# Patient Record
Sex: MALE | Race: WHITE | NOT HISPANIC OR LATINO | Employment: FULL TIME | URBAN - METROPOLITAN AREA
[De-identification: names, ages, dates, MRNs, and addresses within clinical notes are randomized per-mention and may not be internally consistent; named-entity substitution may affect disease eponyms.]

---

## 2023-08-09 LAB
ALANINE AMINOTRANSFERASE (SGPT) (U/L) IN SER/PLAS: 47 U/L (ref 10–52)
ALBUMIN (G/DL) IN SER/PLAS: 4.4 G/DL (ref 3.4–5)
ALKALINE PHOSPHATASE (U/L) IN SER/PLAS: 107 U/L (ref 33–136)
ANION GAP IN SER/PLAS: 11 MMOL/L (ref 10–20)
ASPARTATE AMINOTRANSFERASE (SGOT) (U/L) IN SER/PLAS: 24 U/L (ref 9–39)
BASOPHILS (10*3/UL) IN BLOOD BY AUTOMATED COUNT: 0.05 X10E9/L (ref 0–0.1)
BASOPHILS/100 LEUKOCYTES IN BLOOD BY AUTOMATED COUNT: 0.5 % (ref 0–2)
BILIRUBIN TOTAL (MG/DL) IN SER/PLAS: 0.8 MG/DL (ref 0–1.2)
CALCIDIOL (25 OH VITAMIN D3) (NG/ML) IN SER/PLAS: 50 NG/ML
CALCIUM (MG/DL) IN SER/PLAS: 9.2 MG/DL (ref 8.6–10.3)
CARBON DIOXIDE, TOTAL (MMOL/L) IN SER/PLAS: 29 MMOL/L (ref 21–32)
CHLORIDE (MMOL/L) IN SER/PLAS: 106 MMOL/L (ref 98–107)
CHOLESTEROL (MG/DL) IN SER/PLAS: 110 MG/DL (ref 0–199)
CHOLESTEROL IN HDL (MG/DL) IN SER/PLAS: 36.4 MG/DL
CHOLESTEROL IN LDL (MG/DL) IN SER/PLAS BY DIRECT ASSAY: 71 MG/DL (ref 0–129)
CHOLESTEROL/HDL RATIO: 3
CREATININE (MG/DL) IN SER/PLAS: 0.91 MG/DL (ref 0.5–1.3)
EOSINOPHILS (10*3/UL) IN BLOOD BY AUTOMATED COUNT: 0.46 X10E9/L (ref 0–0.7)
EOSINOPHILS/100 LEUKOCYTES IN BLOOD BY AUTOMATED COUNT: 4.4 % (ref 0–6)
ERYTHROCYTE DISTRIBUTION WIDTH (RATIO) BY AUTOMATED COUNT: 13.6 % (ref 11.5–14.5)
ERYTHROCYTE MEAN CORPUSCULAR HEMOGLOBIN CONCENTRATION (G/DL) BY AUTOMATED: 33 G/DL (ref 32–36)
ERYTHROCYTE MEAN CORPUSCULAR VOLUME (FL) BY AUTOMATED COUNT: 85 FL (ref 80–100)
ERYTHROCYTES (10*6/UL) IN BLOOD BY AUTOMATED COUNT: 5.38 X10E12/L (ref 4.5–5.9)
ESTIMATED AVERAGE GLUCOSE FOR HBA1C: 143 MG/DL
GFR MALE: >90 ML/MIN/1.73M2
GLUCOSE (MG/DL) IN SER/PLAS: 123 MG/DL (ref 74–99)
HEMATOCRIT (%) IN BLOOD BY AUTOMATED COUNT: 45.7 % (ref 41–52)
HEMOGLOBIN (G/DL) IN BLOOD: 15.1 G/DL (ref 13.5–17.5)
HEMOGLOBIN A1C/HEMOGLOBIN TOTAL IN BLOOD: 6.6 %
IMMATURE GRANULOCYTES/100 LEUKOCYTES IN BLOOD BY AUTOMATED COUNT: 0.3 % (ref 0–0.9)
LDL: 55 MG/DL (ref 0–99)
LEUKOCYTES (10*3/UL) IN BLOOD BY AUTOMATED COUNT: 10.4 X10E9/L (ref 4.4–11.3)
LYMPHOCYTES (10*3/UL) IN BLOOD BY AUTOMATED COUNT: 5.64 X10E9/L (ref 1.2–4.8)
LYMPHOCYTES/100 LEUKOCYTES IN BLOOD BY AUTOMATED COUNT: 54.1 % (ref 13–44)
MONOCYTES (10*3/UL) IN BLOOD BY AUTOMATED COUNT: 0.73 X10E9/L (ref 0.1–1)
MONOCYTES/100 LEUKOCYTES IN BLOOD BY AUTOMATED COUNT: 7 % (ref 2–10)
NEUTROPHILS (10*3/UL) IN BLOOD BY AUTOMATED COUNT: 3.52 X10E9/L (ref 1.2–7.7)
NEUTROPHILS/100 LEUKOCYTES IN BLOOD BY AUTOMATED COUNT: 33.7 % (ref 40–80)
PLATELETS (10*3/UL) IN BLOOD AUTOMATED COUNT: 107 X10E9/L (ref 150–450)
POTASSIUM (MMOL/L) IN SER/PLAS: 4.2 MMOL/L (ref 3.5–5.3)
PROTEIN TOTAL: 6.5 G/DL (ref 6.4–8.2)
RBC MORPHOLOGY IN BLOOD: NORMAL
SODIUM (MMOL/L) IN SER/PLAS: 142 MMOL/L (ref 136–145)
TRIGLYCERIDE (MG/DL) IN SER/PLAS: 91 MG/DL (ref 0–149)
UREA NITROGEN (MG/DL) IN SER/PLAS: 14 MG/DL (ref 6–23)
VLDL: 18 MG/DL (ref 0–40)

## 2023-08-16 PROBLEM — C91.10 CLL (CHRONIC LYMPHOCYTIC LEUKEMIA) (MULTI): Status: ACTIVE | Noted: 2023-08-16

## 2023-08-16 PROBLEM — B00.9 HERPES SIMPLEX: Status: RESOLVED | Noted: 2023-08-16 | Resolved: 2023-08-16

## 2023-08-16 PROBLEM — H90.3 SENSORINEURAL HEARING LOSS (SNHL) OF BOTH EARS: Status: ACTIVE | Noted: 2023-08-16

## 2023-08-16 PROBLEM — G47.33 OSA ON CPAP: Status: ACTIVE | Noted: 2023-08-16

## 2023-08-16 PROBLEM — E55.9 VITAMIN D DEFICIENCY: Status: ACTIVE | Noted: 2023-08-16

## 2023-08-16 PROBLEM — D69.6 THROMBOCYTOPENIA (CMS-HCC): Status: ACTIVE | Noted: 2023-08-16

## 2023-08-16 PROBLEM — M62.08 DIASTASIS RECTI: Status: ACTIVE | Noted: 2023-08-16

## 2023-08-16 PROBLEM — I25.10 CAD (CORONARY ARTERY DISEASE): Status: ACTIVE | Noted: 2023-08-16

## 2023-08-16 PROBLEM — E78.2 MIXED HYPERLIPIDEMIA: Status: ACTIVE | Noted: 2023-08-16

## 2023-08-16 PROBLEM — R73.01 IMPAIRED FASTING GLUCOSE: Status: ACTIVE | Noted: 2023-08-16

## 2023-08-16 PROBLEM — H93.13 TINNITUS OF BOTH EARS: Status: ACTIVE | Noted: 2023-08-16

## 2023-08-16 PROBLEM — I10 ESSENTIAL HYPERTENSION: Status: ACTIVE | Noted: 2023-08-16

## 2023-08-16 PROBLEM — Z95.1 S/P CABG X 3: Status: ACTIVE | Noted: 2023-08-16

## 2023-08-16 PROBLEM — I48.0 PAROXYSMAL ATRIAL FIBRILLATION (MULTI): Status: ACTIVE | Noted: 2023-08-16

## 2023-08-16 PROBLEM — K42.9 UMBILICAL HERNIA WITHOUT OBSTRUCTION AND WITHOUT GANGRENE: Status: ACTIVE | Noted: 2023-08-16

## 2023-08-16 PROBLEM — H04.123 DRY EYES, BILATERAL: Status: ACTIVE | Noted: 2023-08-16

## 2023-08-16 RX ORDER — ERGOCALCIFEROL 1.25 MG/1
1 CAPSULE ORAL
COMMUNITY
Start: 2019-12-13 | End: 2024-02-19 | Stop reason: ALTCHOICE

## 2023-08-16 RX ORDER — METOPROLOL TARTRATE 25 MG/1
1 TABLET, FILM COATED ORAL 2 TIMES DAILY
COMMUNITY
Start: 2019-11-25 | End: 2023-11-25

## 2023-08-16 RX ORDER — MULTIVITAMIN
1 TABLET ORAL DAILY
Status: ON HOLD | COMMUNITY
Start: 2019-12-09

## 2023-08-16 RX ORDER — ATORVASTATIN CALCIUM 20 MG/1
1 TABLET, FILM COATED ORAL NIGHTLY
COMMUNITY
Start: 2019-10-24 | End: 2023-12-11

## 2023-08-16 RX ORDER — PANTOPRAZOLE SODIUM 40 MG/1
1 TABLET, DELAYED RELEASE ORAL DAILY
COMMUNITY
Start: 2019-11-25 | End: 2024-03-18 | Stop reason: SDUPTHER

## 2023-08-16 RX ORDER — AMLODIPINE BESYLATE 5 MG/1
1 TABLET ORAL DAILY
COMMUNITY
Start: 2020-04-30 | End: 2023-12-11

## 2023-08-16 RX ORDER — NITROGLYCERIN 0.4 MG/1
0.4 TABLET SUBLINGUAL EVERY 5 MIN PRN
Status: ON HOLD | COMMUNITY
Start: 2019-10-24

## 2023-08-16 RX ORDER — ASPIRIN 81 MG/1
81 TABLET ORAL DAILY
Status: ON HOLD | COMMUNITY
Start: 2019-12-09

## 2023-08-20 PROBLEM — E11.9 CONTROLLED TYPE 2 DIABETES MELLITUS WITHOUT COMPLICATION, WITHOUT LONG-TERM CURRENT USE OF INSULIN (MULTI): Status: ACTIVE | Noted: 2023-08-16

## 2023-08-20 PROBLEM — Z00.00 ANNUAL PHYSICAL EXAM: Status: ACTIVE | Noted: 2023-08-20

## 2023-08-20 NOTE — PATIENT INSTRUCTIONS
Continue current medication.  Continue work on diet - recommend lots of fruits and vegetables, lean protein like chicken, turkey, fish, beans and Greek yogurt. Try to choose healthier carbohydrate options like oatmeal, wheat bread and pasta, sweet potatoes. Limit sugary treats.  Check a fasting sugar first thing in the AM twice weekly and keep a log of the results to bring to your next office visit.  Please contact office if your sugars are consistently >140.  Reevaluate in 4 months    Recommend healthy diet based around fruits, vegetables, and lean proteins such as chicken, turkey, fish, and beans.  Also include moderate portions of healthy carbohydrates such as wheat bread and pasta, sweet potatoes. Limit sweets and alcoholic beverages. Try not drink more than 100 calories in beverages daily.   It is important to get a protein-rich breakfast daily such as oatmeal, eggs or Greek yogurt.  Increase activity as able to a recommended goal of at least 30 minutes of cardiovascular exercise (walking, swimming, biking, jogging etc.) at least 5 days weekly and a goal of 45 minutes or more most days of the week for weight loss. This exercise can be done all at one time or broken up into 2 or more sessions throughout the day.  .

## 2023-08-20 NOTE — ASSESSMENT & PLAN NOTE
Yearly physical done.  PSA ordered  Hep C ordered  Prevnar 20 given - Risks, benefits and side effects reviewed with patient.   Shingrix, Boostrix recommended   colonoscopy 9/14  nonsmoker

## 2023-08-20 NOTE — ASSESSMENT & PLAN NOTE
New diagnosis reviewed with patient  Check sugars in AM 2x weekly - call if consistently >140  Work on diet reviewed with patient.   Reevaluate in 4 months.

## 2023-08-20 NOTE — PROGRESS NOTES
Subjective :  Chief Complaint: Demar Pittman is an 63 y.o. male here for an annual physical.    Earlier today had about 1 1/2 hour episode substernal chest pain associated with some mild diaphoresis. No shortness of breath, pain, nausea. Was at working working at desk. Resolved spontaneously - no current symptoms.    Patient otherwise feels well. No other complaints or concerns.    I have reviewed and reconciled the medication list with the patient today.    Current Outpatient Medications:     amLODIPine (Norvasc) 5 mg tablet, Take 1 tablet (5 mg) by mouth once daily., Disp: , Rfl:     aspirin 81 mg EC tablet, Take 1 tablet (81 mg) by mouth once daily., Disp: , Rfl:     atorvastatin (Lipitor) 20 mg tablet, Take 1 tablet (20 mg) by mouth once daily at bedtime., Disp: , Rfl:     ergocalciferol (Vitamin D-2) 1.25 MG (47444 UT) capsule, Take 1 capsule (1,250 mcg) by mouth every 14 (fourteen) days., Disp: , Rfl:     metoprolol tartrate (Lopressor) 25 mg tablet, Take 1 tablet (25 mg) by mouth 2 times a day., Disp: , Rfl:     multivitamin with folic acid (One Daily Multivitamin) 400 mcg tablet, Take 1 tablet by mouth once daily., Disp: , Rfl:     nitroglycerin (Nitrostat) 0.4 mg SL tablet, Place 1 tablet (0.4 mg) under the tongue every 5 minutes if needed., Disp: , Rfl:     pantoprazole (ProtoNix) 40 mg EC tablet, Take 1 tablet (40 mg) by mouth once daily., Disp: , Rfl:     The patient's relevant past medical, surgical, family and social history was reviewed in [facility].  All pertinent lab work and results for this visit were reviewed with patient.    Recent Results (from the past 1008 hour(s))   Hemoglobin A1C    Collection Time: 08/09/23  9:33 AM   Result Value Ref Range    Hemoglobin A1C 6.6 (A) %    Estimated Average Glucose 143 MG/DL   Lipid Panel    Collection Time: 08/09/23  9:33 AM   Result Value Ref Range    Cholesterol 110 0 - 199 mg/dL    HDL 36.4 (A) mg/dL    Cholesterol/HDL Ratio 3.0     LDL 55 0 - 99 mg/dL     VLDL 18 0 - 40 mg/dL    Triglycerides 91 0 - 149 mg/dL   Comprehensive Metabolic Panel    Collection Time: 08/09/23  9:33 AM   Result Value Ref Range    Glucose 123 (H) 74 - 99 mg/dL    Sodium 142 136 - 145 mmol/L    Potassium 4.2 3.5 - 5.3 mmol/L    Chloride 106 98 - 107 mmol/L    Bicarbonate 29 21 - 32 mmol/L    Anion Gap 11 10 - 20 mmol/L    Urea Nitrogen 14 6 - 23 mg/dL    Creatinine 0.91 0.50 - 1.30 mg/dL    GFR MALE >90 >90 mL/min/1.73m2    Calcium 9.2 8.6 - 10.3 mg/dL    Albumin 4.4 3.4 - 5.0 g/dL    Alkaline Phosphatase 107 33 - 136 U/L    Total Protein 6.5 6.4 - 8.2 g/dL    AST 24 9 - 39 U/L    Total Bilirubin 0.8 0.0 - 1.2 mg/dL    ALT (SGPT) 47 10 - 52 U/L   CBC and Auto Differential    Collection Time: 08/09/23  9:33 AM   Result Value Ref Range    WBC 10.4 4.4 - 11.3 x10E9/L    RBC 5.38 4.50 - 5.90 x10E12/L    Hemoglobin 15.1 13.5 - 17.5 g/dL    Hematocrit 45.7 41.0 - 52.0 %    MCV 85 80 - 100 fL    MCHC 33.0 32.0 - 36.0 g/dL    Platelets 107 (L) 150 - 450 x10E9/L    RDW 13.6 11.5 - 14.5 %    Neutrophils % 33.7 40.0 - 80.0 %    Immature Granulocytes %, Automated 0.3 0.0 - 0.9 %    Lymphocytes % 54.1 13.0 - 44.0 %    Monocytes % 7.0 2.0 - 10.0 %    Eosinophils % 4.4 0.0 - 6.0 %    Basophils % 0.5 0.0 - 2.0 %    Neutrophils Absolute 3.52 1.20 - 7.70 x10E9/L    Lymphocytes Absolute 5.64 (H) 1.20 - 4.80 x10E9/L    Monocytes Absolute 0.73 0.10 - 1.00 x10E9/L    Eosinophils Absolute 0.46 0.00 - 0.70 x10E9/L    Basophils Absolute 0.05 0.00 - 0.10 x10E9/L   Vitamin D, Total    Collection Time: 08/09/23  9:33 AM   Result Value Ref Range    Vitamin D, 25-Hydroxy 50 ng/mL   Cholesterol, LDL Direct    Collection Time: 08/09/23  9:33 AM   Result Value Ref Range    LDL Direct 71 0 - 129 mg/dL   Morphology    Collection Time: 08/09/23  9:33 AM   Result Value Ref Range    RBC Morphology SEE COMMENT    CBC and Auto Differential    Collection Time: 08/17/23  3:00 PM   Result Value Ref Range    WBC CANCELED     nRBC  "CANCELED     RBC CANCELED     Hemoglobin CANCELED     Hematocrit CANCELED     MCV CANCELED     MCHC CANCELED     Platelets CANCELED     RDW CANCELED     Neutrophils % CANCELED     Immature Granulocytes %, Automated CANCELED     Lymphocytes % CANCELED     Monocytes % CANCELED     Eosinophils % CANCELED     Basophils % CANCELED     Neutrophils Absolute CANCELED     Lymphocytes Absolute CANCELED     Monocytes Absolute CANCELED     Eosinophils Absolute CANCELED     Basophils Absolute CANCELED     MANUAL DIFFERENTIAL Y/N CANCELED          Review of Systems   A complete review of systems was performed and all systems were normal except what is noted in the HPI.      List of current healthcare providers:  Patient Care Team:  Deedee Mcclendon MD as PCP - General  Deedee Mcclendon MD as PCP - [facility] PCP  Donny Fox MD as Consulting Physician (Cardiology)  Mandy Wang MD as Consulting Physician (Hematology and Oncology)  Isabela Pineda MD as Surgeon (General Surgery)  Henri Mccormick MD as Surgeon (Otolaryngology)        Over the past 2 weeks, how often have you been bothered by any of the following problems?  Little interest or pleasure in doing things: Not at all  Feeling down, depressed, or hopeless: Not at all  Patient Health Questionnaire-2 Score: 0             Advance Care Planning:    Living Will: No  POA: No    Objective :  /84   Pulse 76   Temp 36.3 °C (97.4 °F)   Ht 1.702 m (5' 7\")   Wt 93.7 kg (206 lb 9.6 oz)   SpO2 95%   BMI 32.36 kg/m²    No results found.  Physical Exam  Constitutional:       Appearance: Normal appearance. He is obese.   HENT:      Head: Normocephalic and atraumatic.   Neck:      Vascular: No carotid bruit.   Cardiovascular:      Rate and Rhythm: Normal rate and regular rhythm.      Heart sounds: Normal heart sounds.   Pulmonary:      Effort: Pulmonary effort is normal.      Breath sounds: Normal breath sounds. No wheezing, rhonchi or rales.   Abdominal:      " General: Abdomen is flat. Bowel sounds are normal.      Palpations: Abdomen is soft.      Tenderness: There is no abdominal tenderness. There is no guarding.   Musculoskeletal:         General: Normal range of motion.      Right lower leg: No edema.      Left lower leg: No edema.   Skin:     General: Skin is dry.   Neurological:      General: No focal deficit present.      Mental Status: He is alert and oriented to person, place, and time.   Psychiatric:         Mood and Affect: Mood normal.         Behavior: Behavior normal.         Thought Content: Thought content normal.         Assessment/Plan :  Problem List Items Addressed This Visit       CLL (chronic lymphocytic leukemia) (CMS/HCC)     Recently saw oncology - off medication at this time  Has follow up 4 months         Essential hypertension     Well controlled continue current medication. Reevaluate in 4 months.            Relevant Orders    Comprehensive Metabolic Panel    Controlled type 2 diabetes mellitus without complication, without long-term current use of insulin (CMS/HCC)     New diagnosis reviewed with patient  Check sugars in AM 2x weekly - call if consistently >140  Work on diet reviewed with patient.   Reevaluate in 4 months.           Relevant Orders    Comprehensive Metabolic Panel    Albumin , Urine Random    Hemoglobin A1C    Vitamin B12    Mixed hyperlipidemia     Well controlled continue current medication. Reevaluate in 4 months.            Relevant Orders    Cholesterol, LDL Direct    Lipid Panel    XENA on CPAP     Patient is compliant with and benefiting from CPAP - will continue use.           Paroxysmal atrial fibrillation (CMS/HCC)     Rate and rhythm controlled.  Saw cardiology 6/23 - no changes has 1 year follow up          Relevant Medications    amLODIPine (Norvasc) 5 mg tablet    metoprolol tartrate (Lopressor) 25 mg tablet    nitroglycerin (Nitrostat) 0.4 mg SL tablet    Thrombocytopenia (CMS/HCC)     Stable  Continue to  monitor - recheck 4 months         Relevant Medications    aspirin 81 mg EC tablet    Other Relevant Orders    CBC and Auto Differential    Vitamin D deficiency     Well controlled continue current medication. Reevaluate in 4 months.            Relevant Orders    Vitamin D 1,25 Dihydroxy    Annual physical exam - Primary     Yearly physical done.  PSA ordered  Hep C ordered  Prevnar 20 given - Risks, benefits and side effects reviewed with patient.   Shingrix, Boostrix recommended   colonoscopy 9/14  nonsmoker         Chest pressure     New ST elevation lead III  Discussed with Dr. Fox   ASA 81 mg x 3 given to patient at 1535  Will go directly to ER for further evaluation         Relevant Orders    ECG 12 lead (Completed)     Other Visit Diagnoses       Encounter for hepatitis C screening test for low risk patient        Relevant Orders    Hepatitis C Antibody    Screening PSA (prostate specific antigen)        Relevant Orders    Prostate Specific Antigen               The following health maintenance schedule was reviewed with the patient and provided in printed form in the after visit summary:  Health Maintenance   Topic Date Due    Yearly Adult Physical  Never done    Medicare Initial Physical (IPPE)  Never done    MMR Vaccines (1 of 1 - Standard series) Never done    Diabetes: Foot Exam  Never done    Diabetes: Retinopathy Screening  Never done    Hepatitis C Screening  Never done    Zoster Vaccines (1 of 2) Never done    DTaP/Tdap/Td Vaccines (1 - Tdap) Never done    COVID-19 Vaccine (2 - Pfizer risk series) 03/12/2022    Abdominal Aortic Aneurysm (AAA) Screening  Never done    Diabetes: Urine Protein Screening  07/30/2023    Influenza Vaccine (1) 09/01/2023    Diabetes: Hemoglobin A1C  11/09/2023    Lipid Panel  08/09/2024    Colorectal Cancer Screening  09/12/2024    Pneumococcal Vaccine: 65+ Years  Completed    HIB Vaccines  Aged Out    Hepatitis B Vaccines  Aged Out    IPV Vaccines  Aged Out    Hepatitis  A Vaccines  Aged Out    Meningococcal Vaccine  Aged Out    Rotavirus Vaccines  Aged Out    HPV Vaccines  Aged Out    Irritable Bowel Syndrome  Discontinued           Patient understands and agrees with treatment plan.          Deedee Mcclendon MD

## 2023-08-21 ENCOUNTER — OFFICE VISIT (OUTPATIENT)
Dept: PRIMARY CARE | Facility: CLINIC | Age: 63
End: 2023-08-21
Payer: COMMERCIAL

## 2023-08-21 VITALS
HEART RATE: 76 BPM | WEIGHT: 206.6 LBS | HEIGHT: 67 IN | SYSTOLIC BLOOD PRESSURE: 126 MMHG | TEMPERATURE: 97.4 F | DIASTOLIC BLOOD PRESSURE: 84 MMHG | OXYGEN SATURATION: 95 % | BODY MASS INDEX: 32.43 KG/M2

## 2023-08-21 DIAGNOSIS — E55.9 VITAMIN D DEFICIENCY: ICD-10-CM

## 2023-08-21 DIAGNOSIS — D69.6 THROMBOCYTOPENIA (CMS-HCC): ICD-10-CM

## 2023-08-21 DIAGNOSIS — Z00.00 ANNUAL PHYSICAL EXAM: Primary | ICD-10-CM

## 2023-08-21 DIAGNOSIS — Z11.59 ENCOUNTER FOR HEPATITIS C SCREENING TEST FOR LOW RISK PATIENT: ICD-10-CM

## 2023-08-21 DIAGNOSIS — E11.9 CONTROLLED TYPE 2 DIABETES MELLITUS WITHOUT COMPLICATION, WITHOUT LONG-TERM CURRENT USE OF INSULIN (MULTI): ICD-10-CM

## 2023-08-21 DIAGNOSIS — I48.0 PAROXYSMAL ATRIAL FIBRILLATION (MULTI): ICD-10-CM

## 2023-08-21 DIAGNOSIS — C91.10 CLL (CHRONIC LYMPHOCYTIC LEUKEMIA) (MULTI): ICD-10-CM

## 2023-08-21 DIAGNOSIS — G47.33 OSA ON CPAP: ICD-10-CM

## 2023-08-21 DIAGNOSIS — E78.2 MIXED HYPERLIPIDEMIA: ICD-10-CM

## 2023-08-21 DIAGNOSIS — I10 ESSENTIAL HYPERTENSION: ICD-10-CM

## 2023-08-21 DIAGNOSIS — Z12.5 SCREENING PSA (PROSTATE SPECIFIC ANTIGEN): ICD-10-CM

## 2023-08-21 DIAGNOSIS — R07.89 CHEST PRESSURE: ICD-10-CM

## 2023-08-21 PROCEDURE — 1160F RVW MEDS BY RX/DR IN RCRD: CPT | Performed by: FAMILY MEDICINE

## 2023-08-21 PROCEDURE — 3079F DIAST BP 80-89 MM HG: CPT | Performed by: FAMILY MEDICINE

## 2023-08-21 PROCEDURE — 90677 PCV20 VACCINE IM: CPT | Performed by: FAMILY MEDICINE

## 2023-08-21 PROCEDURE — 93000 ELECTROCARDIOGRAM COMPLETE: CPT | Performed by: FAMILY MEDICINE

## 2023-08-21 PROCEDURE — 3044F HG A1C LEVEL LT 7.0%: CPT | Performed by: FAMILY MEDICINE

## 2023-08-21 PROCEDURE — 3074F SYST BP LT 130 MM HG: CPT | Performed by: FAMILY MEDICINE

## 2023-08-21 PROCEDURE — 99397 PER PM REEVAL EST PAT 65+ YR: CPT | Performed by: FAMILY MEDICINE

## 2023-08-21 PROCEDURE — 1159F MED LIST DOCD IN RCRD: CPT | Performed by: FAMILY MEDICINE

## 2023-08-21 PROCEDURE — 99215 OFFICE O/P EST HI 40 MIN: CPT | Performed by: FAMILY MEDICINE

## 2023-08-21 PROCEDURE — 1036F TOBACCO NON-USER: CPT | Performed by: FAMILY MEDICINE

## 2023-08-21 PROCEDURE — 90471 IMMUNIZATION ADMIN: CPT | Performed by: FAMILY MEDICINE

## 2023-08-21 RX ORDER — BLOOD SUGAR DIAGNOSTIC
2 STRIP MISCELLANEOUS 2 TIMES WEEKLY
Qty: 200 STRIP | Refills: 11 | Status: SHIPPED | OUTPATIENT
Start: 2023-08-21 | End: 2024-02-19 | Stop reason: WASHOUT

## 2023-08-21 RX ORDER — LANCETS 28 GAUGE
1 EACH MISCELLANEOUS 2 TIMES WEEKLY
Qty: 100 EACH | Refills: 11 | Status: SHIPPED | OUTPATIENT
Start: 2023-08-21 | End: 2024-02-19 | Stop reason: WASHOUT

## 2023-08-21 RX ORDER — BLOOD SUGAR DIAGNOSTIC
STRIP MISCELLANEOUS 2 TIMES WEEKLY
COMMUNITY
End: 2023-08-21 | Stop reason: SDUPTHER

## 2023-08-21 RX ORDER — INSULIN PUMP SYRINGE, 3 ML
1 EACH MISCELLANEOUS 2 TIMES WEEKLY
Qty: 1 EACH | Refills: 0 | Status: SHIPPED | OUTPATIENT
Start: 2023-08-21 | End: 2024-02-12 | Stop reason: WASHOUT

## 2023-08-21 RX ORDER — INSULIN PUMP SYRINGE, 3 ML
1 EACH MISCELLANEOUS 2 TIMES WEEKLY
COMMUNITY
End: 2023-08-21 | Stop reason: SDUPTHER

## 2023-08-21 RX ORDER — LANCETS 28 GAUGE
1 EACH MISCELLANEOUS 2 TIMES WEEKLY
COMMUNITY
End: 2023-08-21 | Stop reason: SDUPTHER

## 2023-08-21 ASSESSMENT — PATIENT HEALTH QUESTIONNAIRE - PHQ9
SUM OF ALL RESPONSES TO PHQ9 QUESTIONS 1 AND 2: 0
1. LITTLE INTEREST OR PLEASURE IN DOING THINGS: NOT AT ALL
2. FEELING DOWN, DEPRESSED OR HOPELESS: NOT AT ALL

## 2023-08-21 ASSESSMENT — ENCOUNTER SYMPTOMS
DEPRESSION: 0
OCCASIONAL FEELINGS OF UNSTEADINESS: 0
LOSS OF SENSATION IN FEET: 0

## 2023-08-21 NOTE — ASSESSMENT & PLAN NOTE
New ST elevation lead III  Discussed with Dr. Fox   ASA 81 mg x 3 given to patient at 1535  Will go directly to ER for further evaluation

## 2023-08-23 ENCOUNTER — PATIENT OUTREACH (OUTPATIENT)
Dept: CARE COORDINATION | Facility: CLINIC | Age: 63
End: 2023-08-23
Payer: COMMERCIAL

## 2023-08-23 ENCOUNTER — DOCUMENTATION (OUTPATIENT)
Dept: CARE COORDINATION | Facility: CLINIC | Age: 63
End: 2023-08-23
Payer: COMMERCIAL

## 2023-08-23 ENCOUNTER — TELEPHONE (OUTPATIENT)
Dept: PRIMARY CARE | Facility: CLINIC | Age: 63
End: 2023-08-23
Payer: COMMERCIAL

## 2023-08-23 NOTE — PROGRESS NOTES
The number listed for this patient is not correct. I had to check allscripts and call his cell phone. Spoke with patient he said he call Dr. Fox office to try to schedule a appointment because he needed a return back to work form. Patient stated that they told him that the form was waitng at the  so he assumes that they didn't want to see him because they didn't give him an appointment. He stated which he didn't understand that but oh well is what he said.

## 2023-08-23 NOTE — PROGRESS NOTES
Discharge Facility:[facility]  Discharge Diagnosis:Myocardial scar on stress test. Chest pain   Admission Date:08/21/23  Discharge Date: 08/22/23    PCP Appointment Date:08/25/23  Specialist Appointment Date:   Hospital Encounter and Summary: Linked   See discharge assessment below for further details  Engagement  Call Start Time: 0852 (8/23/2023  8:52 AM)    Medications  Medications reviewed with patient/caregiver?: Yes (no new meds) (8/23/2023  8:52 AM)  Is the patient having any side effects they believe may be caused by any medication additions or changes?: No (8/23/2023  8:52 AM)  Does the patient have all medications ordered at discharge?: Not applicable (8/23/2023  8:52 AM)  Is the patient taking all medications as directed (includes completed medication regime)?: Yes (8/23/2023  8:52 AM)    Appointments  Does the patient have a primary care provider?: Yes (8/23/2023  8:52 AM)    Self Management  Has home health visited the patient within 72 hours of discharge?: Not applicable (8/23/2023  8:52 AM)  Has all Durable Medical Equipment (DME) been delivered?: No (8/23/2023  8:52 AM)    Patient Teaching  Does the patient have access to their discharge instructions?: Yes (8/23/2023  8:52 AM)  Care Management Interventions: Reviewed instructions with patient (8/23/2023  8:52 AM)  What is the patient's perception of their health status since discharge?: Improving (8/23/2023  8:52 AM)    Wrap Up  Call End Time: 0900 (8/23/2023  8:52 AM)

## 2023-08-25 ENCOUNTER — LAB (OUTPATIENT)
Dept: LAB | Facility: LAB | Age: 63
End: 2023-08-25
Payer: COMMERCIAL

## 2023-08-25 ENCOUNTER — APPOINTMENT (OUTPATIENT)
Dept: PRIMARY CARE | Facility: CLINIC | Age: 63
End: 2023-08-25
Payer: COMMERCIAL

## 2023-08-25 ENCOUNTER — OFFICE VISIT (OUTPATIENT)
Dept: PRIMARY CARE | Facility: CLINIC | Age: 63
End: 2023-08-25
Payer: COMMERCIAL

## 2023-08-25 VITALS
OXYGEN SATURATION: 94 % | BODY MASS INDEX: 32.21 KG/M2 | HEIGHT: 67 IN | RESPIRATION RATE: 16 BRPM | WEIGHT: 205.2 LBS | DIASTOLIC BLOOD PRESSURE: 76 MMHG | TEMPERATURE: 97.8 F | SYSTOLIC BLOOD PRESSURE: 133 MMHG

## 2023-08-25 DIAGNOSIS — R07.89 CHEST PRESSURE: Primary | ICD-10-CM

## 2023-08-25 DIAGNOSIS — I10 ESSENTIAL HYPERTENSION: ICD-10-CM

## 2023-08-25 DIAGNOSIS — E87.6 HYPOKALEMIA: ICD-10-CM

## 2023-08-25 DIAGNOSIS — I48.0 PAROXYSMAL ATRIAL FIBRILLATION (MULTI): ICD-10-CM

## 2023-08-25 DIAGNOSIS — I25.10 CORONARY ARTERY DISEASE INVOLVING NATIVE HEART WITHOUT ANGINA PECTORIS, UNSPECIFIED VESSEL OR LESION TYPE: ICD-10-CM

## 2023-08-25 LAB
ANION GAP IN SER/PLAS: 10 MMOL/L (ref 10–20)
CALCIUM (MG/DL) IN SER/PLAS: 8.8 MG/DL (ref 8.6–10.3)
CARBON DIOXIDE, TOTAL (MMOL/L) IN SER/PLAS: 29 MMOL/L (ref 21–32)
CHLORIDE (MMOL/L) IN SER/PLAS: 103 MMOL/L (ref 98–107)
CREATININE (MG/DL) IN SER/PLAS: 0.89 MG/DL (ref 0.5–1.3)
GFR MALE: >90 ML/MIN/1.73M2
GLUCOSE (MG/DL) IN SER/PLAS: 130 MG/DL (ref 74–99)
POTASSIUM (MMOL/L) IN SER/PLAS: 4.3 MMOL/L (ref 3.5–5.3)
SODIUM (MMOL/L) IN SER/PLAS: 138 MMOL/L (ref 136–145)
UREA NITROGEN (MG/DL) IN SER/PLAS: 11 MG/DL (ref 6–23)

## 2023-08-25 PROCEDURE — 3044F HG A1C LEVEL LT 7.0%: CPT | Performed by: NURSE PRACTITIONER

## 2023-08-25 PROCEDURE — 3078F DIAST BP <80 MM HG: CPT | Performed by: NURSE PRACTITIONER

## 2023-08-25 PROCEDURE — 1036F TOBACCO NON-USER: CPT | Performed by: NURSE PRACTITIONER

## 2023-08-25 PROCEDURE — 1159F MED LIST DOCD IN RCRD: CPT | Performed by: NURSE PRACTITIONER

## 2023-08-25 PROCEDURE — 1160F RVW MEDS BY RX/DR IN RCRD: CPT | Performed by: NURSE PRACTITIONER

## 2023-08-25 PROCEDURE — 3075F SYST BP GE 130 - 139MM HG: CPT | Performed by: NURSE PRACTITIONER

## 2023-08-25 PROCEDURE — 80048 BASIC METABOLIC PNL TOTAL CA: CPT

## 2023-08-25 PROCEDURE — 99213 OFFICE O/P EST LOW 20 MIN: CPT | Performed by: NURSE PRACTITIONER

## 2023-08-25 PROCEDURE — 36415 COLL VENOUS BLD VENIPUNCTURE: CPT

## 2023-08-25 RX ORDER — POTASSIUM CHLORIDE 750 MG/1
TABLET, EXTENDED RELEASE ORAL
COMMUNITY
Start: 2023-08-23 | End: 2024-03-18 | Stop reason: WASHOUT

## 2023-08-25 ASSESSMENT — ENCOUNTER SYMPTOMS
WHEEZING: 0
COUGH: 0
HEADACHES: 0
RESPIRATORY NEGATIVE: 1
CONSTITUTIONAL NEGATIVE: 1
SHORTNESS OF BREATH: 0
PALPITATIONS: 0
CONFUSION: 0
CHILLS: 0
WEAKNESS: 0
VOMITING: 0
DIZZINESS: 0
ABDOMINAL PAIN: 0
NERVOUS/ANXIOUS: 0
ACTIVITY CHANGE: 0
FEVER: 0
APNEA: 0
NAUSEA: 0

## 2023-08-25 NOTE — PROGRESS NOTES
Subjective   Patient ID: Demar Pittman is a 63 y.o. male who presents for Follow-up (Hospital follow up ).    Hospital Follow-Up  Patient was seen in the office by PCP on 8/21/2023.  Patient had right bundle aurea block noticed on EKG and was sent to the ER for evaluation.  During that appointment his PCP called cardiology for an opinion via phone which cardiology recommended an ED visit.  Patient had stress test and echocardiogram during hospitalization for 24 hours which was normal.  He did have hypokalemia with potassium 3.1 and 3.4.  He has not started on the oral potassium recommended at discharge.  We will recheck the BMP on today.  The patient is okay but his wife seems extremely concerned.  She was hoping to come in today to talk with the PCP for rationale of what was seen on the EKG.  I did attempt to give the patient a thorough explanation of the right bundle branch block and likely causing arrhythmias related to the hypokalemia.  However I did explain to them the patient had a thorough heart work-up with a cardiac stress test and echocardiogram and if they had any further questions in regards to those results they should reach out to his cardiologist.    Patient is a 63-year-old male with past medical history of hypertension, hyperlipidemia, coronary artery disease status post CABG x3 (11/20/2019), CLL/SLL status post chemo in remission, GERD who initially presented to the office with chest pain.  The patient's wife is stating that the patient never had chest pain, however she was not present at the office visit when the patient saw the PCP who initiated an EKG prior to discussing and recommending transport to the ED for evaluation.  Patient chose to drive himself to the ED for eval. He was given asp 81mg during his last visit         Review of Systems   Constitutional: Negative.  Negative for activity change, chills and fever.   Respiratory: Negative.  Negative for apnea, cough, shortness of breath  "and wheezing.    Cardiovascular:  Negative for chest pain and palpitations.   Gastrointestinal:  Negative for abdominal pain, nausea and vomiting.   Neurological:  Negative for dizziness, weakness and headaches.   Psychiatric/Behavioral:  Negative for confusion. The patient is not nervous/anxious.        Objective   /76 (BP Location: Right arm, Patient Position: Sitting, BP Cuff Size: Adult)   Temp 36.6 °C (97.8 °F) (Temporal)   Resp 16   Ht 1.702 m (5' 7\")   Wt 93.1 kg (205 lb 3.2 oz)   SpO2 94%   BMI 32.14 kg/m²     Physical Exam  Vitals reviewed.   Constitutional:       Appearance: Normal appearance.   Cardiovascular:      Rate and Rhythm: Normal rate and regular rhythm.      Pulses: Normal pulses.      Heart sounds: Normal heart sounds.   Pulmonary:      Effort: Pulmonary effort is normal.      Breath sounds: Normal breath sounds.   Neurological:      Mental Status: He is alert and oriented to person, place, and time.   Psychiatric:         Mood and Affect: Mood normal.         Behavior: Behavior normal.         Assessment/Plan   Problem List Items Addressed This Visit       CAD (coronary artery disease)     Prn nitroglycerin   Continue asp, beta blocker   Follow up with labs in 3-4 months          Essential hypertension    Paroxysmal atrial fibrillation (CMS/HCC)     Rate and rhythm normal   Follow up with cariology   Continue beta blocker          Chest pressure - Primary     Echocardiogram and Stress test normal   Has cardiology   Continue statin, beta blocker and asp           Hypokalemia     Bmp today   Will call prior to patient starting supplement from ED          Relevant Orders    Basic metabolic panel         "

## 2023-08-29 ENCOUNTER — TELEPHONE (OUTPATIENT)
Dept: PRIMARY CARE | Facility: CLINIC | Age: 63
End: 2023-08-29
Payer: COMMERCIAL

## 2023-08-29 NOTE — TELEPHONE ENCOUNTER
Patient's wife called in and wanted to verify that her  is supposed to be taking the Potassium supplement. The patient did not get his results back from [facility]. [facility] is gone for the day that is the reason I am sending the question to you. The patient was seen by Elissa on 08/25/2023.

## 2023-09-06 ENCOUNTER — PATIENT OUTREACH (OUTPATIENT)
Dept: CARE COORDINATION | Facility: CLINIC | Age: 63
End: 2023-09-06
Payer: COMMERCIAL

## 2023-09-06 NOTE — PROGRESS NOTES
Unable to reach patient for call back after patient's follow up appointment with PCP.   CLAUDIAM with call back number for patient to call if needed   If no voicemail available call attempts x 2 were made to contact the patient to assist with any questions or concerns patient may have.

## 2023-09-20 ENCOUNTER — PATIENT OUTREACH (OUTPATIENT)
Dept: CARE COORDINATION | Facility: CLINIC | Age: 63
End: 2023-09-20
Payer: COMMERCIAL

## 2023-10-10 ENCOUNTER — TELEPHONE (OUTPATIENT)
Dept: PRIMARY CARE | Facility: CLINIC | Age: 63
End: 2023-10-10

## 2023-10-10 DIAGNOSIS — G47.33 OSA ON CPAP: ICD-10-CM

## 2023-10-10 NOTE — TELEPHONE ENCOUNTER
Mrs. Pittman called in and is asking for a prescription for a new cpap machine to be faxed to [facility] at [phone].

## 2023-11-17 ENCOUNTER — PATIENT OUTREACH (OUTPATIENT)
Dept: CARE COORDINATION | Facility: CLINIC | Age: 63
End: 2023-11-17
Payer: COMMERCIAL

## 2023-11-24 DIAGNOSIS — I25.10 CORONARY ARTERY DISEASE INVOLVING NATIVE HEART WITHOUT ANGINA PECTORIS, UNSPECIFIED VESSEL OR LESION TYPE: ICD-10-CM

## 2023-11-24 DIAGNOSIS — E78.2 MIXED HYPERLIPIDEMIA: Primary | ICD-10-CM

## 2023-11-24 DIAGNOSIS — I10 ESSENTIAL HYPERTENSION: ICD-10-CM

## 2023-11-25 RX ORDER — METOPROLOL TARTRATE 25 MG/1
25 TABLET, FILM COATED ORAL 2 TIMES DAILY
Qty: 180 TABLET | Refills: 3 | Status: SHIPPED | OUTPATIENT
Start: 2023-11-25 | End: 2024-04-11

## 2023-12-09 DIAGNOSIS — E78.2 MIXED HYPERLIPIDEMIA: Primary | ICD-10-CM

## 2023-12-09 DIAGNOSIS — I10 ESSENTIAL HYPERTENSION: ICD-10-CM

## 2023-12-11 RX ORDER — AMLODIPINE BESYLATE 5 MG/1
5 TABLET ORAL DAILY
Qty: 90 TABLET | Refills: 3 | Status: SHIPPED | OUTPATIENT
Start: 2023-12-11 | End: 2024-04-11

## 2023-12-11 RX ORDER — ATORVASTATIN CALCIUM 20 MG/1
20 TABLET, FILM COATED ORAL NIGHTLY
Qty: 90 TABLET | Refills: 3 | Status: SHIPPED | OUTPATIENT
Start: 2023-12-11 | End: 2024-04-11

## 2023-12-15 ENCOUNTER — OFFICE VISIT (OUTPATIENT)
Dept: PRIMARY CARE | Facility: CLINIC | Age: 63
End: 2023-12-15
Payer: COMMERCIAL

## 2023-12-15 VITALS
TEMPERATURE: 98.1 F | OXYGEN SATURATION: 93 % | RESPIRATION RATE: 18 BRPM | BODY MASS INDEX: 30.54 KG/M2 | WEIGHT: 194.6 LBS | DIASTOLIC BLOOD PRESSURE: 85 MMHG | HEIGHT: 67 IN | SYSTOLIC BLOOD PRESSURE: 136 MMHG | HEART RATE: 87 BPM

## 2023-12-15 DIAGNOSIS — J06.9 UPPER RESPIRATORY TRACT INFECTION, UNSPECIFIED TYPE: ICD-10-CM

## 2023-12-15 DIAGNOSIS — R05.1 ACUTE COUGH: ICD-10-CM

## 2023-12-15 DIAGNOSIS — C91.10 CLL (CHRONIC LYMPHOCYTIC LEUKEMIA) (MULTI): Primary | ICD-10-CM

## 2023-12-15 PROCEDURE — 1159F MED LIST DOCD IN RCRD: CPT | Performed by: NURSE PRACTITIONER

## 2023-12-15 PROCEDURE — 87632 RESP VIRUS 6-11 TARGETS: CPT

## 2023-12-15 PROCEDURE — 3075F SYST BP GE 130 - 139MM HG: CPT | Performed by: NURSE PRACTITIONER

## 2023-12-15 PROCEDURE — 3044F HG A1C LEVEL LT 7.0%: CPT | Performed by: NURSE PRACTITIONER

## 2023-12-15 PROCEDURE — 87636 SARSCOV2 & INF A&B AMP PRB: CPT

## 2023-12-15 PROCEDURE — 99214 OFFICE O/P EST MOD 30 MIN: CPT | Performed by: NURSE PRACTITIONER

## 2023-12-15 PROCEDURE — 1036F TOBACCO NON-USER: CPT | Performed by: NURSE PRACTITIONER

## 2023-12-15 PROCEDURE — 3079F DIAST BP 80-89 MM HG: CPT | Performed by: NURSE PRACTITIONER

## 2023-12-15 PROCEDURE — 1160F RVW MEDS BY RX/DR IN RCRD: CPT | Performed by: NURSE PRACTITIONER

## 2023-12-15 RX ORDER — AMOXICILLIN AND CLAVULANATE POTASSIUM 500; 125 MG/1; MG/1
500 TABLET, FILM COATED ORAL 2 TIMES DAILY
Qty: 20 TABLET | Refills: 0 | Status: SHIPPED | OUTPATIENT
Start: 2023-12-15 | End: 2023-12-25

## 2023-12-15 RX ORDER — BENZONATATE 200 MG/1
200 CAPSULE ORAL 3 TIMES DAILY PRN
Qty: 42 CAPSULE | Refills: 0 | Status: SHIPPED | OUTPATIENT
Start: 2023-12-15 | End: 2024-01-14

## 2023-12-15 RX ORDER — PREDNISONE 20 MG/1
TABLET ORAL
Qty: 30 TABLET | Refills: 0 | Status: SHIPPED | OUTPATIENT
Start: 2023-12-15 | End: 2023-12-30

## 2023-12-15 ASSESSMENT — ENCOUNTER SYMPTOMS
SHORTNESS OF BREATH: 0
EYE DISCHARGE: 1
FEVER: 0
FATIGUE: 1
SORE THROAT: 1
DIARRHEA: 0
VOMITING: 0
NAUSEA: 0
CHILLS: 1
COUGH: 1
HEADACHES: 1
CHEST TIGHTNESS: 0
WHEEZING: 0

## 2023-12-15 NOTE — ASSESSMENT & PLAN NOTE
Start Augmentin as directed  Risk/benefits/side effects discussed  Prednisone 20 mg with taper dosing  Tessalon 200 mg 3 times daily as needed for cough  Call for worsening  Notify hematology/oncology due to lymph node swelling  CBC and Monospot test today

## 2023-12-15 NOTE — LETTER
December 15, 2023     Patient: Demar Pittman   YOB: 1960   Date of Visit: 12/15/2023       To Whom It May Concern:    Demar Pittman was seen in my clinic on 12/15/2023 at 3:20 pm. Please excuse Demar for his absence from work on this day to make the appointment. It is advised the patient does not return to work until Tuesday, December 18 2023.    If you have any questions or concerns, please don't hesitate to call.         Sincerely,         ANNELISE Pratt-CNP        CC: No Recipients

## 2023-12-15 NOTE — PROGRESS NOTES
"Subjective   Patient ID: Demar Pittman is a 63 y.o. male who presents for Sore Throat (Past 2 weeks ), Cough (Past 2 weeks ), Headache (Past 2 weeks ), and Generalized Body Aches (Past 2 weeks ).    63-year-old male patient seen today due to sore throat, cough, body aches, swollen lymph nodes and headaches.  Symptoms present for approximately 2 weeks.  Others in the home are sick as well.  No COVID testing has been done so far.  Patient does have past medical history of chronic lymphocytic leukemia.  In remission.  Reports seeing hematology for routine office visit earlier this year, has upcoming appointment in February.  We discussed giving them a phone call and making them aware of his symptoms however we will check CBC today    Sore Throat   Associated symptoms include congestion, coughing and headaches. Pertinent negatives include no diarrhea, ear discharge, ear pain, shortness of breath or vomiting.   Cough  Associated symptoms include chills, headaches, postnasal drip and a sore throat. Pertinent negatives include no ear pain, fever, shortness of breath or wheezing.   Headache   Associated symptoms include coughing, a sore throat and tinnitus. Pertinent negatives include no ear pain, fever, nausea or vomiting.        Review of Systems   Constitutional:  Positive for chills and fatigue. Negative for fever.   HENT:  Positive for congestion, postnasal drip, sore throat and tinnitus. Negative for ear discharge and ear pain.    Eyes:  Positive for discharge.   Respiratory:  Positive for cough. Negative for chest tightness, shortness of breath and wheezing.    Gastrointestinal:  Negative for diarrhea, nausea and vomiting.   Neurological:  Positive for headaches.       Objective   /85   Pulse 87   Temp 36.7 °C (98.1 °F) (Temporal)   Resp 18   Ht 1.702 m (5' 7\")   Wt 88.3 kg (194 lb 9.6 oz)   SpO2 93%   BMI 30.48 kg/m²     Physical Exam  Vitals reviewed.   Constitutional:       Appearance: Normal " appearance.   HENT:      Right Ear: Tympanic membrane normal.      Left Ear: Tympanic membrane normal.      Nose: Congestion present.      Mouth/Throat:      Pharynx: Posterior oropharyngeal erythema present.   Cardiovascular:      Rate and Rhythm: Normal rate and regular rhythm.      Pulses: Normal pulses.      Heart sounds: Normal heart sounds.   Lymphadenopathy:      Cervical: Cervical adenopathy present.   Neurological:      Mental Status: He is alert and oriented to person, place, and time.   Psychiatric:         Mood and Affect: Mood normal.         Behavior: Behavior normal.         Assessment/Plan   Problem List Items Addressed This Visit             ICD-10-CM    CLL (chronic lymphocytic leukemia) (CMS/[facility]) - Primary C91.10     Start Augmentin as directed  Risk/benefits/side effects discussed  Prednisone 20 mg with taper dosing  Tessalon 200 mg 3 times daily as needed for cough  Call for worsening  Notify hematology/oncology due to lymph node swelling  CBC and Monospot test today         Relevant Orders    CBC and Auto Differential    Upper respiratory tract infection J06.9     Start Augmentin as directed  Risk/benefits/side effects discussed  Prednisone 20 mg with taper dosing  Tessalon 200 mg 3 times daily as needed for cough  Call for worsening  Notify hematology/oncology due to lymph node swelling  CBC and Monospot test today         Relevant Medications    amoxicillin-pot clavulanate (Augmentin) 500-125 mg tablet    benzonatate (Tessalon) 200 mg capsule    predniSONE (Deltasone) 20 mg tablet    Other Relevant Orders    Mononucleosis Screen    Respiratory Viral Panel    Sars-CoV-2 and Influenza A/B PCR    Acute cough R05.1     Start Augmentin as directed-treating empirically due to 2 weeks of symptoms.  Risk/benefits/side effects discussed  Prednisone 20 mg with taper dosing  Tessalon 200 mg 3 times daily as needed for cough  Call for worsening  Notify hematology/oncology due to lymph node swelling  CBC  and Monospot test today         Relevant Medications    benzonatate (Tessalon) 200 mg capsule

## 2023-12-15 NOTE — ASSESSMENT & PLAN NOTE
Start Augmentin as directed-treating empirically due to 2 weeks of symptoms.  Risk/benefits/side effects discussed  Prednisone 20 mg with taper dosing  Tessalon 200 mg 3 times daily as needed for cough  Call for worsening  Notify hematology/oncology due to lymph node swelling  CBC and Monospot test today

## 2023-12-16 LAB
FLUAV RNA RESP QL NAA+PROBE: NOT DETECTED
FLUBV RNA RESP QL NAA+PROBE: NOT DETECTED
SARS-COV-2 RNA RESP QL NAA+PROBE: NOT DETECTED

## 2023-12-18 ENCOUNTER — TELEPHONE (OUTPATIENT)
Dept: PRIMARY CARE | Facility: CLINIC | Age: 63
End: 2023-12-18

## 2023-12-18 ENCOUNTER — LAB (OUTPATIENT)
Dept: LAB | Facility: LAB | Age: 63
End: 2023-12-18
Payer: COMMERCIAL

## 2023-12-18 DIAGNOSIS — J06.9 UPPER RESPIRATORY TRACT INFECTION, UNSPECIFIED TYPE: ICD-10-CM

## 2023-12-18 DIAGNOSIS — C91.10 CLL (CHRONIC LYMPHOCYTIC LEUKEMIA) (MULTI): ICD-10-CM

## 2023-12-18 LAB
BASOPHILS # BLD AUTO: 0.13 X10*3/UL (ref 0–0.1)
BASOPHILS NFR BLD AUTO: 0.3 %
BURR CELLS BLD QL SMEAR: NORMAL
EOSINOPHIL # BLD AUTO: 0.01 X10*3/UL (ref 0–0.7)
EOSINOPHIL NFR BLD AUTO: 0 %
ERYTHROCYTE [DISTWIDTH] IN BLOOD BY AUTOMATED COUNT: 14.3 % (ref 11.5–14.5)
HCT VFR BLD AUTO: 42.3 % (ref 41–52)
HETEROPH AB SERPLBLD QL IA.RAPID: NEGATIVE
HGB BLD-MCNC: 13.6 G/DL (ref 13.5–17.5)
IMM GRANULOCYTES # BLD AUTO: 0.21 X10*3/UL (ref 0–0.7)
IMM GRANULOCYTES NFR BLD AUTO: 0.5 % (ref 0–0.9)
LYMPHOCYTES # BLD AUTO: 32.09 X10*3/UL (ref 1.2–4.8)
LYMPHOCYTES NFR BLD AUTO: 73.7 %
MCH RBC QN AUTO: 27.5 PG (ref 26–34)
MCHC RBC AUTO-ENTMCNC: 32.2 G/DL (ref 32–36)
MCV RBC AUTO: 86 FL (ref 80–100)
MONOCYTES # BLD AUTO: 1.5 X10*3/UL (ref 0.1–1)
MONOCYTES NFR BLD AUTO: 3.4 %
NEUTROPHILS # BLD AUTO: 9.62 X10*3/UL (ref 1.2–7.7)
NEUTROPHILS NFR BLD AUTO: 22.1 %
NRBC BLD-RTO: 0 /100 WBCS (ref 0–0)
PLATELET # BLD AUTO: 223 X10*3/UL (ref 150–450)
RBC # BLD AUTO: 4.94 X10*6/UL (ref 4.5–5.9)
RBC MORPH BLD: NORMAL
WBC # BLD AUTO: 43.6 X10*3/UL (ref 4.4–11.3)

## 2023-12-18 PROCEDURE — 86308 HETEROPHILE ANTIBODY SCREEN: CPT

## 2023-12-18 PROCEDURE — 85025 COMPLETE CBC W/AUTO DIFF WBC: CPT

## 2023-12-18 PROCEDURE — 36415 COLL VENOUS BLD VENIPUNCTURE: CPT

## 2023-12-18 NOTE — TELEPHONE ENCOUNTER
Patient called in and stated he was seen on 12/15/2023 for respiratory infection and he was tested for FLU and COVID and he just did blood work this morning for his labs I did see the results for the FLU and COVID and that was given but he would like the results of the labs when they come in.

## 2023-12-18 NOTE — TELEPHONE ENCOUNTER
----- Message from HELDER Pratt sent at 12/18/2023  3:10 PM EST -----  Please notify hematology with elevated wbc and lymphocytes   Hx of CLL   Notify patient that hematology/oncology will contact him for poc changes

## 2023-12-21 LAB
ADENOVIRUS RVP, VIRC: NOT DETECTED
ENTEROVIRUS/RHINOVIRUS RVP, VIRC: POSITIVE
HUMAN BOCAVIRUS RVP, VIRC: NOT DETECTED
HUMAN CORONAVIRUS RVP, VIRC: NOT DETECTED
INFLUENZA A , VIRC: NOT DETECTED
INFLUENZA A H1N1-09 , VIRC: NOT DETECTED
INFLUENZA B PCR, VIRC: NOT DETECTED
METAPNEUMOVIRUS , VIRC: NOT DETECTED
PARAINFLUENZA PCR, VIRC: NOT DETECTED
RSV PCR, RVP, VIRC: NOT DETECTED

## 2023-12-29 ENCOUNTER — OFFICE VISIT (OUTPATIENT)
Dept: HEMATOLOGY/ONCOLOGY | Facility: CLINIC | Age: 63
End: 2023-12-29
Payer: COMMERCIAL

## 2023-12-29 VITALS
WEIGHT: 193.12 LBS | RESPIRATION RATE: 16 BRPM | OXYGEN SATURATION: 93 % | HEART RATE: 77 BPM | HEIGHT: 67 IN | TEMPERATURE: 98.1 F | DIASTOLIC BLOOD PRESSURE: 86 MMHG | SYSTOLIC BLOOD PRESSURE: 140 MMHG | BODY MASS INDEX: 30.31 KG/M2

## 2023-12-29 DIAGNOSIS — C91.10 CLL (CHRONIC LYMPHOCYTIC LEUKEMIA) (MULTI): Primary | ICD-10-CM

## 2023-12-29 LAB
BASOPHILS # BLD MANUAL: 0 X10*3/UL (ref 0–0.1)
BASOPHILS NFR BLD MANUAL: 0 %
EOSINOPHIL # BLD MANUAL: 0 X10*3/UL (ref 0–0.7)
EOSINOPHIL NFR BLD MANUAL: 0 %
ERYTHROCYTE [DISTWIDTH] IN BLOOD BY AUTOMATED COUNT: 15.1 % (ref 11.5–14.5)
HCT VFR BLD AUTO: 46.8 % (ref 41–52)
HGB BLD-MCNC: 15.3 G/DL (ref 13.5–17.5)
IMM GRANULOCYTES # BLD AUTO: 0.32 X10*3/UL (ref 0–0.7)
IMM GRANULOCYTES NFR BLD AUTO: 0.5 % (ref 0–0.9)
LYMPHOCYTES # BLD MANUAL: 54.2 X10*3/UL (ref 1.2–4.8)
LYMPHOCYTES NFR BLD MANUAL: 83 %
MCH RBC QN AUTO: 27.8 PG (ref 26–34)
MCHC RBC AUTO-ENTMCNC: 32.7 G/DL (ref 32–36)
MCV RBC AUTO: 85 FL (ref 80–100)
MONOCYTES # BLD MANUAL: 0 X10*3/UL (ref 0.1–1)
MONOCYTES NFR BLD MANUAL: 0 %
NEUTS SEG # BLD MANUAL: 11.1 X10*3/UL (ref 1.2–7)
NEUTS SEG NFR BLD MANUAL: 17 %
NRBC BLD-RTO: ABNORMAL /100{WBCS}
PLATELET # BLD AUTO: 166 X10*3/UL (ref 150–450)
RBC # BLD AUTO: 5.5 X10*6/UL (ref 4.5–5.9)
RBC MORPH BLD: ABNORMAL
TOTAL CELLS COUNTED BLD: 100
WBC # BLD AUTO: 65.3 X10*3/UL (ref 4.4–11.3)

## 2023-12-29 PROCEDURE — 99215 OFFICE O/P EST HI 40 MIN: CPT | Performed by: INTERNAL MEDICINE

## 2023-12-29 PROCEDURE — 85027 COMPLETE CBC AUTOMATED: CPT | Performed by: INTERNAL MEDICINE

## 2023-12-29 PROCEDURE — 1036F TOBACCO NON-USER: CPT | Performed by: INTERNAL MEDICINE

## 2023-12-29 PROCEDURE — 85007 BL SMEAR W/DIFF WBC COUNT: CPT | Performed by: INTERNAL MEDICINE

## 2023-12-29 PROCEDURE — 1160F RVW MEDS BY RX/DR IN RCRD: CPT | Performed by: INTERNAL MEDICINE

## 2023-12-29 PROCEDURE — 3079F DIAST BP 80-89 MM HG: CPT | Performed by: INTERNAL MEDICINE

## 2023-12-29 PROCEDURE — 36415 COLL VENOUS BLD VENIPUNCTURE: CPT | Performed by: INTERNAL MEDICINE

## 2023-12-29 PROCEDURE — 3077F SYST BP >= 140 MM HG: CPT | Performed by: INTERNAL MEDICINE

## 2023-12-29 PROCEDURE — 1126F AMNT PAIN NOTED NONE PRSNT: CPT | Performed by: INTERNAL MEDICINE

## 2023-12-29 PROCEDURE — 1159F MED LIST DOCD IN RCRD: CPT | Performed by: INTERNAL MEDICINE

## 2023-12-29 PROCEDURE — 3044F HG A1C LEVEL LT 7.0%: CPT | Performed by: INTERNAL MEDICINE

## 2023-12-29 RX ORDER — DIPHENHYDRAMINE HYDROCHLORIDE 50 MG/ML
50 INJECTION INTRAMUSCULAR; INTRAVENOUS AS NEEDED
Status: CANCELLED | OUTPATIENT
Start: 2024-04-09

## 2023-12-29 RX ORDER — DIPHENHYDRAMINE HYDROCHLORIDE 50 MG/ML
50 INJECTION INTRAMUSCULAR; INTRAVENOUS AS NEEDED
Status: CANCELLED | OUTPATIENT
Start: 2024-02-13

## 2023-12-29 RX ORDER — DIPHENHYDRAMINE HYDROCHLORIDE 50 MG/ML
50 INJECTION INTRAMUSCULAR; INTRAVENOUS AS NEEDED
Status: CANCELLED | OUTPATIENT
Start: 2024-06-05

## 2023-12-29 RX ORDER — ALBUTEROL SULFATE 0.83 MG/ML
3 SOLUTION RESPIRATORY (INHALATION) AS NEEDED
Status: CANCELLED | OUTPATIENT
Start: 2024-02-14

## 2023-12-29 RX ORDER — ACETAMINOPHEN 325 MG/1
650 TABLET ORAL ONCE
Status: CANCELLED | OUTPATIENT
Start: 2024-06-03

## 2023-12-29 RX ORDER — ALBUTEROL SULFATE 0.83 MG/ML
3 SOLUTION RESPIRATORY (INHALATION) AS NEEDED
Status: CANCELLED | OUTPATIENT
Start: 2024-04-09

## 2023-12-29 RX ORDER — PROCHLORPERAZINE EDISYLATE 5 MG/ML
10 INJECTION INTRAMUSCULAR; INTRAVENOUS EVERY 6 HOURS PRN
Status: CANCELLED | OUTPATIENT
Start: 2024-04-08

## 2023-12-29 RX ORDER — DIPHENHYDRAMINE HYDROCHLORIDE 50 MG/ML
50 INJECTION INTRAMUSCULAR; INTRAVENOUS AS NEEDED
Status: CANCELLED | OUTPATIENT
Start: 2024-05-07

## 2023-12-29 RX ORDER — ALBUTEROL SULFATE 0.83 MG/ML
3 SOLUTION RESPIRATORY (INHALATION) AS NEEDED
Status: CANCELLED | OUTPATIENT
Start: 2024-01-16

## 2023-12-29 RX ORDER — ACETAMINOPHEN 325 MG/1
650 TABLET ORAL ONCE
Status: CANCELLED | OUTPATIENT
Start: 2024-01-15

## 2023-12-29 RX ORDER — PROCHLORPERAZINE EDISYLATE 5 MG/ML
10 INJECTION INTRAMUSCULAR; INTRAVENOUS EVERY 6 HOURS PRN
Status: CANCELLED | OUTPATIENT
Start: 2024-04-09

## 2023-12-29 RX ORDER — DIPHENHYDRAMINE HYDROCHLORIDE 50 MG/ML
50 INJECTION INTRAMUSCULAR; INTRAVENOUS AS NEEDED
Status: CANCELLED | OUTPATIENT
Start: 2024-01-16

## 2023-12-29 RX ORDER — EPINEPHRINE 0.3 MG/.3ML
0.3 INJECTION SUBCUTANEOUS EVERY 5 MIN PRN
Status: CANCELLED | OUTPATIENT
Start: 2024-02-14

## 2023-12-29 RX ORDER — PROCHLORPERAZINE EDISYLATE 5 MG/ML
10 INJECTION INTRAMUSCULAR; INTRAVENOUS EVERY 6 HOURS PRN
Status: CANCELLED | OUTPATIENT
Start: 2024-02-13

## 2023-12-29 RX ORDER — PALONOSETRON 0.05 MG/ML
0.25 INJECTION, SOLUTION INTRAVENOUS ONCE
Status: CANCELLED | OUTPATIENT
Start: 2024-02-12

## 2023-12-29 RX ORDER — EPINEPHRINE 0.3 MG/.3ML
0.3 INJECTION SUBCUTANEOUS EVERY 5 MIN PRN
Status: CANCELLED | OUTPATIENT
Start: 2024-05-08

## 2023-12-29 RX ORDER — ALBUTEROL SULFATE 0.83 MG/ML
3 SOLUTION RESPIRATORY (INHALATION) AS NEEDED
Status: CANCELLED | OUTPATIENT
Start: 2024-03-12

## 2023-12-29 RX ORDER — EPINEPHRINE 0.3 MG/.3ML
0.3 INJECTION SUBCUTANEOUS EVERY 5 MIN PRN
Status: CANCELLED | OUTPATIENT
Start: 2024-06-04

## 2023-12-29 RX ORDER — ALBUTEROL SULFATE 0.83 MG/ML
3 SOLUTION RESPIRATORY (INHALATION) AS NEEDED
Status: CANCELLED | OUTPATIENT
Start: 2024-06-03

## 2023-12-29 RX ORDER — EPINEPHRINE 0.3 MG/.3ML
0.3 INJECTION SUBCUTANEOUS EVERY 5 MIN PRN
Status: CANCELLED | OUTPATIENT
Start: 2024-03-13

## 2023-12-29 RX ORDER — ALBUTEROL SULFATE 0.83 MG/ML
3 SOLUTION RESPIRATORY (INHALATION) AS NEEDED
Status: CANCELLED | OUTPATIENT
Start: 2024-05-06

## 2023-12-29 RX ORDER — FAMOTIDINE 10 MG/ML
20 INJECTION INTRAVENOUS ONCE AS NEEDED
Status: CANCELLED | OUTPATIENT
Start: 2024-02-12

## 2023-12-29 RX ORDER — PALONOSETRON 0.05 MG/ML
0.25 INJECTION, SOLUTION INTRAVENOUS ONCE
Status: CANCELLED | OUTPATIENT
Start: 2024-03-11

## 2023-12-29 RX ORDER — ALBUTEROL SULFATE 0.83 MG/ML
3 SOLUTION RESPIRATORY (INHALATION) AS NEEDED
Status: CANCELLED | OUTPATIENT
Start: 2024-05-08

## 2023-12-29 RX ORDER — FAMOTIDINE 10 MG/ML
20 INJECTION INTRAVENOUS ONCE AS NEEDED
Status: CANCELLED | OUTPATIENT
Start: 2024-06-04

## 2023-12-29 RX ORDER — DIPHENHYDRAMINE HYDROCHLORIDE 50 MG/ML
50 INJECTION INTRAMUSCULAR; INTRAVENOUS AS NEEDED
Status: CANCELLED | OUTPATIENT
Start: 2024-03-13

## 2023-12-29 RX ORDER — EPINEPHRINE 0.3 MG/.3ML
0.3 INJECTION SUBCUTANEOUS EVERY 5 MIN PRN
Status: CANCELLED | OUTPATIENT
Start: 2024-05-06

## 2023-12-29 RX ORDER — PROCHLORPERAZINE MALEATE 10 MG
10 TABLET ORAL EVERY 6 HOURS PRN
Status: CANCELLED | OUTPATIENT
Start: 2024-06-04

## 2023-12-29 RX ORDER — PROCHLORPERAZINE MALEATE 10 MG
10 TABLET ORAL EVERY 6 HOURS PRN
Status: CANCELLED | OUTPATIENT
Start: 2024-04-08

## 2023-12-29 RX ORDER — ALBUTEROL SULFATE 0.83 MG/ML
3 SOLUTION RESPIRATORY (INHALATION) AS NEEDED
Status: CANCELLED | OUTPATIENT
Start: 2024-03-11

## 2023-12-29 RX ORDER — DIPHENHYDRAMINE HYDROCHLORIDE 50 MG/ML
50 INJECTION INTRAMUSCULAR; INTRAVENOUS AS NEEDED
Status: CANCELLED | OUTPATIENT
Start: 2024-01-15

## 2023-12-29 RX ORDER — EPINEPHRINE 0.3 MG/.3ML
0.3 INJECTION SUBCUTANEOUS EVERY 5 MIN PRN
Status: CANCELLED | OUTPATIENT
Start: 2024-02-13

## 2023-12-29 RX ORDER — EPINEPHRINE 0.3 MG/.3ML
0.3 INJECTION SUBCUTANEOUS EVERY 5 MIN PRN
Status: CANCELLED | OUTPATIENT
Start: 2024-03-11

## 2023-12-29 RX ORDER — DIPHENHYDRAMINE HCL 25 MG
50 CAPSULE ORAL ONCE
Status: CANCELLED | OUTPATIENT
Start: 2024-01-15

## 2023-12-29 RX ORDER — EPINEPHRINE 0.3 MG/.3ML
0.3 INJECTION SUBCUTANEOUS EVERY 5 MIN PRN
Status: CANCELLED | OUTPATIENT
Start: 2024-04-10

## 2023-12-29 RX ORDER — DIPHENHYDRAMINE HYDROCHLORIDE 50 MG/ML
50 INJECTION INTRAMUSCULAR; INTRAVENOUS AS NEEDED
Status: CANCELLED | OUTPATIENT
Start: 2024-06-03

## 2023-12-29 RX ORDER — ALBUTEROL SULFATE 0.83 MG/ML
3 SOLUTION RESPIRATORY (INHALATION) AS NEEDED
Status: CANCELLED | OUTPATIENT
Start: 2024-06-05

## 2023-12-29 RX ORDER — EPINEPHRINE 0.3 MG/.3ML
0.3 INJECTION SUBCUTANEOUS EVERY 5 MIN PRN
Status: CANCELLED | OUTPATIENT
Start: 2024-06-03

## 2023-12-29 RX ORDER — PROCHLORPERAZINE EDISYLATE 5 MG/ML
10 INJECTION INTRAMUSCULAR; INTRAVENOUS EVERY 6 HOURS PRN
Status: CANCELLED | OUTPATIENT
Start: 2024-06-03

## 2023-12-29 RX ORDER — EPINEPHRINE 0.3 MG/.3ML
0.3 INJECTION SUBCUTANEOUS EVERY 5 MIN PRN
Status: CANCELLED | OUTPATIENT
Start: 2024-02-12

## 2023-12-29 RX ORDER — PROCHLORPERAZINE EDISYLATE 5 MG/ML
10 INJECTION INTRAMUSCULAR; INTRAVENOUS EVERY 6 HOURS PRN
Status: CANCELLED | OUTPATIENT
Start: 2024-05-06

## 2023-12-29 RX ORDER — PROCHLORPERAZINE EDISYLATE 5 MG/ML
10 INJECTION INTRAMUSCULAR; INTRAVENOUS EVERY 6 HOURS PRN
Status: CANCELLED | OUTPATIENT
Start: 2024-01-15

## 2023-12-29 RX ORDER — DIPHENHYDRAMINE HCL 25 MG
50 CAPSULE ORAL ONCE
Status: CANCELLED | OUTPATIENT
Start: 2024-06-03

## 2023-12-29 RX ORDER — FAMOTIDINE 10 MG/ML
20 INJECTION INTRAVENOUS ONCE AS NEEDED
Status: CANCELLED | OUTPATIENT
Start: 2024-05-06

## 2023-12-29 RX ORDER — DIPHENHYDRAMINE HYDROCHLORIDE 50 MG/ML
50 INJECTION INTRAMUSCULAR; INTRAVENOUS AS NEEDED
Status: CANCELLED | OUTPATIENT
Start: 2024-02-12

## 2023-12-29 RX ORDER — FAMOTIDINE 10 MG/ML
20 INJECTION INTRAVENOUS ONCE AS NEEDED
Status: CANCELLED | OUTPATIENT
Start: 2024-03-13

## 2023-12-29 RX ORDER — DIPHENHYDRAMINE HYDROCHLORIDE 50 MG/ML
50 INJECTION INTRAMUSCULAR; INTRAVENOUS AS NEEDED
Status: CANCELLED | OUTPATIENT
Start: 2024-03-11

## 2023-12-29 RX ORDER — ACETAMINOPHEN 325 MG/1
650 TABLET ORAL ONCE
Status: CANCELLED | OUTPATIENT
Start: 2024-03-11

## 2023-12-29 RX ORDER — ACETAMINOPHEN 325 MG/1
650 TABLET ORAL ONCE
Status: CANCELLED | OUTPATIENT
Start: 2024-05-06

## 2023-12-29 RX ORDER — PALONOSETRON 0.05 MG/ML
0.25 INJECTION, SOLUTION INTRAVENOUS ONCE
Status: CANCELLED | OUTPATIENT
Start: 2024-06-03

## 2023-12-29 RX ORDER — ACETAMINOPHEN 325 MG/1
650 TABLET ORAL ONCE
Status: CANCELLED | OUTPATIENT
Start: 2024-04-08

## 2023-12-29 RX ORDER — ALBUTEROL SULFATE 0.83 MG/ML
3 SOLUTION RESPIRATORY (INHALATION) AS NEEDED
Status: CANCELLED | OUTPATIENT
Start: 2024-02-13

## 2023-12-29 RX ORDER — PROCHLORPERAZINE EDISYLATE 5 MG/ML
10 INJECTION INTRAMUSCULAR; INTRAVENOUS EVERY 6 HOURS PRN
Status: CANCELLED | OUTPATIENT
Start: 2024-03-12

## 2023-12-29 RX ORDER — FAMOTIDINE 10 MG/ML
20 INJECTION INTRAVENOUS ONCE AS NEEDED
Status: CANCELLED | OUTPATIENT
Start: 2024-01-17

## 2023-12-29 RX ORDER — ALBUTEROL SULFATE 0.83 MG/ML
3 SOLUTION RESPIRATORY (INHALATION) AS NEEDED
Status: CANCELLED | OUTPATIENT
Start: 2024-01-15

## 2023-12-29 RX ORDER — ALBUTEROL SULFATE 0.83 MG/ML
3 SOLUTION RESPIRATORY (INHALATION) AS NEEDED
Status: CANCELLED | OUTPATIENT
Start: 2024-02-12

## 2023-12-29 RX ORDER — PROCHLORPERAZINE MALEATE 10 MG
10 TABLET ORAL EVERY 6 HOURS PRN
Status: CANCELLED | OUTPATIENT
Start: 2024-04-09

## 2023-12-29 RX ORDER — DIPHENHYDRAMINE HYDROCHLORIDE 50 MG/ML
50 INJECTION INTRAMUSCULAR; INTRAVENOUS AS NEEDED
Status: CANCELLED | OUTPATIENT
Start: 2024-04-08

## 2023-12-29 RX ORDER — FAMOTIDINE 10 MG/ML
20 INJECTION INTRAVENOUS ONCE AS NEEDED
Status: CANCELLED | OUTPATIENT
Start: 2024-04-08

## 2023-12-29 RX ORDER — EPINEPHRINE 0.3 MG/.3ML
0.3 INJECTION SUBCUTANEOUS EVERY 5 MIN PRN
Status: CANCELLED | OUTPATIENT
Start: 2024-03-12

## 2023-12-29 RX ORDER — FAMOTIDINE 10 MG/ML
20 INJECTION INTRAVENOUS ONCE AS NEEDED
Status: CANCELLED | OUTPATIENT
Start: 2024-05-08

## 2023-12-29 RX ORDER — DIPHENHYDRAMINE HYDROCHLORIDE 50 MG/ML
50 INJECTION INTRAMUSCULAR; INTRAVENOUS AS NEEDED
Status: CANCELLED | OUTPATIENT
Start: 2024-05-08

## 2023-12-29 RX ORDER — FAMOTIDINE 10 MG/ML
20 INJECTION INTRAVENOUS ONCE AS NEEDED
Status: CANCELLED | OUTPATIENT
Start: 2024-04-10

## 2023-12-29 RX ORDER — ALBUTEROL SULFATE 0.83 MG/ML
3 SOLUTION RESPIRATORY (INHALATION) AS NEEDED
Status: CANCELLED | OUTPATIENT
Start: 2024-03-13

## 2023-12-29 RX ORDER — FAMOTIDINE 10 MG/ML
20 INJECTION INTRAVENOUS ONCE AS NEEDED
Status: CANCELLED | OUTPATIENT
Start: 2024-03-12

## 2023-12-29 RX ORDER — FAMOTIDINE 10 MG/ML
20 INJECTION INTRAVENOUS ONCE AS NEEDED
Status: CANCELLED | OUTPATIENT
Start: 2024-06-05

## 2023-12-29 RX ORDER — PROCHLORPERAZINE EDISYLATE 5 MG/ML
10 INJECTION INTRAMUSCULAR; INTRAVENOUS EVERY 6 HOURS PRN
Status: CANCELLED | OUTPATIENT
Start: 2024-05-07

## 2023-12-29 RX ORDER — PALONOSETRON 0.05 MG/ML
0.25 INJECTION, SOLUTION INTRAVENOUS ONCE
Status: CANCELLED | OUTPATIENT
Start: 2024-01-15

## 2023-12-29 RX ORDER — DIPHENHYDRAMINE HYDROCHLORIDE 50 MG/ML
50 INJECTION INTRAMUSCULAR; INTRAVENOUS AS NEEDED
Status: CANCELLED | OUTPATIENT
Start: 2024-05-06

## 2023-12-29 RX ORDER — DIPHENHYDRAMINE HCL 25 MG
50 CAPSULE ORAL ONCE
Status: CANCELLED | OUTPATIENT
Start: 2024-02-12

## 2023-12-29 RX ORDER — DIPHENHYDRAMINE HCL 25 MG
50 CAPSULE ORAL ONCE
Status: CANCELLED | OUTPATIENT
Start: 2024-05-06

## 2023-12-29 RX ORDER — ALBUTEROL SULFATE 0.83 MG/ML
3 SOLUTION RESPIRATORY (INHALATION) AS NEEDED
Status: CANCELLED | OUTPATIENT
Start: 2024-05-07

## 2023-12-29 RX ORDER — ALBUTEROL SULFATE 0.83 MG/ML
3 SOLUTION RESPIRATORY (INHALATION) AS NEEDED
Status: CANCELLED | OUTPATIENT
Start: 2024-04-10

## 2023-12-29 RX ORDER — EPINEPHRINE 0.3 MG/.3ML
0.3 INJECTION SUBCUTANEOUS EVERY 5 MIN PRN
Status: CANCELLED | OUTPATIENT
Start: 2024-05-07

## 2023-12-29 RX ORDER — PROCHLORPERAZINE EDISYLATE 5 MG/ML
10 INJECTION INTRAMUSCULAR; INTRAVENOUS EVERY 6 HOURS PRN
Status: CANCELLED | OUTPATIENT
Start: 2024-02-12

## 2023-12-29 RX ORDER — EPINEPHRINE 0.3 MG/.3ML
0.3 INJECTION SUBCUTANEOUS EVERY 5 MIN PRN
Status: CANCELLED | OUTPATIENT
Start: 2024-01-16

## 2023-12-29 RX ORDER — FAMOTIDINE 10 MG/ML
20 INJECTION INTRAVENOUS ONCE AS NEEDED
Status: CANCELLED | OUTPATIENT
Start: 2024-05-07

## 2023-12-29 RX ORDER — EPINEPHRINE 0.3 MG/.3ML
0.3 INJECTION SUBCUTANEOUS EVERY 5 MIN PRN
Status: CANCELLED | OUTPATIENT
Start: 2024-01-15

## 2023-12-29 RX ORDER — PROCHLORPERAZINE MALEATE 10 MG
10 TABLET ORAL EVERY 6 HOURS PRN
Status: CANCELLED | OUTPATIENT
Start: 2024-03-11

## 2023-12-29 RX ORDER — ALBUTEROL SULFATE 0.83 MG/ML
3 SOLUTION RESPIRATORY (INHALATION) AS NEEDED
Status: CANCELLED | OUTPATIENT
Start: 2024-01-17

## 2023-12-29 RX ORDER — PROCHLORPERAZINE MALEATE 10 MG
10 TABLET ORAL EVERY 6 HOURS PRN
Status: CANCELLED | OUTPATIENT
Start: 2024-05-06

## 2023-12-29 RX ORDER — FAMOTIDINE 10 MG/ML
20 INJECTION INTRAVENOUS ONCE AS NEEDED
Status: CANCELLED | OUTPATIENT
Start: 2024-06-03

## 2023-12-29 RX ORDER — DIPHENHYDRAMINE HYDROCHLORIDE 50 MG/ML
50 INJECTION INTRAMUSCULAR; INTRAVENOUS AS NEEDED
Status: CANCELLED | OUTPATIENT
Start: 2024-02-14

## 2023-12-29 RX ORDER — FAMOTIDINE 10 MG/ML
20 INJECTION INTRAVENOUS ONCE AS NEEDED
Status: CANCELLED | OUTPATIENT
Start: 2024-02-14

## 2023-12-29 RX ORDER — PROCHLORPERAZINE MALEATE 10 MG
10 TABLET ORAL EVERY 6 HOURS PRN
Status: CANCELLED | OUTPATIENT
Start: 2024-01-15

## 2023-12-29 RX ORDER — PROCHLORPERAZINE EDISYLATE 5 MG/ML
10 INJECTION INTRAMUSCULAR; INTRAVENOUS EVERY 6 HOURS PRN
Status: CANCELLED | OUTPATIENT
Start: 2024-03-11

## 2023-12-29 RX ORDER — DIPHENHYDRAMINE HCL 25 MG
50 CAPSULE ORAL ONCE
Status: CANCELLED | OUTPATIENT
Start: 2024-04-08

## 2023-12-29 RX ORDER — DIPHENHYDRAMINE HYDROCHLORIDE 50 MG/ML
50 INJECTION INTRAMUSCULAR; INTRAVENOUS AS NEEDED
Status: CANCELLED | OUTPATIENT
Start: 2024-06-04

## 2023-12-29 RX ORDER — PROCHLORPERAZINE MALEATE 10 MG
10 TABLET ORAL EVERY 6 HOURS PRN
Status: CANCELLED | OUTPATIENT
Start: 2024-02-12

## 2023-12-29 RX ORDER — EPINEPHRINE 0.3 MG/.3ML
0.3 INJECTION SUBCUTANEOUS EVERY 5 MIN PRN
Status: CANCELLED | OUTPATIENT
Start: 2024-04-08

## 2023-12-29 RX ORDER — FAMOTIDINE 10 MG/ML
20 INJECTION INTRAVENOUS ONCE AS NEEDED
Status: CANCELLED | OUTPATIENT
Start: 2024-03-11

## 2023-12-29 RX ORDER — ALBUTEROL SULFATE 0.83 MG/ML
3 SOLUTION RESPIRATORY (INHALATION) AS NEEDED
Status: CANCELLED | OUTPATIENT
Start: 2024-04-08

## 2023-12-29 RX ORDER — DIPHENHYDRAMINE HCL 25 MG
50 CAPSULE ORAL ONCE
Status: CANCELLED | OUTPATIENT
Start: 2024-03-11

## 2023-12-29 RX ORDER — DIPHENHYDRAMINE HYDROCHLORIDE 50 MG/ML
50 INJECTION INTRAMUSCULAR; INTRAVENOUS AS NEEDED
Status: CANCELLED | OUTPATIENT
Start: 2024-04-10

## 2023-12-29 RX ORDER — PROCHLORPERAZINE MALEATE 10 MG
10 TABLET ORAL EVERY 6 HOURS PRN
Status: CANCELLED | OUTPATIENT
Start: 2024-05-07

## 2023-12-29 RX ORDER — PROCHLORPERAZINE MALEATE 10 MG
10 TABLET ORAL EVERY 6 HOURS PRN
Status: CANCELLED | OUTPATIENT
Start: 2024-03-12

## 2023-12-29 RX ORDER — ACETAMINOPHEN 325 MG/1
650 TABLET ORAL ONCE
Status: CANCELLED | OUTPATIENT
Start: 2024-02-12

## 2023-12-29 RX ORDER — FAMOTIDINE 10 MG/ML
20 INJECTION INTRAVENOUS ONCE AS NEEDED
Status: CANCELLED | OUTPATIENT
Start: 2024-04-09

## 2023-12-29 RX ORDER — EPINEPHRINE 0.3 MG/.3ML
0.3 INJECTION SUBCUTANEOUS EVERY 5 MIN PRN
Status: CANCELLED | OUTPATIENT
Start: 2024-04-09

## 2023-12-29 RX ORDER — PROCHLORPERAZINE EDISYLATE 5 MG/ML
10 INJECTION INTRAMUSCULAR; INTRAVENOUS EVERY 6 HOURS PRN
Status: CANCELLED | OUTPATIENT
Start: 2024-06-04

## 2023-12-29 RX ORDER — FAMOTIDINE 10 MG/ML
20 INJECTION INTRAVENOUS ONCE AS NEEDED
Status: CANCELLED | OUTPATIENT
Start: 2024-02-13

## 2023-12-29 RX ORDER — PROCHLORPERAZINE MALEATE 10 MG
10 TABLET ORAL EVERY 6 HOURS PRN
Status: CANCELLED | OUTPATIENT
Start: 2024-06-03

## 2023-12-29 RX ORDER — PALONOSETRON 0.05 MG/ML
0.25 INJECTION, SOLUTION INTRAVENOUS ONCE
Status: CANCELLED | OUTPATIENT
Start: 2024-04-08

## 2023-12-29 RX ORDER — PROCHLORPERAZINE EDISYLATE 5 MG/ML
10 INJECTION INTRAMUSCULAR; INTRAVENOUS EVERY 6 HOURS PRN
Status: CANCELLED | OUTPATIENT
Start: 2024-01-16

## 2023-12-29 RX ORDER — PROCHLORPERAZINE MALEATE 10 MG
10 TABLET ORAL EVERY 6 HOURS PRN
Status: CANCELLED | OUTPATIENT
Start: 2024-01-16

## 2023-12-29 RX ORDER — ALBUTEROL SULFATE 0.83 MG/ML
3 SOLUTION RESPIRATORY (INHALATION) AS NEEDED
Status: CANCELLED | OUTPATIENT
Start: 2024-06-04

## 2023-12-29 RX ORDER — PALONOSETRON 0.05 MG/ML
0.25 INJECTION, SOLUTION INTRAVENOUS ONCE
Status: CANCELLED | OUTPATIENT
Start: 2024-05-06

## 2023-12-29 RX ORDER — EPINEPHRINE 0.3 MG/.3ML
0.3 INJECTION SUBCUTANEOUS EVERY 5 MIN PRN
Status: CANCELLED | OUTPATIENT
Start: 2024-01-17

## 2023-12-29 RX ORDER — DIPHENHYDRAMINE HYDROCHLORIDE 50 MG/ML
50 INJECTION INTRAMUSCULAR; INTRAVENOUS AS NEEDED
Status: CANCELLED | OUTPATIENT
Start: 2024-01-17

## 2023-12-29 RX ORDER — EPINEPHRINE 0.3 MG/.3ML
0.3 INJECTION SUBCUTANEOUS EVERY 5 MIN PRN
Status: CANCELLED | OUTPATIENT
Start: 2024-06-05

## 2023-12-29 RX ORDER — FAMOTIDINE 10 MG/ML
20 INJECTION INTRAVENOUS ONCE AS NEEDED
Status: CANCELLED | OUTPATIENT
Start: 2024-01-15

## 2023-12-29 RX ORDER — DIPHENHYDRAMINE HYDROCHLORIDE 50 MG/ML
50 INJECTION INTRAMUSCULAR; INTRAVENOUS AS NEEDED
Status: CANCELLED | OUTPATIENT
Start: 2024-03-12

## 2023-12-29 RX ORDER — PROCHLORPERAZINE MALEATE 10 MG
10 TABLET ORAL EVERY 6 HOURS PRN
Status: CANCELLED | OUTPATIENT
Start: 2024-02-13

## 2023-12-29 RX ORDER — FAMOTIDINE 10 MG/ML
20 INJECTION INTRAVENOUS ONCE AS NEEDED
Status: CANCELLED | OUTPATIENT
Start: 2024-01-16

## 2023-12-29 ASSESSMENT — PATIENT HEALTH QUESTIONNAIRE - PHQ9
2. FEELING DOWN, DEPRESSED OR HOPELESS: NOT AT ALL
SUM OF ALL RESPONSES TO PHQ9 QUESTIONS 1 AND 2: 0
1. LITTLE INTEREST OR PLEASURE IN DOING THINGS: NOT AT ALL

## 2023-12-29 ASSESSMENT — COLUMBIA-SUICIDE SEVERITY RATING SCALE - C-SSRS
2. HAVE YOU ACTUALLY HAD ANY THOUGHTS OF KILLING YOURSELF?: NO
6. HAVE YOU EVER DONE ANYTHING, STARTED TO DO ANYTHING, OR PREPARED TO DO ANYTHING TO END YOUR LIFE?: NO
1. IN THE PAST MONTH, HAVE YOU WISHED YOU WERE DEAD OR WISHED YOU COULD GO TO SLEEP AND NOT WAKE UP?: NO

## 2023-12-29 ASSESSMENT — PAIN SCALES - GENERAL: PAINLEVEL: 0-NO PAIN

## 2023-12-29 NOTE — PROGRESS NOTES
Patient Visit Information:   Visit Type: Follow Up Visit      Cancer History:   Treatment Synopsis:    B-CLL diagnosed in 2015, SUÁREZ stage 0; WBC initially 16.3 with ALC 12.0, hemoglobin 15.1 and platelets 154,000.  The cells expressed CD5 and A light chains.   CD38 and  Zap-70 were negative. Cytogenetic studies by FISH were inconclusive.  WBC has slowly rising but he maintains good marrow reserve and has not required treatment.  CT chest done 3/9/17 showed extensive cervical, axillary and submental adenopathy; largest  node on the left measured 2.3 x 1.9 cm.  Small (less than 9 mm) pulmonology nodules were again seen; relatively stable.  Abdominal nodes slightly increased.    17: WBC 65.4, ALC 62.8; becoming more symptomatic.  17 : WBC 77.6.  ALC 72.2.  Hemoglobin and platelets were normal.  18 WBC: 69.3. A.9. Hgb 15.2. Plt: 148,000  18: WBC 88.2.  ALC 82.9.  Hemoglobin 14.2.  Platelets 170,000.  Adenopathy stable.  18: WBC 77.3.  ALC 66.5.  Hemoglobin 13.8.  Platelets 143,000.  Adenopathy slightly increased.  18: WBC 92.0.  ALC 86.5.  Hemoglobin 14.1.  Platelets 157,000.  Progressing symptomatic adenopathy.  Treatment options discussed.   18 completed rituximab weekly x 4.  19: WBC 16.3.  ALC 11.4.  3/27/19: WBC 7.2.  ALC 3.0.  Hemoglobin 14.8.  Plts. 130,000.  Submandibular nodes.  Few sxs.  Observe.  19: CBC: WBC 9.1.  ALC 5.0.  Hgb 14.1.  Plts 134,000.  Worsening adenopathy.  Observation per pt.  10/9/19: CBC: WBC 16.5.  ALC 12.4.  Hemoglobin 14.6.  Platelets 148,000.  Worsening adenopathy.  3/17/20: Started bendamustine/rituximab  20: #2 bendamustine/rituximab.  20: #3 BR  20: WBC 4.7.  Hemoglobin 12.9.  Platelets 189,000.  20: #4 BR  20: #5 BR  20: WBC 1.7. hgb 11.9. Platelets 176.  20: #6/6 bendamustine/rituximab  20: WBC 3.1. ALC 0.16. Hgb 12.1. Platelets 129,000.  2020: WBC 1.6.  ALC 0.2.  Hemoglobin  13.7.  Platelets 169,000.  2021: WBC 5.4.  ALC 0.33.  Hemoglobin 13.3.  Platelets 124,000.  9/15/2021: WBC 4.4.  ALC 0.7.  Hemoglobin 13.8.  Platelets 119,000.  No adenopathy.     2022: WBC 5.4.  Hemoglobin 14.2.  Platelets 151,000.  2022: WBC 5.1.  ALC 1.1.  Hemoglobin 14.7.  Platelets 109,000.    2023: WBC 6.4.  ALC 1.9.  Hemoglobin 15.0.  Platelets 128,000.  Observation.  23 , WBC count 10.4, hemoglobin 15.1, platelets 107, ALC 5.64 observation   23 , WBC count 43.6, hemoglobin 13.6, platelets 223, ALC 32.0  2023, WBC count 62, hemoglobin 14.9, platelets 143, absolute lymphocyte count 50.2  History of Present Illness:      ID Statement:    GUNNAR VASQUEZ is a 63 year old Male        Chief Complaint: Follow-up for CLL   Interval History:    He returns today for follow-up for CLL/SLL.  In the beginning of 2023 patient has flulike symptoms and CBC done on 2023 did show WBC count 43.6, hemoglobin 13.6, platelets 223 and absolute lymphocyte count 32.0.  Patient come for follow-up visit.  Patient is feeling better no fever complaining night sweats body weight is stable patient is still active and clinically improving.  Today CBC did show WBC count 62.9, hemoglobin 14.9 and platelets 143.  Result discussed with the patient     Past medical history: CLL/SLL (2015) as described above, status post rituximab, 6 cycles of bendamustine/rituximab (3/2020-2020).  Excellent  response to treatment.  In remission.      History of CABGx3 19, vertigo, XENA, tonsillectomy, retinal tear, stable pulmonary nodules, hyperlipidemia, hypertension, sinusitis.  Unremarkable cardiac catheterization 21.  Hyperglycemia.      Family medical history: Mother  of myeloma in her 70s and his father  of Hodgkin lymphoma at age 39.     Social history: Rare alcohol intake.  Former smoker but quit in .  Occupation:      Review of Systems:   Review  of Systems:    Constitutional: No fever, chills, night sweats.    Head and neck: Mild discomfort from nodes.  No headaches or dizziness.  No significant pain, stiffness.   HEENT: No sore throat, decreased hearing on the right. Vison is adequate.    Cardiac: No chest pain, palpitations.    Respiratory: No worsening dyspnea, cough, hemoptysis.  GI: Appetite is fair, weight stable.  No diarrhea, change in bowel movements, melena, hematochezia. No abdominal pain, nausea, vomiting.  Genitourinary: No frequency, urgency.  No polyuria, dysuria, hematuria.  Musculoskeletal: Complains of chronic arthralgias and myalgias.    Endocrine: No diabetes, thyroid disease.  Skin: No rash, skin lesions, itching.  Neuromuscular: No lightheadedness, dizziness.  No history of seizure.  No numbness, paresthesias. No focal weakness, tremor.                 Allergies and Intolerances:       Allergies:         No Known Allergies: Active     Outpatient Medication Profile:  * Patient Currently Takes Medications as of 17-Aug-2023 15:04 documented in Structured Notes         Zofran 4 mg oral tablet : Last Dose Taken:  , 1 tab(s) orally every 6 hours, As Needed -for nausea and vomiting , Start Date: 10-Mar-2020         Metoprolol Tartrate 25 mg oral tablet: Last Dose Taken:  , 1 tab(s) orally  2 times a day , Start Date: 25-Nov-2019         Multiple Vitamins oral capsule: Last Dose Taken:  , 1 cap(s) orally once  a day , Start Date: 25-Nov-2019         pantoprazole 40 mg oral delayed release tablet: Last Dose Taken:  , 1 tab(s)  orally once a day, Start Date: 25-Nov-2019         atorvastatin 20 mg oral tablet: Last Dose Taken:  , 1 tab(s) orally once  a day (at bedtime), Start Date: 25-Nov-2019         Vitamin D2 50,000 intl units (1.25 mg) oral capsule: Last Dose Taken:   , 1 cap(s) orally every other week         aspirin 81 mg oral tablet: Last Dose Taken:  , 1 tab(s) orally once a day         meclizine 25 mg oral tablet: Last Dose Taken:  , 1  tab(s) orally 3 times  a day, As Needed         nitroglycerin 0.4 mg sublingual tablet: Last Dose Taken:  , 1 tab(s)  sublingual every 5 minutes, As Needed         amLODIPine 5 mg oral tablet: Last Dose Taken:  , 1 tab(s) orally once  a day             Medical History:         Coronary artery disease: ICD-10: I25.10, Status: Active         Chronic lymphocytic leukemia: ICD-10: C91.10, Status: Active     Family History: No Family History items are recorded  in the problem list.      Social History:   Social Substance History:  ·  Smoking Status former smoker   ·  Tobacco Use denies   ·  Alcohol Use occasionally            Vitals and Measurements:   Vitals: Temp: 36.7  HR: 70  RR: 18  BP: 126/82  SPO2%:   95   Measurements: HT(cm): 170.5  WT(kg): 94.1  BSA: 2.11   BMI:  32.3   Last 3 Weights & Heights: Date:                           Weight/Scale Type:                    Height:   17-Aug-2023 14:55                94.1  kg                     170.5  cm  16-Feb-2023 14:23                93.9  kg                     170.5  cm      Physical Exam:      Constitutional: awake/alert/oriented x3, no distress,  alert and cooperative.   Eyes: PERRL, EOMI, clear sclera.   Head/Neck: No palpable nodes in the neck.   Respiratory/Thorax: Thorax symmetric. Clear to auscultation.  Normal breath sounds. No wheezes, rales, rhonchi.   Cardiovascular: Regular, rate and rhythm. No murmur.   Gastrointestinal: Nondistended.  Normal bowel sounds.   Soft, non-tender. No rebound or guarding. No masses palpable.  No palpable hepatomegaly, splenomegaly.   Musculoskeletal: ROM intact.  No joint swelling.  Adequate strength. No deformity.  No vertebral tenderness or mass.   Extremities: Normal extremities; no cyanosis, contusions,  wounds, clubbing. No edema.   Neurological: Alert and oriented x3. Senses and cranial  nerves grossly intact. No focal motor deficits noted. No focal sensory deficits.   Lymphatic: No palpable nodes in the neck,  supraclavicular  areas, axillae, or elsewhere.   Psychological: Appropriate mood and behavior.   Skin: No suspicious lesions.         Lab Results:  WBC count 62.9, hemoglobin 14.9, platelets 143  ·  Results                Assessment and Plan:   Assessment:    1.  Chronic lymphocytic leukemia with symptomatic adenopathy, excellent response to bendamustine/rituximab; completed 6 cycles in August 2020.   Today WBC count 62.9, hemoglobin 14.9, platelets 143 patient is symptomatic including weakness and night sweats.  2.  CAD, status post CABG.       3.  History of arthritis.      4.  Multiple medical problems.     Plan: Lab result discussed with the patient, 200 WBC count 62.9 and on December 18, 2023 WBC count was 43.6.  Patient has progressive disease and symptomatic, no evidence of anemia and platelet count is slightly low.  Patient qualify to initiate treatment at this time, different treatment options discussed with patient including #1 single agent rituximab with maintenance dose of rituximab #. 2 Bendamustine Rituximab #3 BKI #4 venetoclax plus rituximab  Possible side effect of Bendamustine, ibrutinib, venetoclax discussed with the patient in detail.  Ultimately we decided to start Bendamustine and rituximab, basic information provided, consent obtained we will start treatment as soon as possible.  Patient is also advised to increase p.o. intake I will also start uric acid 100 mg p.o. daily today.    Greater than 40 minutes spent discussing his condition, natural history of the disease, treatment, laboratory and imaging results, follow-up and  documentation in EMR.

## 2023-12-29 NOTE — PATIENT INSTRUCTIONS
Office visit per PCP request d/t sudden rise in WBC, in Aug Demar's WBC was 9.7  Today wbc-62.7  Demar agreed to treatment with Bendamustine and Rituxan. This treatment is to start on 1/15/24  Consent obtained  Follow up with dr. Wang with 2nd treatment

## 2024-01-02 ENCOUNTER — APPOINTMENT (OUTPATIENT)
Dept: LAB | Facility: HOSPITAL | Age: 64
End: 2024-01-02
Payer: COMMERCIAL

## 2024-01-02 ENCOUNTER — TELEPHONE (OUTPATIENT)
Dept: HEMATOLOGY/ONCOLOGY | Facility: CLINIC | Age: 64
End: 2024-01-02
Payer: COMMERCIAL

## 2024-01-02 DIAGNOSIS — C91.10 CLL (CHRONIC LYMPHOCYTIC LEUKEMIA) (MULTI): Primary | ICD-10-CM

## 2024-01-02 RX ORDER — ALLOPURINOL 300 MG/1
300 TABLET ORAL DAILY
Qty: 30 TABLET | Refills: 5 | Status: SHIPPED | OUTPATIENT
Start: 2024-01-02 | End: 2024-05-09 | Stop reason: ALTCHOICE

## 2024-01-02 NOTE — TELEPHONE ENCOUNTER
Allopurinol not listed on med list, call placed to pt to discuss. Per pt Dr. Wang prescribed medication on Friday. Informed pt would notify provider to send prescription.

## 2024-01-07 RX ORDER — HEPARIN SODIUM,PORCINE/PF 10 UNIT/ML
50 SYRINGE (ML) INTRAVENOUS AS NEEDED
Status: CANCELLED | OUTPATIENT
Start: 2024-01-15

## 2024-01-07 RX ORDER — HEPARIN 100 UNIT/ML
500 SYRINGE INTRAVENOUS AS NEEDED
Status: CANCELLED | OUTPATIENT
Start: 2024-01-15

## 2024-01-08 ENCOUNTER — TELEPHONE (OUTPATIENT)
Dept: CASE MANAGEMENT | Facility: HOSPITAL | Age: 64
End: 2024-01-08
Payer: COMMERCIAL

## 2024-01-08 NOTE — TELEPHONE ENCOUNTER
Patient dropped off his FMLA paperwork.  SW had to leave a message to see if he wanted Intermittent or Continuous FMLA.  KALEY provided phone number to call SW back.    Titi CARLIN, FRANKW     Patient called KALEY back and stated that he will still be working after his treatments.  KALEY had Dr. Wagn sign off and patient's wife will  tomorrow.    Titi CARLIN, LSW    x2 Attempt to contacting patient.  Patient stated now is not a good time to schedule appointment as she just stepped out of the hospital.  Asked patient to return call to clinic when she gets home to schedule hospital follow up appointment.  Patient agrees.

## 2024-01-15 ENCOUNTER — OFFICE VISIT (OUTPATIENT)
Dept: HEMATOLOGY/ONCOLOGY | Facility: CLINIC | Age: 64
End: 2024-01-15
Payer: COMMERCIAL

## 2024-01-15 ENCOUNTER — INFUSION (OUTPATIENT)
Dept: HEMATOLOGY/ONCOLOGY | Facility: CLINIC | Age: 64
End: 2024-01-15
Payer: COMMERCIAL

## 2024-01-15 VITALS
RESPIRATION RATE: 16 BRPM | OXYGEN SATURATION: 98 % | WEIGHT: 196.7 LBS | BODY MASS INDEX: 30.87 KG/M2 | TEMPERATURE: 97.9 F | HEART RATE: 68 BPM | DIASTOLIC BLOOD PRESSURE: 83 MMHG | SYSTOLIC BLOOD PRESSURE: 132 MMHG | HEIGHT: 67 IN

## 2024-01-15 VITALS
HEIGHT: 67 IN | WEIGHT: 196.65 LBS | RESPIRATION RATE: 16 BRPM | BODY MASS INDEX: 30.87 KG/M2 | OXYGEN SATURATION: 98 % | HEART RATE: 68 BPM | DIASTOLIC BLOOD PRESSURE: 82 MMHG | TEMPERATURE: 97.9 F | SYSTOLIC BLOOD PRESSURE: 132 MMHG

## 2024-01-15 DIAGNOSIS — C91.10 CLL (CHRONIC LYMPHOCYTIC LEUKEMIA) (MULTI): Primary | ICD-10-CM

## 2024-01-15 DIAGNOSIS — C91.10 CLL (CHRONIC LYMPHOCYTIC LEUKEMIA) (MULTI): ICD-10-CM

## 2024-01-15 LAB
ALBUMIN SERPL BCP-MCNC: 4.1 G/DL (ref 3.4–5)
ALP SERPL-CCNC: 104 U/L (ref 33–136)
ALT SERPL W P-5'-P-CCNC: 31 U/L (ref 10–52)
ANION GAP SERPL CALC-SCNC: 12 MMOL/L (ref 10–20)
AST SERPL W P-5'-P-CCNC: 15 U/L (ref 9–39)
BASOPHILS # BLD MANUAL: 0 X10*3/UL (ref 0–0.1)
BASOPHILS NFR BLD MANUAL: 0 %
BILIRUB SERPL-MCNC: 0.7 MG/DL (ref 0–1.2)
BUN SERPL-MCNC: 9 MG/DL (ref 6–23)
CALCIUM SERPL-MCNC: 9 MG/DL (ref 8.6–10.3)
CHLORIDE SERPL-SCNC: 104 MMOL/L (ref 98–107)
CO2 SERPL-SCNC: 27 MMOL/L (ref 21–32)
CREAT SERPL-MCNC: 0.79 MG/DL (ref 0.5–1.3)
EGFRCR SERPLBLD CKD-EPI 2021: >90 ML/MIN/1.73M*2
EOSINOPHIL # BLD MANUAL: 0 X10*3/UL (ref 0–0.7)
EOSINOPHIL NFR BLD MANUAL: 0 %
ERYTHROCYTE [DISTWIDTH] IN BLOOD BY AUTOMATED COUNT: 15.1 % (ref 11.5–14.5)
GLUCOSE SERPL-MCNC: 124 MG/DL (ref 74–99)
HBV CORE AB SER QL: NONREACTIVE
HBV SURFACE AB SER-ACNC: <3.1 MIU/ML
HBV SURFACE AG SERPL QL IA: NONREACTIVE
HCT VFR BLD AUTO: 44.4 % (ref 41–52)
HGB BLD-MCNC: 15 G/DL (ref 13.5–17.5)
IMM GRANULOCYTES # BLD AUTO: 0.11 X10*3/UL (ref 0–0.7)
IMM GRANULOCYTES NFR BLD AUTO: 0.3 % (ref 0–0.9)
LDH SERPL L TO P-CCNC: 168 U/L (ref 84–246)
LYMPHOCYTES # BLD MANUAL: 36.86 X10*3/UL (ref 1.2–4.8)
LYMPHOCYTES NFR BLD MANUAL: 91 %
MCH RBC QN AUTO: 27.8 PG (ref 26–34)
MCHC RBC AUTO-ENTMCNC: 33.8 G/DL (ref 32–36)
MCV RBC AUTO: 82 FL (ref 80–100)
MONOCYTES # BLD MANUAL: 0.81 X10*3/UL (ref 0.1–1)
MONOCYTES NFR BLD MANUAL: 2 %
NEUTROPHILS # BLD MANUAL: 2.84 X10*3/UL (ref 1.2–7.7)
NEUTS BAND # BLD MANUAL: 0.41 X10*3/UL (ref 0–0.7)
NEUTS BAND NFR BLD MANUAL: 1 %
NEUTS SEG # BLD MANUAL: 2.43 X10*3/UL (ref 1.2–7)
NEUTS SEG NFR BLD MANUAL: 6 %
NRBC BLD-RTO: ABNORMAL /100{WBCS}
PLATELET # BLD AUTO: 175 X10*3/UL (ref 150–450)
POTASSIUM SERPL-SCNC: 3.5 MMOL/L (ref 3.5–5.3)
PROT SERPL-MCNC: 6.5 G/DL (ref 6.4–8.2)
RBC # BLD AUTO: 5.4 X10*6/UL (ref 4.5–5.9)
RBC MORPH BLD: ABNORMAL
SODIUM SERPL-SCNC: 139 MMOL/L (ref 136–145)
TOTAL CELLS COUNTED BLD: 100
URATE SERPL-MCNC: 3.2 MG/DL (ref 4–7.5)
WBC # BLD AUTO: 40.5 X10*3/UL (ref 4.4–11.3)

## 2024-01-15 PROCEDURE — 3079F DIAST BP 80-89 MM HG: CPT | Performed by: INTERNAL MEDICINE

## 2024-01-15 PROCEDURE — 1126F AMNT PAIN NOTED NONE PRSNT: CPT | Performed by: INTERNAL MEDICINE

## 2024-01-15 PROCEDURE — 85007 BL SMEAR W/DIFF WBC COUNT: CPT

## 2024-01-15 PROCEDURE — 3075F SYST BP GE 130 - 139MM HG: CPT | Performed by: INTERNAL MEDICINE

## 2024-01-15 PROCEDURE — 1036F TOBACCO NON-USER: CPT | Performed by: INTERNAL MEDICINE

## 2024-01-15 PROCEDURE — 83615 LACTATE (LD) (LDH) ENZYME: CPT

## 2024-01-15 PROCEDURE — 86706 HEP B SURFACE ANTIBODY: CPT | Mod: PORLAB

## 2024-01-15 PROCEDURE — 86704 HEP B CORE ANTIBODY TOTAL: CPT | Mod: PORLAB

## 2024-01-15 PROCEDURE — 87340 HEPATITIS B SURFACE AG IA: CPT | Mod: PORLAB

## 2024-01-15 PROCEDURE — 36415 COLL VENOUS BLD VENIPUNCTURE: CPT

## 2024-01-15 PROCEDURE — 99203 OFFICE O/P NEW LOW 30 MIN: CPT | Performed by: INTERNAL MEDICINE

## 2024-01-15 PROCEDURE — 99213 OFFICE O/P EST LOW 20 MIN: CPT | Performed by: INTERNAL MEDICINE

## 2024-01-15 PROCEDURE — 80053 COMPREHEN METABOLIC PANEL: CPT

## 2024-01-15 PROCEDURE — 1159F MED LIST DOCD IN RCRD: CPT | Performed by: INTERNAL MEDICINE

## 2024-01-15 PROCEDURE — 85027 COMPLETE CBC AUTOMATED: CPT

## 2024-01-15 PROCEDURE — 84550 ASSAY OF BLOOD/URIC ACID: CPT

## 2024-01-15 RX ORDER — HEPARIN SODIUM,PORCINE/PF 10 UNIT/ML
50 SYRINGE (ML) INTRAVENOUS AS NEEDED
OUTPATIENT
Start: 2024-01-15

## 2024-01-15 RX ORDER — HEPARIN 100 UNIT/ML
500 SYRINGE INTRAVENOUS AS NEEDED
OUTPATIENT
Start: 2024-01-15

## 2024-01-15 ASSESSMENT — PAIN SCALES - GENERAL
PAINLEVEL: 0-NO PAIN
PAINLEVEL: 0-NO PAIN

## 2024-01-15 NOTE — PATIENT INSTRUCTIONS
Insurance denied treatment with bendamustine + rituximab, treatment will be discontinued. Will monitor lab work for now.     Follow up with Dr. Wang in 2 months.     Lab appointments are no longer scheduled. Please arrive at least 15 minutes before scheduled appointment time for blood work.

## 2024-01-15 NOTE — PROGRESS NOTES
Pt arrived awake, alert, oriented, no apparent distress with unlabored breaths. Pt reports feeling well today without s/sx of illness. Pt here for infusion of Bendamustine and rituximab, in which he did not receive, due to insurance lack of authorization. BANDAR Poon contacted pt insurance, and authorization was stated to be denied for today. Pt was notified, verbalized understanding. Pt to be seen by Dr. Wang to discuss further treatment plan going forward.

## 2024-01-15 NOTE — PROGRESS NOTES
Patient Visit Information:   Visit Type: Follow Up Visit      Cancer History:   Treatment Synopsis:    B-CLL diagnosed in 2015, SUÁREZ stage 0; WBC initially 16.3 with ALC 12.0, hemoglobin 15.1 and platelets 154,000.  The cells expressed CD5 and A light chains.   CD38 and  Zap-70 were negative. Cytogenetic studies by FISH were inconclusive.  WBC has slowly rising but he maintains good marrow reserve and has not required treatment.  CT chest done 3/9/17 showed extensive cervical, axillary and submental adenopathy; largest  node on the left measured 2.3 x 1.9 cm.  Small (less than 9 mm) pulmonology nodules were again seen; relatively stable.  Abdominal nodes slightly increased.    17: WBC 65.4, ALC 62.8; becoming more symptomatic.  17 : WBC 77.6.  ALC 72.2.  Hemoglobin and platelets were normal.  18 WBC: 69.3. A.9. Hgb 15.2. Plt: 148,000  18: WBC 88.2.  ALC 82.9.  Hemoglobin 14.2.  Platelets 170,000.  Adenopathy stable.  18: WBC 77.3.  ALC 66.5.  Hemoglobin 13.8.  Platelets 143,000.  Adenopathy slightly increased.  18: WBC 92.0.  ALC 86.5.  Hemoglobin 14.1.  Platelets 157,000.  Progressing symptomatic adenopathy.  Treatment options discussed.   18 completed rituximab weekly x 4.  19: WBC 16.3.  ALC 11.4.  3/27/19: WBC 7.2.  ALC 3.0.  Hemoglobin 14.8.  Plts. 130,000.  Submandibular nodes.  Few sxs.  Observe.  19: CBC: WBC 9.1.  ALC 5.0.  Hgb 14.1.  Plts 134,000.  Worsening adenopathy.  Observation per pt.  10/9/19: CBC: WBC 16.5.  ALC 12.4.  Hemoglobin 14.6.  Platelets 148,000.  Worsening adenopathy.  3/17/20: Started bendamustine/rituximab  20: #2 bendamustine/rituximab.  20: #3 BR  20: WBC 4.7.  Hemoglobin 12.9.  Platelets 189,000.  20: #4 BR  20: #5 BR  20: WBC 1.7. hgb 11.9. Platelets 176.  20: #6/6 bendamustine/rituximab  20: WBC 3.1. ALC 0.16. Hgb 12.1. Platelets 129,000.  2020: WBC 1.6.  ALC 0.2.  Hemoglobin  13.7.  Platelets 169,000.  2021: WBC 5.4.  ALC 0.33.  Hemoglobin 13.3.  Platelets 124,000.  9/15/2021: WBC 4.4.  ALC 0.7.  Hemoglobin 13.8.  Platelets 119,000.  No adenopathy.     2022: WBC 5.4.  Hemoglobin 14.2.  Platelets 151,000.  2022: WBC 5.1.  ALC 1.1.  Hemoglobin 14.7.  Platelets 109,000.    2023: WBC 6.4.  ALC 1.9.  Hemoglobin 15.0.  Platelets 128,000.  Observation.  23 , WBC count 10.4, hemoglobin 15.1, platelets 107, ALC 5.64 observation   23 , WBC count 43.6, hemoglobin 13.6, platelets 223, ALC 32.0  2023, WBC count 62, hemoglobin 14.9, platelets 143, absolute lymphocyte count 50.2  January 15, 2024, WBC count 40.5, hemoglobin 15.0, platelets 175  History of Present Illness:      ID Statement:    GUNNAR VASQUEZ is a 63 year old Male        Chief Complaint: Follow-up for CLL   Interval History:    He returns today for follow-up for CLL/SLL.  In the beginning of 2023 patient has flulike symptoms and CBC done on 2023 did show WBC count 43.6, hemoglobin 13.6, platelets 223 and absolute lymphocyte count 32.0.  Patient come for follow-up visit.  Patient is feeling better no fever complaining night sweats body weight is stable patient is still active and clinically improving. On 23 WBC count 62.9, hemoglobin 14.9 and platelets 143.  Today WBC count 40.5, no B symptoms     Past medical history: CLL/SLL (2015) as described above, status post rituximab, 6 cycles of bendamustine/rituximab (3/2020-2020).  Excellent  response to treatment.  In remission.      History of CABGx3 19, vertigo, XENA, tonsillectomy, retinal tear, stable pulmonary nodules, hyperlipidemia, hypertension, sinusitis.  Unremarkable cardiac catheterization 21.  Hyperglycemia.      Family medical history: Mother  of myeloma in her 70s and his father  of Hodgkin lymphoma at age 39.     Social history: Rare alcohol intake.  Former smoker but quit in  1987.  Occupation:      Review of Systems:   Review of Systems:    Constitutional: No fever, chills, night sweats.    Head and neck: Mild discomfort from nodes.  No headaches or dizziness.  No significant pain, stiffness.   HEENT: No sore throat, decreased hearing on the right. Vison is adequate.    Cardiac: No chest pain, palpitations.    Respiratory: No worsening dyspnea, cough, hemoptysis.  GI: Appetite is fair, weight stable.  No diarrhea, change in bowel movements, melena, hematochezia. No abdominal pain, nausea, vomiting.  Genitourinary: No frequency, urgency.  No polyuria, dysuria, hematuria.  Musculoskeletal: Complains of chronic arthralgias and myalgias.    Endocrine: No diabetes, thyroid disease.  Skin: No rash, skin lesions, itching.  Neuromuscular: No lightheadedness, dizziness.  No history of seizure.  No numbness, paresthesias. No focal weakness, tremor.                 Allergies and Intolerances:       Allergies:         No Known Allergies: Active     Outpatient Medication Profile:  * Patient Currently Takes Medications as of 17-Aug-2023 15:04 documented in Structured Notes         Zofran 4 mg oral tablet : Last Dose Taken:  , 1 tab(s) orally every 6 hours, As Needed -for nausea and vomiting , Start Date: 10-Mar-2020         Metoprolol Tartrate 25 mg oral tablet: Last Dose Taken:  , 1 tab(s) orally  2 times a day , Start Date: 25-Nov-2019         Multiple Vitamins oral capsule: Last Dose Taken:  , 1 cap(s) orally once  a day , Start Date: 25-Nov-2019         pantoprazole 40 mg oral delayed release tablet: Last Dose Taken:  , 1 tab(s)  orally once a day, Start Date: 25-Nov-2019         atorvastatin 20 mg oral tablet: Last Dose Taken:  , 1 tab(s) orally once  a day (at bedtime), Start Date: 25-Nov-2019         Vitamin D2 50,000 intl units (1.25 mg) oral capsule: Last Dose Taken:   , 1 cap(s) orally every other week         aspirin 81 mg oral tablet: Last Dose Taken:  , 1 tab(s) orally once a  day         meclizine 25 mg oral tablet: Last Dose Taken:  , 1 tab(s) orally 3 times  a day, As Needed         nitroglycerin 0.4 mg sublingual tablet: Last Dose Taken:  , 1 tab(s)  sublingual every 5 minutes, As Needed         amLODIPine 5 mg oral tablet: Last Dose Taken:  , 1 tab(s) orally once  a day             Medical History:         Coronary artery disease: ICD-10: I25.10, Status: Active         Chronic lymphocytic leukemia: ICD-10: C91.10, Status: Active     Family History: No Family History items are recorded  in the problem list.      Social History:   Social Substance History:  ·  Smoking Status former smoker   ·  Tobacco Use denies   ·  Alcohol Use occasionally            Vitals and Measurements:   Vitals: Temp: 36.7  HR: 70  RR: 18  BP: 126/82  SPO2%:   95   Measurements: HT(cm): 170.5  WT(kg): 94.1  BSA: 2.11   BMI:  32.3   Last 3 Weights & Heights: Date:                           Weight/Scale Type:                    Height:   17-Aug-2023 14:55                94.1  kg                     170.5  cm  16-Feb-2023 14:23                93.9  kg                     170.5  cm      Physical Exam:      Constitutional: awake/alert/oriented x3, no distress,  alert and cooperative.   Eyes: PERRL, EOMI, clear sclera.   Head/Neck: Bilateral submandibular gland is palpable   Respiratory/Thorax: Thorax symmetric. Clear to auscultation.  Normal breath sounds. No wheezes, rales, rhonchi.   Cardiovascular: Regular, rate and rhythm. No murmur.   Gastrointestinal: Nondistended.  Normal bowel sounds.   Soft, non-tender. No rebound or guarding. No masses palpable.  No palpable hepatomegaly, splenomegaly.   Musculoskeletal: ROM intact.  No joint swelling.  Adequate strength. No deformity.  No vertebral tenderness or mass.   Extremities: Normal extremities; no cyanosis, contusions,  wounds, clubbing. No edema.   Neurological: Alert and oriented x3. Senses and cranial  nerves grossly intact. No focal motor deficits noted. No  focal sensory deficits.   Lymphatic: No palpable nodes in the neck, supraclavicular  areas, axillae, or elsewhere.   Psychological: Appropriate mood and behavior.   Skin: No suspicious lesions.         Lab Results:  08:52  (1/15/24) 2 wk ago  (12/29/23) 4 wk ago  (12/18/23) 4 mo ago  (8/23/23) 4 mo ago  (8/22/23) 4 mo ago  (8/21/23) 5 mo ago  (8/17/23)    WBC  4.4 - 11.3 x10*3/uL 40.5 High  65.3 High Panic  43.6 High  CANCELED R, CM 9.7 R 11.1 R CANCELED R, CM   nRBC  CM 0.0 R CANCELED CM   CANCELED CM   Comment: Not Measured   RBC  4.50 - 5.90 x10*6/uL 5.40 5.50 4.94 CANCELED R, CM 4.84 R 5.46 R CANCELED R, CM   Hemoglobin  13.5 - 17.5 g/dL 15.0 15.3 13.6 CANCELED R, CM 13.8 15.4 CANCELED R, CM   Hematocrit  41.0 - 52.0 % 44.4 46.8 42.3 CANCELED R, CM 40.3 Low  45.5 CANCELED R, CM   MCV  80 - 100 fL 82 85 86 CANCELED R, CM 83 83 CANCELED R, CM   MCH  26.0 - 34.0 pg 27.8 27.8 27.5       MCHC  32.0 - 36.0 g/dL 33.8 32.7 32.2 CANCELED R, CM 34.2 33.8 CANCELED R, CM   RDW  11.5 - 14.5 % 15.1 High  15.1 High  14.3 CANCELED R, CM 13.6 13.7 CANCELED R, CM   Platelets  150 - 450 x10*3/uL 175 166 223         ·  Results                Assessment and Plan:   Assessment:    1.  Chronic lymphocytic leukemia with symptomatic adenopathy, excellent response to bendamustine/rituximab; completed 6 cycles in August 2020.   Today WBC count 62.9, hemoglobin 14.9, platelets 143 patient is symptomatic including weakness and night sweats.  2.  CAD, status post CABG.       3.  History of arthritis.      4.  Multiple medical problems.     Plan: The plan was to start Bendamustine and rituximab or  ibrutinib, Bcl-2 inhibitor.    Today WBC count of 40.5, no anemia, no thrombocytopenia, patient feeling better.  I will hold her treatment continue to monitor CBC every 2 months.  Plan discussed with the patient    Greater than 30 minutes spent discussing his condition, natural history of the disease, treatment, laboratory and imaging results,  follow-up and  documentation in EMR.

## 2024-01-16 ENCOUNTER — APPOINTMENT (OUTPATIENT)
Dept: HEMATOLOGY/ONCOLOGY | Facility: CLINIC | Age: 64
End: 2024-01-16
Payer: COMMERCIAL

## 2024-01-17 ENCOUNTER — APPOINTMENT (OUTPATIENT)
Dept: HEMATOLOGY/ONCOLOGY | Facility: CLINIC | Age: 64
End: 2024-01-17
Payer: COMMERCIAL

## 2024-02-10 ENCOUNTER — LAB (OUTPATIENT)
Dept: LAB | Facility: LAB | Age: 64
End: 2024-02-10
Payer: COMMERCIAL

## 2024-02-10 DIAGNOSIS — E55.9 VITAMIN D DEFICIENCY: ICD-10-CM

## 2024-02-10 DIAGNOSIS — I10 ESSENTIAL HYPERTENSION: ICD-10-CM

## 2024-02-10 DIAGNOSIS — E11.9 CONTROLLED TYPE 2 DIABETES MELLITUS WITHOUT COMPLICATION, WITHOUT LONG-TERM CURRENT USE OF INSULIN (MULTI): ICD-10-CM

## 2024-02-10 DIAGNOSIS — Z11.59 ENCOUNTER FOR HEPATITIS C SCREENING TEST FOR LOW RISK PATIENT: ICD-10-CM

## 2024-02-10 DIAGNOSIS — Z12.5 SCREENING PSA (PROSTATE SPECIFIC ANTIGEN): ICD-10-CM

## 2024-02-10 DIAGNOSIS — D69.6 THROMBOCYTOPENIA (CMS-HCC): ICD-10-CM

## 2024-02-10 DIAGNOSIS — E78.2 MIXED HYPERLIPIDEMIA: ICD-10-CM

## 2024-02-10 LAB
ALBUMIN SERPL BCP-MCNC: 4.4 G/DL (ref 3.4–5)
ALP SERPL-CCNC: 97 U/L (ref 33–136)
ALT SERPL W P-5'-P-CCNC: 38 U/L (ref 10–52)
ANION GAP SERPL CALC-SCNC: 11 MMOL/L (ref 10–20)
AST SERPL W P-5'-P-CCNC: 22 U/L (ref 9–39)
BASOPHILS # BLD AUTO: 0.04 X10*3/UL (ref 0–0.1)
BASOPHILS NFR BLD AUTO: 0.1 %
BILIRUB SERPL-MCNC: 0.9 MG/DL (ref 0–1.2)
BUN SERPL-MCNC: 9 MG/DL (ref 6–23)
CALCIUM SERPL-MCNC: 9 MG/DL (ref 8.6–10.3)
CHLORIDE SERPL-SCNC: 105 MMOL/L (ref 98–107)
CHOLEST SERPL-MCNC: 103 MG/DL (ref 0–199)
CHOLESTEROL/HDL RATIO: 2.8
CO2 SERPL-SCNC: 28 MMOL/L (ref 21–32)
CREAT SERPL-MCNC: 0.92 MG/DL (ref 0.5–1.3)
CREAT UR-MCNC: 175.9 MG/DL (ref 20–370)
EGFRCR SERPLBLD CKD-EPI 2021: >90 ML/MIN/1.73M*2
EOSINOPHIL # BLD AUTO: 0.37 X10*3/UL (ref 0–0.7)
EOSINOPHIL NFR BLD AUTO: 1.2 %
ERYTHROCYTE [DISTWIDTH] IN BLOOD BY AUTOMATED COUNT: 15.9 % (ref 11.5–14.5)
EST. AVERAGE GLUCOSE BLD GHB EST-MCNC: 148 MG/DL
GLUCOSE SERPL-MCNC: 128 MG/DL (ref 74–99)
HBA1C MFR BLD: 6.8 %
HCT VFR BLD AUTO: 46.3 % (ref 41–52)
HCV AB SER QL: NONREACTIVE
HDLC SERPL-MCNC: 36.8 MG/DL
HGB BLD-MCNC: 14.9 G/DL (ref 13.5–17.5)
IMM GRANULOCYTES # BLD AUTO: 0.05 X10*3/UL (ref 0–0.7)
IMM GRANULOCYTES NFR BLD AUTO: 0.2 % (ref 0–0.9)
LDLC SERPL CALC-MCNC: 45 MG/DL
LDLC SERPL DIRECT ASSAY-MCNC: 54 MG/DL (ref 0–129)
LYMPHOCYTES # BLD AUTO: 26.09 X10*3/UL (ref 1.2–4.8)
LYMPHOCYTES NFR BLD AUTO: 82.7 %
MCH RBC QN AUTO: 27.5 PG (ref 26–34)
MCHC RBC AUTO-ENTMCNC: 32.2 G/DL (ref 32–36)
MCV RBC AUTO: 85 FL (ref 80–100)
MICROALBUMIN UR-MCNC: 26.3 MG/L
MICROALBUMIN/CREAT UR: 15 UG/MG CREAT
MONOCYTES # BLD AUTO: 0.82 X10*3/UL (ref 0.1–1)
MONOCYTES NFR BLD AUTO: 2.6 %
NEUTROPHILS # BLD AUTO: 4.16 X10*3/UL (ref 1.2–7.7)
NEUTROPHILS NFR BLD AUTO: 13.2 %
NON HDL CHOLESTEROL: 66 MG/DL (ref 0–149)
NRBC BLD-RTO: 0 /100 WBCS (ref 0–0)
PLATELET # BLD AUTO: 131 X10*3/UL (ref 150–450)
POTASSIUM SERPL-SCNC: 3.9 MMOL/L (ref 3.5–5.3)
PROT SERPL-MCNC: 6.1 G/DL (ref 6.4–8.2)
PSA SERPL-MCNC: 0.83 NG/ML
RBC # BLD AUTO: 5.42 X10*6/UL (ref 4.5–5.9)
RBC MORPH BLD: NORMAL
SODIUM SERPL-SCNC: 140 MMOL/L (ref 136–145)
TRIGL SERPL-MCNC: 104 MG/DL (ref 0–149)
VIT B12 SERPL-MCNC: 579 PG/ML (ref 211–911)
VLDL: 21 MG/DL (ref 0–40)
WBC # BLD AUTO: 31.5 X10*3/UL (ref 4.4–11.3)

## 2024-02-10 PROCEDURE — 82652 VIT D 1 25-DIHYDROXY: CPT

## 2024-02-10 PROCEDURE — 86803 HEPATITIS C AB TEST: CPT

## 2024-02-10 PROCEDURE — 84153 ASSAY OF PSA TOTAL: CPT

## 2024-02-10 PROCEDURE — 82607 VITAMIN B-12: CPT

## 2024-02-10 PROCEDURE — 36415 COLL VENOUS BLD VENIPUNCTURE: CPT

## 2024-02-10 PROCEDURE — 82570 ASSAY OF URINE CREATININE: CPT

## 2024-02-10 PROCEDURE — 83036 HEMOGLOBIN GLYCOSYLATED A1C: CPT

## 2024-02-10 PROCEDURE — 85025 COMPLETE CBC W/AUTO DIFF WBC: CPT

## 2024-02-10 PROCEDURE — 80061 LIPID PANEL: CPT

## 2024-02-10 PROCEDURE — 82043 UR ALBUMIN QUANTITATIVE: CPT

## 2024-02-10 PROCEDURE — 83721 ASSAY OF BLOOD LIPOPROTEIN: CPT

## 2024-02-10 PROCEDURE — 80053 COMPREHEN METABOLIC PANEL: CPT

## 2024-02-12 ENCOUNTER — APPOINTMENT (OUTPATIENT)
Dept: HEMATOLOGY/ONCOLOGY | Facility: CLINIC | Age: 64
End: 2024-02-12
Payer: COMMERCIAL

## 2024-02-13 ENCOUNTER — APPOINTMENT (OUTPATIENT)
Dept: HEMATOLOGY/ONCOLOGY | Facility: CLINIC | Age: 64
End: 2024-02-13
Payer: COMMERCIAL

## 2024-02-13 LAB — 1,25(OH)2D3 SERPL-MCNC: 92.7 PG/ML (ref 19.9–79.3)

## 2024-02-14 ENCOUNTER — APPOINTMENT (OUTPATIENT)
Dept: HEMATOLOGY/ONCOLOGY | Facility: CLINIC | Age: 64
End: 2024-02-14
Payer: COMMERCIAL

## 2024-02-15 ENCOUNTER — APPOINTMENT (OUTPATIENT)
Dept: HEMATOLOGY/ONCOLOGY | Facility: CLINIC | Age: 64
End: 2024-02-15
Payer: COMMERCIAL

## 2024-02-16 PROBLEM — E66.09 CLASS 1 OBESITY DUE TO EXCESS CALORIES WITH SERIOUS COMORBIDITY AND BODY MASS INDEX (BMI) OF 30.0 TO 30.9 IN ADULT: Status: ACTIVE | Noted: 2024-02-16

## 2024-02-16 PROBLEM — R07.89 CHEST PRESSURE: Status: RESOLVED | Noted: 2023-08-21 | Resolved: 2024-02-16

## 2024-02-16 PROBLEM — R05.1 ACUTE COUGH: Status: RESOLVED | Noted: 2023-12-15 | Resolved: 2024-02-16

## 2024-02-16 PROBLEM — E66.811 CLASS 1 OBESITY DUE TO EXCESS CALORIES WITH SERIOUS COMORBIDITY AND BODY MASS INDEX (BMI) OF 30.0 TO 30.9 IN ADULT: Status: ACTIVE | Noted: 2024-02-16

## 2024-02-16 PROBLEM — J06.9 UPPER RESPIRATORY TRACT INFECTION: Status: RESOLVED | Noted: 2023-12-15 | Resolved: 2024-02-16

## 2024-02-16 NOTE — ASSESSMENT & PLAN NOTE
A1c up slightly from 6.6 to 6.8  On statin  No microalbuminuria   Work on diet reviewed with patient.   Recheck 6 months

## 2024-02-16 NOTE — PROGRESS NOTES
Subjective   Patient ID: Demar Pitmtan is a 63 y.o. male who presents for Follow-up (6 month follow up).    HPI  Patient presents today for follow up labs and chronic conditions.  Saw oncology 12/23 for CLL recuurence with WBC 65 - plan was to do chemo but WBC started going down so just monitoring at this point. Has follow up Dr. Wang 3/24.  Tired but patient otherwise feels well. No other complaints or concerns.    The patient's relevant past medical, surgical, family, and social history was reviewed in [facility].  All pertinent lab work and results for this visit were reviewed with patient.    Lab on 02/10/2024   Component Date Value Ref Range Status    LDL, Direct 02/10/2024 54  0 - 129 mg/dL Final    Cholesterol 02/10/2024 103  0 - 199 mg/dL Final          Age      Desirable   Borderline High   High     0-19 Y     0 - 169       170 - 199     >/= 200    20-24 Y     0 - 189       190 - 224     >/= 225         >24 Y     0 - 199       200 - 239     >/= 240   **All ranges are based on fasting samples. Specific   therapeutic targets will vary based on patient-specific   cardiac risk.    Pediatric guidelines reference:Pediatrics 2011, 128(S5).Adult guidelines reference: NCEP ATPIII Guidelines,HOLLY 2001, 258:2486-97    Venipuncture immediately after or during the administration of Metamizole may lead to falsely low results. Testing should be performed immediately prior to Metamizole dosing.    HDL-Cholesterol 02/10/2024 36.8  mg/dL Final      Age       Very Low   Low     Normal    High    0-19 Y    < 35      < 40     40-45     ----  20-24 Y    ----     < 40      >45      ----        >24 Y      ----     < 40     40-60      >60      Cholesterol/HDL Ratio 02/10/2024 2.8   Final      Ref Values  Desirable  < 3.4  High Risk  > 5.0    LDL Calculated 02/10/2024 45  <=99 mg/dL Final                                Near   Borderline      AGE      Desirable  Optimal    High     High     Very High     0-19 Y     0 - 109     ---     110-129   >/= 130     ----    20-24 Y     0 - 119     ---    120-159   >/= 160     ----      >24 Y     0 -  99   100-129  130-159   160-189     >/=190      VLDL 02/10/2024 21  0 - 40 mg/dL Final    Triglycerides 02/10/2024 104  0 - 149 mg/dL Final       Age         Desirable   Borderline High   High     Very High   0 D-90 D    19 - 174         ----         ----        ----  91 D- 9 Y     0 -  74        75 -  99     >/= 100      ----    10-19 Y     0 -  89        90 - 129     >/= 130      ----    20-24 Y     0 - 114       115 - 149     >/= 150      ----         >24 Y     0 - 149       150 - 199    200- 499    >/= 500    Venipuncture immediately after or during the administration of Metamizole may lead to falsely low results. Testing should be performed immediately prior to Metamizole dosing.    Non HDL Cholesterol 02/10/2024 66  0 - 149 mg/dL Final          Age       Desirable   Borderline High   High     Very High     0-19 Y     0 - 119       120 - 144     >/= 145    >/= 160    20-24 Y     0 - 149       150 - 189     >/= 190      ----         >24 Y    30 mg/dL above LDL Cholesterol goal      Glucose 02/10/2024 128 (H)  74 - 99 mg/dL Final    Sodium 02/10/2024 140  136 - 145 mmol/L Final    Potassium 02/10/2024 3.9  3.5 - 5.3 mmol/L Final    Chloride 02/10/2024 105  98 - 107 mmol/L Final    Bicarbonate 02/10/2024 28  21 - 32 mmol/L Final    Anion Gap 02/10/2024 11  10 - 20 mmol/L Final    Urea Nitrogen 02/10/2024 9  6 - 23 mg/dL Final    Creatinine 02/10/2024 0.92  0.50 - 1.30 mg/dL Final    eGFR 02/10/2024 >90  >60 mL/min/1.73m*2 Final    Calculations of estimated GFR are performed using the 2021 CKD-EPI Study Refit equation without the race variable for the IDMS-Traceable creatinine methods.  https://jasn.asnjournals.org/content/early/2021/09/22/ASN.0535713949    Calcium 02/10/2024 9.0  8.6 - 10.3 mg/dL Final    Albumin 02/10/2024 4.4  3.4 - 5.0 g/dL Final    Alkaline Phosphatase 02/10/2024 97  33 - 136 U/L Final     Total Protein 02/10/2024 6.1 (L)  6.4 - 8.2 g/dL Final    AST 02/10/2024 22  9 - 39 U/L Final    Bilirubin, Total 02/10/2024 0.9  0.0 - 1.2 mg/dL Final    ALT 02/10/2024 38  10 - 52 U/L Final    Patients treated with Sulfasalazine may generate falsely decreased results for ALT.    Albumin, Urine Random 02/10/2024 26.3  Not established mg/L Final    Creatinine, Urine Random 02/10/2024 175.9  20.0 - 370.0 mg/dL Final    Albumin/Creatine Ratio 02/10/2024 15.0  <30.0 ug/mg Creat Final    Hemoglobin A1C 02/10/2024 6.8 (H)  see below % Final    Estimated Average Glucose 02/10/2024 148  Not Established mg/dL Final    WBC 02/10/2024 31.5 (H)  4.4 - 11.3 x10*3/uL Final    nRBC 02/10/2024 0.0  0.0 - 0.0 /100 WBCs Final    RBC 02/10/2024 5.42  4.50 - 5.90 x10*6/uL Final    Hemoglobin 02/10/2024 14.9  13.5 - 17.5 g/dL Final    Hematocrit 02/10/2024 46.3  41.0 - 52.0 % Final    MCV 02/10/2024 85  80 - 100 fL Final    MCH 02/10/2024 27.5  26.0 - 34.0 pg Final    MCHC 02/10/2024 32.2  32.0 - 36.0 g/dL Final    RDW 02/10/2024 15.9 (H)  11.5 - 14.5 % Final    Platelets 02/10/2024 131 (L)  150 - 450 x10*3/uL Final    Neutrophils % 02/10/2024 13.2  40.0 - 80.0 % Final    Immature Granulocytes %, Automated 02/10/2024 0.2  0.0 - 0.9 % Final    Immature Granulocyte Count (IG) includes promyelocytes, myelocytes and metamyelocytes but does not include bands. Percent differential counts (%) should be interpreted in the context of the absolute cell counts (cells/UL).    Lymphocytes % 02/10/2024 82.7  13.0 - 44.0 % Final    Monocytes % 02/10/2024 2.6  2.0 - 10.0 % Final    Eosinophils % 02/10/2024 1.2  0.0 - 6.0 % Final    Basophils % 02/10/2024 0.1  0.0 - 2.0 % Final    Neutrophils Absolute 02/10/2024 4.16  1.20 - 7.70 x10*3/uL Final    Percent differential counts (%) should be interpreted in the context of the absolute cell counts (cells/uL).    Immature Granulocytes Absolute, Au* 02/10/2024 0.05  0.00 - 0.70 x10*3/uL Final     Lymphocytes Absolute 02/10/2024 26.09 (H)  1.20 - 4.80 x10*3/uL Final    Monocytes Absolute 02/10/2024 0.82  0.10 - 1.00 x10*3/uL Final    Eosinophils Absolute 02/10/2024 0.37  0.00 - 0.70 x10*3/uL Final    Basophils Absolute 02/10/2024 0.04  0.00 - 0.10 x10*3/uL Final    Automated WBC differential has been confirmed by manual smear.    Prostate Specific AG 02/10/2024 0.83  <=4.00 ng/mL Final    Vit D, 1,25-Dihydroxy 02/10/2024 92.7 (H)  19.9 - 79.3 pg/mL Final    INTERPRETIVE INFORMATION: Vitamin D, 1,25-Dihydroxy    This test is primarily indicated during patient evaluation for   hypercalcemia and renal failure. A normal result does not rule out   Vitamin D deficiency. The recommended test for diagnosing Vitamin   D deficiency is Vitamin D 25-hydroxy.  Performed By: [facility]  [address] [place] [id number]  : Gigi Pickett MD, PhD  CLIA Number: [id number]    Vitamin B12 02/10/2024 579  211 - 911 pg/mL Final    Hepatitis C AB 02/10/2024 Nonreactive  Nonreactive Final    Results from patients taking biotin supplements or receiving high-dose biotin therapy should be interpreted with caution due to possible interference with this test. Providers may contact their local laboratory for further information.    RBC Morphology 02/10/2024 No significant RBC morphology present   Final   Infusion on 01/15/2024   Component Date Value Ref Range Status    WBC 01/15/2024 40.5 (H)  4.4 - 11.3 x10*3/uL Final    nRBC 01/15/2024    Final    Not Measured    RBC 01/15/2024 5.40  4.50 - 5.90 x10*6/uL Final    Hemoglobin 01/15/2024 15.0  13.5 - 17.5 g/dL Final    Hematocrit 01/15/2024 44.4  41.0 - 52.0 % Final    MCV 01/15/2024 82  80 - 100 fL Final    MCH 01/15/2024 27.8  26.0 - 34.0 pg Final    MCHC 01/15/2024 33.8  32.0 - 36.0 g/dL Final    RDW 01/15/2024 15.1 (H)  11.5 - 14.5 % Final    Platelets 01/15/2024 175  150 - 450 x10*3/uL Final    Immature Granulocytes %, Automated 01/15/2024 0.3   0.0 - 0.9 % Final    Immature Granulocyte Count (IG) includes promyelocytes, myelocytes and metamyelocytes but does not include bands. Percent differential counts (%) should be interpreted in the context of the absolute cell counts (cells/UL).    Immature Granulocytes Absolute, Au* 01/15/2024 0.11  0.00 - 0.70 x10*3/uL Final    Glucose 01/15/2024 124 (H)  74 - 99 mg/dL Final    Sodium 01/15/2024 139  136 - 145 mmol/L Final    Potassium 01/15/2024 3.5  3.5 - 5.3 mmol/L Final    Chloride 01/15/2024 104  98 - 107 mmol/L Final    Bicarbonate 01/15/2024 27  21 - 32 mmol/L Final    Anion Gap 01/15/2024 12  10 - 20 mmol/L Final    Urea Nitrogen 01/15/2024 9  6 - 23 mg/dL Final    Creatinine 01/15/2024 0.79  0.50 - 1.30 mg/dL Final    eGFR 01/15/2024 >90  >60 mL/min/1.73m*2 Final    Calculations of estimated GFR are performed using the 2021 CKD-EPI Study Refit equation without the race variable for the IDMS-Traceable creatinine methods.  https://jasn.asnjournals.org/content/early/2021/09/22/ASN.7765831128    Calcium 01/15/2024 9.0  8.6 - 10.3 mg/dL Final    Albumin 01/15/2024 4.1  3.4 - 5.0 g/dL Final    Alkaline Phosphatase 01/15/2024 104  33 - 136 U/L Final    Total Protein 01/15/2024 6.5  6.4 - 8.2 g/dL Final    AST 01/15/2024 15  9 - 39 U/L Final    Bilirubin, Total 01/15/2024 0.7  0.0 - 1.2 mg/dL Final    ALT 01/15/2024 31  10 - 52 U/L Final    Patients treated with Sulfasalazine may generate falsely decreased results for ALT.    Hepatitis B Surface AG 01/15/2024 Nonreactive  Nonreactive Final    Biotin interference may cause falsely decreased results. Patients taking a Biotin dose of up to 5 mg/day should refrain from taking Biotin for 24 hours before sample collection. Providers may contact their local laboratory for  further information.    Hepatitis B Core AB- Total 01/15/2024 Nonreactive  Nonreactive Final    Hepatitis B Surface AB 01/15/2024 <3.1  <10.0 mIU/mL Final    Interpretive Criteria:                          <10 mIU/mL    Nonreactive                          >=10 mIU/mL    Reactive     Biotin interference may cause falsely decreased results. Patients taking a Biotin dose of up to 5 mg/day should refrain from taking Biotin for 24 hours before sample collection. Providers may contact their local laboratory for further information.    LDH 01/15/2024 168  84 - 246 U/L Final    Uric Acid 01/15/2024 3.2 (L)  4.0 - 7.5 mg/dL Final    Venipuncture immediately after or during the administration of Metamizole may lead to falsely low results. Testing should be performed immediately  prior to Metamizole dosing.    Neutrophils %, Manual 01/15/2024 6.0  40.0 - 80.0 % Final    Percent differential counts (%) should be interpreted in the context of the absolute cell counts (cells/uL).    Bands %, Manual 01/15/2024 1.0  0.0 - 5.0 % Final    Lymphocytes %, Manual 01/15/2024 91.0  13.0 - 44.0 % Final    Monocytes %, Manual 01/15/2024 2.0  2.0 - 10.0 % Final    Eosinophils %, Manual 01/15/2024 0.0  0.0 - 6.0 % Final    Basophils %, Manual 01/15/2024 0.0  0.0 - 2.0 % Final    Seg Neutrophils Absolute, Manual 01/15/2024 2.43  1.20 - 7.00 x10*3/uL Final    Bands Absolute, Manual 01/15/2024 0.41  0.00 - 0.70 x10*3/uL Final    Lymphocytes Absolute, Manual 01/15/2024 36.86 (H)  1.20 - 4.80 x10*3/uL Final    Monocytes Absolute, Manual 01/15/2024 0.81  0.10 - 1.00 x10*3/uL Final    Eosinophils Absolute, Manual 01/15/2024 0.00  0.00 - 0.70 x10*3/uL Final    Basophils Absolute, Manual 01/15/2024 0.00  0.00 - 0.10 x10*3/uL Final    Total Cells Counted 01/15/2024 100   Final    Neutrophils Absolute, Manual 01/15/2024 2.84  1.20 - 7.70 x10*3/uL Final    RBC Morphology 01/15/2024 No significant RBC morphology present   Final           Review of Systems   A complete review of systems was performed and all systems were normal except what is noted in the HPI.        Objective   /75 (BP Location: Left arm, Patient Position: Sitting, BP Cuff Size:  "Adult)   Pulse 75   Temp 36.2 °C (97.1 °F) (Temporal)   Resp 16   Ht 1.7 m (5' 6.93\")   Wt 91.9 kg (202 lb 9.6 oz)   SpO2 94%   BMI 31.80 kg/m²    Physical Exam  Constitutional:       Appearance: Normal appearance. He is obese.   HENT:      Head: Normocephalic and atraumatic.   Neck:      Vascular: No carotid bruit.   Cardiovascular:      Rate and Rhythm: Normal rate and regular rhythm.      Heart sounds: Normal heart sounds.   Pulmonary:      Effort: Pulmonary effort is normal.      Breath sounds: Normal breath sounds. No wheezing, rhonchi or rales.   Abdominal:      General: Abdomen is flat. Bowel sounds are normal.      Palpations: Abdomen is soft.      Tenderness: There is no abdominal tenderness. There is no guarding.   Musculoskeletal:         General: Normal range of motion.      Right lower leg: No edema.      Left lower leg: No edema.   Lymphadenopathy:      Cervical: Cervical adenopathy present.      Right cervical: Superficial cervical adenopathy present.      Left cervical: Superficial cervical adenopathy present.   Skin:     General: Skin is dry.   Neurological:      General: No focal deficit present.      Mental Status: He is alert and oriented to person, place, and time.   Psychiatric:         Mood and Affect: Mood normal.         Behavior: Behavior normal.         Thought Content: Thought content normal.         Health Maintenance Due   Topic Date Due    MMR Vaccines (1 of 1 - Standard series) Never done    Diabetes: Foot Exam  Never done    Diabetes: Retinopathy Screening  Never done    Zoster Vaccines (1 of 2) Never done    DTaP/Tdap/Td Vaccines (1 - Tdap) Never done    COVID-19 Vaccine (2 - Pfizer risk series) 03/12/2022    Abdominal Aortic Aneurysm (AAA) Screening  Never done    Influenza Vaccine (1) Never done        Assessment/Plan   Problem List Items Addressed This Visit       CAD (coronary artery disease)     Stable and asymptomatic   On statin, aspirin  Has follow up cardiology " 5/24         CLL (chronic lymphocytic leukemia) (CMS/HCC)     WBC down from 65 to 31  Closely monitored by oncology - has follow up 3/24         Relevant Orders    CBC    Essential hypertension     Well controlled. Continue current medicine and recheck in 6 months.           Relevant Orders    Comprehensive Metabolic Panel    Controlled type 2 diabetes mellitus without complication, without long-term current use of insulin (CMS/HCC) - Primary     A1c up slightly from 6.6 to 6.8  On statin  No microalbuminuria   Work on diet reviewed with patient.   Recheck 6 months            Relevant Orders    Comprehensive Metabolic Panel    Hemoglobin A1C    Mixed hyperlipidemia     Well controlled continue current medication. Reevaluate in 6 months.            Relevant Orders    Lipid Panel    Cholesterol, LDL Direct    XENA on CPAP     Patient is compliant with and benefiting from CPAP - will continue use.           Paroxysmal atrial fibrillation (CMS/formerly [facility])     Rate and rhythm controlled.  Continue current medication  Has follow up cardiology 5/24         Thrombocytopenia (CMS/formerly [facility])     Mild decrease - Continue to monitor   Recheck 6 months          Relevant Orders    CBC    Vitamin D deficiency     Hold vitamin D - level high  Recheck 6 months            Relevant Orders    Vitamin D 25-Hydroxy,Total (for eval of Vitamin D levels)    Hypokalemia     Well controlled. Continue current medicine and recheck in 6 months.          Relevant Orders    Comprehensive Metabolic Panel    Class 1 obesity due to excess calories with serious comorbidity and body mass index (BMI) of 30.0 to 30.9 in adult     Work on diet reviewed with patient.   Recommend at least 150 minutes of cardiovascular exercise weekly.           Other Visit Diagnoses       Screening for AAA (abdominal aortic aneurysm)        Relevant Orders    Vascular US Abdominal Aorta Aneurysm AAA Screening              Patient understands and agrees with care plan.           Deedee KRAMER  Jeffry Mcclendon MD

## 2024-02-19 ENCOUNTER — OFFICE VISIT (OUTPATIENT)
Dept: PRIMARY CARE | Facility: CLINIC | Age: 64
End: 2024-02-19
Payer: COMMERCIAL

## 2024-02-19 VITALS
HEART RATE: 75 BPM | WEIGHT: 202.6 LBS | SYSTOLIC BLOOD PRESSURE: 136 MMHG | TEMPERATURE: 97.1 F | BODY MASS INDEX: 31.8 KG/M2 | DIASTOLIC BLOOD PRESSURE: 75 MMHG | OXYGEN SATURATION: 94 % | RESPIRATION RATE: 16 BRPM | HEIGHT: 67 IN

## 2024-02-19 DIAGNOSIS — Z13.6 SCREENING FOR AAA (ABDOMINAL AORTIC ANEURYSM): ICD-10-CM

## 2024-02-19 DIAGNOSIS — D69.6 THROMBOCYTOPENIA (CMS-HCC): ICD-10-CM

## 2024-02-19 DIAGNOSIS — E55.9 VITAMIN D DEFICIENCY: ICD-10-CM

## 2024-02-19 DIAGNOSIS — E87.6 HYPOKALEMIA: ICD-10-CM

## 2024-02-19 DIAGNOSIS — I48.0 PAROXYSMAL ATRIAL FIBRILLATION (MULTI): ICD-10-CM

## 2024-02-19 DIAGNOSIS — C91.10 CLL (CHRONIC LYMPHOCYTIC LEUKEMIA) (MULTI): ICD-10-CM

## 2024-02-19 DIAGNOSIS — E66.09 CLASS 1 OBESITY DUE TO EXCESS CALORIES WITH SERIOUS COMORBIDITY AND BODY MASS INDEX (BMI) OF 30.0 TO 30.9 IN ADULT: ICD-10-CM

## 2024-02-19 DIAGNOSIS — I10 ESSENTIAL HYPERTENSION: ICD-10-CM

## 2024-02-19 DIAGNOSIS — G47.33 OSA ON CPAP: ICD-10-CM

## 2024-02-19 DIAGNOSIS — E11.9 CONTROLLED TYPE 2 DIABETES MELLITUS WITHOUT COMPLICATION, WITHOUT LONG-TERM CURRENT USE OF INSULIN (MULTI): Primary | ICD-10-CM

## 2024-02-19 DIAGNOSIS — I25.10 CORONARY ARTERY DISEASE INVOLVING NATIVE HEART WITHOUT ANGINA PECTORIS, UNSPECIFIED VESSEL OR LESION TYPE: ICD-10-CM

## 2024-02-19 DIAGNOSIS — E78.2 MIXED HYPERLIPIDEMIA: ICD-10-CM

## 2024-02-19 PROCEDURE — 1126F AMNT PAIN NOTED NONE PRSNT: CPT | Performed by: FAMILY MEDICINE

## 2024-02-19 PROCEDURE — 1036F TOBACCO NON-USER: CPT | Performed by: FAMILY MEDICINE

## 2024-02-19 PROCEDURE — 3078F DIAST BP <80 MM HG: CPT | Performed by: FAMILY MEDICINE

## 2024-02-19 PROCEDURE — 3008F BODY MASS INDEX DOCD: CPT | Performed by: FAMILY MEDICINE

## 2024-02-19 PROCEDURE — 99214 OFFICE O/P EST MOD 30 MIN: CPT | Performed by: FAMILY MEDICINE

## 2024-02-19 PROCEDURE — 3048F LDL-C <100 MG/DL: CPT | Performed by: FAMILY MEDICINE

## 2024-02-19 PROCEDURE — 3061F NEG MICROALBUMINURIA REV: CPT | Performed by: FAMILY MEDICINE

## 2024-02-19 PROCEDURE — 1159F MED LIST DOCD IN RCRD: CPT | Performed by: FAMILY MEDICINE

## 2024-02-19 PROCEDURE — 1124F ACP DISCUSS-NO DSCNMKR DOCD: CPT | Performed by: FAMILY MEDICINE

## 2024-02-19 PROCEDURE — 1160F RVW MEDS BY RX/DR IN RCRD: CPT | Performed by: FAMILY MEDICINE

## 2024-02-19 PROCEDURE — 3075F SYST BP GE 130 - 139MM HG: CPT | Performed by: FAMILY MEDICINE

## 2024-02-19 PROCEDURE — 3044F HG A1C LEVEL LT 7.0%: CPT | Performed by: FAMILY MEDICINE

## 2024-02-23 ENCOUNTER — TELEPHONE (OUTPATIENT)
Dept: HEMATOLOGY/ONCOLOGY | Facility: CLINIC | Age: 64
End: 2024-02-23
Payer: COMMERCIAL

## 2024-02-23 NOTE — TELEPHONE ENCOUNTER
Demar called because he was prescribed allopurinol to help counteract side effects of chemo. He said his insurance denied chemo so he isn't getting treatments. He is asking if he can discontinue taking the Allopurinol? He asked if you could call him back and please leave a message. He is at work and will not be able to answer his phone.

## 2024-02-23 NOTE — TELEPHONE ENCOUNTER
Demar was prescribed Allopurinol before he was to start treatment. His insurance denied treatment so he should no longer be taking the allopurinol but dr. Wang is not available today  2/10, WBC 31.5,  uric acid- 3.2  Left a msg for Demar informing him that this RN will speak with dr. Wang on Monday 2/26, spoke with dr. Wang regarding the Allopurinol. Per dr. Wang, Demar should stop taking allopurinol. This information was discussed with Demar's wife. She will relay the information to Demar.

## 2024-02-27 ENCOUNTER — APPOINTMENT (OUTPATIENT)
Dept: HEMATOLOGY/ONCOLOGY | Facility: CLINIC | Age: 64
End: 2024-02-27
Payer: COMMERCIAL

## 2024-03-11 ENCOUNTER — APPOINTMENT (OUTPATIENT)
Dept: HEMATOLOGY/ONCOLOGY | Facility: CLINIC | Age: 64
End: 2024-03-11
Payer: COMMERCIAL

## 2024-03-12 ENCOUNTER — APPOINTMENT (OUTPATIENT)
Dept: HEMATOLOGY/ONCOLOGY | Facility: CLINIC | Age: 64
End: 2024-03-12
Payer: COMMERCIAL

## 2024-03-13 ENCOUNTER — APPOINTMENT (OUTPATIENT)
Dept: HEMATOLOGY/ONCOLOGY | Facility: CLINIC | Age: 64
End: 2024-03-13
Payer: COMMERCIAL

## 2024-03-14 ENCOUNTER — OFFICE VISIT (OUTPATIENT)
Dept: HEMATOLOGY/ONCOLOGY | Facility: CLINIC | Age: 64
End: 2024-03-14
Payer: COMMERCIAL

## 2024-03-14 ENCOUNTER — ONCOLOGY MEDICATION OUTREACH (OUTPATIENT)
Dept: HEMATOLOGY/ONCOLOGY | Facility: CLINIC | Age: 64
End: 2024-03-14

## 2024-03-14 VITALS
HEART RATE: 71 BPM | RESPIRATION RATE: 16 BRPM | DIASTOLIC BLOOD PRESSURE: 92 MMHG | BODY MASS INDEX: 31.31 KG/M2 | OXYGEN SATURATION: 93 % | HEIGHT: 67 IN | SYSTOLIC BLOOD PRESSURE: 148 MMHG | TEMPERATURE: 97.2 F | WEIGHT: 199.52 LBS

## 2024-03-14 DIAGNOSIS — C91.10 CLL (CHRONIC LYMPHOCYTIC LEUKEMIA) (MULTI): ICD-10-CM

## 2024-03-14 LAB
ALBUMIN SERPL BCP-MCNC: 4.7 G/DL (ref 3.4–5)
ALP SERPL-CCNC: 88 U/L (ref 33–136)
ALT SERPL W P-5'-P-CCNC: 32 U/L (ref 10–52)
ANION GAP SERPL CALC-SCNC: 12 MMOL/L (ref 10–20)
AST SERPL W P-5'-P-CCNC: 20 U/L (ref 9–39)
BASOPHILS # BLD AUTO: 0.03 X10*3/UL (ref 0–0.1)
BASOPHILS NFR BLD AUTO: 0.2 %
BILIRUB SERPL-MCNC: 1.1 MG/DL (ref 0–1.2)
BUN SERPL-MCNC: 11 MG/DL (ref 6–23)
CALCIUM SERPL-MCNC: 8.9 MG/DL (ref 8.6–10.3)
CHLORIDE SERPL-SCNC: 105 MMOL/L (ref 98–107)
CO2 SERPL-SCNC: 24 MMOL/L (ref 21–32)
CREAT SERPL-MCNC: 0.92 MG/DL (ref 0.5–1.3)
EGFRCR SERPLBLD CKD-EPI 2021: >90 ML/MIN/1.73M*2
EOSINOPHIL # BLD AUTO: 0.1 X10*3/UL (ref 0–0.7)
EOSINOPHIL NFR BLD AUTO: 0.7 %
ERYTHROCYTE [DISTWIDTH] IN BLOOD BY AUTOMATED COUNT: 14.3 % (ref 11.5–14.5)
GLUCOSE SERPL-MCNC: 108 MG/DL (ref 74–99)
HCT VFR BLD AUTO: 43.9 % (ref 41–52)
HGB BLD-MCNC: 14.9 G/DL (ref 13.5–17.5)
IMM GRANULOCYTES # BLD AUTO: 0.01 X10*3/UL (ref 0–0.7)
IMM GRANULOCYTES NFR BLD AUTO: 0.1 % (ref 0–0.9)
LDH SERPL L TO P-CCNC: 157 U/L (ref 84–246)
LYMPHOCYTES # BLD AUTO: 12.35 X10*3/UL (ref 1.2–4.8)
LYMPHOCYTES NFR BLD AUTO: 88.6 %
MCH RBC QN AUTO: 28.5 PG (ref 26–34)
MCHC RBC AUTO-ENTMCNC: 33.9 G/DL (ref 32–36)
MCV RBC AUTO: 84 FL (ref 80–100)
MONOCYTES # BLD AUTO: 0.96 X10*3/UL (ref 0.1–1)
MONOCYTES NFR BLD AUTO: 6.9 %
NEUTROPHILS # BLD AUTO: 0.49 X10*3/UL (ref 1.2–7.7)
NEUTROPHILS NFR BLD AUTO: 3.5 %
NRBC BLD-RTO: ABNORMAL /100{WBCS}
PLATELET # BLD AUTO: 122 X10*3/UL (ref 150–450)
POTASSIUM SERPL-SCNC: 4.1 MMOL/L (ref 3.5–5.3)
PROT SERPL-MCNC: 6.5 G/DL (ref 6.4–8.2)
RBC # BLD AUTO: 5.23 X10*6/UL (ref 4.5–5.9)
RBC MORPH BLD: NORMAL
SODIUM SERPL-SCNC: 137 MMOL/L (ref 136–145)
URATE SERPL-MCNC: 5.3 MG/DL (ref 4–7.5)
WBC # BLD AUTO: 13.9 X10*3/UL (ref 4.4–11.3)

## 2024-03-14 PROCEDURE — 1126F AMNT PAIN NOTED NONE PRSNT: CPT | Performed by: INTERNAL MEDICINE

## 2024-03-14 PROCEDURE — 1160F RVW MEDS BY RX/DR IN RCRD: CPT | Performed by: INTERNAL MEDICINE

## 2024-03-14 PROCEDURE — 1159F MED LIST DOCD IN RCRD: CPT | Performed by: INTERNAL MEDICINE

## 2024-03-14 PROCEDURE — 36415 COLL VENOUS BLD VENIPUNCTURE: CPT | Performed by: INTERNAL MEDICINE

## 2024-03-14 PROCEDURE — 1036F TOBACCO NON-USER: CPT | Performed by: INTERNAL MEDICINE

## 2024-03-14 PROCEDURE — 3008F BODY MASS INDEX DOCD: CPT | Performed by: INTERNAL MEDICINE

## 2024-03-14 PROCEDURE — 3077F SYST BP >= 140 MM HG: CPT | Performed by: INTERNAL MEDICINE

## 2024-03-14 PROCEDURE — 99215 OFFICE O/P EST HI 40 MIN: CPT | Performed by: INTERNAL MEDICINE

## 2024-03-14 PROCEDURE — 85025 COMPLETE CBC W/AUTO DIFF WBC: CPT | Performed by: INTERNAL MEDICINE

## 2024-03-14 PROCEDURE — 3048F LDL-C <100 MG/DL: CPT | Performed by: INTERNAL MEDICINE

## 2024-03-14 PROCEDURE — 80053 COMPREHEN METABOLIC PANEL: CPT | Performed by: INTERNAL MEDICINE

## 2024-03-14 PROCEDURE — 1123F ACP DISCUSS/DSCN MKR DOCD: CPT | Performed by: INTERNAL MEDICINE

## 2024-03-14 PROCEDURE — 84550 ASSAY OF BLOOD/URIC ACID: CPT | Performed by: INTERNAL MEDICINE

## 2024-03-14 PROCEDURE — 3061F NEG MICROALBUMINURIA REV: CPT | Performed by: INTERNAL MEDICINE

## 2024-03-14 PROCEDURE — 3044F HG A1C LEVEL LT 7.0%: CPT | Performed by: INTERNAL MEDICINE

## 2024-03-14 PROCEDURE — 83615 LACTATE (LD) (LDH) ENZYME: CPT | Performed by: INTERNAL MEDICINE

## 2024-03-14 PROCEDURE — 3080F DIAST BP >= 90 MM HG: CPT | Performed by: INTERNAL MEDICINE

## 2024-03-14 ASSESSMENT — NCCN CANCER DISTRESS MANAGEMENT
NCCN PHYSICAL CONCERNS: 3
NCCN EMOTIONAL CONCERNS: 3
NCCN PHYSICAL CONCERNS: 2

## 2024-03-14 ASSESSMENT — PAIN SCALES - GENERAL: PAINLEVEL: 0-NO PAIN

## 2024-03-14 NOTE — PATIENT INSTRUCTIONS
Prescription for ibrutinib [facility] They will obtain insurance approval and then contact you to arrange delivery of medication. Their phone number is [phone] if you need to contact them.     Once you receive medication call this office prior to starting medication so we can review plan for labs and follow up.

## 2024-03-14 NOTE — PROGRESS NOTES
Reviewed Imbruvica, dose, frequency, administration, treatment cycle, duration of therapy, and missed doses. Counseled on potential side effects including but not limited to chemotherapy side effects: neutropenia, infection risk, anemia, fatigue, weakness, low energy, thrombocytopenia, bleeding/bruising, n/v, diarrhea, constipation, muscle or joint pain, mucositis, skin rash, and afib/aflutter. Discussed techniques to mitigate severity of side effects such as blood count checks, temperature checks, antiemetic use, loperamide use with max dose of 8 tabs per 24 hours, and staying hydrated if having diarrhea.  Provided medication/regimen handout. All questions answered and contact information was given to patient.

## 2024-03-14 NOTE — PROGRESS NOTES
Patient Visit Information:   Visit Type: Follow Up Visit      Cancer History:   Treatment Synopsis:    B-CLL diagnosed in 2015, SUÁREZ stage 0; WBC initially 16.3 with ALC 12.0, hemoglobin 15.1 and platelets 154,000.  The cells expressed CD5 and A light chains.   CD38 and  Zap-70 were negative. Cytogenetic studies by FISH were inconclusive.  WBC has slowly rising but he maintains good marrow reserve and has not required treatment.  CT chest done 3/9/17 showed extensive cervical, axillary and submental adenopathy; largest  node on the left measured 2.3 x 1.9 cm.  Small (less than 9 mm) pulmonology nodules were again seen; relatively stable.  Abdominal nodes slightly increased.    17: WBC 65.4, ALC 62.8; becoming more symptomatic.  17 : WBC 77.6.  ALC 72.2.  Hemoglobin and platelets were normal.  18 WBC: 69.3. A.9. Hgb 15.2. Plt: 148,000  18: WBC 88.2.  ALC 82.9.  Hemoglobin 14.2.  Platelets 170,000.  Adenopathy stable.  18: WBC 77.3.  ALC 66.5.  Hemoglobin 13.8.  Platelets 143,000.  Adenopathy slightly increased.  18: WBC 92.0.  ALC 86.5.  Hemoglobin 14.1.  Platelets 157,000.  Progressing symptomatic adenopathy.  Treatment options discussed.   18 completed rituximab weekly x 4.  19: WBC 16.3.  ALC 11.4.  3/27/19: WBC 7.2.  ALC 3.0.  Hemoglobin 14.8.  Plts. 130,000.  Submandibular nodes.  Few sxs.  Observe.  19: CBC: WBC 9.1.  ALC 5.0.  Hgb 14.1.  Plts 134,000.  Worsening adenopathy.  Observation per pt.  10/9/19: CBC: WBC 16.5.  ALC 12.4.  Hemoglobin 14.6.  Platelets 148,000.  Worsening adenopathy.  3/17/20: Started bendamustine/rituximab  20: #2 bendamustine/rituximab.  20: #3 BR  20: WBC 4.7.  Hemoglobin 12.9.  Platelets 189,000.  20: #4 BR  20: #5 BR  20: WBC 1.7. hgb 11.9. Platelets 176.  20: #6/6 bendamustine/rituximab  20: WBC 3.1. ALC 0.16. Hgb 12.1. Platelets 129,000.  2020: WBC 1.6.  ALC 0.2.  Hemoglobin  13.7.  Platelets 169,000.  2021: WBC 5.4.  ALC 0.33.  Hemoglobin 13.3.  Platelets 124,000.  9/15/2021: WBC 4.4.  ALC 0.7.  Hemoglobin 13.8.  Platelets 119,000.  No adenopathy.     2022: WBC 5.4.  Hemoglobin 14.2.  Platelets 151,000.  2022: WBC 5.1.  ALC 1.1.  Hemoglobin 14.7.  Platelets 109,000.    2023: WBC 6.4.  ALC 1.9.  Hemoglobin 15.0.  Platelets 128,000.  Observation.  23 , WBC count 10.4, hemoglobin 15.1, platelets 107, ALC 5.64 observation   23 , WBC count 43.6, hemoglobin 13.6, platelets 223, ALC 32.0  2023, WBC count 62, hemoglobin 14.9, platelets 143, absolute lymphocyte count 50.2  January 15, 2024, WBC count 40.5, hemoglobin 15.0, platelets 175  2/10/24 , WBC count 31.5, hemoglobin 14.9, platelets 131  3/14/24 , WBC count 13.9, hemoglobin 14.9, platelets 122  History of Present Illness:      ID Statement:    GUNNAR VASQUEZ is a 63 year old Male        Chief Complaint: Follow-up for CLL   Interval History:        3/14/24  He returns today for follow-up for CLL/SLL.  In the beginning of 2023 patient has flulike symptoms and CBC done on 2023 did show WBC count 43.6, hemoglobin 13.6, platelets 223 and absolute lymphocyte count 32.0.  Patient come for follow-up visit.   Patient noticed to have increasing cervical lymphadenopathy today WBC count 13.9 with 88% lymphocyte     Past medical history: CLL/SLL (2015) as described above, status post rituximab, 6 cycles of bendamustine/rituximab (3/2020-2020).  Excellent  response to treatment.  In remission.      History of CABGx3 19, vertigo, XENA, tonsillectomy, retinal tear, stable pulmonary nodules, hyperlipidemia, hypertension, sinusitis.  Unremarkable cardiac catheterization 21.  Hyperglycemia.      Family medical history: Mother  of myeloma in her 70s and his father  of Hodgkin lymphoma at age 39.     Social history: Rare alcohol intake.  Former smoker but quit in  1987.  Occupation:      Review of Systems:   Review of Systems:    Constitutional: No fever, chills, night sweats.    Head and neck: Mild discomfort from nodes.  No headaches or dizziness.  No significant pain, stiffness.   HEENT: No sore throat, decreased hearing on the right. Vison is adequate.    Cardiac: No chest pain, palpitations.    Respiratory: No worsening dyspnea, cough, hemoptysis.  GI: Appetite is fair, weight stable.  No diarrhea, change in bowel movements, melena, hematochezia. No abdominal pain, nausea, vomiting.  Genitourinary: No frequency, urgency.  No polyuria, dysuria, hematuria.  Musculoskeletal: Complains of chronic arthralgias and myalgias.    Endocrine: No diabetes, thyroid disease.  Skin: No rash, skin lesions, itching.  Neuromuscular: No lightheadedness, dizziness.  No history of seizure.  No numbness, paresthesias.                   Allergies and Intolerances:       Allergies:         No Known Allergies: Active     Outpatient Medication Profile:  * Patient Currently Takes Medications as of 17-Aug-2023 15:04 documented in Structured Notes         Zofran 4 mg oral tablet : Last Dose Taken:  , 1 tab(s) orally every 6 hours, As Needed -for nausea and vomiting , Start Date: 10-Mar-2020         Metoprolol Tartrate 25 mg oral tablet: Last Dose Taken:  , 1 tab(s) orally  2 times a day , Start Date: 25-Nov-2019         Multiple Vitamins oral capsule: Last Dose Taken:  , 1 cap(s) orally once  a day , Start Date: 25-Nov-2019         pantoprazole 40 mg oral delayed release tablet: Last Dose Taken:  , 1 tab(s)  orally once a day, Start Date: 25-Nov-2019         atorvastatin 20 mg oral tablet: Last Dose Taken:  , 1 tab(s) orally once  a day (at bedtime), Start Date: 25-Nov-2019         Vitamin D2 50,000 intl units (1.25 mg) oral capsule: Last Dose Taken:   , 1 cap(s) orally every other week         aspirin 81 mg oral tablet: Last Dose Taken:  , 1 tab(s) orally once a day         meclizine 25 mg  oral tablet: Last Dose Taken:  , 1 tab(s) orally 3 times  a day, As Needed         nitroglycerin 0.4 mg sublingual tablet: Last Dose Taken:  , 1 tab(s)  sublingual every 5 minutes, As Needed         amLODIPine 5 mg oral tablet: Last Dose Taken:  , 1 tab(s) orally once  a day             Medical History:         Coronary artery disease: ICD-10: I25.10, Status: Active         Chronic lymphocytic leukemia: ICD-10: C91.10, Status: Active     Family History: No Family History items are recorded  in the problem list.      Social History:   Social Substance History:  ·  Smoking Status former smoker   ·  Tobacco Use denies   ·  Alcohol Use occasionally            Vitals and Measurements:   Vitals: Temp: 36.7  HR: 70  RR: 18  BP: 126/82  SPO2%:   95   Measurements: HT(cm): 170.5  WT(kg): 94.1  BSA: 2.11   BMI:  32.3   Last 3 Weights & Heights: Date:                           Weight/Scale Type:                    Height:   17-Aug-2023 14:55                94.1  kg                     170.5  cm  16-Feb-2023 14:23                93.9  kg                     170.5  cm      Physical Exam:      Constitutional: awake/alert/oriented x3, no distress,  alert and cooperative.   Eyes: PERRL, EOMI, clear sclera.   Head/Neck: Bilateral submandibular gland is palpable   Respiratory/Thorax: Thorax symmetric. Clear to auscultation.  Normal breath sounds. No wheezes, rales, rhonchi.   Cardiovascular: Regular, rate and rhythm. No murmur.   Gastrointestinal: Nondistended.  Normal bowel sounds.   Soft, non-tender. No rebound or guarding. No masses palpable.  No palpable hepatomegaly, splenomegaly.   Musculoskeletal: ROM intact.  No joint swelling.  Adequate strength. No deformity.  No vertebral tenderness or mass.   Extremities: Normal extremities; no cyanosis, contusions,  wounds, clubbing. No edema.   Neurological: Alert and oriented x3. Senses and cranial  nerves grossly intact. No focal motor deficits noted. No focal sensory deficits.    Lymphatic: Patient has bilateral cervical lymphadenopathy, maximum size 2 x 2 cm, bilateral axillary lymph node adenopathy measuring 1 x 2 cm, no inguinal lymphadenopathy   Psychological: Appropriate mood and behavior.   Skin: No suspicious lesions.         Lab Results:                 Component  Ref Range & Units 11:22  (3/14/24) 1 mo ago  (2/10/24) 1 mo ago  (1/15/24) 2 mo ago  (12/29/23) 2 mo ago  (12/18/23) 6 mo ago  (8/23/23) 6 mo ago  (8/22/23)   WBC  4.4 - 11.3 x10*3/uL 13.9 High  31.5 High  40.5 High  65.3 High Panic  43.6 High  CANCELED R, CM 9.7 R   nRBC  0.0 R CM CM 0.0 R CANCELED CM    Comment: Not Measured   RBC  4.50 - 5.90 x10*6/uL 5.23 5.42 5.40 5.50 4.94 CANCELED R, CM 4.84 R   Hemoglobin  13.5 - 17.5 g/dL 14.9 14.9 15.0 15.3 13.6 CANCELED R, CM 13.8   Hematocrit  41.0 - 52.0 % 43.9 46.3 44.4 46.8 42.3 CANCELED R, CM 40.3 Low    MCV  80 - 100 fL 84 85 82 85 86 CANCELED R, CM 83   MCH  26.0 - 34.0 pg 28.5 27.5 27.8 27.8 27.5     MCHC  32.0 - 36.0 g/dL 33.9 32.2 33.8 32.7 32.2 CANCELED R, CM 34.2   RDW  11.5 - 14.5 % 14.3 15.9 High  15.1 High  15.1 High  14.3 CANCELED R, CM 13.6   Platelets  150 - 450 x10*3/uL 122 Low  131 Low  175 166 223 CANCELED R,  Low  R   Neutrophils %  40.0 - 80.0 % 3.5 13.2   22.1 C                 Assessment and Plan:   Assessment:    1.  Chronic lymphocytic leukemia with symptomatic adenopathy, excellent response to bendamustine/rituximab; completed 6 cycles in August 2020.   Today WBC count 62.9, hemoglobin 14.9, platelets 143 patient is symptomatic including weakness and night sweats.    3/14/24, physical examination positive for bilateral cervical lymphadenopathy, bilateral axillary lymphadenopathy  2.  CAD, status post CABG.       3.  History of arthritis.      4.  Multiple medical problems.     Plan:    Today WBC count of 13.9 physical examination did show significant bilateral cervical lymphadenopathy measuring 2 x 3 cm patient also has bilateral axillary  lymphadenopathy.  These findings are consistent with progression of disease.  I will schedule for PET scan and will start ibrutinib 420 mg p.o. daily and possible side effect discussed with the patient.  Reevaluation after 4-week    Greater than 40 minutes spent discussing his condition, natural history of the disease, treatment, laboratory and imaging results, follow-up and  documentation in EMR.

## 2024-03-15 ENCOUNTER — TELEPHONE (OUTPATIENT)
Dept: CASE MANAGEMENT | Facility: HOSPITAL | Age: 64
End: 2024-03-15
Payer: COMMERCIAL

## 2024-03-15 NOTE — TELEPHONE ENCOUNTER
Patient had a follow up appointment with Dr. Wang yesterday 3/14/23.  Patient scored a 5 on his distress screen and identified physical concerns-sleep and fatigue and emotional concerns-loss of interest and enjoyment.   (SW) called patient and spoke with Mrs. Pittman.  She stated that patient was at work.  SW let Mrs. Pittman know if patient would need anything to reach out to SW.  Mrs. Pittman stated that she will let patient know.      Titi Jiménez MSW, LSW

## 2024-03-18 ENCOUNTER — SPECIALTY PHARMACY (OUTPATIENT)
Dept: PHARMACY | Facility: CLINIC | Age: 64
End: 2024-03-18

## 2024-03-18 DIAGNOSIS — E87.6 HYPOKALEMIA: ICD-10-CM

## 2024-03-18 DIAGNOSIS — K21.9 GASTROESOPHAGEAL REFLUX DISEASE WITHOUT ESOPHAGITIS: Primary | ICD-10-CM

## 2024-03-18 DIAGNOSIS — C91.10 CLL (CHRONIC LYMPHOCYTIC LEUKEMIA) (MULTI): ICD-10-CM

## 2024-03-18 DIAGNOSIS — R12 HEARTBURN: ICD-10-CM

## 2024-03-18 DIAGNOSIS — K21.9 GASTROESOPHAGEAL REFLUX DISEASE WITHOUT ESOPHAGITIS: ICD-10-CM

## 2024-03-18 RX ORDER — PANTOPRAZOLE SODIUM 40 MG/1
40 TABLET, DELAYED RELEASE ORAL DAILY
Qty: 90 TABLET | Refills: 3 | Status: ON HOLD | OUTPATIENT
Start: 2024-03-18 | End: 2025-03-18

## 2024-03-18 RX ORDER — PANTOPRAZOLE SODIUM 40 MG/1
40 TABLET, DELAYED RELEASE ORAL DAILY
Qty: 90 TABLET | Refills: 3 | Status: SHIPPED | OUTPATIENT
Start: 2024-03-18 | End: 2024-03-18 | Stop reason: SDUPTHER

## 2024-03-18 RX ORDER — POTASSIUM CHLORIDE 750 MG/1
10 TABLET, FILM COATED, EXTENDED RELEASE ORAL DAILY
Qty: 90 TABLET | Refills: 3 | Status: ON HOLD | OUTPATIENT
Start: 2024-03-18 | End: 2025-03-18

## 2024-03-18 RX ORDER — PANTOPRAZOLE SODIUM 40 MG/1
40 TABLET, DELAYED RELEASE ORAL DAILY
Qty: 90 TABLET | Refills: 3 | Status: SHIPPED | OUTPATIENT
Start: 2024-03-18 | End: 2024-05-09 | Stop reason: ALTCHOICE

## 2024-03-18 RX ORDER — POTASSIUM CHLORIDE 750 MG/1
TABLET, FILM COATED, EXTENDED RELEASE ORAL
Qty: 90 TABLET | Refills: 3 | Status: SHIPPED | OUTPATIENT
Start: 2024-03-18 | End: 2024-05-09 | Stop reason: ALTCHOICE

## 2024-03-20 ENCOUNTER — TELEPHONE (OUTPATIENT)
Dept: PRIMARY CARE | Facility: CLINIC | Age: 64
End: 2024-03-20
Payer: COMMERCIAL

## 2024-03-20 NOTE — TELEPHONE ENCOUNTER
Patient's wife called in and stated that her  would like a second opinion. The patient currently sees Dr. Wang a hematologist. The patient is asking for a referral to a different Hematologist if possible.

## 2024-03-25 ENCOUNTER — SPECIALTY PHARMACY (OUTPATIENT)
Dept: HEMATOLOGY/ONCOLOGY | Facility: CLINIC | Age: 64
End: 2024-03-25
Payer: COMMERCIAL

## 2024-03-27 ENCOUNTER — TELEPHONE (OUTPATIENT)
Dept: HEMATOLOGY/ONCOLOGY | Facility: CLINIC | Age: 64
End: 2024-03-27
Payer: COMMERCIAL

## 2024-03-27 NOTE — TELEPHONE ENCOUNTER
Demar wanted to know how long he would have to be on Imbruvica.  Also is it ok to have water during NPO for CT.  Please leave a message on phone.

## 2024-03-27 NOTE — TELEPHONE ENCOUNTER
This RN called number listed, number was not identified. Left a general message with call back number.   3/28, phone call to Demar, no answer, left msg for return call

## 2024-03-28 ENCOUNTER — HOSPITAL ENCOUNTER (OUTPATIENT)
Dept: RADIOLOGY | Facility: HOSPITAL | Age: 64
Discharge: HOME | End: 2024-03-28
Payer: COMMERCIAL

## 2024-03-28 DIAGNOSIS — C91.10 CLL (CHRONIC LYMPHOCYTIC LEUKEMIA) (MULTI): ICD-10-CM

## 2024-03-28 PROCEDURE — 78815 PET IMAGE W/CT SKULL-THIGH: CPT | Mod: PI

## 2024-03-28 PROCEDURE — A9552 F18 FDG: HCPCS | Performed by: INTERNAL MEDICINE

## 2024-03-28 PROCEDURE — 3430000001 HC RX 343 DIAGNOSTIC RADIOPHARMACEUTICALS: Performed by: INTERNAL MEDICINE

## 2024-03-28 PROCEDURE — 78815 PET IMAGE W/CT SKULL-THIGH: CPT | Mod: PET TUMOR INIT TX STRAT | Performed by: RADIOLOGY

## 2024-03-28 RX ORDER — FLUDEOXYGLUCOSE F 18 200 MCI/ML
13.9 INJECTION, SOLUTION INTRAVENOUS
Status: COMPLETED | OUTPATIENT
Start: 2024-03-28 | End: 2024-03-28

## 2024-03-28 RX ADMIN — FLUDEOXYGLUCOSE F 18 13.9 MILLICURIE: 200 INJECTION, SOLUTION INTRAVENOUS at 14:22

## 2024-04-07 DIAGNOSIS — I25.10 CORONARY ARTERY DISEASE INVOLVING NATIVE HEART WITHOUT ANGINA PECTORIS, UNSPECIFIED VESSEL OR LESION TYPE: ICD-10-CM

## 2024-04-07 DIAGNOSIS — E78.2 MIXED HYPERLIPIDEMIA: ICD-10-CM

## 2024-04-07 DIAGNOSIS — I10 ESSENTIAL HYPERTENSION: ICD-10-CM

## 2024-04-08 ENCOUNTER — APPOINTMENT (OUTPATIENT)
Dept: HEMATOLOGY/ONCOLOGY | Facility: CLINIC | Age: 64
End: 2024-04-08
Payer: COMMERCIAL

## 2024-04-09 ENCOUNTER — APPOINTMENT (OUTPATIENT)
Dept: HEMATOLOGY/ONCOLOGY | Facility: CLINIC | Age: 64
End: 2024-04-09
Payer: COMMERCIAL

## 2024-04-10 ENCOUNTER — APPOINTMENT (OUTPATIENT)
Dept: HEMATOLOGY/ONCOLOGY | Facility: CLINIC | Age: 64
End: 2024-04-10
Payer: COMMERCIAL

## 2024-04-10 NOTE — TELEPHONE ENCOUNTER
Received electronic refill request for Amlodipine 5 mg, Metoprolol 25 mg, and Atorvastatin 20 mg    Last Appointment: 6/27/23 with Dr Fox   Next Appointment: 5/23/24 with Dr Fox   Last Labs: 3/14/24 CMP  2/10/24 Lipid  Last Refilled: 12/11/23 90 days 3 refills to local, but now wishes to fill at [facility]

## 2024-04-11 ENCOUNTER — DOCUMENTATION (OUTPATIENT)
Dept: HEMATOLOGY/ONCOLOGY | Facility: HOSPITAL | Age: 64
End: 2024-04-11
Payer: COMMERCIAL

## 2024-04-11 RX ORDER — METOPROLOL TARTRATE 25 MG/1
25 TABLET, FILM COATED ORAL 2 TIMES DAILY
Qty: 180 TABLET | Refills: 3 | Status: ON HOLD | OUTPATIENT
Start: 2024-04-11 | End: 2025-04-06

## 2024-04-11 RX ORDER — AMLODIPINE BESYLATE 5 MG/1
5 TABLET ORAL DAILY
Qty: 90 TABLET | Refills: 3 | Status: ON HOLD | OUTPATIENT
Start: 2024-04-11 | End: 2025-04-06

## 2024-04-11 RX ORDER — ATORVASTATIN CALCIUM 20 MG/1
20 TABLET, FILM COATED ORAL NIGHTLY
Qty: 90 TABLET | Refills: 3 | Status: SHIPPED | OUTPATIENT
Start: 2024-04-11 | End: 2024-05-23 | Stop reason: ALTCHOICE

## 2024-04-11 NOTE — PROGRESS NOTES
"4/11/24 1645  Received referral for this patient to see Dr. Luis as a second opinion on his CLL, which patient has had since 2015, treated once in 2020 with BRx6 and has been in remission since. Recently patient saw Dr. Wang and was found to have palpable LAD. His WBC had been elevated in the 30-40's as well. PET found \"innumerable\" enlarged lymph nodes above and below diaphragm and increased FDG avidity at the bilat base of tongue, poss soft tissue mass vs LAD in abdomen, aswell as pulm nodules, possibly c/f CLL. Patient most recent WBC on 3/14/24 was 13.9. I have called patient to confirm appointment details and have left my contact information. Patient is scheduled with Dr. Luis on 5/9/24. BANDAR Vicente  "

## 2024-04-17 ENCOUNTER — APPOINTMENT (OUTPATIENT)
Dept: HEMATOLOGY/ONCOLOGY | Facility: CLINIC | Age: 64
End: 2024-04-17
Payer: COMMERCIAL

## 2024-04-21 PROBLEM — D69.6 LOW PLATELET COUNT (CMS-HCC): Status: ACTIVE | Noted: 2024-02-10

## 2024-05-06 ENCOUNTER — APPOINTMENT (OUTPATIENT)
Dept: HEMATOLOGY/ONCOLOGY | Facility: CLINIC | Age: 64
End: 2024-05-06
Payer: COMMERCIAL

## 2024-05-07 ENCOUNTER — APPOINTMENT (OUTPATIENT)
Dept: HEMATOLOGY/ONCOLOGY | Facility: CLINIC | Age: 64
End: 2024-05-07
Payer: COMMERCIAL

## 2024-05-08 ENCOUNTER — APPOINTMENT (OUTPATIENT)
Dept: HEMATOLOGY/ONCOLOGY | Facility: CLINIC | Age: 64
End: 2024-05-08
Payer: COMMERCIAL

## 2024-05-09 ENCOUNTER — OFFICE VISIT (OUTPATIENT)
Dept: HEMATOLOGY/ONCOLOGY | Facility: HOSPITAL | Age: 64
End: 2024-05-09
Payer: COMMERCIAL

## 2024-05-09 ENCOUNTER — LAB (OUTPATIENT)
Dept: LAB | Facility: HOSPITAL | Age: 64
End: 2024-05-09
Payer: COMMERCIAL

## 2024-05-09 ENCOUNTER — ONCOLOGY MEDICATION OUTREACH (OUTPATIENT)
Dept: HEMATOLOGY/ONCOLOGY | Facility: HOSPITAL | Age: 64
End: 2024-05-09

## 2024-05-09 VITALS
WEIGHT: 192.02 LBS | OXYGEN SATURATION: 98 % | DIASTOLIC BLOOD PRESSURE: 80 MMHG | SYSTOLIC BLOOD PRESSURE: 145 MMHG | BODY MASS INDEX: 30.14 KG/M2 | TEMPERATURE: 97.2 F | RESPIRATION RATE: 16 BRPM | HEART RATE: 72 BPM

## 2024-05-09 DIAGNOSIS — C91.10 CLL (CHRONIC LYMPHOCYTIC LEUKEMIA) (MULTI): ICD-10-CM

## 2024-05-09 DIAGNOSIS — C91.10 CLL (CHRONIC LYMPHOCYTIC LEUKEMIA) (MULTI): Primary | ICD-10-CM

## 2024-05-09 LAB
ALBUMIN SERPL BCP-MCNC: 4.3 G/DL (ref 3.4–5)
ALP SERPL-CCNC: 139 U/L (ref 33–136)
ALT SERPL W P-5'-P-CCNC: 383 U/L (ref 10–52)
ANION GAP SERPL CALC-SCNC: 14 MMOL/L (ref 10–20)
AST SERPL W P-5'-P-CCNC: 163 U/L (ref 9–39)
BASOPHILS # BLD AUTO: 0.05 X10*3/UL (ref 0–0.1)
BASOPHILS NFR BLD AUTO: 0.4 %
BILIRUB SERPL-MCNC: 1.3 MG/DL (ref 0–1.2)
BUN SERPL-MCNC: 10 MG/DL (ref 6–23)
BURR CELLS BLD QL SMEAR: NORMAL
CALCIUM SERPL-MCNC: 9 MG/DL (ref 8.6–10.6)
CHLORIDE SERPL-SCNC: 101 MMOL/L (ref 98–107)
CO2 SERPL-SCNC: 25 MMOL/L (ref 21–32)
CREAT SERPL-MCNC: 0.79 MG/DL (ref 0.5–1.3)
DACRYOCYTES BLD QL SMEAR: NORMAL
EGFRCR SERPLBLD CKD-EPI 2021: >90 ML/MIN/1.73M*2
EOSINOPHIL # BLD AUTO: 0.14 X10*3/UL (ref 0–0.7)
EOSINOPHIL NFR BLD AUTO: 1.1 %
ERYTHROCYTE [DISTWIDTH] IN BLOOD BY AUTOMATED COUNT: 13.8 % (ref 11.5–14.5)
GLUCOSE SERPL-MCNC: 94 MG/DL (ref 74–99)
HBV CORE AB SER QL: NONREACTIVE
HBV SURFACE AG SERPL QL IA: NONREACTIVE
HCT VFR BLD AUTO: 43.1 % (ref 41–52)
HCV AB SER QL: NONREACTIVE
HGB BLD-MCNC: 14.7 G/DL (ref 13.5–17.5)
HIV 1+2 AB+HIV1 P24 AG SERPL QL IA: NONREACTIVE
IMM GRANULOCYTES # BLD AUTO: 0.01 X10*3/UL (ref 0–0.7)
IMM GRANULOCYTES NFR BLD AUTO: 0.1 % (ref 0–0.9)
LDH SERPL L TO P-CCNC: 214 U/L (ref 84–246)
LYMPHOCYTES # BLD AUTO: 10.6 X10*3/UL (ref 1.2–4.8)
LYMPHOCYTES NFR BLD AUTO: 83.9 %
MCH RBC QN AUTO: 27.5 PG (ref 26–34)
MCHC RBC AUTO-ENTMCNC: 34.1 G/DL (ref 32–36)
MCV RBC AUTO: 81 FL (ref 80–100)
MONOCYTES # BLD AUTO: 0.77 X10*3/UL (ref 0.1–1)
MONOCYTES NFR BLD AUTO: 6.1 %
NEUTROPHILS # BLD AUTO: 1.06 X10*3/UL (ref 1.2–7.7)
NEUTROPHILS NFR BLD AUTO: 8.4 %
NRBC BLD-RTO: 0 /100 WBCS (ref 0–0)
PLATELET # BLD AUTO: 146 X10*3/UL (ref 150–450)
POTASSIUM SERPL-SCNC: 3.4 MMOL/L (ref 3.5–5.3)
PROT SERPL-MCNC: 6.5 G/DL (ref 6.4–8.2)
RBC # BLD AUTO: 5.34 X10*6/UL (ref 4.5–5.9)
RBC MORPH BLD: NORMAL
SODIUM SERPL-SCNC: 137 MMOL/L (ref 136–145)
WBC # BLD AUTO: 12.6 X10*3/UL (ref 4.4–11.3)

## 2024-05-09 PROCEDURE — 1160F RVW MEDS BY RX/DR IN RCRD: CPT | Performed by: INTERNAL MEDICINE

## 2024-05-09 PROCEDURE — 83615 LACTATE (LD) (LDH) ENZYME: CPT

## 2024-05-09 PROCEDURE — 99215 OFFICE O/P EST HI 40 MIN: CPT | Performed by: INTERNAL MEDICINE

## 2024-05-09 PROCEDURE — 1126F AMNT PAIN NOTED NONE PRSNT: CPT | Performed by: INTERNAL MEDICINE

## 2024-05-09 PROCEDURE — 86704 HEP B CORE ANTIBODY TOTAL: CPT

## 2024-05-09 PROCEDURE — 81263 IGH VARI REGIONAL MUTATION: CPT | Mod: 59

## 2024-05-09 PROCEDURE — 1123F ACP DISCUSS/DSCN MKR DOCD: CPT | Performed by: INTERNAL MEDICINE

## 2024-05-09 PROCEDURE — 3061F NEG MICROALBUMINURIA REV: CPT | Performed by: INTERNAL MEDICINE

## 2024-05-09 PROCEDURE — 1159F MED LIST DOCD IN RCRD: CPT | Performed by: INTERNAL MEDICINE

## 2024-05-09 PROCEDURE — 3008F BODY MASS INDEX DOCD: CPT | Performed by: INTERNAL MEDICINE

## 2024-05-09 PROCEDURE — 86803 HEPATITIS C AB TEST: CPT

## 2024-05-09 PROCEDURE — 1036F TOBACCO NON-USER: CPT | Performed by: INTERNAL MEDICINE

## 2024-05-09 PROCEDURE — 3077F SYST BP >= 140 MM HG: CPT | Performed by: INTERNAL MEDICINE

## 2024-05-09 PROCEDURE — 80053 COMPREHEN METABOLIC PANEL: CPT

## 2024-05-09 PROCEDURE — G0452 MOLECULAR PATHOLOGY INTERPR: HCPCS | Performed by: PATHOLOGY

## 2024-05-09 PROCEDURE — 88185 FLOWCYTOMETRY/TC ADD-ON: CPT | Mod: TC

## 2024-05-09 PROCEDURE — 85025 COMPLETE CBC W/AUTO DIFF WBC: CPT

## 2024-05-09 PROCEDURE — 81450 HL NEO GSAP 5-50DNA/DNA&RNA: CPT

## 2024-05-09 PROCEDURE — 3044F HG A1C LEVEL LT 7.0%: CPT | Performed by: INTERNAL MEDICINE

## 2024-05-09 PROCEDURE — 36415 COLL VENOUS BLD VENIPUNCTURE: CPT

## 2024-05-09 PROCEDURE — 88291 CYTO/MOLECULAR REPORT: CPT | Performed by: PATHOLOGY

## 2024-05-09 PROCEDURE — 87389 HIV-1 AG W/HIV-1&-2 AB AG IA: CPT

## 2024-05-09 PROCEDURE — 99205 OFFICE O/P NEW HI 60 MIN: CPT | Performed by: INTERNAL MEDICINE

## 2024-05-09 PROCEDURE — 87340 HEPATITIS B SURFACE AG IA: CPT

## 2024-05-09 PROCEDURE — 3079F DIAST BP 80-89 MM HG: CPT | Performed by: INTERNAL MEDICINE

## 2024-05-09 PROCEDURE — 88189 FLOWCYTOMETRY/READ 16 & >: CPT | Performed by: INTERNAL MEDICINE

## 2024-05-09 PROCEDURE — 88275 CYTOGENETICS 100-300: CPT

## 2024-05-09 PROCEDURE — 3048F LDL-C <100 MG/DL: CPT | Performed by: INTERNAL MEDICINE

## 2024-05-09 ASSESSMENT — PAIN SCALES - GENERAL: PAINLEVEL: 0-NO PAIN

## 2024-05-09 NOTE — PROGRESS NOTES
Patient Visit Information:   Visit Type: Follow Up Visit      Cancer History:   Treatment Synopsis:    B-CLL diagnosed in 2015, SUÁREZ stage 0; WBC initially 16.3 with ALC 12.0, hemoglobin 15.1 and platelets 154,000.  The cells expressed CD5 and A light chains.   CD38 and  Zap-70 were negative. Cytogenetic studies by FISH were inconclusive.  WBC has slowly rising but he maintains good marrow reserve and has not required treatment.  CT chest done 3/9/17 showed extensive cervical, axillary and submental adenopathy; largest  node on the left measured 2.3 x 1.9 cm.  Small (less than 9 mm) pulmonology nodules were again seen; relatively stable.  Abdominal nodes slightly increased.    17: WBC 65.4, ALC 62.8; becoming more symptomatic.  17 : WBC 77.6.  ALC 72.2.  Hemoglobin and platelets were normal.  18 WBC: 69.3. A.9. Hgb 15.2. Plt: 148,000  18: WBC 88.2.  ALC 82.9.  Hemoglobin 14.2.  Platelets 170,000.  Adenopathy stable.  18: WBC 77.3.  ALC 66.5.  Hemoglobin 13.8.  Platelets 143,000.  Adenopathy slightly increased.  18: WBC 92.0.  ALC 86.5.  Hemoglobin 14.1.  Platelets 157,000.  Progressing symptomatic adenopathy.  Treatment options discussed.   18 completed rituximab weekly x 4.  10/9/19: CBC: WBC 16.5.  ALC 12.4.  Hemoglobin 14.6.  Platelets 148,000.  Worsening adenopathy.  3/17/20: Started bendamustine/rituximab  20: #2 bendamustine/rituximab.  20: #3 BR  20: WBC 4.7.  Hemoglobin 12.9.  Platelets 189,000.  20: #4 BR  20: #5 BR  20: WBC 1.7. hgb 11.9. Platelets 176.  20: #6/6 bendamustine/rituximab  2023, WBC count 62, hemoglobin 14.9, platelets 143, absolute lymphocyte count 50.2  January 15, 2024, WBC count 40.5, hemoglobin 15.0, platelets 175  2/10/24 , WBC count 31.5, hemoglobin 14.9, platelets 131  3/14/24 , WBC count 13.9, hemoglobin 14.9, platelets 122  3/14/24, physical examination positive for bilateral cervical  lymphadenopathy, bilateral axillary lymphadenopathy  PET imaging 3/29/2024 intense uptake in tongue base ( S/P) tonsillectomy. Multiple enlarged lymph nodes in cervical, parotid, nodes, multipl pleural based and parenchymal non FDG avid pulmonary nodes, enlarged axillary lymph nodes, mediastinal and bilateral hilar lymph nodes, periportal lymph nodes, iliac nodes, inguinal nodes.        Chief Complaint: Follow-up for CLL   Interval History:    24   Patient of Dr. Wang with a long history of CLL as above seen today with his wife and son 24 for a second opinion for treatment of CLL.   He received BR completing in 2020.  In 2023 after COVID infection and he had increased lymphocytosis which resolved, subsequently noted to have lymphadenopathy confirmed on PET scan.  Ibrutinib recommended in 2024, but patient has not yet started this because he is leary of side effects. ROS notable for fatigue, no shortness of breath, has night sweats where he actually wakes up soaked, at least 3x a week, minimal weight loss. He has ongoing neck swelling reduced from 2023. Patient reports pulmonary nodules are long standing.        Past medical history: CLL/SLL (2015) as described above, status post rituximab, 6 cycles of bendamustine/rituximab (3/2020-2020).  Excellent  response to treatment.       History of CABGx3 19, vertigo, XENA, remote tonsillectomy, retinal tear, stable pulmonary nodules, hyperlipidemia, hypertension, sinusitis.  Unremarkable cardiac catheterization 21.  Hyperglycemia, torn retina, cataract. Hx of afib since CABG.       Family medical history: Mother  of myeloma in her 70s and his father  of Hodgkin lymphoma at age 39.     Social history: Rare alcohol intake.  Former smoker but quit in .  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 2 biological children, one son with downs syndrome.      Review of  Systems:   Review of Systems:    Constitutional:  night sweats.    Head and neck: Mild discomfort from nodes.  No headaches or dizziness.  No significant pain, stiffness.   HEENT: No sore throat, decreased hearing on the right. Vison is adequate.    Cardiac: No chest pain, palpitations.    Respiratory: No worsening dyspnea, cough, hemoptysis.  GI: Appetite is fair, weight stable.  No diarrhea, change in bowel movements, melena, hematochezia. No abdominal pain, nausea, vomiting.  Genitourinary: No frequency, urgency.  No polyuria, dysuria, hematuria.  Musculoskeletal: Complains of chronic arthralgias and myalgias.    Endocrine: No diabetes, thyroid disease.  Skin: No rash, skin lesions, itching.  Neuromuscular: No lightheadedness, dizziness.  No history of seizure.  No numbness, paresthesias.              Allergies and Intolerances:       Allergies:         No Known Allergies: Active     Outpatient Medication Profile:  * Patient Currently Takes Medications as of 17-Aug-2023 15:04 documented in Structured Notes         Zofran 4 mg oral tablet : Last Dose Taken:  , 1 tab(s) orally every 6 hours, As Needed -for nausea and vomiting , Start Date: 10-Mar-2020         Metoprolol Tartrate 25 mg oral tablet: Last Dose Taken:  , 1 tab(s) orally  2 times a day , Start Date: 25-Nov-2019         Multiple Vitamins oral capsule: Last Dose Taken:  , 1 cap(s) orally once  a day , Start Date: 25-Nov-2019         pantoprazole 40 mg oral delayed release tablet: Last Dose Taken:  , 1 tab(s)  orally once a day, Start Date: 25-Nov-2019         atorvastatin 20 mg oral tablet: Last Dose Taken:  , 1 tab(s) orally once  a day (at bedtime), Start Date: 25-Nov-2019         Vitamin D2 50,000 intl units (1.25 mg) oral capsule: Last Dose Taken:   , 1 cap(s) orally every other week         aspirin 81 mg oral tablet: Last Dose Taken:  , 1 tab(s) orally once a day         meclizine 25 mg oral tablet: Last Dose Taken:  , 1 tab(s) orally 3 times  a day,  As Needed         nitroglycerin 0.4 mg sublingual tablet: Last Dose Taken:  , 1 tab(s)  sublingual every 5 minutes, As Needed         amLODIPine 5 mg oral tablet: Last Dose Taken:  , 1 tab(s) orally once  a day             Medical History:         Coronary artery disease: ICD-10: I25.10, Status: Active         Chronic lymphocytic leukemia: ICD-10: C91.10, Status: Active     Family History: No Family History items are recorded  in the problem list.      Social History:   Social Substance History:  ·  Smoking Status former smoker   ·  Tobacco Use denies   ·  Alcohol Use occasionally            Vitals and Measurements:   ECOG 0    Physical Exam:      Constitutional: awake/alert/oriented x3, no distress,  alert and cooperative.   Eyes: PERRL, EOMI, clear sclera.   Head/Neck: prominent neck with a ring of multiple cervical submandibular nodes measuring 2-3 cm.    Respiratory/Thorax: Thorax symmetric. Clear to auscultation.  Normal breath sounds. No wheezes, rales, rhonchi.   Cardiovascular: Regular, rate and rhythm. No murmur.   Gastrointestinal: Nondistended.  Normal bowel sounds.   Soft, non-tender. No rebound or guarding. No masses palpable.  No palpable hepatomegaly, splenomegaly.   Musculoskeletal: ROM intact.  No joint swelling.  Adequate strength. No deformity.  No vertebral tenderness or mass.   Extremities: Normal extremities; no cyanosis, contusions,  wounds, clubbing. No edema.   Neurological: Alert and oriented x3. Senses and cranial  nerves grossly intact. No focal motor deficits noted. No focal sensory deficits.   Lymphatic: Bilateral axillary nodes mobile about 5 cm in size, bilateral boggy inguinal LN , spleen 5 cm BCM   Psychological: Appropriate mood and behavior.   Skin: No suspicious lesions.         Lab Results:       Reviewed          Assessment and Plan:   Assessment:    1.  Chronic lymphocytic leukemia with symptomatic adenopathy, excellent response to bendamustine/rituximab; completed 6 cycles in  August 2020.    Patient seen as a second opinion today for further management of bulky CLL.  PET imaging shows discordant uptake at the base of the tongue and I have suggested and ordered an ENT consultation for biopsy of this area.    Providing no evidence of transformation.  I agree that BTK inhibitors are a reasonable option, however given the improved cardiac toxicity profile would prefer zanubrutinib over ibrutinib. Patient understands this treatment would be continued indefinitely.  Venetoclax,rituxan would also be a time limited approach  and after we reviewed pros and cons and potential toxcities of each treatment, he prefers venetoclax as it is a time limited treatment.  I explained that give the bulk of his disease, I would recommend inpatient monitoring for  at least the first two dose levels to monitor TLS during the venetoclax ramp up period.   We will go ahead and order the venetoclax for precert.    Studies sent today included hepatitis serologies, flow cytometry , FISH studies and lymphoma NGS panel.    2.  CAD, status post CABG.       3.  Transaminitis, PET imaging shows periportal mass vs lymphadenopathy, hepatitis serologies negative, will review imaging at tumor board repeat.     RTC: 2-3 weeks. ENT consult and above blood studies ordered.Tentative admission on 6/3 for venetoclax ramp up

## 2024-05-13 DIAGNOSIS — C91.10 CLL (CHRONIC LYMPHOCYTIC LEUKEMIA) (MULTI): Primary | ICD-10-CM

## 2024-05-13 LAB
CELL COUNT (BLOOD): 12.6 X10*3/UL
CELL POPULATIONS: NORMAL
DIAGNOSIS: NORMAL
FLOW DIFFERENTIAL: NORMAL
FLOW TEST ORDERED: NORMAL
LAB TEST METHOD: NORMAL
NUMBER OF CELLS COLLECTED: NORMAL PER TUBE
PATH REPORT.TOTAL CANCER: NORMAL
SIGNATURE COMMENT: NORMAL
SPECIMEN VIABILITY: NORMAL

## 2024-05-13 RX ORDER — DIPHENHYDRAMINE HYDROCHLORIDE 50 MG/ML
50 INJECTION INTRAMUSCULAR; INTRAVENOUS AS NEEDED
Status: CANCELLED | OUTPATIENT
Start: 2024-06-11

## 2024-05-13 RX ORDER — FAMOTIDINE 10 MG/ML
20 INJECTION INTRAVENOUS AS NEEDED
OUTPATIENT
Start: 2024-07-02

## 2024-05-13 RX ORDER — FAMOTIDINE 10 MG/ML
20 INJECTION INTRAVENOUS AS NEEDED
Status: CANCELLED | OUTPATIENT
Start: 2024-06-11

## 2024-05-13 RX ORDER — ONDANSETRON HYDROCHLORIDE 8 MG/1
8 TABLET, FILM COATED ORAL EVERY 8 HOURS PRN
Status: CANCELLED | OUTPATIENT
Start: 2024-06-11

## 2024-05-13 RX ORDER — PROCHLORPERAZINE EDISYLATE 5 MG/ML
10 INJECTION INTRAMUSCULAR; INTRAVENOUS EVERY 6 HOURS PRN
OUTPATIENT
Start: 2024-06-25

## 2024-05-13 RX ORDER — ONDANSETRON HYDROCHLORIDE 8 MG/1
8 TABLET, FILM COATED ORAL EVERY 8 HOURS PRN
OUTPATIENT
Start: 2024-06-18

## 2024-05-13 RX ORDER — SODIUM CHLORIDE 9 MG/ML
150 INJECTION, SOLUTION INTRAVENOUS CONTINUOUS
OUTPATIENT
Start: 2024-06-18

## 2024-05-13 RX ORDER — ONDANSETRON HYDROCHLORIDE 8 MG/1
8 TABLET, FILM COATED ORAL EVERY 8 HOURS PRN
OUTPATIENT
Start: 2024-06-25

## 2024-05-13 RX ORDER — ONDANSETRON HYDROCHLORIDE 8 MG/1
8 TABLET, FILM COATED ORAL EVERY 8 HOURS PRN
Qty: 30 TABLET | Refills: 5 | Status: SHIPPED | OUTPATIENT
Start: 2024-05-13 | End: 2024-06-03 | Stop reason: WASHOUT

## 2024-05-13 RX ORDER — SODIUM CHLORIDE 9 MG/ML
150 INJECTION, SOLUTION INTRAVENOUS CONTINUOUS
OUTPATIENT
Start: 2024-06-25

## 2024-05-13 RX ORDER — ALBUTEROL SULFATE 0.83 MG/ML
3 SOLUTION RESPIRATORY (INHALATION) AS NEEDED
OUTPATIENT
Start: 2024-06-25

## 2024-05-13 RX ORDER — ALBUTEROL SULFATE 0.83 MG/ML
3 SOLUTION RESPIRATORY (INHALATION) AS NEEDED
Status: CANCELLED | OUTPATIENT
Start: 2024-06-11

## 2024-05-13 RX ORDER — PROCHLORPERAZINE EDISYLATE 5 MG/ML
10 INJECTION INTRAMUSCULAR; INTRAVENOUS EVERY 6 HOURS PRN
OUTPATIENT
Start: 2024-07-02

## 2024-05-13 RX ORDER — ALLOPURINOL 100 MG/1
300 TABLET ORAL DAILY
OUTPATIENT
Start: 2024-07-02

## 2024-05-13 RX ORDER — ONDANSETRON HYDROCHLORIDE 2 MG/ML
8 INJECTION, SOLUTION INTRAVENOUS EVERY 8 HOURS PRN
Status: CANCELLED | OUTPATIENT
Start: 2024-06-11

## 2024-05-13 RX ORDER — EPINEPHRINE 1 MG/ML
0.3 INJECTION INTRAMUSCULAR; INTRAVENOUS; SUBCUTANEOUS EVERY 5 MIN PRN
OUTPATIENT
Start: 2024-06-25

## 2024-05-13 RX ORDER — ALLOPURINOL 100 MG/1
300 TABLET ORAL DAILY
OUTPATIENT
Start: 2024-06-25

## 2024-05-13 RX ORDER — DIPHENHYDRAMINE HYDROCHLORIDE 50 MG/ML
50 INJECTION INTRAMUSCULAR; INTRAVENOUS AS NEEDED
OUTPATIENT
Start: 2024-07-02

## 2024-05-13 RX ORDER — ONDANSETRON HYDROCHLORIDE 2 MG/ML
8 INJECTION, SOLUTION INTRAVENOUS EVERY 8 HOURS PRN
OUTPATIENT
Start: 2024-07-02

## 2024-05-13 RX ORDER — FAMOTIDINE 10 MG/ML
20 INJECTION INTRAVENOUS AS NEEDED
OUTPATIENT
Start: 2024-06-25

## 2024-05-13 RX ORDER — EPINEPHRINE 1 MG/ML
0.3 INJECTION INTRAMUSCULAR; INTRAVENOUS; SUBCUTANEOUS EVERY 5 MIN PRN
OUTPATIENT
Start: 2024-06-18

## 2024-05-13 RX ORDER — ALBUTEROL SULFATE 0.83 MG/ML
3 SOLUTION RESPIRATORY (INHALATION) AS NEEDED
OUTPATIENT
Start: 2024-06-18

## 2024-05-13 RX ORDER — EPINEPHRINE 1 MG/ML
0.3 INJECTION INTRAMUSCULAR; INTRAVENOUS; SUBCUTANEOUS EVERY 5 MIN PRN
Status: CANCELLED | OUTPATIENT
Start: 2024-06-11

## 2024-05-13 RX ORDER — DIPHENHYDRAMINE HYDROCHLORIDE 50 MG/ML
50 INJECTION INTRAMUSCULAR; INTRAVENOUS AS NEEDED
OUTPATIENT
Start: 2024-06-25

## 2024-05-13 RX ORDER — SODIUM CHLORIDE 9 MG/ML
150 INJECTION, SOLUTION INTRAVENOUS CONTINUOUS
Status: CANCELLED | OUTPATIENT
Start: 2024-06-11

## 2024-05-13 RX ORDER — DIPHENHYDRAMINE HYDROCHLORIDE 50 MG/ML
50 INJECTION INTRAMUSCULAR; INTRAVENOUS AS NEEDED
OUTPATIENT
Start: 2024-06-18

## 2024-05-13 RX ORDER — ALBUTEROL SULFATE 0.83 MG/ML
3 SOLUTION RESPIRATORY (INHALATION) AS NEEDED
OUTPATIENT
Start: 2024-07-02

## 2024-05-13 RX ORDER — EPINEPHRINE 1 MG/ML
0.3 INJECTION INTRAMUSCULAR; INTRAVENOUS; SUBCUTANEOUS EVERY 5 MIN PRN
OUTPATIENT
Start: 2024-07-02

## 2024-05-13 RX ORDER — PROCHLORPERAZINE EDISYLATE 5 MG/ML
10 INJECTION INTRAMUSCULAR; INTRAVENOUS EVERY 6 HOURS PRN
OUTPATIENT
Start: 2024-06-18

## 2024-05-13 RX ORDER — ALLOPURINOL 100 MG/1
300 TABLET ORAL DAILY
Status: CANCELLED | OUTPATIENT
Start: 2024-06-11

## 2024-05-13 RX ORDER — PROCHLORPERAZINE MALEATE 10 MG
10 TABLET ORAL EVERY 6 HOURS PRN
OUTPATIENT
Start: 2024-07-02

## 2024-05-13 RX ORDER — ONDANSETRON HYDROCHLORIDE 8 MG/1
8 TABLET, FILM COATED ORAL EVERY 8 HOURS PRN
OUTPATIENT
Start: 2024-07-02

## 2024-05-13 RX ORDER — SODIUM CHLORIDE 9 MG/ML
150 INJECTION, SOLUTION INTRAVENOUS CONTINUOUS
OUTPATIENT
Start: 2024-07-02

## 2024-05-13 RX ORDER — PROCHLORPERAZINE MALEATE 10 MG
10 TABLET ORAL EVERY 6 HOURS PRN
OUTPATIENT
Start: 2024-06-25

## 2024-05-13 RX ORDER — ALLOPURINOL 300 MG/1
300 TABLET ORAL DAILY
Qty: 30 TABLET | Refills: 1 | Status: SHIPPED | OUTPATIENT
Start: 2024-05-13 | End: 2024-06-03 | Stop reason: ALTCHOICE

## 2024-05-13 RX ORDER — PROCHLORPERAZINE MALEATE 10 MG
10 TABLET ORAL EVERY 6 HOURS PRN
Qty: 30 TABLET | Refills: 5 | Status: SHIPPED | OUTPATIENT
Start: 2024-05-13 | End: 2024-06-03 | Stop reason: WASHOUT

## 2024-05-13 RX ORDER — ALLOPURINOL 100 MG/1
300 TABLET ORAL DAILY
OUTPATIENT
Start: 2024-06-18

## 2024-05-13 RX ORDER — ONDANSETRON HYDROCHLORIDE 2 MG/ML
8 INJECTION, SOLUTION INTRAVENOUS EVERY 8 HOURS PRN
OUTPATIENT
Start: 2024-06-18

## 2024-05-13 RX ORDER — ONDANSETRON HYDROCHLORIDE 2 MG/ML
8 INJECTION, SOLUTION INTRAVENOUS EVERY 8 HOURS PRN
OUTPATIENT
Start: 2024-06-25

## 2024-05-13 RX ORDER — PROCHLORPERAZINE MALEATE 10 MG
10 TABLET ORAL EVERY 6 HOURS PRN
OUTPATIENT
Start: 2024-06-18

## 2024-05-13 RX ORDER — PROCHLORPERAZINE MALEATE 10 MG
10 TABLET ORAL EVERY 6 HOURS PRN
Status: CANCELLED | OUTPATIENT
Start: 2024-06-11

## 2024-05-13 RX ORDER — PROCHLORPERAZINE EDISYLATE 5 MG/ML
10 INJECTION INTRAMUSCULAR; INTRAVENOUS EVERY 6 HOURS PRN
Status: CANCELLED | OUTPATIENT
Start: 2024-06-11

## 2024-05-13 RX ORDER — FAMOTIDINE 10 MG/ML
20 INJECTION INTRAVENOUS AS NEEDED
OUTPATIENT
Start: 2024-06-18

## 2024-05-13 NOTE — PROGRESS NOTES
ONCOLOGY PHARMACY CHEMOTHERAPY EDUCATION NOTE     Diagnosis: CLL    Prescriber: Dr. Luis    Current Outpatient Medications on File Prior to Visit   Medication Sig Dispense Refill    amLODIPine (Norvasc) 5 mg tablet Take 1 tablet (5 mg) by mouth once daily. 90 tablet 3    aspirin 81 mg EC tablet Take 1 tablet (81 mg) by mouth once daily.      atorvastatin (Lipitor) 20 mg tablet Take 1 tablet (20 mg) by mouth once daily at bedtime. 90 tablet 3    metoprolol tartrate (Lopressor) 25 mg tablet Take 1 tablet (25 mg) by mouth 2 times a day. 180 tablet 3    multivitamin with folic acid (One Daily Multivitamin) 400 mcg tablet Take 1 tablet by mouth once daily.      nitroglycerin (Nitrostat) 0.4 mg SL tablet Place 1 tablet (0.4 mg) under the tongue every 5 minutes if needed.      pantoprazole (ProtoNix) 40 mg EC tablet Take 1 tablet (40 mg) by mouth once daily. 90 tablet 3    potassium chloride CR (Klor-Con) 10 mEq ER tablet Take 1 tablet (10 mEq) by mouth once daily. Do not crush, chew, or split. 90 tablet 3    [DISCONTINUED] allopurinol (Zyloprim) 300 mg tablet Take 1 tablet (300 mg) by mouth once daily. (Patient not taking: Reported on 5/9/2024) 30 tablet 5    [DISCONTINUED] ibrutinib (Imbruvica) 420 mg tablet Take 1 tablet (420 mg total) by mouth once daily. (Patient not taking: Reported on 5/9/2024) 28 tablet 11    [DISCONTINUED] pantoprazole (ProtoNix) 40 mg EC tablet Take 1 tablet (40 mg) by mouth once daily. (Patient not taking: Reported on 5/9/2024) 90 tablet 3    [DISCONTINUED] potassium chloride CR 10 mEq ER tablet Take 1 tablet daily (Patient not taking: Reported on 5/9/2024) 90 tablet 3     No current facility-administered medications on file prior to visit.     Treatment Plan       None            Note:      Pharmacist consulted to provide education on Zanubrutinib vs Venetoclax and rituximab  chemotherapy via In-Person  visit.      1.  Chemotherapy Education: The chemotherapy regimen Zanubrutinib vs  Venetoclax and rituximab was discussed with patient and caregiver/s including treatment schedule, potential side effects, and management of side effects.  Potential side effects include but are not limited to: chemotherapy side effects: neutropenia, infection risk, anemia, fatigue, weakness, low energy, thrombocytopenia, bleeding/bruising, n/v, diarrhea, constipation, electrolyte changes, edema, muscle or joint pain, skin rash, infusion reactions, and tumor lysis syndrome.  Management techniques of these side effects were discussed, such as blood count checks, prophylactic anti-infective medicines, temperature checks, blood product transfusions, electrolyte monitoring, antiemetic use, loperamide use with max dose of 8 tabs per 24 hours, staying hydrated if having diarrhea, moisturizer and sunblock SPF30 use, and hydration for TLS prevention.  Written materials were provided including drug-specific side effect handouts.  Prescriptions for supportive care/prophylaxis (if applicable) medications were also discussed in terms of use and potential side effects.      Counseled patient and family about the risks and benefits of each treatment. Both regimens have relatively similar and outstanding efficacy, zanubrutinib is an oral treatment given indefinitely, the combination of venetoclax and rituximab is given for a total of 2 years with potential for a long treatment free period.    2.  Home Medications: Reviewed patients documented home medications. Drug interactions identified between chemotherapy and patient’s home medications: none*.  All questions were answered.  patient and caregiver/s verbalized understanding of the plan of care and management of side effects.  Spent approximately 35 minutes coordinating care and patient interaction.  Will follow-up as necessary.        Thank you for consulting [facility] Team.   Please don’t hesitate to reach out for further questions regarding this  patient.     Pedro Jim, [facility], PharmD

## 2024-05-15 ENCOUNTER — TELEPHONE (OUTPATIENT)
Dept: HEMATOLOGY/ONCOLOGY | Facility: HOSPITAL | Age: 64
End: 2024-05-15
Payer: COMMERCIAL

## 2024-05-15 NOTE — TELEPHONE ENCOUNTER
Called patient to advise of rescheduled appointment with Ent. Appointment rescheduled for 6/19 at 2:00 with Dr Tierney. Spoke with patient's wife, who was agreeable. Advised they can call scheduling to see if a sooner appointment becomes available and advised he was also placed on a waitlist if anything were to open up. Patient's wife agreeable. No further needs at this time.

## 2024-05-16 ENCOUNTER — TUMOR BOARD CONFERENCE (OUTPATIENT)
Dept: HEMATOLOGY/ONCOLOGY | Facility: HOSPITAL | Age: 64
End: 2024-05-16
Payer: COMMERCIAL

## 2024-05-16 NOTE — TUMOR BOARD NOTE
TUMOR BOARD DISCUSSION SUMMARY    PRESENTER: Dr. Ev Luis    DIAGNOSIS: Chronic lymphocytic leukemia with symptomatic adenopathy     SUMMARY/PRESENTATION: Demar Pittman is a 63 y.o. male patient who presents with chronic lymphocytic leukemia with symptomatic adenopathy, s/p 6 cycles of bendamustine/rituximab in August 2020.  Now with increasing adenopathy comes for treatment recommendation.    History: CABGx3 11/20/19, vertigo, XENA, remote tonsillectomy, retinal tear, stable pulmonary nodules, hyperlipidemia, hypertension, sinusitis.  Unremarkable cardiac catheterization 1/4/21.  Hyperglycemia, torn retina, cataract. Hx of afib since CABG.    Previous treatment: rituximab, 6 cycles of bendamustine/rituximab (3/2020-8/2020).    Information reviewed: Radiology Review    Radiology:   (03/28/24) PET CT - Innumerable lymphadenopathy above and below diaphragm most of which demonstrating FDG uptake below blood pool and some of which demonstrating minimal FDG uptake, consistent with chronic lymphocytic lymphoma. Ill-defined large periportal abdomen soft tissue mass versus conglomerate of lymph node demonstrating mild increase in FDG uptake. Multiple pleural-based and parenchymal non FDG avid sub-centimeter pulmonary nodules in bilateral lower lungs and non FDG avid pulmonary nodules seen at major fissure possibly representing fissural lymph nodes. CLL involvement cannot be excluded. However, given their small size, follow with diagnostic CT for documentation of stability would be of value.    Base of tongue highly PET avid with SUV discordant compared to other nodes, local narrowing of esophagus.      RECOMMENDATIONS:   Obtain biopsy of base of tongue to assess for secondary malignancy or Richters transformation. Further correlation with tissue biopsy is recommended.           Disclaimer     [facility] tumor board recommendations represent the consensus opinion of physicians present at a weekly patient care conference.  The treating [facility] physician is not always present, and many of the physicians formulating the recommendation have not personally seen or examined the patient under discussion. It is understood that the treating [facility] physician considers the expertise of the Tumor Board Recommendation in formulating his/her plan for the patient. However, in many situations, based on individualized patient considerations, a different plan is determined by the treating physician to be the optimal medical management.     Scribe Attestation  By signing my name below, ISylvain Scribe   attest that this documentation has been prepared under the direction and in the presence of MALIGNANT HEME TUMOR BOARD.

## 2024-05-21 ENCOUNTER — OFFICE VISIT (OUTPATIENT)
Dept: OTOLARYNGOLOGY | Facility: HOSPITAL | Age: 64
End: 2024-05-21
Payer: COMMERCIAL

## 2024-05-21 VITALS — HEIGHT: 66 IN | BODY MASS INDEX: 29.73 KG/M2 | TEMPERATURE: 98.2 F | WEIGHT: 185 LBS

## 2024-05-21 DIAGNOSIS — K14.8 TONGUE LESION: ICD-10-CM

## 2024-05-21 DIAGNOSIS — R13.12 OROPHARYNGEAL DYSPHAGIA: ICD-10-CM

## 2024-05-21 LAB
ELECTRONICALLY SIGNED BY: NORMAL
IGHV RESULTS: NORMAL

## 2024-05-21 PROCEDURE — 99214 OFFICE O/P EST MOD 30 MIN: CPT | Performed by: STUDENT IN AN ORGANIZED HEALTH CARE EDUCATION/TRAINING PROGRAM

## 2024-05-21 PROCEDURE — 1159F MED LIST DOCD IN RCRD: CPT | Performed by: STUDENT IN AN ORGANIZED HEALTH CARE EDUCATION/TRAINING PROGRAM

## 2024-05-21 PROCEDURE — 1160F RVW MEDS BY RX/DR IN RCRD: CPT | Performed by: STUDENT IN AN ORGANIZED HEALTH CARE EDUCATION/TRAINING PROGRAM

## 2024-05-21 PROCEDURE — 1123F ACP DISCUSS/DSCN MKR DOCD: CPT | Performed by: STUDENT IN AN ORGANIZED HEALTH CARE EDUCATION/TRAINING PROGRAM

## 2024-05-21 PROCEDURE — 3061F NEG MICROALBUMINURIA REV: CPT | Performed by: STUDENT IN AN ORGANIZED HEALTH CARE EDUCATION/TRAINING PROGRAM

## 2024-05-21 PROCEDURE — 3044F HG A1C LEVEL LT 7.0%: CPT | Performed by: STUDENT IN AN ORGANIZED HEALTH CARE EDUCATION/TRAINING PROGRAM

## 2024-05-21 PROCEDURE — 3008F BODY MASS INDEX DOCD: CPT | Performed by: STUDENT IN AN ORGANIZED HEALTH CARE EDUCATION/TRAINING PROGRAM

## 2024-05-21 PROCEDURE — 3048F LDL-C <100 MG/DL: CPT | Performed by: STUDENT IN AN ORGANIZED HEALTH CARE EDUCATION/TRAINING PROGRAM

## 2024-05-21 PROCEDURE — 1036F TOBACCO NON-USER: CPT | Performed by: STUDENT IN AN ORGANIZED HEALTH CARE EDUCATION/TRAINING PROGRAM

## 2024-05-21 ASSESSMENT — PATIENT HEALTH QUESTIONNAIRE - PHQ9
2. FEELING DOWN, DEPRESSED OR HOPELESS: NOT AT ALL
1. LITTLE INTEREST OR PLEASURE IN DOING THINGS: NOT AT ALL
SUM OF ALL RESPONSES TO PHQ9 QUESTIONS 1 & 2: 0

## 2024-05-21 NOTE — H&P (VIEW-ONLY)
Chief Complaint:  No chief complaint on file.      History of Present Illness:  63 y.o. male presents to [facility] on 05/21/24 for a base of tongue lesion.  The patient was referred to me by Dr. Luis. The patient has a history of B-cell lymphoma that was diagnosed and treated in 2015.  It sounds like the disease at the time was also centered in the submental/head and neck region at the time.  There is now concern for recurrence of his cancer.  The patient had a PET scan on 3/29/2024 which showed significant FDG avidity in the base of tongue.  It sounds like additional tissue is also necessary to confirm the diagnosis and proceed with treatment.    The patient also reports that he has been experiencing more dysphagia and has lost some weight in the last several months.  He denies any significant changes in his voice or difficulty breathing.  He does have a history of sleep apnea and had a UPPP about 30 years ago.    The patient does have a history of heart surgery several years ago, but is not on any blood thinners.    Past Medical History  Past Medical History:   Diagnosis Date    Diffuse otitis externa, bilateral 10/11/2021    Chronic diffuse otitis externa of both ears    Hypertrophy of tonsils 12/05/2019    Lingual tonsil hypertrophy    Localized enlarged lymph nodes 04/30/2020    Cervical lymphadenopathy    Personal history of diseases of the skin and subcutaneous tissue 03/09/2020    History of dermatitis    Personal history of other diseases of the circulatory system     History of heart disease    Personal history of other diseases of the circulatory system     History of cardiac disorder    Personal history of other diseases of the circulatory system     History of vascular disorder    Personal history of other diseases of the circulatory system     History of hypertension    Personal history of other specified conditions 12/11/2019    History of dysphagia       Past  Surgical History  Past Surgical History:   Procedure Laterality Date    OTHER SURGICAL HISTORY  10/24/2019    Cataract surgery    OTHER SURGICAL HISTORY  10/24/2019    Tonsillectomy    OTHER SURGICAL HISTORY  2019    Retinal detachment repair    OTHER SURGICAL HISTORY  2019    Coronary artery bypass graft       Social History  Social History     Socioeconomic History    Marital status:      Spouse name: Not on file    Number of children: Not on file    Years of education: Not on file    Highest education level: Not on file   Occupational History    Not on file   Tobacco Use    Smoking status: Former     Current packs/day: 0.00     Types: Cigarettes     Quit date:      Years since quittin.4    Smokeless tobacco: Never   Vaping Use    Vaping status: Never Used   Substance and Sexual Activity    Alcohol use: Not Currently     Alcohol/week: 2.0 standard drinks of alcohol     Types: 2 Shots of liquor per week     Comment: occassionally    Drug use: Never    Sexual activity: Not on file   Other Topics Concern    Not on file   Social History Narrative    Not on file     Social Determinants of Health     Financial Resource Strain: Not on file   Food Insecurity: Not on file   Transportation Needs: Not on file   Physical Activity: Not on file   Stress: Not on file   Social Connections: Not on file   Intimate Partner Violence: Not on file   Housing Stability: Not on file       Family History  No family history on file.    Medications:  Current Outpatient Medications   Medication Sig Dispense Refill    allopurinol (Zyloprim) 300 mg tablet Take 1 tablet (300 mg) by mouth once daily. 30 tablet 1    amLODIPine (Norvasc) 5 mg tablet Take 1 tablet (5 mg) by mouth once daily. 90 tablet 3    aspirin 81 mg EC tablet Take 1 tablet (81 mg) by mouth once daily.      atorvastatin (Lipitor) 20 mg tablet Take 1 tablet (20 mg) by mouth once daily at bedtime. 90 tablet 3    metoprolol tartrate (Lopressor) 25 mg  39.4 tablet Take 1 tablet (25 mg) by mouth 2 times a day. 180 tablet 3    multivitamin with folic acid (One Daily Multivitamin) 400 mcg tablet Take 1 tablet by mouth once daily.      nitroglycerin (Nitrostat) 0.4 mg SL tablet Place 1 tablet (0.4 mg) under the tongue every 5 minutes if needed.      ondansetron (Zofran) 8 mg tablet Take 1 tablet (8 mg) by mouth every 8 hours if needed for nausea or vomiting. 30 tablet 5    pantoprazole (ProtoNix) 40 mg EC tablet Take 1 tablet (40 mg) by mouth once daily. 90 tablet 3    potassium chloride CR (Klor-Con) 10 mEq ER tablet Take 1 tablet (10 mEq) by mouth once daily. Do not crush, chew, or split. 90 tablet 3    prochlorperazine (Compazine) 10 mg tablet Take 1 tablet (10 mg) by mouth every 6 hours if needed for nausea or vomiting. 30 tablet 5    [START ON 6/2/2024] venetoclax (Venclexta) 10 mg-50 mg- 100 mg tablet therapy pack Week 1: Take 20 mg (2 x 10 mg) by mouth once daily. Week 2: take 50 mg by mouth once daily. Week 3: take 100 mg by mouth once daily. Week 4: Take 200 mg (2 x 100 mg) by mouth once daily 42 each 0    diphenhydramine/Maalox/lidocaine (Magic Mouthwash) - Compounded - Outpatient Swish and spit 10ml of mouthwash 3 times per day after meals 120 mL 2     No current facility-administered medications for this visit.       Allergies: Patient has no known allergies.    Immunizations:   Immunization History   Administered Date(s) Administered    Pfizer Gray Cap SARS-CoV-2 02/19/2022    Pfizer Purple Cap SARS-CoV-2 01/22/2022    Pneumococcal conjugate vaccine, 20-valent (PREVNAR 20) 08/21/2023        Review of Systems:  Constitutional: Negative for fever, weight loss and weight gain  HENT: Negative for ear pain, sore throat and hoarseness.  Positive for difficulty swallowing  Cardiovascular: Negative for chest pain and dyspnea on exertion (Can climb up 2 floors)  Respiratory: Is not experiencing shortness of breath  Gastrointestinal: Negative for nausea and  "vomiting  Neurological: Negative for headaches.   Psychiatric: The patient is not nervous/anxious   Musculoskeletal: Denies muscle pain/weakness  Heme/Lymph: Negative for lymph nodes, easy bruising    Physical Exam  Vital Signs:  Temp 36.8 °C (98.2 °F)   Ht 1.676 m (5' 6\")   Wt 83.9 kg (185 lb)   BMI 29.86 kg/m²     General:  Well-developed, well-nourished. No distress.  Overweight male.  Wife in room  Communication and Voice:  Clear pitch and clarity  Respiratory  Respiratory effort:  Equal inspiration and expiration without stridor  Cardiovascular  Peripheral Vascular:  Warm extremities with equal pulses  Neuro: Patient oriented to person, place, and time;  Appropriate mood and affect;  Gait is intact with no imbalance; Cranial nerves II-XII are intact  Head and Face  Inspection:  Normocephalic and atraumatic without mass or lesion  Palpation:  Facial skeleton intact without bony stepoffs  Facial Strength:  Facial motility symmetric and full bilaterally  Eyes: PERRLA. No nystagmus with normal extraocular motion bilaterally  ENT  Pinna:  External ear intact and fully developed  External canal:  Canal is patent with intact skin  Tympanic Membrane:  Clear and mobile  External nose:  No scar or anatomic deformity  Internal Nose:  Septum intact and midline.  No edema, polyp, or rhinorrhea.  TMJ:  No pain to palpation with full mobility  Salivary Glands:  No mass or tenderness  Lips: No lesion.  Oral cavity: No mass or lesion.  Status post UPPP.  There is no visible lesion that I can see from the mouth.  Oropharynx:  No mass or lesion. Tonsillar fossa symmetric. Base of tongue soft without mass or induration  Neck  Trachea:  Midline trachea.  Thyroid:  No mass or nodularity  Lymphatics:  No lymphadenopathy.  Anterior neck fullness and swelling but this seems more consistent with his habitus    Procedure note: Recommended flexible nasopharyngoscopy. Risks, benefits, and alternatives were explained.   Procedure: " Flexible nasopharyngoscopy   Indication: Base of tongue mass  Anesthesia: 4% lidocaine and 0.5% phenylephrine   After adequate Afrin and lidocaine spray advance the flexible endoscope. I was able to visualize the nasal cavity and nasopharynx. The findings were notable for the following:  No masses/lesions/ulcers in the nasopharynx.  There is significant prominence bilaterally of his base of tongue tissue.  This seems more pedunculated on the left-hand side.  This is consistent with the findings on the PET scan.  This fullness is somewhat obstructing his airway and feeling his vallecula.  I am able to pass around the base of tongue fullness and see bilaterally mobile cords    Impression/Plan:  63 y.o. male presents to [facility] on 05/21/24 with history of lymphoma now presenting with a base of lung lesion concerning for recurrence.    -I was able to see a significant mass at the base of tongue.  Unfortunately, this is not in the location that I can easily biopsy in clinic.  I will confirm with Dr. Luis if that additional tissue is necessary.  I will plan to take him to the operating room for direct laryngoscopy and biopsy.  Will make sure that the specimen is sent for lymphoma protocol.  Of note, the patient does have some obstruction of the airway due to the mass, but this does not seem significantly concerning at this time.  I did discuss that some patients do require tracheostomy if they develop airway obstruction, but I do not think this is necessary at this time

## 2024-05-21 NOTE — Clinical Note
Ev, I saw this patient today.  He certainly does have a base of tongue mass, but unfortunately, I am unable to biopsy in the office.  We will try to get him in for biopsy in the operating room as soon as possible.  I just wanted to confirm that you did want tissue from this.  Thanks!

## 2024-05-21 NOTE — PROGRESS NOTES
Chief Complaint:  No chief complaint on file.      History of Present Illness:  63 y.o. male presents to [facility] on 05/21/24 for a base of tongue lesion.  The patient was referred to me by Dr. Luis. The patient has a history of B-cell lymphoma that was diagnosed and treated in 2015.  It sounds like the disease at the time was also centered in the submental/head and neck region at the time.  There is now concern for recurrence of his cancer.  The patient had a PET scan on 3/29/2024 which showed significant FDG avidity in the base of tongue.  It sounds like additional tissue is also necessary to confirm the diagnosis and proceed with treatment.    The patient also reports that he has been experiencing more dysphagia and has lost some weight in the last several months.  He denies any significant changes in his voice or difficulty breathing.  He does have a history of sleep apnea and had a UPPP about 30 years ago.    The patient does have a history of heart surgery several years ago, but is not on any blood thinners.    Past Medical History  Past Medical History:   Diagnosis Date    Diffuse otitis externa, bilateral 10/11/2021    Chronic diffuse otitis externa of both ears    Hypertrophy of tonsils 12/05/2019    Lingual tonsil hypertrophy    Localized enlarged lymph nodes 04/30/2020    Cervical lymphadenopathy    Personal history of diseases of the skin and subcutaneous tissue 03/09/2020    History of dermatitis    Personal history of other diseases of the circulatory system     History of heart disease    Personal history of other diseases of the circulatory system     History of cardiac disorder    Personal history of other diseases of the circulatory system     History of vascular disorder    Personal history of other diseases of the circulatory system     History of hypertension    Personal history of other specified conditions 12/11/2019    History of dysphagia       Past  Surgical History  Past Surgical History:   Procedure Laterality Date    OTHER SURGICAL HISTORY  10/24/2019    Cataract surgery    OTHER SURGICAL HISTORY  10/24/2019    Tonsillectomy    OTHER SURGICAL HISTORY  2019    Retinal detachment repair    OTHER SURGICAL HISTORY  2019    Coronary artery bypass graft       Social History  Social History     Socioeconomic History    Marital status:      Spouse name: Not on file    Number of children: Not on file    Years of education: Not on file    Highest education level: Not on file   Occupational History    Not on file   Tobacco Use    Smoking status: Former     Current packs/day: 0.00     Types: Cigarettes     Quit date:      Years since quittin.4    Smokeless tobacco: Never   Vaping Use    Vaping status: Never Used   Substance and Sexual Activity    Alcohol use: Not Currently     Alcohol/week: 2.0 standard drinks of alcohol     Types: 2 Shots of liquor per week     Comment: occassionally    Drug use: Never    Sexual activity: Not on file   Other Topics Concern    Not on file   Social History Narrative    Not on file     Social Determinants of Health     Financial Resource Strain: Not on file   Food Insecurity: Not on file   Transportation Needs: Not on file   Physical Activity: Not on file   Stress: Not on file   Social Connections: Not on file   Intimate Partner Violence: Not on file   Housing Stability: Not on file       Family History  No family history on file.    Medications:  Current Outpatient Medications   Medication Sig Dispense Refill    allopurinol (Zyloprim) 300 mg tablet Take 1 tablet (300 mg) by mouth once daily. 30 tablet 1    amLODIPine (Norvasc) 5 mg tablet Take 1 tablet (5 mg) by mouth once daily. 90 tablet 3    aspirin 81 mg EC tablet Take 1 tablet (81 mg) by mouth once daily.      atorvastatin (Lipitor) 20 mg tablet Take 1 tablet (20 mg) by mouth once daily at bedtime. 90 tablet 3    metoprolol tartrate (Lopressor) 25 mg  tablet Take 1 tablet (25 mg) by mouth 2 times a day. 180 tablet 3    multivitamin with folic acid (One Daily Multivitamin) 400 mcg tablet Take 1 tablet by mouth once daily.      nitroglycerin (Nitrostat) 0.4 mg SL tablet Place 1 tablet (0.4 mg) under the tongue every 5 minutes if needed.      ondansetron (Zofran) 8 mg tablet Take 1 tablet (8 mg) by mouth every 8 hours if needed for nausea or vomiting. 30 tablet 5    pantoprazole (ProtoNix) 40 mg EC tablet Take 1 tablet (40 mg) by mouth once daily. 90 tablet 3    potassium chloride CR (Klor-Con) 10 mEq ER tablet Take 1 tablet (10 mEq) by mouth once daily. Do not crush, chew, or split. 90 tablet 3    prochlorperazine (Compazine) 10 mg tablet Take 1 tablet (10 mg) by mouth every 6 hours if needed for nausea or vomiting. 30 tablet 5    [START ON 6/2/2024] venetoclax (Venclexta) 10 mg-50 mg- 100 mg tablet therapy pack Week 1: Take 20 mg (2 x 10 mg) by mouth once daily. Week 2: take 50 mg by mouth once daily. Week 3: take 100 mg by mouth once daily. Week 4: Take 200 mg (2 x 100 mg) by mouth once daily 42 each 0    diphenhydramine/Maalox/lidocaine (Magic Mouthwash) - Compounded - Outpatient Swish and spit 10ml of mouthwash 3 times per day after meals 120 mL 2     No current facility-administered medications for this visit.       Allergies: Patient has no known allergies.    Immunizations:   Immunization History   Administered Date(s) Administered    Pfizer Gray Cap SARS-CoV-2 02/19/2022    Pfizer Purple Cap SARS-CoV-2 01/22/2022    Pneumococcal conjugate vaccine, 20-valent (PREVNAR 20) 08/21/2023        Review of Systems:  Constitutional: Negative for fever, weight loss and weight gain  HENT: Negative for ear pain, sore throat and hoarseness.  Positive for difficulty swallowing  Cardiovascular: Negative for chest pain and dyspnea on exertion (Can climb up 2 floors)  Respiratory: Is not experiencing shortness of breath  Gastrointestinal: Negative for nausea and  "vomiting  Neurological: Negative for headaches.   Psychiatric: The patient is not nervous/anxious   Musculoskeletal: Denies muscle pain/weakness  Heme/Lymph: Negative for lymph nodes, easy bruising    Physical Exam  Vital Signs:  Temp 36.8 °C (98.2 °F)   Ht 1.676 m (5' 6\")   Wt 83.9 kg (185 lb)   BMI 29.86 kg/m²     General:  Well-developed, well-nourished. No distress.  Overweight male.  Wife in room  Communication and Voice:  Clear pitch and clarity  Respiratory  Respiratory effort:  Equal inspiration and expiration without stridor  Cardiovascular  Peripheral Vascular:  Warm extremities with equal pulses  Neuro: Patient oriented to person, place, and time;  Appropriate mood and affect;  Gait is intact with no imbalance; Cranial nerves II-XII are intact  Head and Face  Inspection:  Normocephalic and atraumatic without mass or lesion  Palpation:  Facial skeleton intact without bony stepoffs  Facial Strength:  Facial motility symmetric and full bilaterally  Eyes: PERRLA. No nystagmus with normal extraocular motion bilaterally  ENT  Pinna:  External ear intact and fully developed  External canal:  Canal is patent with intact skin  Tympanic Membrane:  Clear and mobile  External nose:  No scar or anatomic deformity  Internal Nose:  Septum intact and midline.  No edema, polyp, or rhinorrhea.  TMJ:  No pain to palpation with full mobility  Salivary Glands:  No mass or tenderness  Lips: No lesion.  Oral cavity: No mass or lesion.  Status post UPPP.  There is no visible lesion that I can see from the mouth.  Oropharynx:  No mass or lesion. Tonsillar fossa symmetric. Base of tongue soft without mass or induration  Neck  Trachea:  Midline trachea.  Thyroid:  No mass or nodularity  Lymphatics:  No lymphadenopathy.  Anterior neck fullness and swelling but this seems more consistent with his habitus    Procedure note: Recommended flexible nasopharyngoscopy. Risks, benefits, and alternatives were explained.   Procedure: " Flexible nasopharyngoscopy   Indication: Base of tongue mass  Anesthesia: 4% lidocaine and 0.5% phenylephrine   After adequate Afrin and lidocaine spray advance the flexible endoscope. I was able to visualize the nasal cavity and nasopharynx. The findings were notable for the following:  No masses/lesions/ulcers in the nasopharynx.  There is significant prominence bilaterally of his base of tongue tissue.  This seems more pedunculated on the left-hand side.  This is consistent with the findings on the PET scan.  This fullness is somewhat obstructing his airway and feeling his vallecula.  I am able to pass around the base of tongue fullness and see bilaterally mobile cords    Impression/Plan:  63 y.o. male presents to [facility] on 05/21/24 with history of lymphoma now presenting with a base of lung lesion concerning for recurrence.    -I was able to see a significant mass at the base of tongue.  Unfortunately, this is not in the location that I can easily biopsy in clinic.  I will confirm with Dr. Luis if that additional tissue is necessary.  I will plan to take him to the operating room for direct laryngoscopy and biopsy.  Will make sure that the specimen is sent for lymphoma protocol.  Of note, the patient does have some obstruction of the airway due to the mass, but this does not seem significantly concerning at this time.  I did discuss that some patients do require tracheostomy if they develop airway obstruction, but I do not think this is necessary at this time

## 2024-05-22 ENCOUNTER — SPECIALTY PHARMACY (OUTPATIENT)
Dept: HEMATOLOGY/ONCOLOGY | Facility: HOSPITAL | Age: 64
End: 2024-05-22
Payer: COMMERCIAL

## 2024-05-22 ENCOUNTER — TELEPHONE (OUTPATIENT)
Dept: CARDIOLOGY | Facility: CLINIC | Age: 64
End: 2024-05-22
Payer: COMMERCIAL

## 2024-05-22 DIAGNOSIS — K14.8 TONGUE LESION: ICD-10-CM

## 2024-05-22 NOTE — PROGRESS NOTES
ONCOLOGY PHARMACY CHEMOTHERAPY EDUCATION NOTE     Diagnosis: CLL    Prescriber: Dr. Luis    Current Outpatient Medications on File Prior to Visit   Medication Sig Dispense Refill    allopurinol (Zyloprim) 300 mg tablet Take 1 tablet (300 mg) by mouth once daily. 30 tablet 1    amLODIPine (Norvasc) 5 mg tablet Take 1 tablet (5 mg) by mouth once daily. 90 tablet 3    aspirin 81 mg EC tablet Take 1 tablet (81 mg) by mouth once daily.      atorvastatin (Lipitor) 20 mg tablet Take 1 tablet (20 mg) by mouth once daily at bedtime. 90 tablet 3    diphenhydramine/Maalox/lidocaine (Magic Mouthwash) - Compounded - Outpatient Swish and spit 10ml of mouthwash 3 times per day after meals 120 mL 2    metoprolol tartrate (Lopressor) 25 mg tablet Take 1 tablet (25 mg) by mouth 2 times a day. 180 tablet 3    multivitamin with folic acid (One Daily Multivitamin) 400 mcg tablet Take 1 tablet by mouth once daily.      nitroglycerin (Nitrostat) 0.4 mg SL tablet Place 1 tablet (0.4 mg) under the tongue every 5 minutes if needed.      ondansetron (Zofran) 8 mg tablet Take 1 tablet (8 mg) by mouth every 8 hours if needed for nausea or vomiting. 30 tablet 5    pantoprazole (ProtoNix) 40 mg EC tablet Take 1 tablet (40 mg) by mouth once daily. 90 tablet 3    potassium chloride CR (Klor-Con) 10 mEq ER tablet Take 1 tablet (10 mEq) by mouth once daily. Do not crush, chew, or split. 90 tablet 3    prochlorperazine (Compazine) 10 mg tablet Take 1 tablet (10 mg) by mouth every 6 hours if needed for nausea or vomiting. 30 tablet 5    [START ON 6/2/2024] venetoclax (Venclexta) 10 mg-50 mg- 100 mg tablet therapy pack Week 1: Take 20 mg (2 x 10 mg) by mouth once daily. Week 2: take 50 mg by mouth once daily. Week 3: take 100 mg by mouth once daily. Week 4: Take 200 mg (2 x 100 mg) by mouth once daily 42 each 0     No current facility-administered medications on file prior to visit.     Treatment Plan       None            Note:      Pharmacist  consulted to provide education on Zanubrutinib vs Venetoclax and rituximab  chemotherapy via In-Person  visit.      1.  Chemotherapy Education: The chemotherapy regimen Zanubrutinib vs Venetoclax and rituximab was discussed with patient and caregiver/s including treatment schedule, potential side effects, and management of side effects.  Potential side effects include but are not limited to: chemotherapy side effects: neutropenia, infection risk, anemia, fatigue, weakness, low energy, thrombocytopenia, bleeding/bruising, n/v, diarrhea, constipation, electrolyte changes, edema, muscle or joint pain, skin rash, infusion reactions, and tumor lysis syndrome.  Management techniques of these side effects were discussed, such as blood count checks, prophylactic anti-infective medicines, temperature checks, blood product transfusions, electrolyte monitoring, antiemetic use, loperamide use with max dose of 8 tabs per 24 hours, staying hydrated if having diarrhea, moisturizer and sunblock SPF30 use, and hydration for TLS prevention.  Written materials were provided including drug-specific side effect handouts.  Prescriptions for supportive care/prophylaxis (if applicable) medications were also discussed in terms of use and potential side effects.      Counseled patient and family about the risks and benefits of each treatment. Both regimens have relatively similar and outstanding efficacy, zanubrutinib is an oral treatment given indefinitely, the combination of venetoclax and rituximab is given for a total of 2 years with potential for a long treatment free period.    2.  Home Medications: Reviewed patients documented home medications. Drug interactions identified between chemotherapy and patient’s home medications: none*.  All questions were answered.  patient and caregiver/s verbalized understanding of the plan of care and management of side effects.  Spent approximately 35 minutes coordinating care and patient interaction.   Will follow-up as necessary.        Thank you for consulting [facility].   Please don’t hesitate to reach out for further questions regarding this patient.     Pedro [facility], PharmD  [facility] Clinical Note    Demar Pittman is a 63 y.o. male, who is on the specialty pharmacy service for management of: Oncology Core with status of: (Enrolled)     Demar was contacted on 5/22/2024.    Refer to the encounter summary report for documentation details about patient counseling and education.      Medication Adherence  The importance of adherence was discussed with the patient and they were advised to take the medication as prescribed by their provider. Demar was encouraged to call his physician's office if they have a question regarding a missed dose.       Patient advised to contact the pharmacy if there are any changes to her medication list, including prescriptions, OTC medications, herbal products, or supplements. Patient was advised of [facility]’s dispensing process, refill timeline, contact information ([phone]), and patient management follow up. Patient confirmed understanding of education conducted during assessment. All patient questions and concerns were addressed to the best of my ability. Patient was encouraged to contact the specialty pharmacy with any questions or concerns.    Confirmed follow-up outreaches are properly scheduled. Reviewed goals of therapy in the program targets.    Pedro Jim, TawnyaD

## 2024-05-23 ENCOUNTER — OFFICE VISIT (OUTPATIENT)
Dept: CARDIOLOGY | Facility: CLINIC | Age: 64
End: 2024-05-23
Payer: COMMERCIAL

## 2024-05-23 ENCOUNTER — TELEPHONE (OUTPATIENT)
Dept: OTOLARYNGOLOGY | Facility: HOSPITAL | Age: 64
End: 2024-05-23

## 2024-05-23 VITALS
DIASTOLIC BLOOD PRESSURE: 84 MMHG | BODY MASS INDEX: 29.7 KG/M2 | HEART RATE: 76 BPM | WEIGHT: 184 LBS | SYSTOLIC BLOOD PRESSURE: 130 MMHG

## 2024-05-23 DIAGNOSIS — I25.10 CORONARY ARTERY DISEASE INVOLVING NATIVE CORONARY ARTERY OF NATIVE HEART WITHOUT ANGINA PECTORIS: Primary | ICD-10-CM

## 2024-05-23 LAB
ELECTRONICALLY SIGNED BY: NORMAL
LYMPHOID NGS RESULTS: NORMAL

## 2024-05-23 PROCEDURE — 3048F LDL-C <100 MG/DL: CPT | Performed by: INTERNAL MEDICINE

## 2024-05-23 PROCEDURE — 1123F ACP DISCUSS/DSCN MKR DOCD: CPT | Performed by: INTERNAL MEDICINE

## 2024-05-23 PROCEDURE — 93000 ELECTROCARDIOGRAM COMPLETE: CPT | Performed by: INTERNAL MEDICINE

## 2024-05-23 PROCEDURE — 3079F DIAST BP 80-89 MM HG: CPT | Performed by: INTERNAL MEDICINE

## 2024-05-23 PROCEDURE — 3008F BODY MASS INDEX DOCD: CPT | Performed by: INTERNAL MEDICINE

## 2024-05-23 PROCEDURE — 99214 OFFICE O/P EST MOD 30 MIN: CPT | Performed by: INTERNAL MEDICINE

## 2024-05-23 PROCEDURE — 1160F RVW MEDS BY RX/DR IN RCRD: CPT | Performed by: INTERNAL MEDICINE

## 2024-05-23 PROCEDURE — 3044F HG A1C LEVEL LT 7.0%: CPT | Performed by: INTERNAL MEDICINE

## 2024-05-23 PROCEDURE — 3061F NEG MICROALBUMINURIA REV: CPT | Performed by: INTERNAL MEDICINE

## 2024-05-23 PROCEDURE — 3075F SYST BP GE 130 - 139MM HG: CPT | Performed by: INTERNAL MEDICINE

## 2024-05-23 PROCEDURE — 1159F MED LIST DOCD IN RCRD: CPT | Performed by: INTERNAL MEDICINE

## 2024-05-23 NOTE — PROGRESS NOTES
Counseling:  The patient was counseled regarding diagnostic results, instructions for management, risk factor reductions, prognosis, patient and family education, impressions, risks and benefits of treatment options and importance of compliance with treatment.      Chief Complaint:   The patient presents today for annual followup of CAD, dyslipidemia and HTN, and for preop clearance.     History Of Present Illness:    Demar Pittman is a 63 year old male patient who presents today for annual followup of CAD, dyslipidemia and HTN, and for preop clearance. His PMH is significant for CLL, hyperlipidemia, HTN, XENA, CAD s/p CABG (LIMA- LAD, R SVG- PDA, and L Radial) 11/2019 and IFG. The patient will be undergoing direct laryngoscopy with biopsy of a tongue mass and requires cardiac clearance. Over the past year, the patient states that he has done well from a cardiac standpoint. He denies any CP, chest discomfort or SOB. His only complaint is that of a sore throat and mouth s/t a lesion found on his tongue. BP has been stable, although he reports occasional elevated readings in response to pain. EKG today shows NSR with no acute changes. The patient is compliant with his prescribed medications.     Last Recorded Vitals:  Vitals:    05/23/24 1631   BP: 130/84   Pulse: 76   Weight: 83.5 kg (184 lb)       Past Surgical History:  He has a past surgical history that includes Other surgical history (10/24/2019); Other surgical history (10/24/2019); Other surgical history (12/13/2019); and Other surgical history (12/29/2019).      Social History:  He reports that he quit smoking about 39 years ago. His smoking use included cigarettes. He has never used smokeless tobacco. He reports that he does not currently use alcohol after a past usage of about 2.0 standard drinks of alcohol per week. He reports that he does not use drugs.    Family History:  No family history on file.     Allergies:  Patient has no known  allergies.    Outpatient Medications:  Current Outpatient Medications   Medication Instructions    allopurinol (ZYLOPRIM) 300 mg, oral, Daily    amLODIPine (NORVASC) 5 mg, oral, Daily    aspirin 81 mg, oral, Daily    atorvastatin (LIPITOR) 20 mg, oral, Nightly    diphenhydramine/Maalox/lidocaine (Magic Mouthwash) - Compounded - Outpatient Swish and spit 10ml of mouthwash 3 times per day after meals    metoprolol tartrate (LOPRESSOR) 25 mg, oral, 2 times daily    multivitamin with folic acid (One Daily Multivitamin) 400 mcg tablet 1 tablet, oral, Daily    nitroglycerin (NITROSTAT) 0.4 mg, sublingual, Every 5 min PRN    ondansetron (ZOFRAN) 8 mg, oral, Every 8 hours PRN    pantoprazole (PROTONIX) 40 mg, oral, Daily    potassium chloride CR (Klor-Con) 10 mEq ER tablet 10 mEq, oral, Daily, Do not crush, chew, or split.    prochlorperazine (COMPAZINE) 10 mg, oral, Every 6 hours PRN    [START ON 6/2/2024] venetoclax (Venclexta) 10 mg-50 mg- 100 mg tablet therapy pack Week 1: Take 20 mg (2 x 10 mg) by mouth once daily. Week 2: take 50 mg by mouth once daily. Week 3: take 100 mg by mouth once daily. Week 4: Take 200 mg (2 x 100 mg) by mouth once daily     Review of Systems   HENT:          Sore mouth and throat   All other systems reviewed and are negative.     Physical Exam:  Constitutional:       Appearance: Healthy appearance. Not in distress.   Neck:      Vascular: No JVR. JVD normal.   Pulmonary:      Effort: Pulmonary effort is normal.      Breath sounds: Normal breath sounds. No wheezing. No rhonchi. No rales.   Chest:      Chest wall: Not tender to palpatation.   Cardiovascular:      PMI at left midclavicular line. Normal rate. Regular rhythm. Normal S1. Normal S2.       Murmurs: There is no murmur.      No gallop.  No click. No rub.   Pulses:     Intact distal pulses.   Edema:     Peripheral edema absent.   Abdominal:      General: Bowel sounds are normal.      Palpations: Abdomen is soft.      Tenderness: There is  no abdominal tenderness.   Musculoskeletal: Normal range of motion.         General: No tenderness. Skin:     General: Skin is warm and dry.   Neurological:      General: No focal deficit present.      Mental Status: Alert and oriented to person, place and time.          Last Labs:  CBC -  Lab Results   Component Value Date    WBC 12.6 (H) 05/09/2024    HGB 14.7 05/09/2024    HCT 43.1 05/09/2024    MCV 81 05/09/2024     (L) 05/09/2024       CMP -  Lab Results   Component Value Date    CALCIUM 9.0 05/09/2024    PHOS 3.6 11/25/2019    PROT 6.5 05/09/2024    ALBUMIN 4.3 05/09/2024     (H) 05/09/2024     (H) 05/09/2024    ALKPHOS 139 (H) 05/09/2024    BILITOT 1.3 (H) 05/09/2024       LIPID PANEL -   Lab Results   Component Value Date    CHOL 103 02/10/2024    TRIG 104 02/10/2024    HDL 36.8 02/10/2024    CHHDL 2.8 02/10/2024    LDLF 55 08/09/2023    VLDL 21 02/10/2024    NHDL 66 02/10/2024       RENAL FUNCTION PANEL -   Lab Results   Component Value Date    GLUCOSE 94 05/09/2024     05/09/2024    K 3.4 (L) 05/09/2024     05/09/2024    CO2 25 05/09/2024    ANIONGAP 14 05/09/2024    BUN 10 05/09/2024    CREATININE 0.79 05/09/2024    GFRMALE >90 08/25/2023    CALCIUM 9.0 05/09/2024    PHOS 3.6 11/25/2019    ALBUMIN 4.3 05/09/2024        Lab Results   Component Value Date     (H) 08/21/2023    HGBA1C 6.8 (H) 02/10/2024       Last Cardiology Tests:  08/22/2023 - Stress Test  1. Normal Stress Test.No clinical or electrocardiographic evidence for ischemia at maximal infusion.  2. Medium-sized area of fixed perfusion defect of the inferolateral and anterolateral segments, consistent with myocardial scar or hibernating myocardium in left circumflex territory. No ischemia was detected. Well-maintained left ventricular function.      08/22/2023 - TTE  1. Left ventricular systolic function is normal with a 65% estimated ejection fraction.  2. Mildly elevated RVSP.    01/04/2021 - Cardiac  Catheterization (LH)  1. Triple vessel coronary artery disease with proximal left anterior descending involvement.  2. Patent grafts to LAD, CX and RCA.     12/29/2020 - CXR  Minimal atelectasis left lower lobe with resolution of the previous noted pleural effusion.     12/14/2020 - Exercise Stress Echo  1. Abnormal resting EGG without further changes or chest pain.  2. No clinical, echocardiographic or electrocardiographic evidence for ischemia at maximal workload.  3. The blunted heart rate diminishes the sensitivity of this test.  4. The submaximal level of stress was achieved.     06/05/2020 - TTE  1. The left ventricular systolic function is normal with a 60% estimated ejection fraction.  2. Spectral Doppler shows an impaired relaxation pattern of left ventricular diastolic filling.     02/03/2020 - TTE  1. The left ventricular systolic function is normal with a 65% estimated ejection fraction.  2. Spectral Doppler shows an impaired relaxation pattern of left ventricular diastolic filling.  3. Aortic valve stenosis is not present.  4. Left Ventricular Global Longitudinal Strain- -17.5%.     01/03/2020 - CT Chest  1. Extensive lymphadenopathy involving the chest wall, neck, mediastinum, hilum and axilla. The nodes appear similar in size and number to the prior exam.  2. Lymphadenopathy in the upper abdomen.  3. Interval development of a left pleural effusion. Left lower lobe consolidation and loss of volume.     11/20/2019 - Interoperative RICKIE  1. The left ventricular systolic function is normal.  2. POST CARDIOPULMONARY BYPASS REPORT: Patient has a normal sinus rhythm. LV function is mildly improved from pre-pump exam. RV function is mildly improved from pre-pump exam. No evidence of post aortic cannulation dissection.     11/14/2019 - TTE  1. The left ventricular systolic function is normal with a 60-65% estimated ejection fraction.  2. Slightly elevated RVSP.  3. No prior echocardiogram available for  comparison.     11/14/2019 - Vascular Lab Carotid Artery Duplex U/S  1. Right Carotid: Findings are consistent with less than 50% stenosis of the right proximal ICA. Right external carotid artery appears patent with no evidence of stenosis. No evidence of hemodynamically significant stenosis of the right common carotid artery. The right vertebral artery is patent with antegrade flow. No evidence of hemodynamically significant stenosis in the right subclavian.  2. Left Carotid: Findings are consistent with less than 50% stenosis of the left proximal ICA. Left external carotid artery appears patent with no evidence of stenosis. No evidence of hemodynamically significant stenosis of the left common carotid artery. The left vertebral artery is patent with antegrade flow. No evidence of hemodynamically significant stenosis in the left subclavian.  3. Additional Findings: Technically difficult study due to grossly enlarged lymph nodes . Patient has history of chronic Hodgkins lymphoma.     11/07/2019 - Cardiac Catheterization (LH)  1. Triple vessel coronary artery disease with proximal left anterior descending involvement.  2. Normal LV filling pressures.    Lab review: I have personally reviewed the laboratory result(s).  Diagnostic review: I have personally reviewed the result(s) of the Echocardiogram and Stress Test from 08/2023.     Assessment/Plan   1) CAD s/p CABG Nov. 2019  On ASA 81 mg daily, metoprolol tartrate 25 mg BID, atorvastatin 20 mg daily, amlodipine 5 mg daily  Counselled about diet and exercise  Repeat echo June 2020 with normal LVEF  [facility] January 2021 with patent grafts to LAD, circumflex and RCA  Stress 08/22/2023 negative for ischemia  TTE 08/22/2023 with LVEF 65%, mildly elevated RVSP  Denies CP, chest discomfort or SOB  Discontinue atorvastatin d/t elevated liver enzymes (see below)  Continue all other prescribed medications  F/U 6 months     2) Hyperlipidemia  Goal LDL less than 70   On  atorvastatin 20 mg daily  Lipid panel 02/10/2024 with LDL of 45; at goal  CMP 05/09/2024 with elevated AST and ALT of 163 and 383 respectively; increased from 20 and 32 prior  Discontinue atorvastatin   Repeat CMP - call with results  Check U/S of liver - call with results  F/U 6 months     3) HTN  Stable - occasional elevated readings in response to pain  On metoprolol tartrate 25 mg BID and amlodipine 5 mg daily  Continue current medical Rx  F/U 6 months     4) CLL  Management per oncology   Will be undergoing further chemotherapy d/t worsening lymphadenopathy pending workup and management of tongue mass.     5) Cardiovascular Risk Stratification  Will be undergoing direct laryngoscopy with biopsy of tongue mass  Stress 08/22/2023 negative for ischemia  TTE 08/22/2023 with LVEF 65%, mildly elevated RVSP  Low risk for planned surgery - clearance provided      Scribe Attestation  By signing my name below, I, Mayte Perez, Scribe   attest that this documentation has been prepared under the direction and in the presence of Donny Fox MD.

## 2024-05-23 NOTE — TELEPHONE ENCOUNTER
Topic: Pt called and given surgery date      I called pt and informed him that surgery will be 5/28 at [facility] in [place]. Pt given address and map within the surgery packet that was handed to him on 5/21 when he was in the clinic to see Dr Chirinos. Pt aware [facility] surgery department will call him on Friday/Monday to let him know what time to arrive for surgery on Tuesday. Pt aware nothing to eat or drink after midnight on Monday.    Pt will see his Cardiologist, Dr Fox, today for a FUV ,but he also agrees to assess for surgery clearance during this appointment. Dr Chirinos and PAT team aware.

## 2024-05-23 NOTE — PATIENT INSTRUCTIONS
Discontinue your atorvastatin for now.  Continue all other  medications as prescribed.  Please have repeat blood work drawn. You will be notified of the results once they become available.  Dr. Fox has ordered an ultrasound of your liver. You will be notified of the results once they become available.  From a heart standpoint, you are cleared for your upcoming surgery.   Followup with Dr. Fox in 6 months.    If you have any questions or cardiac concerns, please call our office at [phone].

## 2024-05-23 NOTE — LETTER
May 23, 2024     Deedee Mcclendon MD  [address]  [facility], [address] [place] [id number]    Patient: Demar Pittman   YOB: 1960   Date of Visit: 5/23/2024       Dear Dr. Deedee Mcclendon MD:    Thank you for referring Demar Pittman to me for evaluation. Below are my notes for this consultation.  If you have questions, please do not hesitate to call me. I look forward to following your patient along with you.       Sincerely,     Donny Fox MD      CC: No Recipients  ______________________________________________________________________________________    Counseling:  The patient was counseled regarding diagnostic results, instructions for management, risk factor reductions, prognosis, patient and family education, impressions, risks and benefits of treatment options and importance of compliance with treatment.      Chief Complaint:   The patient presents today for annual followup of CAD, dyslipidemia and HTN, and for preop clearance.     History Of Present Illness:    Demar Pittman is a 63 year old male patient who presents today for annual followup of CAD, dyslipidemia and HTN, and for preop clearance. His PMH is significant for CLL, hyperlipidemia, HTN, XENA, CAD s/p CABG (LIMA- LAD, R SVG- PDA, and L Radial) 11/2019 and IFG. The patient will be undergoing direct laryngoscopy with biopsy of a tongue mass and requires cardiac clearance. Over the past year, the patient states that he has done well from a cardiac standpoint. He denies any CP, chest discomfort or SOB. His only complaint is that of a sore throat and mouth s/t a lesion found on his tongue. BP has been stable, although he reports occasional elevated readings in response to pain. EKG today shows NSR with no acute changes. The patient is compliant with his prescribed medications.     Last Recorded Vitals:  Vitals:    05/23/24 1631   BP: 130/84   Pulse: 76   Weight: 83.5 kg (184 lb)       Past  Surgical History:  He has a past surgical history that includes Other surgical history (10/24/2019); Other surgical history (10/24/2019); Other surgical history (12/13/2019); and Other surgical history (12/29/2019).      Social History:  He reports that he quit smoking about 39 years ago. His smoking use included cigarettes. He has never used smokeless tobacco. He reports that he does not currently use alcohol after a past usage of about 2.0 standard drinks of alcohol per week. He reports that he does not use drugs.    Family History:  No family history on file.     Allergies:  Patient has no known allergies.    Outpatient Medications:  Current Outpatient Medications   Medication Instructions   • allopurinol (ZYLOPRIM) 300 mg, oral, Daily   • amLODIPine (NORVASC) 5 mg, oral, Daily   • aspirin 81 mg, oral, Daily   • atorvastatin (LIPITOR) 20 mg, oral, Nightly   • diphenhydramine/Maalox/lidocaine (Magic Mouthwash) - Compounded - Outpatient Swish and spit 10ml of mouthwash 3 times per day after meals   • metoprolol tartrate (LOPRESSOR) 25 mg, oral, 2 times daily   • multivitamin with folic acid (One Daily Multivitamin) 400 mcg tablet 1 tablet, oral, Daily   • nitroglycerin (NITROSTAT) 0.4 mg, sublingual, Every 5 min PRN   • ondansetron (ZOFRAN) 8 mg, oral, Every 8 hours PRN   • pantoprazole (PROTONIX) 40 mg, oral, Daily   • potassium chloride CR (Klor-Con) 10 mEq ER tablet 10 mEq, oral, Daily, Do not crush, chew, or split.   • prochlorperazine (COMPAZINE) 10 mg, oral, Every 6 hours PRN   • [START ON 6/2/2024] venetoclax (Venclexta) 10 mg-50 mg- 100 mg tablet therapy pack Week 1: Take 20 mg (2 x 10 mg) by mouth once daily. Week 2: take 50 mg by mouth once daily. Week 3: take 100 mg by mouth once daily. Week 4: Take 200 mg (2 x 100 mg) by mouth once daily     Review of Systems   HENT:          Sore mouth and throat   All other systems reviewed and are negative.     Physical Exam:  Constitutional:       Appearance:  Healthy appearance. Not in distress.   Neck:      Vascular: No JVR. JVD normal.   Pulmonary:      Effort: Pulmonary effort is normal.      Breath sounds: Normal breath sounds. No wheezing. No rhonchi. No rales.   Chest:      Chest wall: Not tender to palpatation.   Cardiovascular:      PMI at left midclavicular line. Normal rate. Regular rhythm. Normal S1. Normal S2.       Murmurs: There is no murmur.      No gallop.  No click. No rub.   Pulses:     Intact distal pulses.   Edema:     Peripheral edema absent.   Abdominal:      General: Bowel sounds are normal.      Palpations: Abdomen is soft.      Tenderness: There is no abdominal tenderness.   Musculoskeletal: Normal range of motion.         General: No tenderness. Skin:     General: Skin is warm and dry.   Neurological:      General: No focal deficit present.      Mental Status: Alert and oriented to person, place and time.          Last Labs:  CBC -  Lab Results   Component Value Date    WBC 12.6 (H) 05/09/2024    HGB 14.7 05/09/2024    HCT 43.1 05/09/2024    MCV 81 05/09/2024     (L) 05/09/2024       CMP -  Lab Results   Component Value Date    CALCIUM 9.0 05/09/2024    PHOS 3.6 11/25/2019    PROT 6.5 05/09/2024    ALBUMIN 4.3 05/09/2024     (H) 05/09/2024     (H) 05/09/2024    ALKPHOS 139 (H) 05/09/2024    BILITOT 1.3 (H) 05/09/2024       LIPID PANEL -   Lab Results   Component Value Date    CHOL 103 02/10/2024    TRIG 104 02/10/2024    HDL 36.8 02/10/2024    CHHDL 2.8 02/10/2024    LDLF 55 08/09/2023    VLDL 21 02/10/2024    NHDL 66 02/10/2024       RENAL FUNCTION PANEL -   Lab Results   Component Value Date    GLUCOSE 94 05/09/2024     05/09/2024    K 3.4 (L) 05/09/2024     05/09/2024    CO2 25 05/09/2024    ANIONGAP 14 05/09/2024    BUN 10 05/09/2024    CREATININE 0.79 05/09/2024    GFRMALE >90 08/25/2023    CALCIUM 9.0 05/09/2024    PHOS 3.6 11/25/2019    ALBUMIN 4.3 05/09/2024        Lab Results   Component Value Date      (H) 08/21/2023    HGBA1C 6.8 (H) 02/10/2024       Last Cardiology Tests:  08/22/2023 - Stress Test  1. Normal Stress Test.No clinical or electrocardiographic evidence for ischemia at maximal infusion.  2. Medium-sized area of fixed perfusion defect of the inferolateral and anterolateral segments, consistent with myocardial scar or hibernating myocardium in left circumflex territory. No ischemia was detected. Well-maintained left ventricular function.      08/22/2023 - TTE  1. Left ventricular systolic function is normal with a 65% estimated ejection fraction.  2. Mildly elevated RVSP.    01/04/2021 - Cardiac Catheterization (LH)  1. Triple vessel coronary artery disease with proximal left anterior descending involvement.  2. Patent grafts to LAD, CX and RCA.     12/29/2020 - CXR  Minimal atelectasis left lower lobe with resolution of the previous noted pleural effusion.     12/14/2020 - Exercise Stress Echo  1. Abnormal resting EGG without further changes or chest pain.  2. No clinical, echocardiographic or electrocardiographic evidence for ischemia at maximal workload.  3. The blunted heart rate diminishes the sensitivity of this test.  4. The submaximal level of stress was achieved.     06/05/2020 - TTE  1. The left ventricular systolic function is normal with a 60% estimated ejection fraction.  2. Spectral Doppler shows an impaired relaxation pattern of left ventricular diastolic filling.     02/03/2020 - TTE  1. The left ventricular systolic function is normal with a 65% estimated ejection fraction.  2. Spectral Doppler shows an impaired relaxation pattern of left ventricular diastolic filling.  3. Aortic valve stenosis is not present.  4. Left Ventricular Global Longitudinal Strain- -17.5%.     01/03/2020 - CT Chest  1. Extensive lymphadenopathy involving the chest wall, neck, mediastinum, hilum and axilla. The nodes appear similar in size and number to the prior exam.  2. Lymphadenopathy in the upper  abdomen.  3. Interval development of a left pleural effusion. Left lower lobe consolidation and loss of volume.     11/20/2019 - Interoperative RICKIE  1. The left ventricular systolic function is normal.  2. POST CARDIOPULMONARY BYPASS REPORT: Patient has a normal sinus rhythm. LV function is mildly improved from pre-pump exam. RV function is mildly improved from pre-pump exam. No evidence of post aortic cannulation dissection.     11/14/2019 - TTE  1. The left ventricular systolic function is normal with a 60-65% estimated ejection fraction.  2. Slightly elevated RVSP.  3. No prior echocardiogram available for comparison.     11/14/2019 - Vascular Lab Carotid Artery Duplex U/S  1. Right Carotid: Findings are consistent with less than 50% stenosis of the right proximal ICA. Right external carotid artery appears patent with no evidence of stenosis. No evidence of hemodynamically significant stenosis of the right common carotid artery. The right vertebral artery is patent with antegrade flow. No evidence of hemodynamically significant stenosis in the right subclavian.  2. Left Carotid: Findings are consistent with less than 50% stenosis of the left proximal ICA. Left external carotid artery appears patent with no evidence of stenosis. No evidence of hemodynamically significant stenosis of the left common carotid artery. The left vertebral artery is patent with antegrade flow. No evidence of hemodynamically significant stenosis in the left subclavian.  3. Additional Findings: Technically difficult study due to grossly enlarged lymph nodes . Patient has history of chronic Hodgkins lymphoma.     11/07/2019 - Cardiac Catheterization (LH)  1. Triple vessel coronary artery disease with proximal left anterior descending involvement.  2. Normal LV filling pressures.    Lab review: I have personally reviewed the laboratory result(s).  Diagnostic review: I have personally reviewed the result(s) of the Echocardiogram and Stress  Test from 2023.     Assessment/Plan  1) CAD s/p CABG 2019  On ASA 81 mg daily, metoprolol tartrate 25 mg BID, atorvastatin 20 mg daily, amlodipine 5 mg daily  Counselled about diet and exercise  Repeat echo 2020 with normal LVEF  [facility] 2021 with patent grafts to LAD, circumflex and RCA  Stress 2023 negative for ischemia  TTE 2023 with LVEF 65%, mildly elevated RVSP  Denies CP, chest discomfort or SOB  Discontinue atorvastatin d/t elevated liver enzymes (see below)  Continue all other prescribed medications  F/U 6 months     2) Hyperlipidemia  Goal LDL less than 70   On atorvastatin 20 mg daily  Lipid panel 02/10/2024 with LDL of 45; at goal  CMP 2024 with elevated AST and ALT of 163 and 383 respectively; increased from 20 and 32 prior  Discontinue atorvastatin   Repeat CMP - call with results  Check U/S of liver - call with results  F/U 6 months     3) HTN  Stable - occasional elevated readings in response to pain  On metoprolol tartrate 25 mg BID and amlodipine 5 mg daily  Continue current medical Rx  F/U 6 months     4) CLL  Management per oncology   Will be undergoing further chemotherapy d/t worsening lymphadenopathy pending workup and management of tongue mass.     5) Cardiovascular Risk Stratification  Will be undergoing direct laryngoscopy with biopsy of tongue mass  Stress 2023 negative for ischemia  TTE 2023 with LVEF 65%, mildly elevated RVSP  Low risk for planned surgery - clearance provided      Scribe Attestation  By signing my name below, I, Yesenia Alonso   attest that this documentation has been prepared under the direction and in the presence of Donny Fox MD.                        [address] [address]                   Phone# [phone]              Fax# [phone]      Date: 24    RE: Demar Pittman            : 1960       Surgical/Procedural Clearance for:  ENT  procedure  Patient is at: LOW cardiovascular risk for this LOW risk procedure.           Can hold Antiplatelet ASA for 5 days prior      N/A hold Anticoagulant                    Is further cardiac workup is needed prior to the procedure?  No     Patient should continue Beta Blocker in the perioperative period.  Yes     Patient should resume antiplatelet/anticoagulation as soon as cleared by surgeon/procedure physician.  N/A       Thank You,    05/23/24 at 4:50 PM - Donny Fox MD

## 2024-05-25 ENCOUNTER — HOSPITAL ENCOUNTER (OUTPATIENT)
Dept: RADIOLOGY | Facility: HOSPITAL | Age: 64
Discharge: HOME | End: 2024-05-25
Payer: COMMERCIAL

## 2024-05-25 DIAGNOSIS — I25.10 CORONARY ARTERY DISEASE INVOLVING NATIVE CORONARY ARTERY OF NATIVE HEART WITHOUT ANGINA PECTORIS: ICD-10-CM

## 2024-05-25 PROCEDURE — 76705 ECHO EXAM OF ABDOMEN: CPT | Performed by: RADIOLOGY

## 2024-05-25 PROCEDURE — 76705 ECHO EXAM OF ABDOMEN: CPT

## 2024-05-28 ENCOUNTER — ANESTHESIA EVENT (OUTPATIENT)
Dept: OPERATING ROOM | Facility: HOSPITAL | Age: 64
End: 2024-05-28
Payer: COMMERCIAL

## 2024-05-28 ENCOUNTER — HOSPITAL ENCOUNTER (OUTPATIENT)
Facility: HOSPITAL | Age: 64
Setting detail: OUTPATIENT SURGERY
Discharge: HOME | End: 2024-05-28
Attending: STUDENT IN AN ORGANIZED HEALTH CARE EDUCATION/TRAINING PROGRAM | Admitting: STUDENT IN AN ORGANIZED HEALTH CARE EDUCATION/TRAINING PROGRAM
Payer: COMMERCIAL

## 2024-05-28 ENCOUNTER — ANESTHESIA (OUTPATIENT)
Dept: OPERATING ROOM | Facility: HOSPITAL | Age: 64
End: 2024-05-28
Payer: COMMERCIAL

## 2024-05-28 VITALS
HEIGHT: 67 IN | TEMPERATURE: 96.8 F | DIASTOLIC BLOOD PRESSURE: 78 MMHG | WEIGHT: 180.78 LBS | OXYGEN SATURATION: 93 % | SYSTOLIC BLOOD PRESSURE: 132 MMHG | HEART RATE: 79 BPM | RESPIRATION RATE: 18 BRPM | BODY MASS INDEX: 28.37 KG/M2

## 2024-05-28 DIAGNOSIS — K14.8 TONGUE LESION: Primary | ICD-10-CM

## 2024-05-28 PROBLEM — T88.4XXA DIFFICULT INTUBATION: Status: ACTIVE | Noted: 2024-05-28

## 2024-05-28 PROCEDURE — 2500000005 HC RX 250 GENERAL PHARMACY W/O HCPCS: Performed by: NURSE ANESTHETIST, CERTIFIED REGISTERED

## 2024-05-28 PROCEDURE — 31535 LARYNGOSCOPY W/BIOPSY: CPT | Performed by: STUDENT IN AN ORGANIZED HEALTH CARE EDUCATION/TRAINING PROGRAM

## 2024-05-28 PROCEDURE — 88307 TISSUE EXAM BY PATHOLOGIST: CPT | Mod: TC,SUR | Performed by: STUDENT IN AN ORGANIZED HEALTH CARE EDUCATION/TRAINING PROGRAM

## 2024-05-28 PROCEDURE — 2500000004 HC RX 250 GENERAL PHARMACY W/ HCPCS (ALT 636 FOR OP/ED): Performed by: STUDENT IN AN ORGANIZED HEALTH CARE EDUCATION/TRAINING PROGRAM

## 2024-05-28 PROCEDURE — 3700000001 HC GENERAL ANESTHESIA TIME - INITIAL BASE CHARGE: Performed by: STUDENT IN AN ORGANIZED HEALTH CARE EDUCATION/TRAINING PROGRAM

## 2024-05-28 PROCEDURE — A4217 STERILE WATER/SALINE, 500 ML: HCPCS | Performed by: STUDENT IN AN ORGANIZED HEALTH CARE EDUCATION/TRAINING PROGRAM

## 2024-05-28 PROCEDURE — 3700000002 HC GENERAL ANESTHESIA TIME - EACH INCREMENTAL 1 MINUTE: Performed by: STUDENT IN AN ORGANIZED HEALTH CARE EDUCATION/TRAINING PROGRAM

## 2024-05-28 PROCEDURE — 7100000001 HC RECOVERY ROOM TIME - INITIAL BASE CHARGE: Performed by: STUDENT IN AN ORGANIZED HEALTH CARE EDUCATION/TRAINING PROGRAM

## 2024-05-28 PROCEDURE — 2500000004 HC RX 250 GENERAL PHARMACY W/ HCPCS (ALT 636 FOR OP/ED): Performed by: NURSE ANESTHETIST, CERTIFIED REGISTERED

## 2024-05-28 PROCEDURE — 7100000009 HC PHASE TWO TIME - INITIAL BASE CHARGE: Performed by: STUDENT IN AN ORGANIZED HEALTH CARE EDUCATION/TRAINING PROGRAM

## 2024-05-28 PROCEDURE — 3600000008 HC OR TIME - EACH INCREMENTAL 1 MINUTE - PROCEDURE LEVEL THREE: Performed by: STUDENT IN AN ORGANIZED HEALTH CARE EDUCATION/TRAINING PROGRAM

## 2024-05-28 PROCEDURE — 88185 FLOWCYTOMETRY/TC ADD-ON: CPT | Mod: TC | Performed by: STUDENT IN AN ORGANIZED HEALTH CARE EDUCATION/TRAINING PROGRAM

## 2024-05-28 PROCEDURE — 3600000003 HC OR TIME - INITIAL BASE CHARGE - PROCEDURE LEVEL THREE: Performed by: STUDENT IN AN ORGANIZED HEALTH CARE EDUCATION/TRAINING PROGRAM

## 2024-05-28 PROCEDURE — 7100000010 HC PHASE TWO TIME - EACH INCREMENTAL 1 MINUTE: Performed by: STUDENT IN AN ORGANIZED HEALTH CARE EDUCATION/TRAINING PROGRAM

## 2024-05-28 PROCEDURE — 7100000002 HC RECOVERY ROOM TIME - EACH INCREMENTAL 1 MINUTE: Performed by: STUDENT IN AN ORGANIZED HEALTH CARE EDUCATION/TRAINING PROGRAM

## 2024-05-28 PROCEDURE — 2500000001 HC RX 250 WO HCPCS SELF ADMINISTERED DRUGS (ALT 637 FOR MEDICARE OP): Performed by: STUDENT IN AN ORGANIZED HEALTH CARE EDUCATION/TRAINING PROGRAM

## 2024-05-28 RX ORDER — SODIUM CHLORIDE, SODIUM LACTATE, POTASSIUM CHLORIDE, CALCIUM CHLORIDE 600; 310; 30; 20 MG/100ML; MG/100ML; MG/100ML; MG/100ML
100 INJECTION, SOLUTION INTRAVENOUS CONTINUOUS
Status: DISCONTINUED | OUTPATIENT
Start: 2024-05-28 | End: 2024-05-28 | Stop reason: HOSPADM

## 2024-05-28 RX ORDER — SUCCINYLCHOLINE CHLORIDE 20 MG/ML
INJECTION INTRAMUSCULAR; INTRAVENOUS AS NEEDED
Status: DISCONTINUED | OUTPATIENT
Start: 2024-05-28 | End: 2024-05-28

## 2024-05-28 RX ORDER — FENTANYL CITRATE 50 UG/ML
50 INJECTION, SOLUTION INTRAMUSCULAR; INTRAVENOUS EVERY 5 MIN PRN
Status: DISCONTINUED | OUTPATIENT
Start: 2024-05-28 | End: 2024-05-28 | Stop reason: HOSPADM

## 2024-05-28 RX ORDER — METOPROLOL TARTRATE 1 MG/ML
INJECTION, SOLUTION INTRAVENOUS AS NEEDED
Status: DISCONTINUED | OUTPATIENT
Start: 2024-05-28 | End: 2024-05-28

## 2024-05-28 RX ORDER — FENTANYL CITRATE 50 UG/ML
12.5 INJECTION, SOLUTION INTRAMUSCULAR; INTRAVENOUS EVERY 5 MIN PRN
Status: DISCONTINUED | OUTPATIENT
Start: 2024-05-28 | End: 2024-05-28 | Stop reason: HOSPADM

## 2024-05-28 RX ORDER — LIDOCAINE HYDROCHLORIDE 20 MG/ML
INJECTION, SOLUTION INFILTRATION; PERINEURAL AS NEEDED
Status: DISCONTINUED | OUTPATIENT
Start: 2024-05-28 | End: 2024-05-28

## 2024-05-28 RX ORDER — ONDANSETRON HYDROCHLORIDE 2 MG/ML
4 INJECTION, SOLUTION INTRAVENOUS ONCE AS NEEDED
Status: DISCONTINUED | OUTPATIENT
Start: 2024-05-28 | End: 2024-05-28 | Stop reason: HOSPADM

## 2024-05-28 RX ORDER — OXYMETAZOLINE HCL 0.05 %
SPRAY, NON-AEROSOL (ML) NASAL AS NEEDED
Status: DISCONTINUED | OUTPATIENT
Start: 2024-05-28 | End: 2024-05-28 | Stop reason: HOSPADM

## 2024-05-28 RX ORDER — LIDOCAINE HYDROCHLORIDE 10 MG/ML
0.1 INJECTION INFILTRATION; PERINEURAL ONCE
Status: DISCONTINUED | OUTPATIENT
Start: 2024-05-28 | End: 2024-05-28 | Stop reason: HOSPADM

## 2024-05-28 RX ORDER — FENTANYL CITRATE 50 UG/ML
INJECTION, SOLUTION INTRAMUSCULAR; INTRAVENOUS AS NEEDED
Status: DISCONTINUED | OUTPATIENT
Start: 2024-05-28 | End: 2024-05-28

## 2024-05-28 RX ORDER — PROPOFOL 10 MG/ML
INJECTION, EMULSION INTRAVENOUS AS NEEDED
Status: DISCONTINUED | OUTPATIENT
Start: 2024-05-28 | End: 2024-05-28

## 2024-05-28 RX ORDER — IBUPROFEN 400 MG/1
400 TABLET ORAL EVERY 6 HOURS PRN
Qty: 56 TABLET | Refills: 0 | Status: ON HOLD | OUTPATIENT
Start: 2024-05-28 | End: 2024-06-11

## 2024-05-28 RX ORDER — ACETAMINOPHEN 325 MG/1
650 TABLET ORAL EVERY 6 HOURS PRN
Qty: 56 TABLET | Refills: 0 | Status: ON HOLD | OUTPATIENT
Start: 2024-05-28 | End: 2024-06-11

## 2024-05-28 RX ORDER — WATER 1 ML/ML
IRRIGANT IRRIGATION AS NEEDED
Status: DISCONTINUED | OUTPATIENT
Start: 2024-05-28 | End: 2024-05-28 | Stop reason: HOSPADM

## 2024-05-28 RX ORDER — ONDANSETRON HYDROCHLORIDE 2 MG/ML
INJECTION, SOLUTION INTRAVENOUS AS NEEDED
Status: DISCONTINUED | OUTPATIENT
Start: 2024-05-28 | End: 2024-05-28

## 2024-05-28 RX ORDER — MIDAZOLAM HYDROCHLORIDE 1 MG/ML
INJECTION INTRAMUSCULAR; INTRAVENOUS AS NEEDED
Status: DISCONTINUED | OUTPATIENT
Start: 2024-05-28 | End: 2024-05-28

## 2024-05-28 RX ORDER — FENTANYL CITRATE 50 UG/ML
25 INJECTION, SOLUTION INTRAMUSCULAR; INTRAVENOUS EVERY 5 MIN PRN
Status: DISCONTINUED | OUTPATIENT
Start: 2024-05-28 | End: 2024-05-28 | Stop reason: HOSPADM

## 2024-05-28 RX ADMIN — SODIUM CHLORIDE, SODIUM LACTATE, POTASSIUM CHLORIDE, AND CALCIUM CHLORIDE: 600; 310; 30; 20 INJECTION, SOLUTION INTRAVENOUS at 17:33

## 2024-05-28 RX ADMIN — FENTANYL CITRATE 50 MCG: 50 INJECTION, SOLUTION INTRAMUSCULAR; INTRAVENOUS at 17:52

## 2024-05-28 RX ADMIN — PROPOFOL 50 MG: 10 INJECTION, EMULSION INTRAVENOUS at 18:01

## 2024-05-28 RX ADMIN — FENTANYL CITRATE 50 MCG: 50 INJECTION, SOLUTION INTRAMUSCULAR; INTRAVENOUS at 17:38

## 2024-05-28 RX ADMIN — SUCCINYLCHOLINE CHLORIDE 120 MG: 20 INJECTION, SOLUTION INTRAMUSCULAR; INTRAVENOUS at 17:39

## 2024-05-28 RX ADMIN — PROPOFOL 50 MG: 10 INJECTION, EMULSION INTRAVENOUS at 17:54

## 2024-05-28 RX ADMIN — LIDOCAINE HYDROCHLORIDE 100 MG: 20 INJECTION, SOLUTION INFILTRATION; PERINEURAL at 17:38

## 2024-05-28 RX ADMIN — MIDAZOLAM HYDROCHLORIDE 2 MG: 1 INJECTION, SOLUTION INTRAMUSCULAR; INTRAVENOUS at 17:38

## 2024-05-28 RX ADMIN — METOPROLOL TARTRATE 2 MG: 5 INJECTION, SOLUTION INTRAVENOUS at 17:54

## 2024-05-28 RX ADMIN — ONDANSETRON 4 MG: 2 INJECTION INTRAMUSCULAR; INTRAVENOUS at 18:13

## 2024-05-28 RX ADMIN — PROPOFOL 100 MG: 10 INJECTION, EMULSION INTRAVENOUS at 17:38

## 2024-05-28 RX ADMIN — PROPOFOL 50 MG: 10 INJECTION, EMULSION INTRAVENOUS at 17:47

## 2024-05-28 RX ADMIN — DEXAMETHASONE SODIUM PHOSPHATE 12 MG: 4 INJECTION INTRA-ARTICULAR; INTRALESIONAL; INTRAMUSCULAR; INTRAVENOUS; SOFT TISSUE at 17:58

## 2024-05-28 SDOH — HEALTH STABILITY: MENTAL HEALTH: CURRENT SMOKER: 0

## 2024-05-28 ASSESSMENT — PAIN SCALES - GENERAL
PAINLEVEL_OUTOF10: 0 - NO PAIN
PAIN_LEVEL: 2
PAINLEVEL_OUTOF10: 0 - NO PAIN
PAINLEVEL_OUTOF10: 0 - NO PAIN
PAINLEVEL_OUTOF10: 6
PAINLEVEL_OUTOF10: 0 - NO PAIN

## 2024-05-28 ASSESSMENT — PAIN - FUNCTIONAL ASSESSMENT
PAIN_FUNCTIONAL_ASSESSMENT: 0-10

## 2024-05-28 NOTE — ANESTHESIA PREPROCEDURE EVALUATION
Patient: Demar Pittman    Procedure Information       Date/Time: 05/28/24 1801    Procedure: Biopsy Trachea - Procedure: Direct laryngoscopy with biopsy of base of tongue lesion    Procedure time 1 hr    Location: [facility] OR  / [facility] [place] [facility]    Surgeons: Miles Chirinos MD            Relevant Problems   Anesthesia  Past Surgical History:  10/24/2019: OTHER SURGICAL HISTORY      Comment:  Cataract surgery  10/24/2019: OTHER SURGICAL HISTORY      Comment:  Tonsillectomy  12/13/2019: OTHER SURGICAL HISTORY      Comment:  Retinal detachment repair  12/29/2019: OTHER SURGICAL HISTORY      Comment:  Coronary artery bypass graft     (+) Difficult intubation      Cardiac  TTE 2023:    CONCLUSIONS:  1. Left ventricular systolic function is normal with a 65% estimated ejection fraction.  2. Mildly e     (+) CAD (coronary artery disease)   (+) Essential hypertension   (+) Mixed hyperlipidemia   (+) Paroxysmal atrial fibrillation (Multi)      Pulmonary   (+) XENA on CPAP      Endocrine   (+) Class 1 obesity due to excess calories with serious comorbidity and body mass index (BMI) of 30.0 to 30.9 in adult   (+) Controlled type 2 diabetes mellitus without complication, without long-term current use of insulin (Multi)      Hematology  Hx CLL   (+) CLL (chronic lymphocytic leukemia) (Multi)   (+) Thrombocytopenia (CMS-HCC)      HEENT  Hx BOT mass secondary to CLL   (+) Sensorineural hearing loss (SNHL) of both ears       Clinical information reviewed:   Tobacco  Allergies  Meds   Med Hx  Surg Hx   Fam Hx  Soc Hx        NPO Detail:  NPO/Void Status  Date of Last Liquid: 05/27/24  Time of Last Liquid: 2200  Date of Last Solid: 05/27/24  Time of Last Solid: 2200         Physical Exam    Airway  Mallampati: IV  TM distance: >3 FB  Neck ROM: full     Cardiovascular - normal exam  Rhythm: regular  Rate: normal     Dental   (+) upper dentures, lower dentures     Pulmonary - normal exam  Breath sounds clear to  auscultation     Abdominal            Anesthesia Plan    History of general anesthesia?: yes  History of complications of general anesthesia?: yes    ASA 4     general     The patient is not a current smoker.  Education provided regarding risk of obstructive sleep apnea.  intravenous and inhalational induction   Postoperative administration of opioids is intended.  Trial extubation is planned.  Anesthetic plan and risks discussed with patient.  Use of blood products discussed with patient who consented to blood products.    Plan discussed with CRNA.

## 2024-05-28 NOTE — ANESTHESIA PROCEDURE NOTES
Airway  Date/Time: 5/28/2024 5:45 PM  Urgency: elective    Airway not difficult    Staffing  Performed: CRNA   Authorized by: Callum Sena MD    Performed by: ANNELISE Storm-KADE  Patient location during procedure: OR    Indications and Patient Condition  Indications for airway management: anesthesia  Spontaneous Ventilation: absent  Sedation level: deep  Preoxygenated: yes  Patient position: sniffing  Mask difficulty assessment: 1 - vent by mask    Final Airway Details  Final airway type: endotracheal airway      Successful airway: ETT  Cuffed: yes   Successful intubation technique: video laryngoscopy  Endotracheal tube insertion site: oral  Blade: Laya  Blade size: #4  ETT size (mm): 6.0  Cormack-Lehane Classification: grade I - full view of glottis  Placement verified by: chest auscultation and capnometry   Measured from: lips  ETT to lips (cm): 24  Number of attempts at approach: 1

## 2024-05-28 NOTE — ANESTHESIA POSTPROCEDURE EVALUATION
Patient: Demar Pittman    Procedure Summary       Date: 05/28/24 Room / Location: [facility] OR 04 / Virtual [facility] [place] [place] [facility]    Anesthesia Start: 1734 Anesthesia Stop: 1844    Procedure: Biopsy Base of Tongue Diagnosis:       Tongue lesion      (Tongue lesion [K14.8])    Surgeons: Miles Chirinos MD Responsible Provider: Callum Sena MD    Anesthesia Type: general ASA Status: 4            Anesthesia Type: general    Vitals Value Taken Time   /78 05/28/24 1915   Temp 36 °C (96.8 °F) 05/28/24 1915   Pulse 79 05/28/24 1915   Resp 18 05/28/24 1915   SpO2 93 % 05/28/24 1915       Anesthesia Post Evaluation    Patient location during evaluation: bedside  Patient participation: complete - patient participated  Level of consciousness: sleepy but conscious  Pain score: 2  Pain management: adequate  Airway patency: patent  Cardiovascular status: acceptable and hemodynamically stable  Respiratory status: acceptable and face mask  Hydration status: acceptable  Postoperative Nausea and Vomiting: none        There were no known notable events for this encounter.

## 2024-05-28 NOTE — DISCHARGE INSTRUCTIONS
Postoperative Care Instructions:  Direct Laryngoscopy, Base of Tongue Biopsy    Postoperative Care:  It is normal for your voice to be hoarse for several days or weeks after surgery while the healing process occurs.     Make sure to drink plenty of water to stay well hydrated after surgery, as this will help to speed your recovery by keeping your secretions thin and your vocal cords moist.  Use of cough lozenges is also allowed.      Diet: It is okay to resume your normal diet. You may want to start out with liquids and light meals prior to advancing your regular diet.     What to Expect Following Surgery:    The following are some symptoms that may follow your recent surgery:    Pain - Some mild pain is normal after surgery.  As adequate pain control is necessary for healing, please take over-the-counter Tylenol or Motrin per the package directions as needed for pain.  Occasionally, a narcotic pain medication is prescribed for your use after surgery.  If this is the case, please take the medication only as directed.  You may need to take an over-the-counter stool softener as narcotic pain medications can cause constipation. Massage, cold packs, relaxation techniques, listening to music, and positive thinking will also help control your postoperative pain.    Taste disturbance and/or tongue numbness - Due to the surgical instruments used during surgery, you may experience tongue numbness and/or taste disturbance after surgery, but this is usually temporary.  It may take 1-4 weeks to completely resolve.  It is also normal for your tongue to feel swollen or bruised after surgery, and this will also resolve in a few days.    Bleeding - For a few days after surgery, it is normal to cough up some blood in your mucus.  However, if you experience continuous bright red bleeding from your mouth or if you are coughing up blood clots, please call your doctor's office immediately.  If you are on any blood thinning  medications, such as Aspirin, Plavix, or Coumadin, you may restart them immediately after surgery unless you were specifically told not to do so by your surgeon.    Muscle aches/soreness - You may experience generalized muscle aches and/or soreness after surgery, and this is a normal effect of anesthesia.  They should completely resolve after a few days.     Swelling/throat tightness - If you were given steroid medication, this can help with swelling and should be taken as directed. The side effects from steroid use is typically minimal when taken for short periods, as used in these cases. If you have questions, please discuss with your pharmacist.

## 2024-05-28 NOTE — OP NOTE
Biopsy Trachea Operative Note     Date: 2024  OR Location: [facility] OR    Name: Demar Pittman, : 1960, Age: 63 y.o., MRN: [id number], Sex: male    Diagnosis  Pre-op Diagnosis     * Tongue lesion [K14.8] Post-op Diagnosis     * Tongue lesion [K14.8]     Procedures  Biopsy Base of Tongue  17345 - IN LARYNGOSCOPY DIRECT OPERATIVE W/BIOPSY      Surgeons      * Miles Chirinos - Primary    Resident/Fellow/Other Assistant:  Surgeons and Role:     * Cordelia Dalton MD - Resident - Assisting     * Brit Slater MD - Resident - Assisting     * Kwadwo Danielson MD - Resident - Assisting    Procedure Summary  Anesthesia: General  ASA: ASA status not filed in the log.  Anesthesia Staff: Anesthesiologist: Callum Sena MD  CRNA: ANNELISE Storm-CRNA  Estimated Blood Loss: 3mL  Intra-op Medications: Administrations occurring from  to  on 24:  * No intraprocedure medications in log *        Specimen: No specimens collected     Staff:   Circulator: Toribio Mike Person: Collette         Drains and/or Catheters: * None in log *    Tourniquet Times:         Implants:     Findings: Mass of the base of tongue spanning the entire base of tongue into vallecula and effacing the epiglottis.     Indications: Demar Pittman is an 63 y.o. male who is having surgery for Tongue lesion [K14.8].    The patient was seen in the preoperative area. The risks, benefits, complications, treatment options, non-operative alternatives, expected recovery and outcomes were discussed with the patient. The possibilities of reaction to medication, pulmonary aspiration, injury to surrounding structures, bleeding, recurrent infection, the need for additional procedures, failure to diagnose a condition, and creating a complication requiring transfusion or operation were discussed with the patient. The patient concurred with the proposed plan, giving informed consent.  The site of surgery was properly noted/marked if  necessary per policy. The patient has been actively warmed in preoperative area. Preoperative antibiotics are not indicated. Venous thrombosis prophylaxis has been ordered and is bilateral SCDs.    Procedure Details:  Indications:   Patient has a base of tongue mass in the setting of known lymphoma. Therefore, discussion was held in clinic regarding proceeding to the OR for the above procedures. After discussing all risks, benefits, indications and alternatives to the planned procedures patient signed written informed consent to proceed.    Procedure in Detail:   Patient was seen and evaluated in the pre-operative area. Informed consent was obtained as described above. Patient was then taken to the operative room by the anesthesia team. General anesthesia was induced, and patient was orotracheally intubated without issue. Patient was then turned 90 degrees towards the ENT team. Time out was performed by Dr. Chirinos..    First, direct laryngoscopy was performed using the Dedo laryngoscope. All sites of the upper aerodigestive tract were visualized including the uvula, soft palate, tonsils, vallecula, epiglottis, base of tongue, arytenoids, false and true vocal cords, post-cricoid region and pyriform sinuses. There was noted to be a lesion of the tongue base as described in the findings. Multiple biopsies were obtained from this lesion and submitted for lymphoma protocol.     At this point the procedure was terminated. Patient was turned back over to the anesthesia team and was awakened, extubated and transported to the PACU in stable condition.    Dr. Chirinos was present for the critical portions of the procedure.      Complications:  None; patient tolerated the procedure well.    Disposition: PACU - hemodynamically stable.  Condition: stable     Kwadwo Danielson MD  Resident, PGY-1  Otorhinolaryngology - Head & Neck Surgery    ENT Consult Pager: [phone]  Head and Neck Phone: [phone]  ENT Peds Pager: [phone]  ENT  Subspecialty Team: [place]         Attending Attestation:     Miles Chirinos  Phone Number: [phone]

## 2024-05-28 NOTE — PERIOPERATIVE NURSING NOTE
1922- Discharge instructions given and reviewed with pt and family. No questions or concerns at this time

## 2024-05-30 ENCOUNTER — DOCUMENTATION (OUTPATIENT)
Dept: HEMATOLOGY/ONCOLOGY | Facility: HOSPITAL | Age: 64
End: 2024-05-30
Payer: COMMERCIAL

## 2024-05-30 DIAGNOSIS — C91.10 CLL (CHRONIC LYMPHOCYTIC LEUKEMIA) (MULTI): Primary | ICD-10-CM

## 2024-05-30 RX ORDER — DEXAMETHASONE 4 MG/1
4 TABLET ORAL
Qty: 14 TABLET | Refills: 0 | Status: SHIPPED | OUTPATIENT
Start: 2024-05-30 | End: 2024-06-03 | Stop reason: SDUPTHER

## 2024-05-30 NOTE — PROGRESS NOTES
Call made to patient/wife > informed her that Dr Luis was sending in prescription for steroid, 4 mg po bid, for oral/pharyngeal inflammation.  Confirmed visit for 6.3.24 and for med to go to [facility] in [place].  Verbalized understanding, no further questions at this time.

## 2024-05-31 LAB
CELL COUNT (BLOOD): 2.06 X10*3/UL
CELL POPULATIONS: NORMAL
DIAGNOSIS: NORMAL
FLOW DIFFERENTIAL: NORMAL
FLOW TEST ORDERED: NORMAL
LAB TEST METHOD: NORMAL
NUMBER OF CELLS COLLECTED: NORMAL
PATH REPORT.TOTAL CANCER: NORMAL
SIGNATURE COMMENT: NORMAL
SPECIMEN VIABILITY: NORMAL

## 2024-06-03 ENCOUNTER — DOCUMENTATION (OUTPATIENT)
Dept: HEMATOLOGY/ONCOLOGY | Facility: HOSPITAL | Age: 64
End: 2024-06-03
Payer: COMMERCIAL

## 2024-06-03 ENCOUNTER — OFFICE VISIT (OUTPATIENT)
Dept: HEMATOLOGY/ONCOLOGY | Facility: HOSPITAL | Age: 64
End: 2024-06-03
Payer: COMMERCIAL

## 2024-06-03 ENCOUNTER — LAB (OUTPATIENT)
Dept: LAB | Facility: HOSPITAL | Age: 64
End: 2024-06-03
Payer: COMMERCIAL

## 2024-06-03 ENCOUNTER — APPOINTMENT (OUTPATIENT)
Dept: HEMATOLOGY/ONCOLOGY | Facility: CLINIC | Age: 64
End: 2024-06-03
Payer: COMMERCIAL

## 2024-06-03 VITALS
TEMPERATURE: 97.3 F | DIASTOLIC BLOOD PRESSURE: 65 MMHG | HEART RATE: 65 BPM | BODY MASS INDEX: 29.04 KG/M2 | OXYGEN SATURATION: 98 % | RESPIRATION RATE: 16 BRPM | WEIGHT: 185.41 LBS | SYSTOLIC BLOOD PRESSURE: 117 MMHG

## 2024-06-03 DIAGNOSIS — C91.10 CLL (CHRONIC LYMPHOCYTIC LEUKEMIA) (MULTI): Primary | ICD-10-CM

## 2024-06-03 DIAGNOSIS — C91.10 CLL (CHRONIC LYMPHOCYTIC LEUKEMIA) (MULTI): ICD-10-CM

## 2024-06-03 LAB
ALBUMIN SERPL BCP-MCNC: 4 G/DL (ref 3.4–5)
ALP SERPL-CCNC: 122 U/L (ref 33–136)
ALT SERPL W P-5'-P-CCNC: 476 U/L (ref 10–52)
ANION GAP SERPL CALC-SCNC: 14 MMOL/L (ref 10–20)
AST SERPL W P-5'-P-CCNC: 144 U/L (ref 9–39)
BASOPHILS # BLD AUTO: 0.02 X10*3/UL (ref 0–0.1)
BASOPHILS NFR BLD AUTO: 0 %
BILIRUB SERPL-MCNC: 0.7 MG/DL (ref 0–1.2)
BUN SERPL-MCNC: 24 MG/DL (ref 6–23)
BURR CELLS BLD QL SMEAR: NORMAL
CALCIUM SERPL-MCNC: 8.9 MG/DL (ref 8.6–10.3)
CHLORIDE SERPL-SCNC: 103 MMOL/L (ref 98–107)
CHROM ANALY OVERALL INTERP-IMP: NORMAL
CO2 SERPL-SCNC: 25 MMOL/L (ref 21–32)
CREAT SERPL-MCNC: 0.74 MG/DL (ref 0.5–1.3)
DACRYOCYTES BLD QL SMEAR: NORMAL
EGFRCR SERPLBLD CKD-EPI 2021: >90 ML/MIN/1.73M*2
ELECTRONICALLY COSIGNED BY CYTOGENETICS: NORMAL
ELECTRONICALLY SIGNED BY CYTOGENETICS: NORMAL
EOSINOPHIL # BLD AUTO: 0.01 X10*3/UL (ref 0–0.7)
EOSINOPHIL NFR BLD AUTO: 0 %
ERYTHROCYTE [DISTWIDTH] IN BLOOD BY AUTOMATED COUNT: 14.6 % (ref 11.5–14.5)
FISH ISCN RESULTS: NORMAL
GLUCOSE SERPL-MCNC: 187 MG/DL (ref 74–99)
HCT VFR BLD AUTO: 43.7 % (ref 41–52)
HGB BLD-MCNC: 14.2 G/DL (ref 13.5–17.5)
IMM GRANULOCYTES # BLD AUTO: 0.37 X10*3/UL (ref 0–0.7)
IMM GRANULOCYTES NFR BLD AUTO: 0.5 % (ref 0–0.9)
LDH SERPL L TO P-CCNC: 197 U/L (ref 84–246)
LYMPHOCYTES # BLD AUTO: 57.99 X10*3/UL (ref 1.2–4.8)
LYMPHOCYTES NFR BLD AUTO: 82.7 %
MCH RBC QN AUTO: 27.3 PG (ref 26–34)
MCHC RBC AUTO-ENTMCNC: 32.5 G/DL (ref 32–36)
MCV RBC AUTO: 84 FL (ref 80–100)
MONOCYTES # BLD AUTO: 1.14 X10*3/UL (ref 0.1–1)
MONOCYTES NFR BLD AUTO: 1.6 %
NEUTROPHILS # BLD AUTO: 10.6 X10*3/UL (ref 1.2–7.7)
NEUTROPHILS NFR BLD AUTO: 15.2 %
NRBC BLD-RTO: 0 /100 WBCS (ref 0–0)
OVALOCYTES BLD QL SMEAR: NORMAL
PHOSPHATE SERPL-MCNC: 4.1 MG/DL (ref 2.5–4.9)
PLATELET # BLD AUTO: 218 X10*3/UL (ref 150–450)
PLATELET CLUMP BLD QL SMEAR: PRESENT
POTASSIUM SERPL-SCNC: 4.2 MMOL/L (ref 3.5–5.3)
PROT SERPL-MCNC: 6 G/DL (ref 6.4–8.2)
RBC # BLD AUTO: 5.2 X10*6/UL (ref 4.5–5.9)
RBC MORPH BLD: NORMAL
SODIUM SERPL-SCNC: 138 MMOL/L (ref 136–145)
URATE SERPL-MCNC: 5 MG/DL (ref 4–7.5)
WBC # BLD AUTO: 70.1 X10*3/UL (ref 4.4–11.3)

## 2024-06-03 PROCEDURE — 3008F BODY MASS INDEX DOCD: CPT | Performed by: INTERNAL MEDICINE

## 2024-06-03 PROCEDURE — 83615 LACTATE (LD) (LDH) ENZYME: CPT

## 2024-06-03 PROCEDURE — RXMED WILLOW AMBULATORY MEDICATION CHARGE

## 2024-06-03 PROCEDURE — 3074F SYST BP LT 130 MM HG: CPT | Performed by: INTERNAL MEDICINE

## 2024-06-03 PROCEDURE — 1126F AMNT PAIN NOTED NONE PRSNT: CPT | Performed by: INTERNAL MEDICINE

## 2024-06-03 PROCEDURE — 84100 ASSAY OF PHOSPHORUS: CPT

## 2024-06-03 PROCEDURE — 1159F MED LIST DOCD IN RCRD: CPT | Performed by: INTERNAL MEDICINE

## 2024-06-03 PROCEDURE — 99215 OFFICE O/P EST HI 40 MIN: CPT | Performed by: INTERNAL MEDICINE

## 2024-06-03 PROCEDURE — 85025 COMPLETE CBC W/AUTO DIFF WBC: CPT

## 2024-06-03 PROCEDURE — 84550 ASSAY OF BLOOD/URIC ACID: CPT

## 2024-06-03 PROCEDURE — 3078F DIAST BP <80 MM HG: CPT | Performed by: INTERNAL MEDICINE

## 2024-06-03 PROCEDURE — 3048F LDL-C <100 MG/DL: CPT | Performed by: INTERNAL MEDICINE

## 2024-06-03 PROCEDURE — 3044F HG A1C LEVEL LT 7.0%: CPT | Performed by: INTERNAL MEDICINE

## 2024-06-03 PROCEDURE — 1123F ACP DISCUSS/DSCN MKR DOCD: CPT | Performed by: INTERNAL MEDICINE

## 2024-06-03 PROCEDURE — 36415 COLL VENOUS BLD VENIPUNCTURE: CPT

## 2024-06-03 PROCEDURE — 84075 ASSAY ALKALINE PHOSPHATASE: CPT

## 2024-06-03 PROCEDURE — 3061F NEG MICROALBUMINURIA REV: CPT | Performed by: INTERNAL MEDICINE

## 2024-06-03 RX ORDER — ALLOPURINOL 300 MG/1
300 TABLET ORAL DAILY
Qty: 30 TABLET | Refills: 2 | Status: ON HOLD | OUTPATIENT
Start: 2024-06-03 | End: 2024-09-01

## 2024-06-03 RX ORDER — ONDANSETRON HYDROCHLORIDE 8 MG/1
8 TABLET, FILM COATED ORAL EVERY 8 HOURS PRN
Qty: 30 TABLET | Refills: 5 | Status: ON HOLD | OUTPATIENT
Start: 2024-06-03

## 2024-06-03 RX ORDER — DEXAMETHASONE 4 MG/1
4 TABLET ORAL
Qty: 8 TABLET | Refills: 0 | Status: ON HOLD | OUTPATIENT
Start: 2024-06-03 | End: 2024-06-07

## 2024-06-03 RX ORDER — ACYCLOVIR 400 MG/1
400 TABLET ORAL 2 TIMES DAILY
Qty: 14 TABLET | Refills: 0 | Status: ON HOLD | OUTPATIENT
Start: 2024-06-03 | End: 2024-06-10

## 2024-06-03 RX ORDER — PROCHLORPERAZINE MALEATE 10 MG
10 TABLET ORAL EVERY 6 HOURS PRN
Qty: 30 TABLET | Refills: 5 | Status: ON HOLD | OUTPATIENT
Start: 2024-06-03

## 2024-06-03 ASSESSMENT — PAIN SCALES - GENERAL: PAINLEVEL: 0-NO PAIN

## 2024-06-03 NOTE — PROGRESS NOTES
Patient Visit Information:   Visit Type: Follow Up Visit      Cancer History:   Treatment Synopsis:    B-CLL diagnosed in 2015, SUÁREZ stage 0; WBC initially 16.3 with ALC 12.0, hemoglobin 15.1 and platelets 154,000.  The cells expressed CD5 and A light chains.   CD38 and  Zap-70 were negative. Cytogenetic studies by FISH were inconclusive.  WBC has slowly rising but he maintains good marrow reserve and has not required treatment.  CT chest done 3/9/17 showed extensive cervical, axillary and submental adenopathy; largest  node on the left measured 2.3 x 1.9 cm.  Small (less than 9 mm) pulmonology nodules were again seen; relatively stable.  Abdominal nodes slightly increased.    17: WBC 65.4, ALC 62.8; becoming more symptomatic.  17 : WBC 77.6.  ALC 72.2.  Hemoglobin and platelets were normal.  18 WBC: 69.3. A.9. Hgb 15.2. Plt: 148,000  18: WBC 88.2.  ALC 82.9.  Hemoglobin 14.2.  Platelets 170,000.  Adenopathy stable.  18: WBC 77.3.  ALC 66.5.  Hemoglobin 13.8.  Platelets 143,000.  Adenopathy slightly increased.  18: WBC 92.0.  ALC 86.5.  Hemoglobin 14.1.  Platelets 157,000.  Progressing symptomatic adenopathy.  Treatment options discussed.   18 completed rituximab weekly x 4.  10/9/19: CBC: WBC 16.5.  ALC 12.4.  Hemoglobin 14.6.  Platelets 148,000.  Worsening adenopathy.  3/17/20: Started bendamustine/rituximab  20: #2 bendamustine/rituximab.  20: #3 BR  20: WBC 4.7.  Hemoglobin 12.9.  Platelets 189,000.  20: #4 BR  20: #5 BR  20: WBC 1.7. hgb 11.9. Platelets 176.  20: #6/6 bendamustine/rituximab  2023, WBC count 62, hemoglobin 14.9, platelets 143, absolute lymphocyte count 50.2  January 15, 2024, WBC count 40.5, hemoglobin 15.0, platelets 175  2/10/24 , WBC count 31.5, hemoglobin 14.9, platelets 131  3/14/24 , WBC count 13.9, hemoglobin 14.9, platelets 122  3/14/24, physical examination positive for bilateral cervical  lymphadenopathy, bilateral axillary lymphadenopathy    PET imaging 3/29/2024 intense uptake in tongue base ( S/P) tonsillectomy. Multiple enlarged lymph nodes in cervical, parotid, nodes, multipl pleural based and parenchymal non FDG avid pulmonary nodes, enlarged axillary lymph nodes, mediastinal and bilateral hilar lymph nodes, periportal lymph nodes, iliac nodes, inguinal nodes.     -FISH panel  of peripheral blood 5/9/2024 Pos for del 11q, negative for t(11,14), trisomy 12, monosomy 13 and deletion of 17p.    -Needle biopsy of posterior tongue mass 5/28/24 involvement by chronic lymphocytic leukemia/small lymphocytic lymphoma no evidence of transformation       Chief Complaint: Follow-up for CLL   Interval History:    Patient of Dr. Wang with a long history of CLL initially seen with his wife and son 5/9/24 for a second opinion for treatment of CLL.   He received BR completing in 8/2020.  In December 2023 after COVID infection and he had increased lymphocytosis which resolved, subsequently noted to have lymphadenopathy confirmed on PET scan.  Ibrutinib recommended in March 2024, but patient has not yet started this because he was  leary of side effects.     Due to discordance in uptake at base of tongue on PET imaging, tongue biopsy was done 5/28/24 which was consistent with CLL and had no evidence of large cell transformation.    Patient seen today 6/3/24 for further treatment planning.  Due to increasing difficulty swallowing  from his tongue mass, he was started on a course of dexamethasone 4 mg po bid 5/30/24 which has helped with the swallowing and neck swelling.   He does however have soreness and ulceration on sides of his tongue making eating difficulty. He denies shortness of breath.  Continues to have night sweats 1-2 nights a week,  weight is stabilizing.  ROS notable for fatigue, no shortness of breath, has night sweats where he actually wakes up soaked, at least 3x a week, minimal weight loss.    Recent elevation of LFTs to 5x ULN, without obvious liver lesions on imaging and negative hepatitis serologies.  Cardiologist has stopped statins due to elevated LFTs.  He significant ulcerations under his tongue.    He drags himself to work as much as possible.        Past medical history: CLL/SLL (2015) as described above, status post rituximab, 6 cycles of bendamustine/rituximab (3/2020-2020).  Excellent  response to treatment.       History of CABGx3 19, vertigo, XENA, remote tonsillectomy, retinal tear, stable pulmonary nodules, hyperlipidemia, hypertension, sinusitis.  Unremarkable cardiac catheterization 21.  Hyperglycemia, torn retina, cataract. Hx of afib since CABG.       Family medical history: Mother  of myeloma in her 70s and his father  of Hodgkin lymphoma at age 39.     Social history: Rare alcohol intake.  Former smoker but quit in .  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 2 biological children, one son with downs syndrome.      Review of Systems:   Review of Systems:    Constitutional:  night sweats.    Head and neck: Mild discomfort from nodes.  No headaches or dizziness.  No significant pain, stiffness.   HEENT: No sore throat, decreased hearing on the right. Vison is adequate.    Cardiac: No chest pain, palpitations.    Respiratory: No worsening dyspnea, cough, hemoptysis.  GI: Appetite is fair, weight stable.  No diarrhea, change in bowel movements, melena, hematochezia. No abdominal pain, nausea, vomiting.  Genitourinary: No frequency, urgency.  No polyuria, dysuria, hematuria.  Musculoskeletal: Complains of chronic arthralgias and myalgias.    Endocrine: No diabetes, thyroid disease.  Skin: No rash, skin lesions, itching.  Neuromuscular: No lightheadedness, dizziness.  No history of seizure.  No numbness, paresthesias.              Allergies and Intolerances:       Allergies:         No Known Allergies: Active      Outpatient Medication Profile:  * Patient Currently Takes Medications as of 17-Aug-2023 15:04 documented in Structured Notes         Zofran 4 mg oral tablet : Last Dose Taken:  , 1 tab(s) orally every 6 hours, As Needed -for nausea and vomiting , Start Date: 10-Mar-2020         Metoprolol Tartrate 25 mg oral tablet: Last Dose Taken:  , 1 tab(s) orally  2 times a day , Start Date: 25-Nov-2019         Multiple Vitamins oral capsule: Last Dose Taken:  , 1 cap(s) orally once  a day , Start Date: 25-Nov-2019         pantoprazole 40 mg oral delayed release tablet: Last Dose Taken:  , 1 tab(s)  orally once a day, Start Date: 25-Nov-2019         atorvastatin 20 mg oral tablet: Last Dose Taken:  , 1 tab(s) orally once  a day (at bedtime), Start Date: 25-Nov-2019         Vitamin D2 50,000 intl units (1.25 mg) oral capsule: Last Dose Taken:   , 1 cap(s) orally every other week         aspirin 81 mg oral tablet: Last Dose Taken:  , 1 tab(s) orally once a day         meclizine 25 mg oral tablet: Last Dose Taken:  , 1 tab(s) orally 3 times  a day, As Needed         nitroglycerin 0.4 mg sublingual tablet: Last Dose Taken:  , 1 tab(s)  sublingual every 5 minutes, As Needed         amLODIPine 5 mg oral tablet: Last Dose Taken:  , 1 tab(s) orally once  a day             Medical History:         Coronary artery disease: ICD-10: I25.10, Status: Active         Chronic lymphocytic leukemia: ICD-10: C91.10, Status: Active     Family History: No Family History items are recorded  in the problem list.      Social History:   Social Substance History:  ·  Smoking Status former smoker   ·  Tobacco Use denies   ·  Alcohol Use occasionally            Vitals and Measurements:   ECOG 0    Physical Exam:      Constitutional: awake/alert/oriented x3, no distress,  alert and cooperative.   Eyes: PERRL, EOMI, clear sclera.   Head/Neck: prominent neck with a ring of multiple cervical submandibular nodes measuring 2-3 cm.  Diffuse ulcerations both  sides of tongue   Respiratory/Thorax: Thorax symmetric. Clear to auscultation.  Normal breath sounds. No wheezes, rales, rhonchi.   Cardiovascular: Regular, rate and rhythm. No murmur.   Gastrointestinal: Nondistended.  Normal bowel sounds.   Soft, non-tender. No rebound or guarding. No masses palpable.  No palpable hepatomegaly, splenomegaly.   Musculoskeletal: ROM intact.  No joint swelling.  Adequate strength. No deformity.  No vertebral tenderness or mass.   Extremities: Normal extremities; no cyanosis, contusions,  wounds, clubbing. No edema.   Neurological: Alert and oriented x3. Senses and cranial  nerves grossly intact. No focal motor deficits noted. No focal sensory deficits.   Lymphatic: Bilateral axillary nodes mobile about 5 cm in size, bilateral boggy inguinal LN , spleen 5 cm BCM   Psychological: Appropriate mood and behavior.   Skin: No suspicious lesions.         Lab Results:       Reviewed          Assessment and Plan:   Assessment:    1.  Chronic lymphocytic leukemia with symptomatic adenopathy, excellent response to bendamustine/rituximab; completed 6 cycles in August 2020.    Patient seen  for further management of bulky CLL.  PET imaging shows discordant uptake at the base of the tongue , pulmonary nodules which the patient states are longstanding. There is no evidence of Richters transformation by histology and FISH analysis shows del 11q. Due to symptoms of tongue swelling has started dexamethasone 4 mg po  bid for 10 days for sx relief.    6/3/24 Previously discussed treatment options and patient would prefer to received venetoclax as a time limited approach  and I have recommended the [place] regimen with escalating doses of venetoclax followed by monthly rituxan for 6  monthly doses  and a planned 2 year course of venetoclax. We reviewed side effects of venetoclax and rituxan signed chemo consent.  Given patients tumor burden have schedule inpatient hospitalization to monitor for TLS for at  least first two dose ramp ups on 5/10 and 5/17/24  Started allopurinol for TLS prevention    2. Mouth sores ulcerative lesions both sides of tongue, given acyclovir 400 mg po tid, may need ENT evaluation when admitted.    2.  CAD, status post CABG.       3.  Transaminitis, PET imaging shows periportal mass vs lymphadenopathy, hepatitis serologies negative, ultrasound consistent with large periportal node, have ordered CT of liver with contrast as transaminitis worse today. Also sent CMV pcr on blood  .     RTC: admission 6/10 and 6/17 for venetoclax rampup. Malignant heme visit on    6/17 and visit with  Mayelin Bailon on 6/24, each with TLS labs.

## 2024-06-04 ENCOUNTER — SPECIALTY PHARMACY (OUTPATIENT)
Dept: PHARMACY | Facility: CLINIC | Age: 64
End: 2024-06-04

## 2024-06-04 ENCOUNTER — APPOINTMENT (OUTPATIENT)
Dept: HEMATOLOGY/ONCOLOGY | Facility: CLINIC | Age: 64
End: 2024-06-04
Payer: COMMERCIAL

## 2024-06-04 LAB
LAB AP ASR DISCLAIMER: NORMAL
LABORATORY COMMENT REPORT: NORMAL
PATH REPORT.FINAL DX SPEC: NORMAL
PATH REPORT.GROSS SPEC: NORMAL
PATH REPORT.RELEVANT HX SPEC: NORMAL
PATH REPORT.TOTAL CANCER: NORMAL

## 2024-06-05 ENCOUNTER — APPOINTMENT (OUTPATIENT)
Dept: HEMATOLOGY/ONCOLOGY | Facility: CLINIC | Age: 64
End: 2024-06-05
Payer: COMMERCIAL

## 2024-06-05 ENCOUNTER — PHARMACY VISIT (OUTPATIENT)
Dept: PHARMACY | Facility: CLINIC | Age: 64
End: 2024-06-05
Payer: COMMERCIAL

## 2024-06-05 LAB
CMV DNA SERPL NAA+PROBE-LOG IU: NORMAL {LOG_IU}/ML
LABORATORY COMMENT REPORT: NOT DETECTED

## 2024-06-06 ENCOUNTER — TUMOR BOARD CONFERENCE (OUTPATIENT)
Dept: HEMATOLOGY/ONCOLOGY | Facility: HOSPITAL | Age: 64
End: 2024-06-06
Payer: COMMERCIAL

## 2024-06-10 ENCOUNTER — HOSPITAL ENCOUNTER (INPATIENT)
Facility: HOSPITAL | Age: 64
LOS: 3 days | Discharge: HOME HEALTH | End: 2024-06-13
Attending: INTERNAL MEDICINE | Admitting: NURSE PRACTITIONER
Payer: COMMERCIAL

## 2024-06-10 ENCOUNTER — APPOINTMENT (OUTPATIENT)
Dept: RADIOLOGY | Facility: HOSPITAL | Age: 64
End: 2024-06-10
Payer: COMMERCIAL

## 2024-06-10 DIAGNOSIS — C91.10 CLL (CHRONIC LYMPHOCYTIC LEUKEMIA) (MULTI): Primary | ICD-10-CM

## 2024-06-10 DIAGNOSIS — K12.31 MUCOSITIS DUE TO CHEMOTHERAPY: ICD-10-CM

## 2024-06-10 LAB
ALBUMIN SERPL BCP-MCNC: 3.8 G/DL (ref 3.4–5)
ANION GAP SERPL CALC-SCNC: 17 MMOL/L (ref 10–20)
APTT PPP: 24 SECONDS (ref 27–38)
BASOPHILS # BLD AUTO: 0.03 X10*3/UL (ref 0–0.1)
BASOPHILS NFR BLD AUTO: 0 %
BUN SERPL-MCNC: 18 MG/DL (ref 6–23)
BURR CELLS BLD QL SMEAR: NORMAL
CALCIUM SERPL-MCNC: 8.9 MG/DL (ref 8.6–10.6)
CHLORIDE SERPL-SCNC: 99 MMOL/L (ref 98–107)
CO2 SERPL-SCNC: 23 MMOL/L (ref 21–32)
CREAT SERPL-MCNC: 0.69 MG/DL (ref 0.5–1.3)
EGFRCR SERPLBLD CKD-EPI 2021: >90 ML/MIN/1.73M*2
EOSINOPHIL # BLD AUTO: 0.01 X10*3/UL (ref 0–0.7)
EOSINOPHIL NFR BLD AUTO: 0 %
ERYTHROCYTE [DISTWIDTH] IN BLOOD BY AUTOMATED COUNT: 15.2 % (ref 11.5–14.5)
FIBRINOGEN PPP-MCNC: 215 MG/DL (ref 200–400)
GLUCOSE SERPL-MCNC: 189 MG/DL (ref 74–99)
HCT VFR BLD AUTO: 44.6 % (ref 41–52)
HGB BLD-MCNC: 14 G/DL (ref 13.5–17.5)
IMM GRANULOCYTES # BLD AUTO: 0.56 X10*3/UL (ref 0–0.7)
IMM GRANULOCYTES NFR BLD AUTO: 0.5 % (ref 0–0.9)
INR PPP: 1.1 (ref 0.9–1.1)
LDH SERPL L TO P-CCNC: 190 U/L (ref 84–246)
LYMPHOCYTES # BLD AUTO: 96.4 X10*3/UL (ref 1.2–4.8)
LYMPHOCYTES NFR BLD AUTO: 87.1 %
MCH RBC QN AUTO: 27.1 PG (ref 26–34)
MCHC RBC AUTO-ENTMCNC: 31.4 G/DL (ref 32–36)
MCV RBC AUTO: 86 FL (ref 80–100)
MONOCYTES # BLD AUTO: 0.73 X10*3/UL (ref 0.1–1)
MONOCYTES NFR BLD AUTO: 0.7 %
NEUTROPHILS # BLD AUTO: 12.95 X10*3/UL (ref 1.2–7.7)
NEUTROPHILS NFR BLD AUTO: 11.7 %
NRBC BLD-RTO: 0 /100 WBCS (ref 0–0)
PHOSPHATE SERPL-MCNC: 3.2 MG/DL (ref 2.5–4.9)
PLATELET # BLD AUTO: 128 X10*3/UL (ref 150–450)
POTASSIUM SERPL-SCNC: 3.8 MMOL/L (ref 3.5–5.3)
PROTHROMBIN TIME: 12.3 SECONDS (ref 9.8–12.8)
RBC # BLD AUTO: 5.16 X10*6/UL (ref 4.5–5.9)
RBC MORPH BLD: NORMAL
SODIUM SERPL-SCNC: 135 MMOL/L (ref 136–145)
URATE SERPL-MCNC: 2.8 MG/DL (ref 4–7.5)
WBC # BLD AUTO: 110.7 X10*3/UL (ref 4.4–11.3)

## 2024-06-10 PROCEDURE — 2500000004 HC RX 250 GENERAL PHARMACY W/ HCPCS (ALT 636 FOR OP/ED): Performed by: NURSE PRACTITIONER

## 2024-06-10 PROCEDURE — 2500000001 HC RX 250 WO HCPCS SELF ADMINISTERED DRUGS (ALT 637 FOR MEDICARE OP): Performed by: INTERNAL MEDICINE

## 2024-06-10 PROCEDURE — XW0DXR5 INTRODUCTION OF VENETOCLAX ANTINEOPLASTIC INTO MOUTH AND PHARYNX, EXTERNAL APPROACH, NEW TECHNOLOGY GROUP 5: ICD-10-PCS | Performed by: INTERNAL MEDICINE

## 2024-06-10 PROCEDURE — 82960 TEST FOR G6PD ENZYME: CPT | Performed by: NURSE PRACTITIONER

## 2024-06-10 PROCEDURE — 83615 LACTATE (LD) (LDH) ENZYME: CPT | Performed by: NURSE PRACTITIONER

## 2024-06-10 PROCEDURE — 84550 ASSAY OF BLOOD/URIC ACID: CPT | Performed by: NURSE PRACTITIONER

## 2024-06-10 PROCEDURE — 85610 PROTHROMBIN TIME: CPT | Performed by: NURSE PRACTITIONER

## 2024-06-10 PROCEDURE — 1200000003 HC ONCOLOGY  ROOM WITH TELEMETRY DAILY

## 2024-06-10 PROCEDURE — 80069 RENAL FUNCTION PANEL: CPT | Performed by: NURSE PRACTITIONER

## 2024-06-10 PROCEDURE — C1751 CATH, INF, PER/CENT/MIDLINE: HCPCS

## 2024-06-10 PROCEDURE — 85384 FIBRINOGEN ACTIVITY: CPT | Performed by: NURSE PRACTITIONER

## 2024-06-10 PROCEDURE — 2500000001 HC RX 250 WO HCPCS SELF ADMINISTERED DRUGS (ALT 637 FOR MEDICARE OP): Performed by: NURSE PRACTITIONER

## 2024-06-10 PROCEDURE — 36410 VNPNXR 3YR/> PHY/QHP DX/THER: CPT

## 2024-06-10 PROCEDURE — 2780000003 HC OR 278 NO HCPCS

## 2024-06-10 PROCEDURE — 05H833Z INSERTION OF INFUSION DEVICE INTO LEFT AXILLARY VEIN, PERCUTANEOUS APPROACH: ICD-10-PCS | Performed by: STUDENT IN AN ORGANIZED HEALTH CARE EDUCATION/TRAINING PROGRAM

## 2024-06-10 PROCEDURE — 85025 COMPLETE CBC W/AUTO DIFF WBC: CPT | Performed by: NURSE PRACTITIONER

## 2024-06-10 RX ORDER — ONDANSETRON HYDROCHLORIDE 8 MG/1
8 TABLET, FILM COATED ORAL EVERY 8 HOURS PRN
Status: DISCONTINUED | OUTPATIENT
Start: 2024-06-10 | End: 2024-06-13 | Stop reason: HOSPADM

## 2024-06-10 RX ORDER — PANTOPRAZOLE SODIUM 40 MG/1
40 TABLET, DELAYED RELEASE ORAL
Status: DISCONTINUED | OUTPATIENT
Start: 2024-06-11 | End: 2024-06-10

## 2024-06-10 RX ORDER — SODIUM CHLORIDE 9 MG/ML
100 INJECTION, SOLUTION INTRAVENOUS CONTINUOUS
Status: DISCONTINUED | OUTPATIENT
Start: 2024-06-10 | End: 2024-06-11

## 2024-06-10 RX ORDER — MULTIVIT-MIN/IRON FUM/FOLIC AC 7.5 MG-4
1 TABLET ORAL DAILY
Status: DISCONTINUED | OUTPATIENT
Start: 2024-06-10 | End: 2024-06-13 | Stop reason: HOSPADM

## 2024-06-10 RX ORDER — ASPIRIN 81 MG/1
81 TABLET ORAL DAILY
Status: DISCONTINUED | OUTPATIENT
Start: 2024-06-10 | End: 2024-06-13 | Stop reason: HOSPADM

## 2024-06-10 RX ORDER — LIDOCAINE HYDROCHLORIDE 10 MG/ML
5 INJECTION INFILTRATION; PERINEURAL ONCE
Status: DISCONTINUED | OUTPATIENT
Start: 2024-06-10 | End: 2024-06-13 | Stop reason: HOSPADM

## 2024-06-10 RX ORDER — DEXAMETHASONE 4 MG/1
4 TABLET ORAL
Status: DISCONTINUED | OUTPATIENT
Start: 2024-06-10 | End: 2024-06-13

## 2024-06-10 RX ORDER — ALLOPURINOL 300 MG/1
300 TABLET ORAL DAILY
Status: DISCONTINUED | OUTPATIENT
Start: 2024-06-10 | End: 2024-06-11

## 2024-06-10 RX ORDER — ACYCLOVIR 400 MG/1
400 TABLET ORAL 2 TIMES DAILY
Status: DISCONTINUED | OUTPATIENT
Start: 2024-06-10 | End: 2024-06-13 | Stop reason: HOSPADM

## 2024-06-10 RX ORDER — POLYETHYLENE GLYCOL 3350 17 G/17G
17 POWDER, FOR SOLUTION ORAL DAILY
Status: DISCONTINUED | OUTPATIENT
Start: 2024-06-10 | End: 2024-06-13 | Stop reason: HOSPADM

## 2024-06-10 RX ORDER — AMLODIPINE BESYLATE 5 MG/1
5 TABLET ORAL DAILY
Status: DISCONTINUED | OUTPATIENT
Start: 2024-06-10 | End: 2024-06-13 | Stop reason: HOSPADM

## 2024-06-10 RX ORDER — PANTOPRAZOLE SODIUM 40 MG/1
40 TABLET, DELAYED RELEASE ORAL DAILY
Status: DISCONTINUED | OUTPATIENT
Start: 2024-06-10 | End: 2024-06-13 | Stop reason: HOSPADM

## 2024-06-10 RX ORDER — METOPROLOL TARTRATE 25 MG/1
25 TABLET, FILM COATED ORAL 2 TIMES DAILY
Status: DISCONTINUED | OUTPATIENT
Start: 2024-06-10 | End: 2024-06-13 | Stop reason: HOSPADM

## 2024-06-10 RX ADMIN — ALLOPURINOL 300 MG: 300 TABLET ORAL at 20:29

## 2024-06-10 RX ADMIN — ACYCLOVIR 400 MG: 400 TABLET ORAL at 20:29

## 2024-06-10 RX ADMIN — SODIUM CHLORIDE 100 ML/HR: 9 INJECTION, SOLUTION INTRAVENOUS at 18:08

## 2024-06-10 RX ADMIN — VENETOCLAX 20 MG: 10 TABLET, FILM COATED ORAL at 18:08

## 2024-06-10 RX ADMIN — ASPIRIN 81 MG: 81 TABLET, COATED ORAL at 20:29

## 2024-06-10 RX ADMIN — DEXAMETHASONE 4 MG: 4 TABLET ORAL at 16:32

## 2024-06-10 RX ADMIN — METOPROLOL TARTRATE 25 MG: 25 TABLET, FILM COATED ORAL at 20:29

## 2024-06-10 SDOH — SOCIAL STABILITY: SOCIAL INSECURITY: DOES ANYONE TRY TO KEEP YOU FROM HAVING/CONTACTING OTHER FRIENDS OR DOING THINGS OUTSIDE YOUR HOME?: NO

## 2024-06-10 SDOH — SOCIAL STABILITY: SOCIAL INSECURITY: ARE YOU OR HAVE YOU BEEN THREATENED OR ABUSED PHYSICALLY, EMOTIONALLY, OR SEXUALLY BY ANYONE?: NO

## 2024-06-10 SDOH — SOCIAL STABILITY: SOCIAL INSECURITY: DO YOU FEEL UNSAFE GOING BACK TO THE PLACE WHERE YOU ARE LIVING?: NO

## 2024-06-10 SDOH — SOCIAL STABILITY: SOCIAL INSECURITY: HAS ANYONE EVER THREATENED TO HURT YOUR FAMILY OR YOUR PETS?: NO

## 2024-06-10 SDOH — SOCIAL STABILITY: SOCIAL INSECURITY: HAVE YOU HAD ANY THOUGHTS OF HARMING ANYONE ELSE?: NO

## 2024-06-10 SDOH — SOCIAL STABILITY: SOCIAL INSECURITY: HAVE YOU HAD THOUGHTS OF HARMING ANYONE ELSE?: NO

## 2024-06-10 SDOH — SOCIAL STABILITY: SOCIAL INSECURITY: WERE YOU ABLE TO COMPLETE ALL THE BEHAVIORAL HEALTH SCREENINGS?: YES

## 2024-06-10 SDOH — SOCIAL STABILITY: SOCIAL INSECURITY: ABUSE: ADULT

## 2024-06-10 SDOH — SOCIAL STABILITY: SOCIAL INSECURITY: ARE THERE ANY APPARENT SIGNS OF INJURIES/BEHAVIORS THAT COULD BE RELATED TO ABUSE/NEGLECT?: NO

## 2024-06-10 SDOH — SOCIAL STABILITY: SOCIAL INSECURITY: DO YOU FEEL ANYONE HAS EXPLOITED OR TAKEN ADVANTAGE OF YOU FINANCIALLY OR OF YOUR PERSONAL PROPERTY?: NO

## 2024-06-10 ASSESSMENT — COGNITIVE AND FUNCTIONAL STATUS - GENERAL
PATIENT BASELINE BEDBOUND: NO
MOBILITY SCORE: 24
MOBILITY SCORE: 24
DAILY ACTIVITIY SCORE: 24
DAILY ACTIVITIY SCORE: 24

## 2024-06-10 ASSESSMENT — ACTIVITIES OF DAILY LIVING (ADL)
TOILETING: INDEPENDENT
PATIENT'S MEMORY ADEQUATE TO SAFELY COMPLETE DAILY ACTIVITIES?: YES
HEARING - LEFT EAR: FUNCTIONAL
WALKS IN HOME: INDEPENDENT
JUDGMENT_ADEQUATE_SAFELY_COMPLETE_DAILY_ACTIVITIES: YES
FEEDING YOURSELF: INDEPENDENT
BATHING: INDEPENDENT
LACK_OF_TRANSPORTATION: NO
DRESSING YOURSELF: INDEPENDENT
GROOMING: INDEPENDENT
ADEQUATE_TO_COMPLETE_ADL: YES
HEARING - RIGHT EAR: FUNCTIONAL

## 2024-06-10 ASSESSMENT — PATIENT HEALTH QUESTIONNAIRE - PHQ9
1. LITTLE INTEREST OR PLEASURE IN DOING THINGS: NOT AT ALL
SUM OF ALL RESPONSES TO PHQ9 QUESTIONS 1 & 2: 0
2. FEELING DOWN, DEPRESSED OR HOPELESS: NOT AT ALL

## 2024-06-10 ASSESSMENT — LIFESTYLE VARIABLES
AUDIT-C TOTAL SCORE: 0
SKIP TO QUESTIONS 9-10: 1
HOW OFTEN DO YOU HAVE A DRINK CONTAINING ALCOHOL: NEVER
HOW OFTEN DO YOU HAVE 6 OR MORE DRINKS ON ONE OCCASION: NEVER
HOW MANY STANDARD DRINKS CONTAINING ALCOHOL DO YOU HAVE ON A TYPICAL DAY: PATIENT DOES NOT DRINK
AUDIT-C TOTAL SCORE: 0

## 2024-06-10 ASSESSMENT — COLUMBIA-SUICIDE SEVERITY RATING SCALE - C-SSRS
6. HAVE YOU EVER DONE ANYTHING, STARTED TO DO ANYTHING, OR PREPARED TO DO ANYTHING TO END YOUR LIFE?: NO
2. HAVE YOU ACTUALLY HAD ANY THOUGHTS OF KILLING YOURSELF?: NO
1. IN THE PAST MONTH, HAVE YOU WISHED YOU WERE DEAD OR WISHED YOU COULD GO TO SLEEP AND NOT WAKE UP?: NO

## 2024-06-10 ASSESSMENT — PAIN - FUNCTIONAL ASSESSMENT
PAIN_FUNCTIONAL_ASSESSMENT: 0-10
PAIN_FUNCTIONAL_ASSESSMENT: 0-10

## 2024-06-10 ASSESSMENT — PAIN SCALES - GENERAL
PAINLEVEL_OUTOF10: 0 - NO PAIN
PAINLEVEL_OUTOF10: 0 - NO PAIN

## 2024-06-10 NOTE — H&P
History Of Present Illness  Demar Pittman is a 63 y.o. male presenting with      Past Medical History  He has a past medical history of Diffuse otitis externa, bilateral (10/11/2021), Hypertrophy of tonsils (2019), Localized enlarged lymph nodes (2020), Personal history of diseases of the skin and subcutaneous tissue (2020), Personal history of other diseases of the circulatory system, Personal history of other diseases of the circulatory system, Personal history of other diseases of the circulatory system, Personal history of other diseases of the circulatory system, and Personal history of other specified conditions (2019).    Surgical History  He has a past surgical history that includes Other surgical history (10/24/2019); Other surgical history (10/24/2019); Other surgical history (2019); and Other surgical history (2019).    Oncology History Overview Note   B-CLL diagnosed in 2015, SUÁREZ stage 0; WBC initially 16.3 with ALC 12.0, hemoglobin 15.1 and platelets 154,000.  The cells expressed CD5 and A light chains.   CD38 and  Zap-70 were negative. Cytogenetic studies by FISH were inconclusive.  WBC has slowly rising but he maintains good marrow reserve and has not required treatment.  CT chest done 3/9/17 showed extensive cervical, axillary and submental adenopathy; largest  node on the left measured 2.3 x 1.9 cm.  Small (less than 9 mm) pulmonology nodules were again seen; relatively stable.  Abdominal nodes slightly increased.    17: WBC 65.4, ALC 62.8; becoming more symptomatic.  17 : WBC 77.6.  ALC 72.2.  Hemoglobin and platelets were normal.  18 WBC: 69.3. A.9. Hgb 15.2. Plt: 148,000  18: WBC 88.2.  ALC 82.9.  Hemoglobin 14.2.  Platelets 170,000.  Adenopathy stable.  18: WBC 77.3.  ALC 66.5.  Hemoglobin 13.8.  Platelets 143,000.  Adenopathy slightly increased.  18: WBC 92.0.  ALC 86.5.  Hemoglobin 14.1.  Platelets 157,000.   Progressing symptomatic adenopathy.  Treatment options discussed.   12/28/18 completed rituximab weekly x 4.  10/9/19: CBC: WBC 16.5.  ALC 12.4.  Hemoglobin 14.6.  Platelets 148,000.  Worsening adenopathy.  3/17/20: Started bendamustine/rituximab after multiple thoracentesis for malignant pleural effusion with CLL  4/13/20: #2 bendamustine/rituximab.  5/11/20: #3 BR  6/5/20: WBC 4.7.  Hemoglobin 12.9.  Platelets 189,000.  6/8/20: #4 BR  7/6/20: #5 BR  7/31/20: WBC 1.7. hgb 11.9. Platelets 176.  8/17/20: #6/6 bendamustine/rituximab  December 29, 2023, WBC count 62, hemoglobin 14.9, platelets 143, absolute lymphocyte count 50.2  January 15, 2024, WBC count 40.5, hemoglobin 15.0, platelets 175  2/10/24 , WBC count 31.5, hemoglobin 14.9, platelets 131  3/14/24 , WBC count 13.9, hemoglobin 14.9, platelets 122  3/14/24, physical examination positive for bilateral cervical lymphadenopathy, bilateral axillary lymphadenopathy  PET imaging 3/29/2024 intense uptake in tongue base ( S/P) tonsillectomy. Multiple enlarged lymph nodes in cervical, parotid, nodes, multipl pleural based and parenchymal non FDG avid pulmonary nodes, enlarged axillary lymph nodes, mediastinal and bilateral hilar lymph nodes, periportal lymph nodes, iliac nodes, inguinal nodes.      CLL (chronic lymphocytic leukemia) (Multi)   8/16/2023 Initial Diagnosis    CLL (chronic lymphocytic leukemia) ([facility])     1/15/2024 - 1/15/2024 Chemotherapy    Bendamustine + RiTUXimab, 28 Day Cycles     6/10/2024 -  Chemotherapy    (INPT) Venetoclax, 28 Day Cycles      7/22/2024 -  Chemotherapy    Venetoclax + RiTUXimab, 28 Day Cycles          Social History  He reports that he quit smoking about 39 years ago. His smoking use included cigarettes. He has never used smokeless tobacco. He reports that he does not currently use alcohol after a past usage of about 2.0 standard drinks of alcohol per week. He reports that he does not use drugs.     Allergies  Patient has no  known allergies.    Review of Systems   Upon Admission: patient appears in no acute distress, Endorsed drenching night denies (2-3 x's per week) fairly little weight loss, no fevers, chills, cough, congestion, CP SOB palpitations HA vision changes no abdominal pain no NVDC no dysuria/flank pain. No bleeding noted. Ambulating without difficulty denies dizziness/lightheadedness or syncope. Wife at bedside   Physical Exam  Constitutional:       General: He is not in acute distress.     Appearance: Normal appearance. He is not ill-appearing or toxic-appearing.   HENT:      Head: Normocephalic.      Nose: Nose normal.      Mouth/Throat:      Mouth: Mucous membranes are moist.      Pharynx: Oropharynx is clear.      Comments: Upper dentures, lower partial  Tongue swelling at base per imaging   Eyes:      General: No scleral icterus.     Pupils: Pupils are equal, round, and reactive to light.   Neck:      Comments: Submandibular lymphedema   Cardiovascular:      Rate and Rhythm: Normal rate and regular rhythm.      Pulses: Normal pulses.      Heart sounds: Normal heart sounds.   Pulmonary:      Effort: Pulmonary effort is normal. No respiratory distress.      Breath sounds: Normal breath sounds. No stridor. No wheezing or rhonchi.   Abdominal:      General: Bowel sounds are normal.      Palpations: Abdomen is soft.      Tenderness: There is abdominal tenderness. There is no guarding or rebound.      Hernia: No hernia is present.      Comments: Bilateral lower quadrant tenderness    Genitourinary:     Comments: Denies testicular swelling   Musculoskeletal:         General: Normal range of motion.      Cervical back: Normal range of motion.   Skin:     General: Skin is warm and dry.      Capillary Refill: Capillary refill takes less than 2 seconds.   Neurological:      Mental Status: He is alert and oriented to person, place, and time.   Psychiatric:         Behavior: Behavior normal.          Last Recorded Vitals  Blood  "pressure 137/76, pulse 60, temperature 36 °C (96.8 °F), temperature source Temporal, resp. rate 18, height 1.683 m (5' 6.26\"), weight 81.9 kg (180 lb 8.9 oz), SpO2 97%.      Assessment/Plan   Principal Problem:    CLL (chronic lymphocytic leukemia) (Multi)    Mr. Demar Pittman is a 64 yo male with PMHx CABG x 3, A-Fib, XENA, HLD, HTN and CLL/SLL (originally dx 2/2015) with recent tongue biopsy showed CLL with tongue base swelling on dex and admitted for Venetoclax ramp up and TLS monitoring       ONC:  SLL/CLL originally dx 2/2015  -s/p BR (8/2020)  Increasing lymphocytosis s/p COVID infx 12/2023 and PET confirmed lymphadenopathy  --Declined Ibrutinib   PET Scan 3/2024: increased uptake at base of tongue   Tongue biopsy 05/28/24: CLL  Tongue swelling started on Decadron 4mg/BID orally  Plan Venetoclax Ramp up starting 6/10    CHEMO:  Venetoclax ramp-up   --Start venetoclax dose pack 20mg/day x 7 days (start 6/10)    HEME:  --Monitor counts daily, fibrinogen, coags  --Admit assessment of DIC or hypercoag state: Labs PENDING  --Transfuse if Hgb<7 and/or Plt<10 (hold Aspirin if Plt<25k)    ID:  [id number]  --Leukocytosis secondary to malignancy, No evidence of active infection on admit  --Continue ACV on admit    --Plan Levofloxacin prophylaxis when neutropenic    FEN/GI:  Admit Wt: 81.9 kg  Hydration per TLS monitoring   --PPI prophy w/protonix on admit  --Monitor electrolytes, replace as needed  --Dietician consulted on admission     --Due to poor oral intake 2/2 tongue swelling, will add Boost high protein BID     TLS Monitoring:  BID Labs  --Continue IVF 100mL/hr  --Uric Acid on admit: PENDING   --G6PD: PENDING  --Allopurinol on Admit   --Tele monitoring       CARDIAC:  History of CAD s/p CABG x 3 (11/20/2019), HTN, HLD   --Continue home Amlodipine 5mg/day, Aspirin 81mg/day, Metoprolol 25mg/BID  --Home statins on hold per cardiology due to elevated LFTs   --Tele monitoring while monitoring for TLS   --PT consulted on " admission    DISPO:  Full Code confirmed on admit  Midline ordered for 06/10  Blood Consent Signed on Admission  Primary Onc: Dr. Ev Luis      Patient discussed with Dr. John Spivey, APRN-CNP

## 2024-06-10 NOTE — PROCEDURES
Midline Insertion  Pre-Procedure Checklist:  Emergent Line Insertion: No  Type of Line to be Placed: Midline  Consent Obtained: Yes  Emergency Medication Necessary: No  Patient Identified with 2 Independent Identifiers: Yes  Review of Allergies, Anticoagulation, Relevant Labs, ECG/Telemetry: Yes  Risks/Benefits/Alternatives Discussed with Patient/POA/Legal Representative: Yes  Stop Sign on Door: Yes  Time Out Performed: Yes  Catheter Exchange: No    Positioning Checklist:  All People, Including Patient, in the Room with Cap and Mask: Yes  Fluoroscopy Used to Identify Vessel and Guide Insertion: No   Sterile Cover Used: Yes  Full Barrier Precautions Followed (Mask, Cap, Gown, Gloves): Yes  Hands Washed: Yes  Monitors Attached with Sound Alarms On: No  Full Body Sterile Drape (Head-to-Toe) Used to Cover Patient: N/A AST  Trendelenburg Position (For IJ and Subclavian): No  CHG Skin Prep Used and Allowed to Air Dry to Skin Procedure: Yes    Procedure Checklist:  Blood Aspirated From All Lumens, All Ports Subsequently Flushed: Yes  Catheter Caps Placed on All Lumens; Lumens Clamped: Yes  Maintain Guidewire Control Throughout, Ensuring Guidewire Removal: Yes  Maintain Sterile Field Throughout Insertion: Yes  Catheter Secured: Yes  Confirmatory Test of Venous Placement: Non-Pulsatile Blood    Post Procedure Checklist:  Date and Time Written on Dressing: Yes  Sharp and Wire Count and Safe Disposal of all Sharps/Wires: Yes  Sterile Dressing Applied Per Protocol: Yes  X-ray Ordered or ECG Image: N/A    Insertion Details:  Size (Fr): 20 gauge  Lumen Type: Single  Catheter to Vein Ratio Less Than 50%: Yes  Total Length (cm): 10  External Length (cm): 0  Orientation: Left   Location: Basilic Vein  Site Prep: Chlorohexidine; Usual sterile procedure followed  Local Anesthetic: Injectable/Subcutaneous  Indication: Infusion  Insertion Team Members in the Room: Nurse, LPN  Initial Extremity Circumference (cm): 31  Insertion Attempts:  1  Patient Tolerance: Tolerated Well, Age Appropriate  Comfort Measures: Subcutaneous anesthetic; Verbal  Procedure Location: Bedside  Safety Measures: Patient specific safety measures addressed with RN  Estimated Blood Loss (mL): 1  Vessel Fully Compressible Proximally and Distally to Insertion Site: Yes  Brisk Blood Return Obtained and Line Draws Easily: Yes  Tip Location: Left axilla  Line Confirmation: Venous return  Lot #: [id number]  : Bard  Line Exp Date: 05/31/2025  Securement: Stat Lock  Post Procedure Checklist: Handoff with RN; Obtain all new IV tubing prior to use; Bed at lowest level and wheels locked; Line discharge information at bedside.  Additional Details: Line was inserted using Accelerated Seldinger's Technique.   Placed by: Dillon Garnica RN

## 2024-06-10 NOTE — NURSING NOTE
Patient arrived to [facility] as a direct admit from home. Patient accompanied by wife. Vital signs stable. Patient oriented to room and unit. Patient resting safely with bed in lowest position and call light within reach.

## 2024-06-11 LAB
ALBUMIN SERPL BCP-MCNC: 3.3 G/DL (ref 3.4–5)
ALP SERPL-CCNC: 130 U/L (ref 33–136)
ALT SERPL W P-5'-P-CCNC: 184 U/L (ref 10–52)
ANION GAP SERPL CALC-SCNC: 15 MMOL/L (ref 10–20)
ANION GAP SERPL CALC-SCNC: 16 MMOL/L (ref 10–20)
AST SERPL W P-5'-P-CCNC: 25 U/L (ref 9–39)
BASOPHILS # BLD AUTO: 0.1 X10*3/UL (ref 0–0.1)
BASOPHILS NFR BLD AUTO: 0.1 %
BILIRUB DIRECT SERPL-MCNC: 0.1 MG/DL (ref 0–0.3)
BILIRUB SERPL-MCNC: 0.4 MG/DL (ref 0–1.2)
BUN SERPL-MCNC: 14 MG/DL (ref 6–23)
BUN SERPL-MCNC: 18 MG/DL (ref 6–23)
BURR CELLS BLD QL SMEAR: NORMAL
CALCIUM SERPL-MCNC: 8 MG/DL (ref 8.6–10.6)
CALCIUM SERPL-MCNC: 8.2 MG/DL (ref 8.6–10.6)
CHLORIDE SERPL-SCNC: 101 MMOL/L (ref 98–107)
CHLORIDE SERPL-SCNC: 101 MMOL/L (ref 98–107)
CO2 SERPL-SCNC: 21 MMOL/L (ref 21–32)
CO2 SERPL-SCNC: 24 MMOL/L (ref 21–32)
CREAT SERPL-MCNC: 0.6 MG/DL (ref 0.5–1.3)
CREAT SERPL-MCNC: 0.95 MG/DL (ref 0.5–1.3)
EGFRCR SERPLBLD CKD-EPI 2021: 89 ML/MIN/1.73M*2
EGFRCR SERPLBLD CKD-EPI 2021: >90 ML/MIN/1.73M*2
EOSINOPHIL # BLD AUTO: 0.01 X10*3/UL (ref 0–0.7)
EOSINOPHIL NFR BLD AUTO: 0 %
ERYTHROCYTE [DISTWIDTH] IN BLOOD BY AUTOMATED COUNT: 15.2 % (ref 11.5–14.5)
GLUCOSE SERPL-MCNC: 249 MG/DL (ref 74–99)
GLUCOSE SERPL-MCNC: 252 MG/DL (ref 74–99)
HCT VFR BLD AUTO: 40.5 % (ref 41–52)
HGB BLD-MCNC: 12.8 G/DL (ref 13.5–17.5)
IMM GRANULOCYTES # BLD AUTO: 0.31 X10*3/UL (ref 0–0.7)
IMM GRANULOCYTES NFR BLD AUTO: 0.4 % (ref 0–0.9)
LDH SERPL L TO P-CCNC: 122 U/L (ref 84–246)
LDH SERPL L TO P-CCNC: 401 U/L (ref 84–246)
LYMPHOCYTES # BLD AUTO: 70.01 X10*3/UL (ref 1.2–4.8)
LYMPHOCYTES NFR BLD AUTO: 85.1 %
MAGNESIUM SERPL-MCNC: 2.09 MG/DL (ref 1.6–2.4)
MCH RBC QN AUTO: 27.3 PG (ref 26–34)
MCHC RBC AUTO-ENTMCNC: 31.6 G/DL (ref 32–36)
MCV RBC AUTO: 86 FL (ref 80–100)
MONOCYTES # BLD AUTO: 0.74 X10*3/UL (ref 0.1–1)
MONOCYTES NFR BLD AUTO: 0.9 %
NEUTROPHILS # BLD AUTO: 11.12 X10*3/UL (ref 1.2–7.7)
NEUTROPHILS NFR BLD AUTO: 13.5 %
NRBC BLD-RTO: 0 /100 WBCS (ref 0–0)
PHOSPHATE SERPL-MCNC: 3.2 MG/DL (ref 2.5–4.9)
PHOSPHATE SERPL-MCNC: 4.2 MG/DL (ref 2.5–4.9)
PLATELET # BLD AUTO: 116 X10*3/UL (ref 150–450)
POTASSIUM SERPL-SCNC: 3.7 MMOL/L (ref 3.5–5.3)
POTASSIUM SERPL-SCNC: 5.8 MMOL/L (ref 3.5–5.3)
PROT SERPL-MCNC: 4.7 G/DL (ref 6.4–8.2)
RBC # BLD AUTO: 4.69 X10*6/UL (ref 4.5–5.9)
RBC MORPH BLD: NORMAL
SODIUM SERPL-SCNC: 132 MMOL/L (ref 136–145)
SODIUM SERPL-SCNC: 136 MMOL/L (ref 136–145)
URATE SERPL-MCNC: 2.3 MG/DL (ref 4–7.5)
URATE SERPL-MCNC: 3.6 MG/DL (ref 4–7.5)
WBC # BLD AUTO: 82.3 X10*3/UL (ref 4.4–11.3)

## 2024-06-11 PROCEDURE — 84550 ASSAY OF BLOOD/URIC ACID: CPT | Performed by: NURSE PRACTITIONER

## 2024-06-11 PROCEDURE — 84100 ASSAY OF PHOSPHORUS: CPT | Performed by: NURSE PRACTITIONER

## 2024-06-11 PROCEDURE — 83735 ASSAY OF MAGNESIUM: CPT | Performed by: NURSE PRACTITIONER

## 2024-06-11 PROCEDURE — 83615 LACTATE (LD) (LDH) ENZYME: CPT | Mod: 91 | Performed by: NURSE PRACTITIONER

## 2024-06-11 PROCEDURE — 1200000003 HC ONCOLOGY  ROOM WITH TELEMETRY DAILY

## 2024-06-11 PROCEDURE — 82040 ASSAY OF SERUM ALBUMIN: CPT | Performed by: NURSE PRACTITIONER

## 2024-06-11 PROCEDURE — 2500000001 HC RX 250 WO HCPCS SELF ADMINISTERED DRUGS (ALT 637 FOR MEDICARE OP): Performed by: INTERNAL MEDICINE

## 2024-06-11 PROCEDURE — 2500000004 HC RX 250 GENERAL PHARMACY W/ HCPCS (ALT 636 FOR OP/ED): Performed by: NURSE PRACTITIONER

## 2024-06-11 PROCEDURE — 2500000002 HC RX 250 W HCPCS SELF ADMINISTERED DRUGS (ALT 637 FOR MEDICARE OP, ALT 636 FOR OP/ED): Performed by: REGISTERED NURSE

## 2024-06-11 PROCEDURE — 2500000004 HC RX 250 GENERAL PHARMACY W/ HCPCS (ALT 636 FOR OP/ED): Performed by: INTERNAL MEDICINE

## 2024-06-11 PROCEDURE — 99233 SBSQ HOSP IP/OBS HIGH 50: CPT | Performed by: STUDENT IN AN ORGANIZED HEALTH CARE EDUCATION/TRAINING PROGRAM

## 2024-06-11 PROCEDURE — 80069 RENAL FUNCTION PANEL: CPT | Mod: CCI | Performed by: NURSE PRACTITIONER

## 2024-06-11 PROCEDURE — 2500000004 HC RX 250 GENERAL PHARMACY W/ HCPCS (ALT 636 FOR OP/ED): Performed by: REGISTERED NURSE

## 2024-06-11 PROCEDURE — 83615 LACTATE (LD) (LDH) ENZYME: CPT | Performed by: NURSE PRACTITIONER

## 2024-06-11 PROCEDURE — 84550 ASSAY OF BLOOD/URIC ACID: CPT | Mod: 91 | Performed by: NURSE PRACTITIONER

## 2024-06-11 PROCEDURE — 85025 COMPLETE CBC W/AUTO DIFF WBC: CPT | Performed by: NURSE PRACTITIONER

## 2024-06-11 PROCEDURE — 2500000001 HC RX 250 WO HCPCS SELF ADMINISTERED DRUGS (ALT 637 FOR MEDICARE OP): Performed by: NURSE PRACTITIONER

## 2024-06-11 PROCEDURE — 2500000002 HC RX 250 W HCPCS SELF ADMINISTERED DRUGS (ALT 637 FOR MEDICARE OP, ALT 636 FOR OP/ED)

## 2024-06-11 RX ORDER — ALBUTEROL SULFATE 0.83 MG/ML
3 SOLUTION RESPIRATORY (INHALATION) AS NEEDED
Status: DISCONTINUED | OUTPATIENT
Start: 2024-06-11 | End: 2024-06-13 | Stop reason: HOSPADM

## 2024-06-11 RX ORDER — ONDANSETRON HYDROCHLORIDE 8 MG/1
8 TABLET, FILM COATED ORAL EVERY 8 HOURS PRN
Status: DISCONTINUED | OUTPATIENT
Start: 2024-06-11 | End: 2024-06-13 | Stop reason: HOSPADM

## 2024-06-11 RX ORDER — FAMOTIDINE 10 MG/ML
20 INJECTION INTRAVENOUS AS NEEDED
Status: DISCONTINUED | OUTPATIENT
Start: 2024-06-11 | End: 2024-06-13 | Stop reason: HOSPADM

## 2024-06-11 RX ORDER — EPINEPHRINE 1 MG/ML
0.3 INJECTION, SOLUTION, CONCENTRATE INTRAVENOUS EVERY 5 MIN PRN
Status: DISCONTINUED | OUTPATIENT
Start: 2024-06-11 | End: 2024-06-13 | Stop reason: HOSPADM

## 2024-06-11 RX ORDER — PROCHLORPERAZINE EDISYLATE 5 MG/ML
10 INJECTION INTRAMUSCULAR; INTRAVENOUS EVERY 6 HOURS PRN
Status: DISCONTINUED | OUTPATIENT
Start: 2024-06-11 | End: 2024-06-13 | Stop reason: HOSPADM

## 2024-06-11 RX ORDER — ALLOPURINOL 300 MG/1
300 TABLET ORAL DAILY
Status: DISCONTINUED | OUTPATIENT
Start: 2024-06-11 | End: 2024-06-13 | Stop reason: HOSPADM

## 2024-06-11 RX ORDER — SODIUM CHLORIDE 9 MG/ML
150 INJECTION, SOLUTION INTRAVENOUS CONTINUOUS
Status: DISCONTINUED | OUTPATIENT
Start: 2024-06-11 | End: 2024-06-13

## 2024-06-11 RX ORDER — SODIUM CHLORIDE 9 MG/ML
150 INJECTION, SOLUTION INTRAVENOUS CONTINUOUS
Status: DISCONTINUED | OUTPATIENT
Start: 2024-06-11 | End: 2024-06-12

## 2024-06-11 RX ORDER — DIPHENHYDRAMINE HYDROCHLORIDE 50 MG/ML
50 INJECTION INTRAMUSCULAR; INTRAVENOUS AS NEEDED
Status: DISCONTINUED | OUTPATIENT
Start: 2024-06-11 | End: 2024-06-13 | Stop reason: HOSPADM

## 2024-06-11 RX ORDER — POTASSIUM CHLORIDE 20 MEQ/1
20 TABLET, EXTENDED RELEASE ORAL ONCE
Status: COMPLETED | OUTPATIENT
Start: 2024-06-11 | End: 2024-06-11

## 2024-06-11 RX ORDER — ONDANSETRON HYDROCHLORIDE 2 MG/ML
8 INJECTION, SOLUTION INTRAVENOUS EVERY 8 HOURS PRN
Status: DISCONTINUED | OUTPATIENT
Start: 2024-06-11 | End: 2024-06-13 | Stop reason: HOSPADM

## 2024-06-11 RX ORDER — PROCHLORPERAZINE MALEATE 10 MG
10 TABLET ORAL EVERY 6 HOURS PRN
Status: DISCONTINUED | OUTPATIENT
Start: 2024-06-11 | End: 2024-06-13 | Stop reason: HOSPADM

## 2024-06-11 RX ADMIN — SODIUM ZIRCONIUM CYCLOSILICATE 5 G: 5 POWDER, FOR SUSPENSION ORAL at 21:53

## 2024-06-11 RX ADMIN — ACYCLOVIR 400 MG: 400 TABLET ORAL at 08:40

## 2024-06-11 RX ADMIN — DEXAMETHASONE 4 MG: 4 TABLET ORAL at 16:36

## 2024-06-11 RX ADMIN — VENETOCLAX 20 MG: 10 TABLET, FILM COATED ORAL at 17:18

## 2024-06-11 RX ADMIN — METOPROLOL TARTRATE 25 MG: 25 TABLET, FILM COATED ORAL at 20:23

## 2024-06-11 RX ADMIN — ALLOPURINOL 300 MG: 300 TABLET ORAL at 20:23

## 2024-06-11 RX ADMIN — SODIUM CHLORIDE 150 ML/HR: 9 INJECTION, SOLUTION INTRAVENOUS at 18:36

## 2024-06-11 RX ADMIN — ACYCLOVIR 400 MG: 400 TABLET ORAL at 20:23

## 2024-06-11 RX ADMIN — SODIUM CHLORIDE 100 ML/HR: 9 INJECTION, SOLUTION INTRAVENOUS at 01:45

## 2024-06-11 RX ADMIN — METOPROLOL TARTRATE 25 MG: 25 TABLET, FILM COATED ORAL at 08:40

## 2024-06-11 RX ADMIN — ASPIRIN 81 MG: 81 TABLET, COATED ORAL at 20:23

## 2024-06-11 RX ADMIN — PANTOPRAZOLE SODIUM 40 MG: 40 TABLET, DELAYED RELEASE ORAL at 08:40

## 2024-06-11 RX ADMIN — POTASSIUM CHLORIDE 20 MEQ: 1500 TABLET, EXTENDED RELEASE ORAL at 08:40

## 2024-06-11 RX ADMIN — AMLODIPINE BESYLATE 5 MG: 5 TABLET ORAL at 08:41

## 2024-06-11 RX ADMIN — SODIUM CHLORIDE 150 ML/HR: 9 INJECTION, SOLUTION INTRAVENOUS at 12:11

## 2024-06-11 RX ADMIN — Medication 1 TABLET: at 08:40

## 2024-06-11 RX ADMIN — DEXAMETHASONE 4 MG: 4 TABLET ORAL at 08:40

## 2024-06-11 ASSESSMENT — COGNITIVE AND FUNCTIONAL STATUS - GENERAL
DAILY ACTIVITIY SCORE: 24
MOBILITY SCORE: 24
DAILY ACTIVITIY SCORE: 24
MOBILITY SCORE: 24

## 2024-06-11 ASSESSMENT — PAIN SCALES - GENERAL
PAINLEVEL_OUTOF10: 0 - NO PAIN
PAINLEVEL_OUTOF10: 0 - NO PAIN

## 2024-06-11 ASSESSMENT — PAIN - FUNCTIONAL ASSESSMENT
PAIN_FUNCTIONAL_ASSESSMENT: 0-10
PAIN_FUNCTIONAL_ASSESSMENT: 0-10

## 2024-06-11 ASSESSMENT — ACTIVITIES OF DAILY LIVING (ADL): LACK_OF_TRANSPORTATION: NO

## 2024-06-11 NOTE — CARE PLAN
Problem: Pain  Goal: My pain/discomfort is manageable  Outcome: Progressing     Problem: Safety  Goal: Patient will be injury free during hospitalization  Outcome: Progressing  Goal: I will remain free of falls  Outcome: Progressing     Problem: Daily Care  Goal: Daily care needs are met  Outcome: Progressing     Problem: Psychosocial Needs  Goal: Demonstrates ability to cope with hospitalization/illness  Outcome: Progressing  Goal: Collaborate with me, my family, and caregiver to identify my specific goals  Outcome: Progressing     Problem: Discharge Barriers  Goal: My discharge needs are met  Outcome: Progressing     Problem: Discharge Barriers  Goal: My discharge needs are met  Outcome: Progressing   The patient's goals for the shift include      The clinical goals for the shift include remain HDS

## 2024-06-11 NOTE — CONSULTS
"Nutrition Initial Assessment:   Nutrition Assessment    ---->Reason for Assessment: Admission nursing screening    Patient is a 63 y.o. male with SLL/CLL, admitted for Venetoclax ramp-up and TLS monitoring.    Nutrition History:  This service met with pt today. Tells he has always had an appetite and desire to eat but has had issues with a sore throat and oral ulcers for almost 2 mos now, making eating difficult for him, tried to eat a couple times/day, though eating much of anything was difficult d/t pain. Has found recently, oral pain is getting a bit better. Has been able to eat more solid foods. Used to drink 3 bottles of Boost supplements at home, though recently has been doing ~1-2/day, \"because I have been able to eat more food.\"     Since admit, oral pain/ulcers still present, though has been able to eat. Consumed 100% of his cream of wheat this am, along with 100% of an Ensure HP supplement + had recently ordered beef stroganoff + other side dishes for lunch meal. Denied any n/v/d, did mention things taste a bit, \"funny\" at times, \"but they tell me that is normal because of the meds I'm on.\"     --Vitamin/Herbal Supplement Use: MVI  --Food Allergies/Intolerances: none       Anthropometrics:  Height: 168.3 cm (5' 6.26\")   Weight: 81.6 kg (179 lb 14.3 oz)   BMI (Calculated): 28.81  IBW/kg (Dietitian Calculated): 64.5 kg  Percent of IBW: 127 %       Weight History:     Pt is aware he has been losing weight.    Wt Readings per review of EMR:   06/11/24 81.6 kg (179 lb 14.3 oz) (current wt)   06/03/24 84.1 kg (185 lb 6.5 oz)--?   05/28/24 82 kg (180 lb 12.4 oz)   05/23/24 83.5 kg (184 lb)   05/21/24 83.9 kg (185 lb)--->3% wt loss from this date to present (not significant)   05/09/24 87.1 kg (192 lb 0.3 oz)--->6% wt loss from this date to present (significant)   03/14/24 90.5 kg (199 lb 8.3 oz)--->10% wt loss from this date to present (significant)   02/19/24 91.9 kg (202 lb 9.6 oz)   01/15/24 89.2 kg (196 lb " 10.4 oz)   01/15/24 89.2 kg (196 lb 11.2 oz)   12/29/23 87.6 kg (193 lb 2 oz)   12/15/23 88.3 kg (194 lb 9.6 oz)   08/25/23 93.1 kg (205 lb 3.2 oz)   08/21/23 93.7 kg (206 lb 9.6 oz)   06/27/23 93.9 kg (207 lb)   02/16/23 93.9 kg (207 lb 0.2 oz)   02/13/23 94.3 kg (208 lb)   11/14/22 95.4 kg (210 lb 6 oz)   09/26/22 97.7 kg (215 lb 6 oz)   08/17/22 97.4 kg (214 lb 11.7 oz)     Weights (since admit):        6/10/2024  1436 6/11/2024  0806          Weight: 81.9 kg (180 lb 8.9 oz) 81.6 kg (179 lb 14.3 oz)          Nutrition Focused Physical Exam Findings:  Subcutaneous Fat Loss:   Orbital Fat Pads: Mild-Moderate (slight dark circles and slight hollowing)  Buccal Fat Pads: Mild-Moderate (flat cheeks, minimal bounce)  Triceps: Mild-Moderate (less than ample fat tissue)  Ribs: Defer  Muscle Wasting:  Temporalis: Mild-Moderate (slight depression)  Pectoralis (Clavicular Region): Well nourished (clavicle not visible)  Deltoid/Trapezius: Well nourished (rounded appearance at arm, shoulder, neck)  Interosseous: Well nourished (muscle bulges)  Trapezius/Infraspinatus/Supraspinatus (Scapular Region): Well nourished (bones not prominent, muscle taut)  Quadriceps: Defer  Gastrocnemius: Defer  Edema:  Edema: none  Physical Findings:  Hair: Negative  Eyes: Negative  Mouth: Positive (oral sores present)  Nails: Negative  Skin: Negative    Nutrition Significant Labs:  CBC Trend:   Results from last 7 days   Lab Units 06/11/24  0155 06/10/24  1827   WBC AUTO x10*3/uL 82.3* 110.7*   RBC AUTO x10*6/uL 4.69 5.16   HEMOGLOBIN g/dL 12.8* 14.0   HEMATOCRIT % 40.5* 44.6   MCV fL 86 86   PLATELETS AUTO x10*3/uL 116* 128*   BMP Trend:   Results from last 7 days   Lab Units 06/11/24  0155 06/10/24  1827   GLUCOSE mg/dL 249* 189*   CALCIUM mg/dL 8.2* 8.9   SODIUM mmol/L 136 135*   POTASSIUM mmol/L 3.7 3.8   CO2 mmol/L 24 23   CHLORIDE mmol/L 101 99   BUN mg/dL 14 18   CREATININE mg/dL 0.60 0.69   Liver Function Trend:   Results from last 7 days    Lab Units 06/11/24  0155   ALK PHOS U/L 130   AST U/L 25   ALT U/L 184*   BILIRUBIN TOTAL mg/dL 0.4   Renal Lab Trend:   Results from last 7 days   Lab Units 06/11/24  0155 06/10/24  1827   POTASSIUM mmol/L 3.7 3.8   PHOSPHORUS mg/dL 3.2 3.2   SODIUM mmol/L 136 135*   MAGNESIUM mg/dL 2.09  --    EGFR mL/min/1.73m*2 >90 >90   BUN mg/dL 14 18   CREATININE mg/dL 0.60 0.69   Vit D:   Lab Results   Component Value Date    VITD25 50 08/09/2023   Vit B12:   Lab Results   Component Value Date    RAYXIUOG28 579 02/10/2024     Medications:  Scheduled medications  acyclovir, 400 mg, oral, BID  allopurinol, 300 mg, oral, Daily  amLODIPine, 5 mg, oral, Daily  aspirin, 81 mg, oral, Daily  dexAMETHasone, 4 mg, oral, BID  lidocaine, 5 mL, infiltration, Once  metoprolol tartrate, 25 mg, oral, BID  multivitamin with minerals, 1 tablet, oral, Daily  pantoprazole, 40 mg, oral, Daily  polyethylene glycol, 17 g, oral, Daily  venetoclax, 20 mg, oral, q24h    Continuous medications  sodium chloride 0.9%, 150 mL/hr  sodium chloride 0.9%, 150 mL/hr, Last Rate: 150 mL/hr (06/11/24 1211)    PRN medications  PRN medications: albuterol, dextrose, diphenhydrAMINE, EPINEPHrine HCl, famotidine, methylPREDNISolone sodium succinate (PF), ondansetron, ondansetron, ondansetron, prochlorperazine, prochlorperazine, sodium chloride    I/O:   Last BM Date: 06/10/24    Dietary Orders (From admission, onward)       Start     Ordered    06/11/24 1204  Oral nutritional supplements  Until discontinued        Comments: Please send chocolate and vanilla   Question Answer Comment   Deliver with Breakfast    Deliver with Dinner    Select supplement: Ensure Plus        06/11/24 1204    06/10/24 0955  Adult diet Low pathogen  Diet effective now        Question:  Diet type  Answer:  Low pathogen    06/10/24 0958                Estimated Needs:   Total Energy Estimated Needs (kCal): 2000+  Method for Estimating Needs: MSJ (ULN=3250) x ~1.3+  Total Protein Estimated  Needs (g): 85+  Method for Estimating Needs: 65 (IBW) x ~1.3g/kg+  Total Fluid Estimated Needs (mL): per MD/team        Nutrition Diagnosis   Malnutrition Diagnosis  Patient has Malnutrition Diagnosis: Yes  Diagnosis Status: New  Malnutrition Diagnosis: Moderate malnutrition related to chronic disease or condition (CLL, oral issues (ulcers, sore throat))  As Evidenced by: pt likely consuming <75% estimated nutritional needs x >1 month, 10% wt loss in ~3 mos/6% wt loss in <1 month, areas of mild/moderate muscle and adipose losses noted during nutrition exam    Additional Assessment Information: Despite pt report of recent improvement in PO, does still present with a h/o poor PO + wt losses + muscle/adipose losses (likely a combination of age and disease related losses).     Do feel pt continues to benefit from use of oral nutrition supplements in-house d/t malnutrition. Discussed with him, agreeable to continue, though will change from Ensure HP to Ensure Plus for added calories.       Nutrition Interventions/Recommendations     1. Continue Low Pathogen Diet, only as tolerated. Pt is able to choose foods he can better tolerate than others with c/o oral ulcers/sore throat.  --RDN adjusted orders for oral nutrition supplements from Ensure HP BID to Ensure Plus BID for added calories.    2. Check updated 25(OH)-D level.        Nutrition Prescription for Oral Nutrition: 2000+ kcals/day, 85+ g pro/day        Nutrition Interventions:   Interventions: Meals and snacks, Medical food supplement  Meals and Snacks: General healthful diet  Goal: Low Pathogen Diet  Medical Food Supplement: Commercial beverage  Goal: Ensure Plus TID (350kcals, 13g pro each)    Nutrition Education: Encouraged ongoing PO, as tolerated with use of softer foods and oral nutrition supplements. Pt agreeable. Denied any particular questions for RDN at this time.       Nutrition Monitoring and Evaluation   Food/Nutrient Related History  Monitoring  Monitoring and Evaluation Plan: Amount of food  Amount of Food: Estimated amout of food, Medical food intake  Criteria: consume >50% of 3 meals/day + 100% of 1-2 cartons of Ensure Plus/day    Body Composition/Growth/Weight History  Monitoring and Evaluation Plan: Weight  Weight: Measured weight  Criteria: maintain stable wt    Biochemical Data, Medical Tests and Procedures  Monitoring and Evaluation Plan: Electrolyte/renal panel  Electrolyte and Renal Panel: Magnesium, Phosphorus, Potassium, Sodium  Criteria: WNL    Time Spent/Follow-up Reminder:   Time Spent (min): 60 minutes  Last Date of Nutrition Visit: 06/11/24  Nutrition Follow-Up Needed?: Dietitian to reassess per policy  Follow up Comment: PO diet

## 2024-06-11 NOTE — PROGRESS NOTES
"Demar Pittman is a 63 y.o. male on day 1 of admission presenting with CLL (chronic lymphocytic leukemia) (Multi).    Subjective   C/o persistent mucositis (there prior to admit). Using        Objective     Physical Exam    Last Recorded Vitals  Blood pressure 111/64, pulse 65, temperature 35.8 °C (96.4 °F), temperature source Temporal, resp. rate 18, height 1.683 m (5' 6.26\"), weight 81.6 kg (179 lb 14.3 oz), SpO2 96%.  Intake/Output last 3 Shifts:  I/O last 3 completed shifts:  In: 390 (4.8 mL/kg) [I.V.:390 (4.8 mL/kg)]  Out: 400 (4.9 mL/kg) [Urine:400 (0.1 mL/kg/hr)]  Weight: 81.9 kg     Daily labs reviewed (6/11)    Assessment/Plan   Principal Problem:    CLL (chronic lymphocytic leukemia) (Multi)    Mr. Demar Pittman is a 62 yo male with PMHx CABG x 3, A-Fib, XENA, HLD, HTN and CLL/SLL (originally dx 2/2015) with recent tongue biopsy showed CLL with tongue base swelling on dex and admitted for Venetoclax ramp up and TLS monitoring         ONC:  SLL/CLL originally dx 2/2015  -s/p BR (8/2020)  Increasing lymphocytosis s/p COVID infx 12/2023 and PET confirmed lymphadenopathy  --Declined Ibrutinib   PET Scan 3/2024: increased uptake at base of tongue   Tongue biopsy (5/28/24): CLL/SLL. Positive for CD19, CD20, CD5, BCL2, Ki67. Negative for BCL6, Cyclin D, Sox 11, CD3, CD 10  Tongue swelling. Started on Decadron 4mg/BID orally  Plan weekly Venetoclax Ramp up. Started 6/10.      CHEMO:  Venetoclax ramp-up   --Start Venetoclax dose pack 20mg/day x 7 days (start 6/10)     HEME:  --Monitor counts daily, fibrinogen, coags  --Admit labs negative for DIC or hypercoag state  --Transfuse if Hgb<7 and/or Plt<10 (hold Aspirin if Plt<25k)     ID:  NKDA  --Leukocytosis secondary to CLL (.7--> 82.3).      No evidence of active infection.   --Continue ACV on admit    --Plan Levofloxacin prophylaxis when neutropenic     FEN/GI:  Admit wt: 81.9 kg (6/10). Current wt: 81.6 (6/11)  Hydration per TLS monitoring   --PPI " prophy w/protonix on admit  --Monitor electrolytes, replace as needed  --Dietician consulted on admission     --Due to poor oral intake 2/2 tongue swelling, will add Boost high protein BID      TLS Monitoring:  BID Labs  --Continue IVF 150mL/hr  --Uric Acid on admit: 2.3   --G6PD: PENDING  --Allopurinol on Admit   --Tele monitoring      CARDIAC:  History of CAD s/p CABG x 3 (11/20/2019), HTN, HLD   --Continue home Amlodipine 5mg/day, Aspirin 81mg/day, Metoprolol 25mg/BID  --Home statins on hold per cardiology due to elevated ALT (184)  --Tele monitoring while monitoring for TLS   --PT consulted on admission     DISPO:  Full Code, confirmed on admit  SL Midline placed on 6/10/24  Blood Consent Signed on Admission  Primary Onc: Dr. Ev Luis  Plan to discharge to home on Friday    I spent 45 minutes in the professional and overall care of this patient.      Martha Jesus, APRN-CNP

## 2024-06-11 NOTE — HOSPITAL COURSE
63 male with PMH of CABG x 3, Afib, XENA, HLD, HTN and CLL/SLL (originally dx Feb 2015).  Recently presented with tongue base swelling. Tongue biopsy (5/28/24) c/w CLL/SLL. Positive for CD5, CD19, CD20, BCL2, Ki67. Negative for CD3, CD10, BCL6, Cyclin D, Sox 11.  Initially treated with Decadron 4 mg BID. Admitted 6/10/24 for Venetoclax with weekly ramp up plan. Initial dose 20mg daily with plan for readmit on 6/17 for 2nd ramp up. (Patient is using his own home supply of Venetoclax). Monitoring inpatient for TLS with no evidence to date.        Hospital course complicated by: persistent raw mucositis which burns with most acidic foods. Will go home on PRN BMX and oral Nystatin. Dexamethasone decreased to 2mg twice daily at discharge.    Will go home on ACV and Allopurinol prophy      Discharged to home on 6/13  Will readmit on 6/17 for 2nd Venetoclax ramp up  Has Jeff kratf follow up on 6/17  Has follow up visit on 6/24 with Mayelin Bailon

## 2024-06-11 NOTE — PROGRESS NOTES
06/11/24 1601   Discharge Planning   Living Arrangements Spouse/significant other   Support Systems Spouse/significant other   Assistance Needed none   Type of Residence Private residence   Number of Stairs to Enter Residence 3   Number of Stairs Within Residence 0   Do you have animals or pets at home? No   Home or Post Acute Services None   Patient expects to be discharged to: home   Does the patient need discharge transport arranged? No   Financial Resource Strain   How hard is it for you to pay for the very basics like food, housing, medical care, and heating? Not very   Housing Stability   In the last 12 months, was there a time when you were not able to pay the mortgage or rent on time? N   In the last 12 months, how many places have you lived? 1   In the last 12 months, was there a time when you did not have a steady place to sleep or slept in a shelter (including now)? N   Transportation Needs   In the past 12 months, has lack of transportation kept you from medical appointments or from getting medications? no   In the past 12 months, has lack of transportation kept you from meetings, work, or from getting things needed for daily living? No     Pt with CLL was admitted for Venetoclax ramp up and TLS monitoring.  He will return to home, where he live with his wife, when ready for discharge.  He is independent and doesn't require any DME or home care services.  He has a midline catheter.  MD is Dr. Ev Luis.  Will continue to follow for any home going needs that may arise.  Rose Gay RN, TCC

## 2024-06-12 LAB
ALBUMIN SERPL BCP-MCNC: 3.1 G/DL (ref 3.4–5)
ALBUMIN SERPL BCP-MCNC: 3.4 G/DL (ref 3.4–5)
ANION GAP SERPL CALC-SCNC: 13 MMOL/L (ref 10–20)
ANION GAP SERPL CALC-SCNC: 16 MMOL/L (ref 10–20)
BASOPHILS # BLD AUTO: 0.02 X10*3/UL (ref 0–0.1)
BASOPHILS NFR BLD AUTO: 0 %
BUN SERPL-MCNC: 17 MG/DL (ref 6–23)
BUN SERPL-MCNC: 24 MG/DL (ref 6–23)
BURR CELLS BLD QL SMEAR: NORMAL
CALCIUM SERPL-MCNC: 7.9 MG/DL (ref 8.6–10.6)
CALCIUM SERPL-MCNC: 8.2 MG/DL (ref 8.6–10.6)
CHLORIDE SERPL-SCNC: 101 MMOL/L (ref 98–107)
CHLORIDE SERPL-SCNC: 101 MMOL/L (ref 98–107)
CO2 SERPL-SCNC: 23 MMOL/L (ref 21–32)
CO2 SERPL-SCNC: 24 MMOL/L (ref 21–32)
CREAT SERPL-MCNC: 0.66 MG/DL (ref 0.5–1.3)
CREAT SERPL-MCNC: 0.77 MG/DL (ref 0.5–1.3)
EGFRCR SERPLBLD CKD-EPI 2021: >90 ML/MIN/1.73M*2
EGFRCR SERPLBLD CKD-EPI 2021: >90 ML/MIN/1.73M*2
EOSINOPHIL # BLD AUTO: 0 X10*3/UL (ref 0–0.7)
EOSINOPHIL NFR BLD AUTO: 0 %
ERYTHROCYTE [DISTWIDTH] IN BLOOD BY AUTOMATED COUNT: 15.3 % (ref 11.5–14.5)
G6PD RBC QL: NORMAL
GLUCOSE BLD MANUAL STRIP-MCNC: 221 MG/DL (ref 74–99)
GLUCOSE BLD MANUAL STRIP-MCNC: 257 MG/DL (ref 74–99)
GLUCOSE SERPL-MCNC: 227 MG/DL (ref 74–99)
GLUCOSE SERPL-MCNC: 240 MG/DL (ref 74–99)
HCT VFR BLD AUTO: 38.4 % (ref 41–52)
HGB BLD-MCNC: 12.1 G/DL (ref 13.5–17.5)
IMM GRANULOCYTES # BLD AUTO: 0.27 X10*3/UL (ref 0–0.7)
IMM GRANULOCYTES NFR BLD AUTO: 0.3 % (ref 0–0.9)
LDH SERPL L TO P-CCNC: 121 U/L (ref 84–246)
LYMPHOCYTES # BLD AUTO: 71.21 X10*3/UL (ref 1.2–4.8)
LYMPHOCYTES NFR BLD AUTO: 86.1 %
MAGNESIUM SERPL-MCNC: 1.94 MG/DL (ref 1.6–2.4)
MCH RBC QN AUTO: 27.1 PG (ref 26–34)
MCHC RBC AUTO-ENTMCNC: 31.5 G/DL (ref 32–36)
MCV RBC AUTO: 86 FL (ref 80–100)
MONOCYTES # BLD AUTO: 0.68 X10*3/UL (ref 0.1–1)
MONOCYTES NFR BLD AUTO: 0.8 %
NEUTROPHILS # BLD AUTO: 10.55 X10*3/UL (ref 1.2–7.7)
NEUTROPHILS NFR BLD AUTO: 12.8 %
NRBC BLD-RTO: 0 /100 WBCS (ref 0–0)
PHOSPHATE SERPL-MCNC: 3.6 MG/DL (ref 2.5–4.9)
PHOSPHATE SERPL-MCNC: 4.2 MG/DL (ref 2.5–4.9)
PLATELET # BLD AUTO: 105 X10*3/UL (ref 150–450)
POTASSIUM SERPL-SCNC: 3.6 MMOL/L (ref 3.5–5.3)
POTASSIUM SERPL-SCNC: 3.9 MMOL/L (ref 3.5–5.3)
RBC # BLD AUTO: 4.46 X10*6/UL (ref 4.5–5.9)
RBC MORPH BLD: NORMAL
SODIUM SERPL-SCNC: 133 MMOL/L (ref 136–145)
SODIUM SERPL-SCNC: 137 MMOL/L (ref 136–145)
URATE SERPL-MCNC: 3.2 MG/DL (ref 4–7.5)
WBC # BLD AUTO: 82.7 X10*3/UL (ref 4.4–11.3)

## 2024-06-12 PROCEDURE — 85025 COMPLETE CBC W/AUTO DIFF WBC: CPT | Performed by: NURSE PRACTITIONER

## 2024-06-12 PROCEDURE — 2500000004 HC RX 250 GENERAL PHARMACY W/ HCPCS (ALT 636 FOR OP/ED): Performed by: NURSE PRACTITIONER

## 2024-06-12 PROCEDURE — 2500000001 HC RX 250 WO HCPCS SELF ADMINISTERED DRUGS (ALT 637 FOR MEDICARE OP): Performed by: NURSE PRACTITIONER

## 2024-06-12 PROCEDURE — 2500000001 HC RX 250 WO HCPCS SELF ADMINISTERED DRUGS (ALT 637 FOR MEDICARE OP): Performed by: INTERNAL MEDICINE

## 2024-06-12 PROCEDURE — 2500000004 HC RX 250 GENERAL PHARMACY W/ HCPCS (ALT 636 FOR OP/ED): Performed by: PHYSICIAN ASSISTANT

## 2024-06-12 PROCEDURE — 80069 RENAL FUNCTION PANEL: CPT | Performed by: NURSE PRACTITIONER

## 2024-06-12 PROCEDURE — 2500000002 HC RX 250 W HCPCS SELF ADMINISTERED DRUGS (ALT 637 FOR MEDICARE OP, ALT 636 FOR OP/ED)

## 2024-06-12 PROCEDURE — 99233 SBSQ HOSP IP/OBS HIGH 50: CPT | Performed by: STUDENT IN AN ORGANIZED HEALTH CARE EDUCATION/TRAINING PROGRAM

## 2024-06-12 PROCEDURE — 82947 ASSAY GLUCOSE BLOOD QUANT: CPT | Mod: 91

## 2024-06-12 PROCEDURE — 1200000003 HC ONCOLOGY  ROOM WITH TELEMETRY DAILY

## 2024-06-12 PROCEDURE — 2500000005 HC RX 250 GENERAL PHARMACY W/O HCPCS: Performed by: PHYSICIAN ASSISTANT

## 2024-06-12 PROCEDURE — 2500000001 HC RX 250 WO HCPCS SELF ADMINISTERED DRUGS (ALT 637 FOR MEDICARE OP): Performed by: PHYSICIAN ASSISTANT

## 2024-06-12 PROCEDURE — 2500000004 HC RX 250 GENERAL PHARMACY W/ HCPCS (ALT 636 FOR OP/ED): Performed by: INTERNAL MEDICINE

## 2024-06-12 PROCEDURE — 83735 ASSAY OF MAGNESIUM: CPT | Performed by: NURSE PRACTITIONER

## 2024-06-12 PROCEDURE — 84550 ASSAY OF BLOOD/URIC ACID: CPT | Performed by: NURSE PRACTITIONER

## 2024-06-12 PROCEDURE — 83615 LACTATE (LD) (LDH) ENZYME: CPT | Performed by: NURSE PRACTITIONER

## 2024-06-12 PROCEDURE — 80069 RENAL FUNCTION PANEL: CPT | Mod: 91 | Performed by: NURSE PRACTITIONER

## 2024-06-12 PROCEDURE — 2500000002 HC RX 250 W HCPCS SELF ADMINISTERED DRUGS (ALT 637 FOR MEDICARE OP, ALT 636 FOR OP/ED): Performed by: PHYSICIAN ASSISTANT

## 2024-06-12 RX ORDER — FUROSEMIDE 10 MG/ML
40 INJECTION INTRAMUSCULAR; INTRAVENOUS ONCE
Status: COMPLETED | OUTPATIENT
Start: 2024-06-12 | End: 2024-06-12

## 2024-06-12 RX ORDER — DEXTROSE 50 % IN WATER (D50W) INTRAVENOUS SYRINGE
25
Status: DISCONTINUED | OUTPATIENT
Start: 2024-06-12 | End: 2024-06-13 | Stop reason: HOSPADM

## 2024-06-12 RX ORDER — INSULIN LISPRO 100 [IU]/ML
0-10 INJECTION, SOLUTION INTRAVENOUS; SUBCUTANEOUS
Status: DISCONTINUED | OUTPATIENT
Start: 2024-06-12 | End: 2024-06-13 | Stop reason: HOSPADM

## 2024-06-12 RX ORDER — NYSTATIN 100000 [USP'U]/ML
5 SUSPENSION ORAL 4 TIMES DAILY
Qty: 200 ML | Refills: 0 | Status: SHIPPED | OUTPATIENT
Start: 2024-06-12 | End: 2024-06-13

## 2024-06-12 RX ORDER — ACYCLOVIR 400 MG/1
400 TABLET ORAL 2 TIMES DAILY
Qty: 14 TABLET | Refills: 0 | Status: SHIPPED | OUTPATIENT
Start: 2024-06-12 | End: 2024-06-13

## 2024-06-12 RX ORDER — DEXTROSE 50 % IN WATER (D50W) INTRAVENOUS SYRINGE
12.5
Status: DISCONTINUED | OUTPATIENT
Start: 2024-06-12 | End: 2024-06-13 | Stop reason: HOSPADM

## 2024-06-12 RX ORDER — NYSTATIN 100000 [USP'U]/ML
5 SUSPENSION ORAL 4 TIMES DAILY
Status: DISCONTINUED | OUTPATIENT
Start: 2024-06-12 | End: 2024-06-13 | Stop reason: HOSPADM

## 2024-06-12 RX ORDER — INSULIN LISPRO 100 [IU]/ML
3 INJECTION, SOLUTION INTRAVENOUS; SUBCUTANEOUS ONCE
Status: COMPLETED | OUTPATIENT
Start: 2024-06-12 | End: 2024-06-12

## 2024-06-12 RX ADMIN — ACYCLOVIR 400 MG: 400 TABLET ORAL at 21:06

## 2024-06-12 RX ADMIN — DIPHENHYDRAMINE HYDROCHLORIDE AND LIDOCAINE HYDROCHLORIDE AND ALUMINUM HYDROXIDE AND MAGNESIUM HYDRO 10 ML: KIT at 12:11

## 2024-06-12 RX ADMIN — PANTOPRAZOLE SODIUM 40 MG: 40 TABLET, DELAYED RELEASE ORAL at 09:34

## 2024-06-12 RX ADMIN — NYSTATIN 500000 UNITS: 100000 SUSPENSION ORAL at 14:43

## 2024-06-12 RX ADMIN — FUROSEMIDE 40 MG: 10 INJECTION, SOLUTION INTRAMUSCULAR; INTRAVENOUS at 12:11

## 2024-06-12 RX ADMIN — DIPHENHYDRAMINE HYDROCHLORIDE AND LIDOCAINE HYDROCHLORIDE AND ALUMINUM HYDROXIDE AND MAGNESIUM HYDRO 10 ML: KIT at 23:33

## 2024-06-12 RX ADMIN — DIPHENHYDRAMINE HYDROCHLORIDE AND LIDOCAINE HYDROCHLORIDE AND ALUMINUM HYDROXIDE AND MAGNESIUM HYDRO 10 ML: KIT at 16:57

## 2024-06-12 RX ADMIN — INSULIN LISPRO 4 UNITS: 100 INJECTION, SOLUTION INTRAVENOUS; SUBCUTANEOUS at 17:55

## 2024-06-12 RX ADMIN — ALLOPURINOL 300 MG: 300 TABLET ORAL at 21:06

## 2024-06-12 RX ADMIN — DEXAMETHASONE 4 MG: 4 TABLET ORAL at 09:34

## 2024-06-12 RX ADMIN — INSULIN LISPRO 3 UNITS: 100 INJECTION, SOLUTION INTRAVENOUS; SUBCUTANEOUS at 22:48

## 2024-06-12 RX ADMIN — VENETOCLAX 20 MG: 10 TABLET, FILM COATED ORAL at 17:55

## 2024-06-12 RX ADMIN — NYSTATIN 500000 UNITS: 100000 SUSPENSION ORAL at 17:55

## 2024-06-12 RX ADMIN — ACYCLOVIR 400 MG: 400 TABLET ORAL at 09:34

## 2024-06-12 RX ADMIN — DEXAMETHASONE 4 MG: 4 TABLET ORAL at 16:57

## 2024-06-12 RX ADMIN — AMLODIPINE BESYLATE 5 MG: 5 TABLET ORAL at 09:34

## 2024-06-12 RX ADMIN — Medication 1 TABLET: at 09:34

## 2024-06-12 RX ADMIN — ASPIRIN 81 MG: 81 TABLET, COATED ORAL at 21:06

## 2024-06-12 RX ADMIN — SODIUM CHLORIDE 150 ML/HR: 9 INJECTION, SOLUTION INTRAVENOUS at 14:43

## 2024-06-12 RX ADMIN — SODIUM CHLORIDE 150 ML/HR: 9 INJECTION, SOLUTION INTRAVENOUS at 01:31

## 2024-06-12 ASSESSMENT — COGNITIVE AND FUNCTIONAL STATUS - GENERAL
MOBILITY SCORE: 24
MOBILITY SCORE: 24
DAILY ACTIVITIY SCORE: 24
DAILY ACTIVITIY SCORE: 24

## 2024-06-12 ASSESSMENT — PAIN - FUNCTIONAL ASSESSMENT: PAIN_FUNCTIONAL_ASSESSMENT: 0-10

## 2024-06-12 ASSESSMENT — PAIN SCALES - GENERAL
PAINLEVEL_OUTOF10: 0 - NO PAIN
PAINLEVEL_OUTOF10: 0 - NO PAIN

## 2024-06-12 NOTE — PROGRESS NOTES
Pharmacy Medication History Review    Demar Pittman is a 63 y.o. male admitted for CLL (chronic lymphocytic leukemia) (Multi). Pharmacy reviewed the patient's [id number] medications and allergies for accuracy.    The list below reflects the updated PTA list. Comments regarding how patient may be taking medications differently can be found in the Admit Orders Activity  Prior to Admission Medications   Prescriptions Last Dose Informant Patient Reported?   acetaminophen (Tylenol) 325 mg tablet   No   Sig: Take 2 tablets (650 mg) by mouth every 6 hours if needed for mild pain (1 - 3) for up to 14 days.   acyclovir (Zovirax) 400 mg tablet   No   Sig: Take 1 tablet (400 mg) by mouth 2 times a day for 7 days.   allopurinol (Zyloprim) 300 mg tablet Unknown Self No   Sig: Take 1 tablet (300 mg) by mouth once daily.   amLODIPine (Norvasc) 5 mg tablet Unknown Self No   Sig: Take 1 tablet (5 mg) by mouth once daily.   aspirin 81 mg EC tablet Unknown Self Yes   Sig: Take 1 tablet (81 mg) by mouth once daily.   dexAMETHasone (Decadron) 4 mg tablet   No   Sig: Take 1 tablet (4 mg) by mouth 2 times daily (morning and late afternoon) for 4 days. Take with food, do not take ibuprofen while you are taking this medication.   diphenhydramine/Maalox/lidocaine (Magic Mouthwash) - Compounded - Outpatient Unknown Self No   Sig: Swish and spit 10ml of mouthwash 3 times per day after meals   ibuprofen 400 mg tablet Not Taking  No   Sig: Take 1 tablet (400 mg) by mouth every 6 hours if needed for moderate pain (4 - 6) for up to 14 days.   Patient not taking: Reported on 6/12/2024   metoprolol tartrate (Lopressor) 25 mg tablet Unknown Self No   Sig: Take 1 tablet (25 mg) by mouth 2 times a day.   multivitamin with folic acid (One Daily Multivitamin) 400 mcg tablet Unknown Self Yes   Sig: Take 1 tablet by mouth once daily.   nitroglycerin (Nitrostat) 0.4 mg SL tablet Unknown Self Yes   Sig: Place 1 tablet (0.4 mg) under the tongue  every 5 minutes if needed.   ondansetron (Zofran) 8 mg tablet Unknown Self No   Sig: Take 1 tablet (8 mg) by mouth every 8 hours if needed for nausea or vomiting.   pantoprazole (ProtoNix) 40 mg EC tablet Unknown Self No   Sig: Take 1 tablet (40 mg) by mouth once daily.   potassium chloride CR (Klor-Con) 10 mEq ER tablet Unknown Self No   Sig: Take 1 tablet (10 mEq) by mouth once daily. Do not crush, chew, or split.   prochlorperazine (Compazine) 10 mg tablet Unknown Self No   Sig: Take 1 tablet (10 mg) by mouth every 6 hours if needed for nausea or vomiting.   venetoclax (Venclexta) 10 mg-50 mg- 100 mg tablet therapy pack Unknown Self No   Sig: Week 1: Take 20 mg (2 x 10 mg) by mouth once daily. Week 2: take 50 mg by mouth once daily. Week 3: take 100 mg by mouth once daily. Week 4: Take 200 mg (2 x 100 mg) by mouth once daily      Facility-Administered Medications: None        The list below reflects the updated allergy list. Please review each documented allergy for additional clarification and justification.  Allergies  Reviewed by Kar Sandoval PharmD on 6/12/2024   No Known Allergies         Patient accepts M2B at discharge. Pharmacy has been updated to [facility].    Sources used to complete the med history include out patient fill history, OARRS, and patient interview. Patient had a medication list with drug names, doses and directions.      Below are additional concerns with the patient's PTA list.  Patient is no longer taking (Atorvastatin or Ibuprofen)    Kar Sandoval PharmD  Transitions of Care Pharmacist  [facility]  Please reach out via Secure Chat for questions, or if no response call Girly St[facility] or [facility]

## 2024-06-12 NOTE — PROGRESS NOTES
"Demar Pittman is a 63 y.o. male on day 2 of admission presenting with CLL (chronic lymphocytic leukemia) (Multi).    Subjective   Afebrile. C/o persistent mucositis located on sides of his tongue. Having BM's. Denies SOB or N/V. ROS otherwise unremarkable.      Objective     Physical Exam  Constitutional:       General: He is not in acute distress.     Appearance: Normal appearance. He is not ill-appearing or toxic-appearing.   HENT:      Head: Normocephalic.      Nose: Nose normal.      Mouth/Throat:      Mouth: Mucous membranes are moist.                  Large ulcerative lesions on sides of the tongue     Pharynx: Oropharynx is clear.      Comments: Upper dentures, lower partial  Eyes:      General: No scleral icterus.     Pupils: Pupils are equal, round, and reactive to light.   Neck:      Comments: Submandibular lymphedema   Cardiovascular:      Rate and Rhythm: Normal rate and regular rhythm.      Pulses: Normal pulses.      Heart sounds: Normal heart sounds.   Pulmonary:      Effort: Pulmonary effort is normal. No respiratory distress.      Breath sounds: Normal breath sounds. No stridor. No wheezing or rhonchi.   Abdominal:      General: Bowel sounds are normal.      Palpations: Abdomen is soft.      Tenderness: There is abdominal tenderness. There is no guarding or rebound.      Hernia: No hernia is present.      Comments: Bilateral lower quadrant tenderness    Musculoskeletal:         General: Normal range of motion.      Cervical back: Normal range of motion.   Skin:     General: Skin is warm and dry.      Capillary Refill: Capillary refill takes less than 2 seconds.   Neurological:      Mental Status: He is alert and oriented to person, place, and time.   Psychiatric:         Behavior: Behavior normal.        Last Recorded Vitals  Blood pressure 117/68, pulse 58, temperature 36.2 °C (97.2 °F), temperature source Temporal, resp. rate 18, height 1.683 m (5' 6.26\"), weight 84.1 kg (185 lb 6.5 oz), SpO2 " 98%.  Intake/Output last 3 Shifts:  I/O last 3 completed shifts:  In: 2130.8 (26.1 mL/kg) [I.V.:2130.8 (26.1 mL/kg)]  Out: 250 (3.1 mL/kg) [Urine:250 (0.1 mL/kg/hr)]  Weight: 81.6 kg     Type   Results for orders placed or performed during the hospital encounter of 06/10/24 (from the past 24 hour(s))   Renal Function Panel   Result Value Ref Range    Glucose 252 (H) 74 - 99 mg/dL    Sodium 132 (L) 136 - 145 mmol/L    Potassium 5.8 (H) 3.5 - 5.3 mmol/L    Chloride 101 98 - 107 mmol/L    Bicarbonate 21 21 - 32 mmol/L    Anion Gap 16 10 - 20 mmol/L    Urea Nitrogen 18 6 - 23 mg/dL    Creatinine 0.95 0.50 - 1.30 mg/dL    eGFR 89 >60 mL/min/1.73m*2    Calcium 8.0 (L) 8.6 - 10.6 mg/dL    Phosphorus 4.2 2.5 - 4.9 mg/dL    Albumin 3.3 (L) 3.4 - 5.0 g/dL   Uric Acid   Result Value Ref Range    Uric Acid 3.6 (L) 4.0 - 7.5 mg/dL   Lactate Dehydrogenase   Result Value Ref Range     (H) 84 - 246 U/L   Renal Function Panel   Result Value Ref Range    Glucose 240 (H) 74 - 99 mg/dL    Sodium 133 (L) 136 - 145 mmol/L    Potassium 3.9 3.5 - 5.3 mmol/L    Chloride 101 98 - 107 mmol/L    Bicarbonate 23 21 - 32 mmol/L    Anion Gap 13 10 - 20 mmol/L    Urea Nitrogen 17 6 - 23 mg/dL    Creatinine 0.66 0.50 - 1.30 mg/dL    eGFR >90 >60 mL/min/1.73m*2    Calcium 7.9 (L) 8.6 - 10.6 mg/dL    Phosphorus 3.6 2.5 - 4.9 mg/dL    Albumin 3.1 (L) 3.4 - 5.0 g/dL   Uric Acid   Result Value Ref Range    Uric Acid 3.2 (L) 4.0 - 7.5 mg/dL   Lactate Dehydrogenase   Result Value Ref Range     84 - 246 U/L   CBC and Auto Differential   Result Value Ref Range    WBC 82.7 (HH) 4.4 - 11.3 x10*3/uL    nRBC 0.0 0.0 - 0.0 /100 WBCs    RBC 4.46 (L) 4.50 - 5.90 x10*6/uL    Hemoglobin 12.1 (L) 13.5 - 17.5 g/dL    Hematocrit 38.4 (L) 41.0 - 52.0 %    MCV 86 80 - 100 fL    MCH 27.1 26.0 - 34.0 pg    MCHC 31.5 (L) 32.0 - 36.0 g/dL    RDW 15.3 (H) 11.5 - 14.5 %    Platelets 105 (L) 150 - 450 x10*3/uL    Neutrophils % 12.8 40.0 - 80.0 %    Immature  Granulocytes %, Automated 0.3 0.0 - 0.9 %    Lymphocytes % 86.1 13.0 - 44.0 %    Monocytes % 0.8 2.0 - 10.0 %    Eosinophils % 0.0 0.0 - 6.0 %    Basophils % 0.0 0.0 - 2.0 %    Neutrophils Absolute 10.55 (H) 1.20 - 7.70 x10*3/uL    Immature Granulocytes Absolute, Automated 0.27 0.00 - 0.70 x10*3/uL    Lymphocytes Absolute 71.21 (H) 1.20 - 4.80 x10*3/uL    Monocytes Absolute 0.68 0.10 - 1.00 x10*3/uL    Eosinophils Absolute 0.00 0.00 - 0.70 x10*3/uL    Basophils Absolute 0.02 0.00 - 0.10 x10*3/uL   Magnesium   Result Value Ref Range    Magnesium 1.94 1.60 - 2.40 mg/dL   Morphology   Result Value Ref Range    RBC Morphology See Below     Zohaib Cells Many         Assessment/Plan   Principal Problem:    CLL (chronic lymphocytic leukemia) (Multi)    Mr. Demar Pittman is a 62 yo male with PMHx CABG x 3, A-Fib, XENA, HLD, HTN and CLL/SLL (originally dx 2/2015) with recent tongue biopsy showed CLL with tongue base swelling on dex and admitted for Venetoclax ramp up and TLS monitoring.        ONC:  SLL/CLL originally dx 2/2015  -s/p BR (8/2020)  Increasing lymphocytosis s/p COVID infx 12/2023 and PET confirmed lymphadenopathy  --Declined Ibrutinib   PET Scan 3/2024: increased uptake at base of tongue   Tongue biopsy (5/28/24): CLL/SLL. Positive for CD19, CD20, CD5, BCL2, Ki67. Negative for BCL6, Cyclin D, Sox 11, CD3, CD 10  Tongue swelling. Started on Decadron 4mg/BID orally  Plan weekly Venetoclax Ramp up. Started 6/10.   Will be readmitted for 2nd Venetoclax ramp up on 6/17.     CHEMO:  Venetoclax ramp-up   --Start Venetoclax dose pack 20mg/day x 7 days (start 6/10)     HEME:  --Monitor counts daily, fibrinogen, coags  --Admit labs negative for DIC or hypercoag state  --Transfuse if Hgb<7 and/or Plt<10 (hold Aspirin if Plt<25k)     ID:  [id number]  --Leukocytosis secondary to CLL (.7--> 82.7).      No evidence of active infection.   --Continue ACV on admit    --Plan Levofloxacin prophylaxis when neutropenic      FEN/GI:  Admit wt: 81.9 kg (6/10). Current wt: 84.1 kg (6/12)  Hydration per TLS monitoring   --PPI prophy w/protonix on admit  --Monitor electrolytes, replace as needed  --Volume overload. Given Lasix 40mg IV once on 6/12.   --Mucositis. Cont PRN BMX, added scheduled oral Nystatin  --Dietician consulted on admission     --Due to poor oral intake 2/2 tongue swelling, will add Boost high protein BID      TLS Monitoring:  BID Labs  --Continue IVF 150mL/hr  --Uric Acid on admit: 2.3   --G6PD: neg  --Allopurinol on Admit   --Tele monitoring      CARDIAC:  History of CAD s/p CABG x 3 (11/20/2019), HTN, HLD   --Continue home Amlodipine 5mg/day, Aspirin 81mg/day, Metoprolol 25mg/BID  --Home statins on hold per cardiology due to elevated ALT (184)  --Tele monitoring while monitoring for TLS   --PT consulted on admission     DISPO:  Full Code, confirmed on admit  SL Midline placed on 6/10/24  Blood Consent Signed on Admission  Primary Onc: Dr. Ev Luis  Plan to discharge to home on Friday    Patient seen, examined,, and discussed with Dr. John Reyes.      Lilliam Gilbert PA-C

## 2024-06-12 NOTE — CARE PLAN
Problem: Pain  Goal: My pain/discomfort is manageable  Outcome: Progressing     Problem: Safety  Goal: Patient will be injury free during hospitalization  Outcome: Progressing  Goal: I will remain free of falls  Outcome: Progressing     Problem: Daily Care  Goal: Daily care needs are met  Outcome: Progressing     Problem: Psychosocial Needs  Goal: Demonstrates ability to cope with hospitalization/illness  Outcome: Progressing  Goal: Collaborate with me, my family, and caregiver to identify my specific goals  Outcome: Progressing     Problem: Discharge Barriers  Goal: My discharge needs are met  Outcome: Progressing   The patient's goals for the shift include      The clinical goals for the shift include remain HDS

## 2024-06-13 ENCOUNTER — HOME INFUSION (OUTPATIENT)
Dept: INFUSION THERAPY | Age: 64
End: 2024-06-13
Payer: COMMERCIAL

## 2024-06-13 VITALS
HEIGHT: 66 IN | WEIGHT: 191.8 LBS | SYSTOLIC BLOOD PRESSURE: 130 MMHG | TEMPERATURE: 97.5 F | HEART RATE: 58 BPM | DIASTOLIC BLOOD PRESSURE: 83 MMHG | OXYGEN SATURATION: 98 % | BODY MASS INDEX: 30.82 KG/M2 | RESPIRATION RATE: 18 BRPM

## 2024-06-13 LAB
ALBUMIN SERPL BCP-MCNC: 3 G/DL (ref 3.4–5)
ALBUMIN SERPL BCP-MCNC: 3 G/DL (ref 3.4–5)
ALP SERPL-CCNC: 97 U/L (ref 33–136)
ALT SERPL W P-5'-P-CCNC: 139 U/L (ref 10–52)
ANION GAP SERPL CALC-SCNC: 13 MMOL/L (ref 10–20)
AST SERPL W P-5'-P-CCNC: 20 U/L (ref 9–39)
BASOPHILS # BLD AUTO: 0.02 X10*3/UL (ref 0–0.1)
BASOPHILS NFR BLD AUTO: 0 %
BILIRUB DIRECT SERPL-MCNC: 0.1 MG/DL (ref 0–0.3)
BILIRUB SERPL-MCNC: 0.4 MG/DL (ref 0–1.2)
BUN SERPL-MCNC: 18 MG/DL (ref 6–23)
BURR CELLS BLD QL SMEAR: NORMAL
CALCIUM SERPL-MCNC: 7.8 MG/DL (ref 8.6–10.6)
CHLORIDE SERPL-SCNC: 105 MMOL/L (ref 98–107)
CO2 SERPL-SCNC: 24 MMOL/L (ref 21–32)
CREAT SERPL-MCNC: 0.57 MG/DL (ref 0.5–1.3)
EGFRCR SERPLBLD CKD-EPI 2021: >90 ML/MIN/1.73M*2
EOSINOPHIL # BLD AUTO: 0 X10*3/UL (ref 0–0.7)
EOSINOPHIL NFR BLD AUTO: 0 %
ERYTHROCYTE [DISTWIDTH] IN BLOOD BY AUTOMATED COUNT: 15.3 % (ref 11.5–14.5)
GLUCOSE BLD MANUAL STRIP-MCNC: 124 MG/DL (ref 74–99)
GLUCOSE BLD MANUAL STRIP-MCNC: 142 MG/DL (ref 74–99)
GLUCOSE SERPL-MCNC: 177 MG/DL (ref 74–99)
HCT VFR BLD AUTO: 38.2 % (ref 41–52)
HGB BLD-MCNC: 12 G/DL (ref 13.5–17.5)
IMM GRANULOCYTES # BLD AUTO: 0.29 X10*3/UL (ref 0–0.7)
IMM GRANULOCYTES NFR BLD AUTO: 0.3 % (ref 0–0.9)
LYMPHOCYTES # BLD AUTO: 76.38 X10*3/UL (ref 1.2–4.8)
LYMPHOCYTES NFR BLD AUTO: 88.5 %
MAGNESIUM SERPL-MCNC: 1.99 MG/DL (ref 1.6–2.4)
MCH RBC QN AUTO: 27.3 PG (ref 26–34)
MCHC RBC AUTO-ENTMCNC: 31.4 G/DL (ref 32–36)
MCV RBC AUTO: 87 FL (ref 80–100)
MONOCYTES # BLD AUTO: 0.9 X10*3/UL (ref 0.1–1)
MONOCYTES NFR BLD AUTO: 1 %
NEUTROPHILS # BLD AUTO: 8.75 X10*3/UL (ref 1.2–7.7)
NEUTROPHILS NFR BLD AUTO: 10.2 %
NRBC BLD-RTO: 0 /100 WBCS (ref 0–0)
PHOSPHATE SERPL-MCNC: 3.7 MG/DL (ref 2.5–4.9)
PLATELET # BLD AUTO: 102 X10*3/UL (ref 150–450)
POTASSIUM SERPL-SCNC: 3.5 MMOL/L (ref 3.5–5.3)
PROT SERPL-MCNC: 4.2 G/DL (ref 6.4–8.2)
RBC # BLD AUTO: 4.39 X10*6/UL (ref 4.5–5.9)
RBC MORPH BLD: NORMAL
SODIUM SERPL-SCNC: 138 MMOL/L (ref 136–145)
URATE SERPL-MCNC: 3 MG/DL (ref 4–7.5)
WBC # BLD AUTO: 86.3 X10*3/UL (ref 4.4–11.3)

## 2024-06-13 PROCEDURE — 84550 ASSAY OF BLOOD/URIC ACID: CPT | Performed by: PHYSICIAN ASSISTANT

## 2024-06-13 PROCEDURE — 2500000004 HC RX 250 GENERAL PHARMACY W/ HCPCS (ALT 636 FOR OP/ED): Performed by: NURSE PRACTITIONER

## 2024-06-13 PROCEDURE — 2500000005 HC RX 250 GENERAL PHARMACY W/O HCPCS: Performed by: PHYSICIAN ASSISTANT

## 2024-06-13 PROCEDURE — 83735 ASSAY OF MAGNESIUM: CPT | Performed by: NURSE PRACTITIONER

## 2024-06-13 PROCEDURE — 84100 ASSAY OF PHOSPHORUS: CPT | Performed by: NURSE PRACTITIONER

## 2024-06-13 PROCEDURE — 85025 COMPLETE CBC W/AUTO DIFF WBC: CPT | Performed by: NURSE PRACTITIONER

## 2024-06-13 PROCEDURE — 82040 ASSAY OF SERUM ALBUMIN: CPT | Performed by: PHYSICIAN ASSISTANT

## 2024-06-13 PROCEDURE — 2500000001 HC RX 250 WO HCPCS SELF ADMINISTERED DRUGS (ALT 637 FOR MEDICARE OP): Performed by: PHYSICIAN ASSISTANT

## 2024-06-13 PROCEDURE — 82947 ASSAY GLUCOSE BLOOD QUANT: CPT

## 2024-06-13 PROCEDURE — 2500000001 HC RX 250 WO HCPCS SELF ADMINISTERED DRUGS (ALT 637 FOR MEDICARE OP): Performed by: NURSE PRACTITIONER

## 2024-06-13 PROCEDURE — 99239 HOSP IP/OBS DSCHRG MGMT >30: CPT | Performed by: STUDENT IN AN ORGANIZED HEALTH CARE EDUCATION/TRAINING PROGRAM

## 2024-06-13 RX ORDER — DEXAMETHASONE 2 MG/1
2 TABLET ORAL
Qty: 56 TABLET | Refills: 0 | Status: ON HOLD | OUTPATIENT
Start: 2024-06-13 | End: 2024-06-19

## 2024-06-13 RX ORDER — DEXAMETHASONE 4 MG/1
2 TABLET ORAL
Status: DISCONTINUED | OUTPATIENT
Start: 2024-06-13 | End: 2024-06-13 | Stop reason: HOSPADM

## 2024-06-13 RX ORDER — NYSTATIN 100000 [USP'U]/ML
5 SUSPENSION ORAL 4 TIMES DAILY
Qty: 200 ML | Refills: 0 | Status: SHIPPED | OUTPATIENT
Start: 2024-06-13 | End: 2024-06-23

## 2024-06-13 RX ORDER — ACYCLOVIR 400 MG/1
400 TABLET ORAL 2 TIMES DAILY
Qty: 60 TABLET | Refills: 1 | Status: SHIPPED | OUTPATIENT
Start: 2024-06-13 | End: 2024-07-13

## 2024-06-13 RX ADMIN — NYSTATIN 500000 UNITS: 100000 SUSPENSION ORAL at 06:35

## 2024-06-13 RX ADMIN — AMLODIPINE BESYLATE 5 MG: 5 TABLET ORAL at 08:34

## 2024-06-13 RX ADMIN — DEXAMETHASONE 4 MG: 4 TABLET ORAL at 08:34

## 2024-06-13 RX ADMIN — Medication 1 TABLET: at 08:34

## 2024-06-13 RX ADMIN — ACYCLOVIR 400 MG: 400 TABLET ORAL at 08:34

## 2024-06-13 RX ADMIN — PANTOPRAZOLE SODIUM 40 MG: 40 TABLET, DELAYED RELEASE ORAL at 08:34

## 2024-06-13 RX ADMIN — METOPROLOL TARTRATE 25 MG: 25 TABLET, FILM COATED ORAL at 09:03

## 2024-06-13 RX ADMIN — DIPHENHYDRAMINE HYDROCHLORIDE AND LIDOCAINE HYDROCHLORIDE AND ALUMINUM HYDROXIDE AND MAGNESIUM HYDRO 10 ML: KIT at 06:35

## 2024-06-13 ASSESSMENT — PAIN SCALES - GENERAL: PAINLEVEL_OUTOF10: 0 - NO PAIN

## 2024-06-13 ASSESSMENT — COGNITIVE AND FUNCTIONAL STATUS - GENERAL
DAILY ACTIVITIY SCORE: 24
MOBILITY SCORE: 24

## 2024-06-13 ASSESSMENT — ACTIVITIES OF DAILY LIVING (ADL): LACK_OF_TRANSPORTATION: NO

## 2024-06-13 ASSESSMENT — PAIN - FUNCTIONAL ASSESSMENT: PAIN_FUNCTIONAL_ASSESSMENT: 0-10

## 2024-06-13 NOTE — PROGRESS NOTES
06/13/24 1100   Discharge Planning   Living Arrangements Spouse/significant other   Support Systems Spouse/significant other   Type of Residence Private residence   Number of Stairs to Enter Residence 3   Number of Stairs Within Residence 0   Do you have animals or pets at home? No   Home or Post Acute Services In home services   Type of Home Care Services Other (Comment)   Patient expects to be discharged to: home with [facility]   Does the patient need discharge transport arranged? No   Financial Resource Strain   How hard is it for you to pay for the very basics like food, housing, medical care, and heating? Not very   Housing Stability   In the last 12 months, was there a time when you were not able to pay the mortgage or rent on time? N   In the last 12 months, how many places have you lived? 1   In the last 12 months, was there a time when you did not have a steady place to sleep or slept in a shelter (including now)? N   Transportation Needs   In the past 12 months, has lack of transportation kept you from medical appointments or from getting medications? no   In the past 12 months, has lack of transportation kept you from meetings, work, or from getting things needed for daily living? No     Pt is ready for discharge today and states he would like to keep his midline in place since he is being readmitted again on Monday.  A referral was sent to [facility] for pharmacy only.  The pt's wife is an LPN and can do the flushing independently.  Currently pending insurance check and processing by [facility].  Rose Gay RN, TCC

## 2024-06-13 NOTE — DISCHARGE SUMMARY
Discharge Diagnosis  CLL (chronic lymphocytic leukemia) (Multi)      Hospital Course  63 male with PMH of CABG x 3, Afib, XENA, HLD, HTN and CLL/SLL (originally dx Feb 2015).  Recently presented with tongue base swelling. Tongue biopsy (5/28/24) c/w CLL/SLL. Positive for CD5, CD19, CD20, BCL2, Ki67. Negative for CD3, CD10, BCL6, Cyclin D, Sox 11.  Initially treated with Decadron 4 mg BID. Admitted 6/10/24 for Venetoclax with weekly ramp up plan. Initial dose 20mg daily with plan for readmit on 6/17 for 2nd ramp up. (Patient is using his own home supply of Venetoclax). Monitoring inpatient for TLS with no evidence to date.        Hospital course complicated by: persistent raw mucositis which burns with most acidic foods. Will go home on PRN BMX and oral Nystatin. Dexamethasone decreased to 2mg twice daily at discharge.    Will go home on ACV and Allopurinol prophy      Discharged to home on 6/13  Will readmit on 6/17 for 2nd Venetoclax ramp up  Has [facility] follow up on 6/17  Has follow up visit on 6/24 with Mayelin Bailon  Patient chose to keep midline at discharge. Wife is a retired nurse and will help with line care at home.      Pertinent Physical Exam At Time of Discharge  Constitutional:       General: He is not in acute distress.     Appearance: Normal appearance. He is not ill-appearing or toxic-appearing.   HENT:      Head: Normocephalic.      Nose: Nose normal.      Mouth/Throat:      Mouth: Mucous membranes are moist.                  Large ulcerative lesions on sides of the tongue     Pharynx: Oropharynx is clear.      Comments: Upper dentures, lower partial  Eyes:      General: No scleral icterus.     Pupils: Pupils are equal, round, and reactive to light.   Neck:      Comments: Submandibular lymphedema   Cardiovascular:      Rate and Rhythm: Normal rate and regular rhythm.      Pulses: Normal pulses.      Heart sounds: Normal heart sounds.   Pulmonary:      Effort: Pulmonary effort is normal. No  respiratory distress.      Breath sounds: Normal breath sounds. No stridor. No wheezing or rhonchi.   Abdominal:      General: Bowel sounds are normal.      Palpations: Abdomen is soft.      Tenderness: Nontender. There is no guarding or rebound.      Hernia: No hernia is present.   Musculoskeletal:         General: Normal range of motion.      Cervical back: Normal range of motion.   Skin:     General: Skin is warm and dry.      Capillary Refill: Capillary refill takes less than 2 seconds.   Neurological:      Mental Status: He is alert and oriented to person, place, and time.   Psychiatric:         Behavior: Behavior normal.        Home Medications     Medication List      START taking these medications     nystatin 100,000 unit/mL suspension; Commonly known as: Mycostatin;   Swish and swallow 5 mL (500,000 Units) 4 times a day for 10 days.     CHANGE how you take these medications     dexAMETHasone 2 mg tablet; Commonly known as: Decadron; Take 1 tablet (2   mg) by mouth 2 times daily (morning and late afternoon) for 14 days.; What   changed: medication strength, how much to take, additional instructions     CONTINUE taking these medications     acyclovir 400 mg tablet; Commonly known as: Zovirax; Take 1 tablet (400   mg) by mouth 2 times a day.   allopurinol 300 mg tablet; Commonly known as: Zyloprim; Take 1 tablet   (300 mg) by mouth once daily.   amLODIPine 5 mg tablet; Commonly known as: Norvasc; Take 1 tablet (5 mg)   by mouth once daily.   aspirin 81 mg EC tablet   diphenhydramine/Maalox/lidocaine (Magic Mouthwash) - Compounded -   Outpatient; Swish and spit 10ml of mouthwash 3 times per day after meals   metoprolol tartrate 25 mg tablet; Commonly known as: Lopressor; Take 1   tablet (25 mg) by mouth 2 times a day.   nitroglycerin 0.4 mg SL tablet; Commonly known as: Nitrostat   ondansetron 8 mg tablet; Commonly known as: Zofran; Take 1 tablet (8 mg)   by mouth every 8 hours if needed for nausea or  vomiting.   One Daily Multivitamin tablet; Generic drug: multivitamin   pantoprazole 40 mg EC tablet; Commonly known as: ProtoNix; Take 1 tablet   (40 mg) by mouth once daily.   prochlorperazine 10 mg tablet; Commonly known as: Compazine; Take 1   tablet (10 mg) by mouth every 6 hours if needed for nausea or vomiting.   Venclexta Starting Pack 10 mg-50 mg- 100 mg tablet therapy pack; Generic   drug: venetoclax; Week 1: Take 20 mg (2 x 10 mg) by mouth once daily. Week   2: take 50 mg by mouth once daily. Week 3: take 100 mg by mouth once   daily. Week 4: Take 200 mg (2 x 100 mg) by mouth once daily     STOP taking these medications     acetaminophen 325 mg tablet; Commonly known as: Tylenol   ibuprofen 400 mg tablet   potassium chloride CR 10 mEq ER tablet; Commonly known as: Klor-Con       Outpatient Follow-Up  Future Appointments   Date Time Provider Department Center   6/17/2024  3:00 PM INF 05 [facility]   6/17/2024  3:00 PM [facility]   6/24/2024  3:00 PM ANNELISE Gant-CNP [id number] [facility]   8/23/2024  3:00 PM Deedee Mcclendon MD [id number] [facility]   12/2/2024 10:30 AM Donny Fox MD [id number] [facility]       Lilliam Gilbert PA-C

## 2024-06-13 NOTE — PROGRESS NOTES
Demar Pittman is a 63 y.o. patient discharged from hospital to [facility] for daily midline flushing  Pt has SL MIDLINE placed 6/10 with orders to flush per [facility] protocol    Pt care rep to maintain cath care needs for pt.  Pharmacy sending flushes thru 6/17 when patient will be readmitted

## 2024-06-17 ENCOUNTER — INFUSION (OUTPATIENT)
Dept: HEMATOLOGY/ONCOLOGY | Facility: HOSPITAL | Age: 64
End: 2024-06-17
Payer: COMMERCIAL

## 2024-06-17 ENCOUNTER — HOSPITAL ENCOUNTER (INPATIENT)
Facility: HOSPITAL | Age: 64
LOS: 2 days | Discharge: HOME | End: 2024-06-19
Attending: INTERNAL MEDICINE | Admitting: PHYSICIAN ASSISTANT
Payer: COMMERCIAL

## 2024-06-17 ENCOUNTER — OFFICE VISIT (OUTPATIENT)
Dept: HEMATOLOGY/ONCOLOGY | Facility: HOSPITAL | Age: 64
End: 2024-06-17
Payer: COMMERCIAL

## 2024-06-17 VITALS
HEART RATE: 63 BPM | SYSTOLIC BLOOD PRESSURE: 135 MMHG | WEIGHT: 179.45 LBS | RESPIRATION RATE: 16 BRPM | DIASTOLIC BLOOD PRESSURE: 77 MMHG | OXYGEN SATURATION: 98 % | TEMPERATURE: 98.1 F | BODY MASS INDEX: 28.74 KG/M2

## 2024-06-17 DIAGNOSIS — E83.51 HYPOCALCEMIA: ICD-10-CM

## 2024-06-17 DIAGNOSIS — C91.10 CLL (CHRONIC LYMPHOCYTIC LEUKEMIA) (MULTI): ICD-10-CM

## 2024-06-17 DIAGNOSIS — C91.10 CLL (CHRONIC LYMPHOCYTIC LEUKEMIA) (MULTI): Primary | ICD-10-CM

## 2024-06-17 LAB
ALBUMIN SERPL BCP-MCNC: 3.4 G/DL (ref 3.4–5)
ALBUMIN SERPL BCP-MCNC: 3.8 G/DL (ref 3.4–5)
ALP SERPL-CCNC: 120 U/L (ref 33–136)
ALT SERPL W P-5'-P-CCNC: 125 U/L (ref 10–52)
ANION GAP SERPL CALC-SCNC: 13 MMOL/L (ref 10–20)
ANION GAP SERPL CALC-SCNC: 14 MMOL/L (ref 10–20)
AST SERPL W P-5'-P-CCNC: 24 U/L (ref 9–39)
BASOPHILS # BLD AUTO: 0.01 X10*3/UL (ref 0–0.1)
BASOPHILS NFR BLD AUTO: 0 %
BILIRUB SERPL-MCNC: 0.6 MG/DL (ref 0–1.2)
BUN SERPL-MCNC: 18 MG/DL (ref 6–23)
BUN SERPL-MCNC: 19 MG/DL (ref 6–23)
BURR CELLS BLD QL SMEAR: NORMAL
CALCIUM SERPL-MCNC: 8.2 MG/DL (ref 8.6–10.3)
CALCIUM SERPL-MCNC: 8.3 MG/DL (ref 8.6–10.6)
CHLORIDE SERPL-SCNC: 100 MMOL/L (ref 98–107)
CHLORIDE SERPL-SCNC: 99 MMOL/L (ref 98–107)
CO2 SERPL-SCNC: 25 MMOL/L (ref 21–32)
CO2 SERPL-SCNC: 26 MMOL/L (ref 21–32)
CREAT SERPL-MCNC: 0.65 MG/DL (ref 0.5–1.3)
CREAT SERPL-MCNC: 0.66 MG/DL (ref 0.5–1.3)
EGFRCR SERPLBLD CKD-EPI 2021: >90 ML/MIN/1.73M*2
EGFRCR SERPLBLD CKD-EPI 2021: >90 ML/MIN/1.73M*2
EOSINOPHIL # BLD AUTO: 0.03 X10*3/UL (ref 0–0.7)
EOSINOPHIL NFR BLD AUTO: 0 %
ERYTHROCYTE [DISTWIDTH] IN BLOOD BY AUTOMATED COUNT: 17.1 % (ref 11.5–14.5)
GLUCOSE SERPL-MCNC: 204 MG/DL (ref 74–99)
GLUCOSE SERPL-MCNC: 235 MG/DL (ref 74–99)
HCT VFR BLD AUTO: 44.2 % (ref 41–52)
HGB BLD-MCNC: 14.4 G/DL (ref 13.5–17.5)
IMM GRANULOCYTES # BLD AUTO: 0.47 X10*3/UL (ref 0–0.7)
IMM GRANULOCYTES NFR BLD AUTO: 0.4 % (ref 0–0.9)
LDH SERPL L TO P-CCNC: 133 U/L (ref 84–246)
LDH SERPL L TO P-CCNC: 155 U/L (ref 84–246)
LYMPHOCYTES # BLD AUTO: 112.9 X10*3/UL (ref 1.2–4.8)
LYMPHOCYTES NFR BLD AUTO: 89.9 %
MAGNESIUM SERPL-MCNC: 2.09 MG/DL (ref 1.6–2.4)
MCH RBC QN AUTO: 27.5 PG (ref 26–34)
MCHC RBC AUTO-ENTMCNC: 32.6 G/DL (ref 32–36)
MCV RBC AUTO: 85 FL (ref 80–100)
MONOCYTES # BLD AUTO: 1.33 X10*3/UL (ref 0.1–1)
MONOCYTES NFR BLD AUTO: 1.1 %
NEUTROPHILS # BLD AUTO: 10.78 X10*3/UL (ref 1.2–7.7)
NEUTROPHILS NFR BLD AUTO: 8.6 %
NRBC BLD-RTO: 0 /100 WBCS (ref 0–0)
OVALOCYTES BLD QL SMEAR: NORMAL
PHOSPHATE SERPL-MCNC: 3.6 MG/DL (ref 2.5–4.9)
PHOSPHATE SERPL-MCNC: 3.7 MG/DL (ref 2.5–4.9)
PLATELET # BLD AUTO: 116 X10*3/UL (ref 150–450)
POTASSIUM SERPL-SCNC: 3.4 MMOL/L (ref 3.5–5.3)
POTASSIUM SERPL-SCNC: 3.4 MMOL/L (ref 3.5–5.3)
PROT SERPL-MCNC: 5.5 G/DL (ref 6.4–8.2)
RBC # BLD AUTO: 5.23 X10*6/UL (ref 4.5–5.9)
RBC MORPH BLD: NORMAL
ROULEAUX BLD QL SMEAR: PRESENT
SODIUM SERPL-SCNC: 135 MMOL/L (ref 136–145)
SODIUM SERPL-SCNC: 136 MMOL/L (ref 136–145)
URATE SERPL-MCNC: 2.9 MG/DL (ref 4–7.5)
URATE SERPL-MCNC: 2.9 MG/DL (ref 4–7.5)
WBC # BLD AUTO: 125.5 X10*3/UL (ref 4.4–11.3)

## 2024-06-17 PROCEDURE — 3061F NEG MICROALBUMINURIA REV: CPT | Performed by: PHYSICIAN ASSISTANT

## 2024-06-17 PROCEDURE — XW0DXR5 INTRODUCTION OF VENETOCLAX ANTINEOPLASTIC INTO MOUTH AND PHARYNX, EXTERNAL APPROACH, NEW TECHNOLOGY GROUP 5: ICD-10-PCS | Performed by: INTERNAL MEDICINE

## 2024-06-17 PROCEDURE — 1123F ACP DISCUSS/DSCN MKR DOCD: CPT | Performed by: PHYSICIAN ASSISTANT

## 2024-06-17 PROCEDURE — 3078F DIAST BP <80 MM HG: CPT | Performed by: PHYSICIAN ASSISTANT

## 2024-06-17 PROCEDURE — 1111F DSCHRG MED/CURRENT MED MERGE: CPT | Performed by: PHYSICIAN ASSISTANT

## 2024-06-17 PROCEDURE — 84550 ASSAY OF BLOOD/URIC ACID: CPT

## 2024-06-17 PROCEDURE — 2500000001 HC RX 250 WO HCPCS SELF ADMINISTERED DRUGS (ALT 637 FOR MEDICARE OP): Performed by: PHYSICIAN ASSISTANT

## 2024-06-17 PROCEDURE — 83735 ASSAY OF MAGNESIUM: CPT

## 2024-06-17 PROCEDURE — 3048F LDL-C <100 MG/DL: CPT | Performed by: PHYSICIAN ASSISTANT

## 2024-06-17 PROCEDURE — 2500000004 HC RX 250 GENERAL PHARMACY W/ HCPCS (ALT 636 FOR OP/ED): Performed by: PHYSICIAN ASSISTANT

## 2024-06-17 PROCEDURE — 84100 ASSAY OF PHOSPHORUS: CPT

## 2024-06-17 PROCEDURE — 1126F AMNT PAIN NOTED NONE PRSNT: CPT | Performed by: PHYSICIAN ASSISTANT

## 2024-06-17 PROCEDURE — 83615 LACTATE (LD) (LDH) ENZYME: CPT

## 2024-06-17 PROCEDURE — 99214 OFFICE O/P EST MOD 30 MIN: CPT | Performed by: PHYSICIAN ASSISTANT

## 2024-06-17 PROCEDURE — 3008F BODY MASS INDEX DOCD: CPT | Performed by: PHYSICIAN ASSISTANT

## 2024-06-17 PROCEDURE — 2500000002 HC RX 250 W HCPCS SELF ADMINISTERED DRUGS (ALT 637 FOR MEDICARE OP, ALT 636 FOR OP/ED): Performed by: PHYSICIAN ASSISTANT

## 2024-06-17 PROCEDURE — 85025 COMPLETE CBC W/AUTO DIFF WBC: CPT

## 2024-06-17 PROCEDURE — 2500000001 HC RX 250 WO HCPCS SELF ADMINISTERED DRUGS (ALT 637 FOR MEDICARE OP)

## 2024-06-17 PROCEDURE — 1200000003 HC ONCOLOGY  ROOM WITH TELEMETRY DAILY

## 2024-06-17 PROCEDURE — 80053 COMPREHEN METABOLIC PANEL: CPT

## 2024-06-17 PROCEDURE — 80069 RENAL FUNCTION PANEL: CPT | Mod: CCI | Performed by: INTERNAL MEDICINE

## 2024-06-17 PROCEDURE — 3075F SYST BP GE 130 - 139MM HG: CPT | Performed by: PHYSICIAN ASSISTANT

## 2024-06-17 PROCEDURE — 36591 DRAW BLOOD OFF VENOUS DEVICE: CPT

## 2024-06-17 PROCEDURE — 3044F HG A1C LEVEL LT 7.0%: CPT | Performed by: PHYSICIAN ASSISTANT

## 2024-06-17 RX ORDER — DOCUSATE SODIUM 100 MG/1
100 CAPSULE, LIQUID FILLED ORAL 2 TIMES DAILY
Status: DISCONTINUED | OUTPATIENT
Start: 2024-06-17 | End: 2024-06-19 | Stop reason: HOSPADM

## 2024-06-17 RX ORDER — SODIUM CHLORIDE 9 MG/ML
100 INJECTION, SOLUTION INTRAVENOUS CONTINUOUS
Status: DISCONTINUED | OUTPATIENT
Start: 2024-06-17 | End: 2024-06-18

## 2024-06-17 RX ORDER — HEPARIN 100 UNIT/ML
500 SYRINGE INTRAVENOUS AS NEEDED
Status: DISCONTINUED | OUTPATIENT
Start: 2024-06-17 | End: 2024-06-17 | Stop reason: HOSPADM

## 2024-06-17 RX ORDER — PANTOPRAZOLE SODIUM 40 MG/1
40 TABLET, DELAYED RELEASE ORAL DAILY
Status: DISCONTINUED | OUTPATIENT
Start: 2024-06-17 | End: 2024-06-19 | Stop reason: HOSPADM

## 2024-06-17 RX ORDER — TALC
3 POWDER (GRAM) TOPICAL NIGHTLY PRN
Status: DISCONTINUED | OUTPATIENT
Start: 2024-06-17 | End: 2024-06-19 | Stop reason: HOSPADM

## 2024-06-17 RX ORDER — HEPARIN SODIUM,PORCINE/PF 10 UNIT/ML
50 SYRINGE (ML) INTRAVENOUS AS NEEDED
Status: DISCONTINUED | OUTPATIENT
Start: 2024-06-17 | End: 2024-06-17 | Stop reason: HOSPADM

## 2024-06-17 RX ORDER — AMLODIPINE BESYLATE 5 MG/1
5 TABLET ORAL DAILY
Status: DISCONTINUED | OUTPATIENT
Start: 2024-06-17 | End: 2024-06-19 | Stop reason: HOSPADM

## 2024-06-17 RX ORDER — POTASSIUM CHLORIDE 1.5 G/1.58G
40 POWDER, FOR SOLUTION ORAL ONCE
Status: COMPLETED | OUTPATIENT
Start: 2024-06-17 | End: 2024-06-17

## 2024-06-17 RX ORDER — NYSTATIN 100000 [USP'U]/ML
5 SUSPENSION ORAL 4 TIMES DAILY
Status: DISCONTINUED | OUTPATIENT
Start: 2024-06-17 | End: 2024-06-19 | Stop reason: HOSPADM

## 2024-06-17 RX ORDER — ACYCLOVIR 400 MG/1
400 TABLET ORAL 2 TIMES DAILY
Status: DISCONTINUED | OUTPATIENT
Start: 2024-06-17 | End: 2024-06-19 | Stop reason: HOSPADM

## 2024-06-17 RX ORDER — DEXAMETHASONE 4 MG/1
2 TABLET ORAL
Status: DISCONTINUED | OUTPATIENT
Start: 2024-06-17 | End: 2024-06-19 | Stop reason: HOSPADM

## 2024-06-17 RX ORDER — PROCHLORPERAZINE MALEATE 10 MG
10 TABLET ORAL EVERY 6 HOURS PRN
Status: DISCONTINUED | OUTPATIENT
Start: 2024-06-17 | End: 2024-06-19 | Stop reason: HOSPADM

## 2024-06-17 RX ORDER — ASPIRIN 81 MG/1
81 TABLET ORAL DAILY
Status: DISCONTINUED | OUTPATIENT
Start: 2024-06-17 | End: 2024-06-18

## 2024-06-17 RX ORDER — HEPARIN 100 UNIT/ML
500 SYRINGE INTRAVENOUS AS NEEDED
OUTPATIENT
Start: 2024-06-17

## 2024-06-17 RX ORDER — METOPROLOL TARTRATE 25 MG/1
25 TABLET, FILM COATED ORAL 2 TIMES DAILY
Status: DISCONTINUED | OUTPATIENT
Start: 2024-06-17 | End: 2024-06-19 | Stop reason: HOSPADM

## 2024-06-17 RX ORDER — NITROGLYCERIN 0.4 MG/1
0.4 TABLET SUBLINGUAL EVERY 5 MIN PRN
Status: DISCONTINUED | OUTPATIENT
Start: 2024-06-17 | End: 2024-06-19 | Stop reason: HOSPADM

## 2024-06-17 RX ORDER — HEPARIN SODIUM,PORCINE/PF 10 UNIT/ML
50 SYRINGE (ML) INTRAVENOUS AS NEEDED
OUTPATIENT
Start: 2024-06-17

## 2024-06-17 RX ORDER — POTASSIUM CHLORIDE 20 MEQ/1
40 TABLET, EXTENDED RELEASE ORAL ONCE
Status: COMPLETED | OUTPATIENT
Start: 2024-06-17 | End: 2024-06-17

## 2024-06-17 RX ORDER — MULTIVIT-MIN/IRON FUM/FOLIC AC 7.5 MG-4
1 TABLET ORAL DAILY
Status: DISCONTINUED | OUTPATIENT
Start: 2024-06-17 | End: 2024-06-19 | Stop reason: HOSPADM

## 2024-06-17 RX ORDER — ONDANSETRON HYDROCHLORIDE 8 MG/1
8 TABLET, FILM COATED ORAL EVERY 8 HOURS PRN
Status: DISCONTINUED | OUTPATIENT
Start: 2024-06-17 | End: 2024-06-19 | Stop reason: HOSPADM

## 2024-06-17 RX ORDER — ALLOPURINOL 300 MG/1
300 TABLET ORAL DAILY
Status: DISCONTINUED | OUTPATIENT
Start: 2024-06-17 | End: 2024-06-19 | Stop reason: HOSPADM

## 2024-06-17 SDOH — SOCIAL STABILITY: SOCIAL INSECURITY: ABUSE: ADULT

## 2024-06-17 SDOH — SOCIAL STABILITY: SOCIAL INSECURITY: DOES ANYONE TRY TO KEEP YOU FROM HAVING/CONTACTING OTHER FRIENDS OR DOING THINGS OUTSIDE YOUR HOME?: NO

## 2024-06-17 SDOH — SOCIAL STABILITY: SOCIAL INSECURITY: HAS ANYONE EVER THREATENED TO HURT YOUR FAMILY OR YOUR PETS?: NO

## 2024-06-17 SDOH — SOCIAL STABILITY: SOCIAL INSECURITY: ARE THERE ANY APPARENT SIGNS OF INJURIES/BEHAVIORS THAT COULD BE RELATED TO ABUSE/NEGLECT?: NO

## 2024-06-17 SDOH — SOCIAL STABILITY: SOCIAL INSECURITY: DO YOU FEEL UNSAFE GOING BACK TO THE PLACE WHERE YOU ARE LIVING?: NO

## 2024-06-17 SDOH — SOCIAL STABILITY: SOCIAL INSECURITY: ARE YOU OR HAVE YOU BEEN THREATENED OR ABUSED PHYSICALLY, EMOTIONALLY, OR SEXUALLY BY ANYONE?: NO

## 2024-06-17 SDOH — SOCIAL STABILITY: SOCIAL INSECURITY: WERE YOU ABLE TO COMPLETE ALL THE BEHAVIORAL HEALTH SCREENINGS?: YES

## 2024-06-17 SDOH — SOCIAL STABILITY: SOCIAL INSECURITY: DO YOU FEEL ANYONE HAS EXPLOITED OR TAKEN ADVANTAGE OF YOU FINANCIALLY OR OF YOUR PERSONAL PROPERTY?: NO

## 2024-06-17 SDOH — SOCIAL STABILITY: SOCIAL INSECURITY: HAVE YOU HAD THOUGHTS OF HARMING ANYONE ELSE?: NO

## 2024-06-17 SDOH — SOCIAL STABILITY: SOCIAL INSECURITY: HAVE YOU HAD ANY THOUGHTS OF HARMING ANYONE ELSE?: NO

## 2024-06-17 ASSESSMENT — COGNITIVE AND FUNCTIONAL STATUS - GENERAL
PATIENT BASELINE BEDBOUND: NO
MOBILITY SCORE: 24
DAILY ACTIVITIY SCORE: 24

## 2024-06-17 ASSESSMENT — ENCOUNTER SYMPTOMS
COUGH: 0
ABDOMINAL PAIN: 0
APPETITE CHANGE: 0
CONSTIPATION: 0
DIARRHEA: 0
DIZZINESS: 0
HEMATURIA: 0
FATIGUE: 0
SHORTNESS OF BREATH: 0
DYSURIA: 0
HEADACHES: 0

## 2024-06-17 ASSESSMENT — ACTIVITIES OF DAILY LIVING (ADL)
WALKS IN HOME: INDEPENDENT
TOILETING: INDEPENDENT
BATHING: INDEPENDENT
PATIENT'S MEMORY ADEQUATE TO SAFELY COMPLETE DAILY ACTIVITIES?: YES
GROOMING: INDEPENDENT
ADEQUATE_TO_COMPLETE_ADL: YES
HEARING - RIGHT EAR: FUNCTIONAL
FEEDING YOURSELF: INDEPENDENT
DRESSING YOURSELF: INDEPENDENT
HEARING - LEFT EAR: FUNCTIONAL
LACK_OF_TRANSPORTATION: NO
JUDGMENT_ADEQUATE_SAFELY_COMPLETE_DAILY_ACTIVITIES: YES

## 2024-06-17 ASSESSMENT — LIFESTYLE VARIABLES
AUDIT-C TOTAL SCORE: 0
AUDIT-C TOTAL SCORE: 0
SKIP TO QUESTIONS 9-10: 1
HOW OFTEN DO YOU HAVE A DRINK CONTAINING ALCOHOL: NEVER
HOW OFTEN DO YOU HAVE 6 OR MORE DRINKS ON ONE OCCASION: NEVER
HOW MANY STANDARD DRINKS CONTAINING ALCOHOL DO YOU HAVE ON A TYPICAL DAY: PATIENT DOES NOT DRINK

## 2024-06-17 ASSESSMENT — PATIENT HEALTH QUESTIONNAIRE - PHQ9
2. FEELING DOWN, DEPRESSED OR HOPELESS: NOT AT ALL
SUM OF ALL RESPONSES TO PHQ9 QUESTIONS 1 & 2: 0
1. LITTLE INTEREST OR PLEASURE IN DOING THINGS: NOT AT ALL

## 2024-06-17 ASSESSMENT — PAIN SCALES - GENERAL: PAINLEVEL: 0-NO PAIN

## 2024-06-17 NOTE — PROGRESS NOTES
"Patient ID: Demar Pittman \"Shraddha" is a 63 y.o. male.    Subjective    HPI  Presents today for visit in [facility], accompanied by his wife.      Pt states that he is doing well. He is starting to notice that his mouth ulcers are improving with the BMX and Nystatin swish & swallow. He is tolerating foods and liquids much better at this time and able to wear his dentures for longer periods of time. He states he has no significant side effects from the Venetoclax.   Denies N/V/D/C, fevers/chills, bleeding, skin changes, and other signs of infection.     Review of Systems   Constitutional:  Negative for appetite change and fatigue.   HENT:   Positive for mouth sores (improving).    Respiratory:  Negative for cough and shortness of breath.    Cardiovascular:  Negative for chest pain.   Gastrointestinal:  Negative for abdominal pain, constipation and diarrhea.   Genitourinary:  Negative for dysuria and hematuria.    Skin:  Negative for rash.   Neurological:  Negative for dizziness and headaches.   All other systems reviewed and are negative.      Objective    BSA: 1.95 meters squared  /77 (BP Location: Right arm, Patient Position: Sitting)   Pulse 63   Temp 36.7 °C (98.1 °F)   Resp 16   Wt 81.4 kg (179 lb 7.3 oz)   SpO2 98%   BMI 28.74 kg/m²   Vital signs reviewed today.      Physical Exam  Constitutional:       Appearance: Normal appearance.   HENT:      Head: Normocephalic.      Nose: Nose normal.      Mouth/Throat:      Mouth: Mucous membranes are moist.      Comments: Multiple sites of mucositis on both sides of tongue that are improving per pt and wife.   Eyes:      Conjunctiva/sclera: Conjunctivae normal.   Cardiovascular:      Rate and Rhythm: Normal rate and regular rhythm.      Heart sounds: Normal heart sounds.   Pulmonary:      Effort: Pulmonary effort is normal.      Breath sounds: Normal breath sounds. No wheezing, rhonchi or rales.   Abdominal:      General: Abdomen is flat. Bowel " sounds are normal.      Palpations: Abdomen is soft. There is no mass.      Tenderness: There is no abdominal tenderness.   Musculoskeletal:         General: Normal range of motion.      Cervical back: Normal range of motion and neck supple.   Skin:     General: Skin is warm and dry.      Capillary Refill: Capillary refill takes less than 2 seconds.   Neurological:      General: No focal deficit present.      Mental Status: He is alert and oriented to person, place, and time. Mental status is at baseline.   Psychiatric:         Mood and Affect: Mood normal.         Behavior: Behavior normal.       Performance Status:  Karnofsky Score: 90 - Able to carry on normal activity; minor signs or symptoms of disease       Relevant Diagnostics:   Results for orders placed or performed in visit on 06/17/24 (from the past 24 hour(s))   CBC and Auto Differential   Result Value Ref Range    .5 (HH) 4.4 - 11.3 x10*3/uL    nRBC 0.0 0.0 - 0.0 /100 WBCs    RBC 5.23 4.50 - 5.90 x10*6/uL    Hemoglobin 14.4 13.5 - 17.5 g/dL    Hematocrit 44.2 41.0 - 52.0 %    MCV 85 80 - 100 fL    MCH 27.5 26.0 - 34.0 pg    MCHC 32.6 32.0 - 36.0 g/dL    RDW 17.1 (H) 11.5 - 14.5 %    Platelets 116 (L) 150 - 450 x10*3/uL    Neutrophils % 8.6 40.0 - 80.0 %    Immature Granulocytes %, Automated 0.4 0.0 - 0.9 %    Lymphocytes % 89.9 13.0 - 44.0 %    Monocytes % 1.1 2.0 - 10.0 %    Eosinophils % 0.0 0.0 - 6.0 %    Basophils % 0.0 0.0 - 2.0 %    Neutrophils Absolute 10.78 (H) 1.20 - 7.70 x10*3/uL    Immature Granulocytes Absolute, Automated 0.47 0.00 - 0.70 x10*3/uL    Lymphocytes Absolute 112.90 (H) 1.20 - 4.80 x10*3/uL    Monocytes Absolute 1.33 (H) 0.10 - 1.00 x10*3/uL    Eosinophils Absolute 0.03 0.00 - 0.70 x10*3/uL    Basophils Absolute 0.01 0.00 - 0.10 x10*3/uL   Comprehensive Metabolic Panel   Result Value Ref Range    Glucose 235 (H) 74 - 99 mg/dL    Sodium 136 136 - 145 mmol/L    Potassium 3.4 (L) 3.5 - 5.3 mmol/L    Chloride 100 98 - 107  mmol/L    Bicarbonate 26 21 - 32 mmol/L    Anion Gap 13 10 - 20 mmol/L    Urea Nitrogen 19 6 - 23 mg/dL    Creatinine 0.66 0.50 - 1.30 mg/dL    eGFR >90 >60 mL/min/1.73m*2    Calcium 8.2 (L) 8.6 - 10.3 mg/dL    Albumin 3.8 3.4 - 5.0 g/dL    Alkaline Phosphatase 120 33 - 136 U/L    Total Protein 5.5 (L) 6.4 - 8.2 g/dL    AST 24 9 - 39 U/L    Bilirubin, Total 0.6 0.0 - 1.2 mg/dL     (H) 10 - 52 U/L   Lactate Dehydrogenase   Result Value Ref Range     84 - 246 U/L   Uric Acid   Result Value Ref Range    Uric Acid 2.9 (L) 4.0 - 7.5 mg/dL   Morphology   Result Value Ref Range    RBC Morphology See Below     Ovalocytes Few     Zohaib Cells Many     Rouleaux Present            Assessment/Plan      Oncology History Overview Note   B-CLL diagnosed in 2015, SUÁREZ stage 0; WBC initially 16.3 with ALC 12.0, hemoglobin 15.1 and platelets 154,000.  The cells expressed CD5 and A light chains.   CD38 and  Zap-70 were negative. Cytogenetic studies by FISH were inconclusive.  WBC has slowly rising but he maintains good marrow reserve and has not required treatment.  CT chest done 3/9/17 showed extensive cervical, axillary and submental adenopathy; largest  node on the left measured 2.3 x 1.9 cm.  Small (less than 9 mm) pulmonology nodules were again seen; relatively stable.  Abdominal nodes slightly increased.    17: WBC 65.4, ALC 62.8; becoming more symptomatic.  17 : WBC 77.6.  ALC 72.2.  Hemoglobin and platelets were normal.  18 WBC: 69.3. A.9. Hgb 15.2. Plt: 148,000  18: WBC 88.2.  ALC 82.9.  Hemoglobin 14.2.  Platelets 170,000.  Adenopathy stable.  18: WBC 77.3.  ALC 66.5.  Hemoglobin 13.8.  Platelets 143,000.  Adenopathy slightly increased.  18: WBC 92.0.  ALC 86.5.  Hemoglobin 14.1.  Platelets 157,000.  Progressing symptomatic adenopathy.  Treatment options discussed.   18 completed rituximab weekly x 4.  10/9/19: CBC: WBC 16.5.  ALC 12.4.  Hemoglobin 14.6.   Platelets 148,000.  Worsening adenopathy.  3/17/20: Started bendamustine/rituximab after multiple thoracentesis for malignant pleural effusion with CLL  4/13/20: #2 bendamustine/rituximab.  5/11/20: #3 BR  6/5/20: WBC 4.7.  Hemoglobin 12.9.  Platelets 189,000.  6/8/20: #4 BR  7/6/20: #5 BR  7/31/20: WBC 1.7. hgb 11.9. Platelets 176.  8/17/20: #6/6 bendamustine/rituximab  December 29, 2023, WBC count 62, hemoglobin 14.9, platelets 143, absolute lymphocyte count 50.2  January 15, 2024, WBC count 40.5, hemoglobin 15.0, platelets 175  2/10/24 , WBC count 31.5, hemoglobin 14.9, platelets 131  3/14/24 , WBC count 13.9, hemoglobin 14.9, platelets 122  3/14/24, physical examination positive for bilateral cervical lymphadenopathy, bilateral axillary lymphadenopathy  PET imaging 3/29/2024 intense uptake in tongue base ( S/P) tonsillectomy. Multiple enlarged lymph nodes in cervical, parotid, nodes, multipl pleural based and parenchymal non FDG avid pulmonary nodes, enlarged axillary lymph nodes, mediastinal and bilateral hilar lymph nodes, periportal lymph nodes, iliac nodes, inguinal nodes.      CLL (chronic lymphocytic leukemia) (Multi)   8/16/2023 Initial Diagnosis    CLL (chronic lymphocytic leukemia) (CMS/[facility])     1/15/2024 - 1/15/2024 Chemotherapy    Bendamustine + RiTUXimab, 28 Day Cycles     6/11/2024 -  Chemotherapy    (INPT) Venetoclax, 28 Day Cycles      7/23/2024 -  Chemotherapy    Venetoclax + RiTUXimab, 28 Day Cycles          CLL (chronic lymphocytic leukemia) (Multi)  Currently on Venetoclax 20 mg, admit today (06/17/24) for next ramp up to 50 mg daily x 7 days.   No signs of active Tumor Lysis today. Plan to proceed with admission. Next ramp ups are planned to be done outpatient.     Tongue lesion  Lesions on both sides of tongue are improving with time. Unknown etiology of lesions, but predate Venetoclax. Continue to use BMX solution PRN & Nystatin Swish & Swallow 4x daily.     RTC:  - 6/17/24: Admit to  [facility] for next dose escalation  - 6/24/24: Labs and Mayelin Bailon, CNP    Aide LI PAMiguel AS    This patient was seen and evaluated together with Physician Assistant Student, JENNIFER Noriega, and I examined the patient myself and participated in all medical decision-making. The medical document is a product of collaboration between us.     Compa Lugo PA-C  [facility]

## 2024-06-17 NOTE — PROGRESS NOTES
Pt present today for count check and lab draw prior to admission.  Pt saw provider during appointment.  Pt reports no new or worsening sx to this RN.  No treatment needed in infusion.  Pt discharged to [facility] in stable condition.

## 2024-06-17 NOTE — H&P
"History Of Present Illness  Demar Pittman \"Bud\" is a 63 y.o. male with PMHx CABG x 3, A-Fib, XENA, HLD, HTN and CLL/SLL (originally dx 2/2015) with recent tongue biopsy showed CLL with tongue base swelling on dex and admitted (6/17) for week 2 Venetoclax ramp up (50 mg daily) and TLS monitoring.     Oral mucositis/b/l tongue ulcerations improved per patient.  Increased appetite.  Denies odynaphagia.    Review of Systems  Denies recent sx of Ha, fever, chills, night sweats, sinus pain, anorexia, odynophagia, CP, cough. Sob, dyspnea, abd pain, GERD, diarrhea, constipation, dysuria, bleeding, unexplained bruising, skin lesions, rashes, swelling, edema, paresthesia, & diff w balance, coordination, & gait.      Past Medical History  Past Medical History:   Diagnosis Date    Diffuse otitis externa, bilateral 10/11/2021    Chronic diffuse otitis externa of both ears    Hypertrophy of tonsils 12/05/2019    Lingual tonsil hypertrophy    Localized enlarged lymph nodes 04/30/2020    Cervical lymphadenopathy    Personal history of diseases of the skin and subcutaneous tissue 03/09/2020    History of dermatitis    Personal history of other diseases of the circulatory system     History of heart disease    Personal history of other diseases of the circulatory system     History of cardiac disorder    Personal history of other diseases of the circulatory system     History of vascular disorder    Personal history of other diseases of the circulatory system     History of hypertension    Personal history of other specified conditions 12/11/2019    History of dysphagia       Surgical History  Past Surgical History:   Procedure Laterality Date    OTHER SURGICAL HISTORY  10/24/2019    Cataract surgery    OTHER SURGICAL HISTORY  10/24/2019    Tonsillectomy    OTHER SURGICAL HISTORY  12/13/2019    Retinal detachment repair    OTHER SURGICAL HISTORY  12/29/2019    Coronary artery bypass graft        Social History  He reports that he " quit smoking about 39 years ago. His smoking use included cigarettes. He has never used smokeless tobacco. He reports that he does not currently use alcohol after a past usage of about 2.0 standard drinks of alcohol per week. He reports that he does not use drugs.    Family History  No family history on file.     Allergies  Patient has no known allergies.       Vitals:    06/17/24 1659   BP: 139/84   Patient Position: Sitting   Pulse: 56   Resp: 16   Temp: 36.3 °C (97.3 °F)   TempSrc: Temporal   SpO2: 98%   Weight: 81.3 kg (179 lb 3.7 oz)        Physical Exam  Constitutional:       Appearance: Normal appearance.   HENT:      Head: Normocephalic.      Mouth/Throat:      Mouth: Mucous membranes are moist.      Pharynx: Posterior oropharyngeal erythema present.      Comments: Superficial ulcerations along lateral tongue  Eyes:      Extraocular Movements: Extraocular movements intact.      Pupils: Pupils are equal, round, and reactive to light.   Cardiovascular:      Rate and Rhythm: Normal rate and regular rhythm.      Heart sounds: Normal heart sounds.   Pulmonary:      Effort: Pulmonary effort is normal.      Breath sounds: Normal breath sounds.   Abdominal:      General: Bowel sounds are normal.      Palpations: Abdomen is soft.   Musculoskeletal:         General: Normal range of motion.      Cervical back: Normal range of motion.   Skin:     General: Skin is warm and dry.      Comments: Well healed mid sternal surgical incision.   Neurological:      General: No focal deficit present.      Mental Status: He is alert and oriented to person, place, and time.   Psychiatric:         Mood and Affect: Mood normal.         Behavior: Behavior normal.       Results from last 7 days   Lab Units 06/17/24  1514 06/13/24  0417 06/12/24  0139   WBC AUTO x10*3/uL 125.5* 86.3* 82.7*   HEMOGLOBIN g/dL 14.4 12.0* 12.1*   HEMATOCRIT % 44.2 38.2* 38.4*   PLATELETS AUTO x10*3/uL 116* 102* 105*   NEUTROS PCT AUTO % 8.6 10.2 12.8   LYMPHS  PCT AUTO % 89.9 88.5 86.1   MONOS PCT AUTO % 1.1 1.0 0.8   EOS PCT AUTO % 0.0 0.0 0.0       Results from last 7 days   Lab Units 06/17/24  1514 06/13/24  0417 06/12/24  1705 06/11/24  1641 06/11/24  0155   SODIUM mmol/L 136 138 137   < > 136   POTASSIUM mmol/L 3.4* 3.5 3.6   < > 3.7   CHLORIDE mmol/L 100 105 101   < > 101   CO2 mmol/L 26 24 24   < > 24   BUN mg/dL 19 18 24*   < > 14   CREATININE mg/dL 0.66 0.57 0.77   < > 0.60   CALCIUM mg/dL 8.2* 7.8* 8.2*   < > 8.2*   PROTEIN TOTAL g/dL 5.5* 4.2*  --   --  4.7*   BILIRUBIN TOTAL mg/dL 0.6 0.4  --   --  0.4   ALK PHOS U/L 120 97  --   --  130   ALT U/L 125* 139*  --   --  184*   AST U/L 24 20  --   --  25   GLUCOSE mg/dL 235* 177* 227*   < > 249*    < > = values in this interval not displayed.     Results from last 7 days   Lab Units 06/13/24  0417 06/12/24  0139 06/11/24  0155   MAGNESIUM mg/dL 1.99 1.94 2.09         Assessment/Plan   Principal Problem:    CLL (chronic lymphocytic leukemia) (Multi)     Mr. Demar Pittman is a 62 yo male with PMHx CABG x 3, A-Fib, XENA, HLD, HTN and CLL/SLL (originally dx 2/2015) with recent tongue biopsy showed CLL with tongue base swelling on dex and admitted for Venetoclax ramp up and TLS monitoring         ONC:  SLL/CLL originally dx 2/2015  -s/p BR (8/2020)  Increasing lymphocytosis s/p COVID infx 12/2023 and PET confirmed lymphadenopathy  --Declined Ibrutinib   PET Scan 3/2024: increased uptake at base of tongue   Tongue biopsy 05/28/24: CLL  Tongue swelling started on Decadron 4mg/BID orally, now 2 mg bid (6/13-6/27)  Completed week 1  Venetoclax dose pack 20mg/day x 7 days (start 6/10)     CHEMO:  Venetoclax ramp-up   --Start week 2 Venetoclax 50mg/day x 7 days (start 6/17)     HEME:  --Leucocytosis, thrombocytopenia  --Monitor counts daily, fibrinogen, coags  --Transfuse if Hgb<7 and/or Plt<10 (hold Aspirin if Plt<25k)     ID:  [id number]  --Leukocytosis secondary to malignancy, No evidence of active infection on  admit  --Continue ACV on admit    --Plan Levofloxacin prophylaxis when neutropenic     FEN/GI:  Admit Wt: 81.3 kg  Hydration per TLS monitoring   --PPI prophy w/protonix on admit  --Monitor electrolytes, replace as needed  --Hypokalemia (3.4), give KCL 40 meq po x1 (6/17)     TLS Monitoring:  BID Labs  --Continue IVF 100mL/hr  --Uric Acid on admit: 2.9  --G6PD (6/10) - normal  --cont Allopurinol    --Tele monitoring         CARDIAC:  History of CAD s/p CABG x 3 (11/20/2019), HTN, HLD   --Continue home Amlodipine 5mg/day, Aspirin 81mg/day, Metoprolol 25mg/BID  --Home statins on hold per cardiology due to elevated LFTs improving   --Tele monitoring while monitoring for TLS   --PT consulted on admission     DISPO:  Full Code confirmed on admit  LUE Midline placed 6/10  Blood Consent Signed (6/10/24)  Primary Onc: Dr. Ev Luis       I spent 45 minutes in the professional and overall care of this patient.      Compa Ortiz PA-C

## 2024-06-17 NOTE — ASSESSMENT & PLAN NOTE
Currently on Venetoclax 20 mg, admit today (06/17/24) for next ramp up to 50 mg daily x 7 days.   No signs of active Tumor Lysis today. Plan to proceed with admission. Next ramp ups are planned to be done outpatient.

## 2024-06-17 NOTE — ASSESSMENT & PLAN NOTE
Lesions on both sides of tongue are improving with time. Unknown etiology of lesions, but predate Venetoclax. Continue to use BMX solution PRN & Nystatin Swish & Swallow 4x daily.

## 2024-06-18 DIAGNOSIS — C91.10 CLL (CHRONIC LYMPHOCYTIC LEUKEMIA) (MULTI): Primary | ICD-10-CM

## 2024-06-18 LAB
ALBUMIN SERPL BCP-MCNC: 3.4 G/DL (ref 3.4–5)
ALBUMIN SERPL BCP-MCNC: 3.6 G/DL (ref 3.4–5)
ANION GAP SERPL CALC-SCNC: 15 MMOL/L (ref 10–20)
ANION GAP SERPL CALC-SCNC: 16 MMOL/L (ref 10–20)
BASOPHILS # BLD AUTO: 0.34 X10*3/UL (ref 0–0.1)
BASOPHILS NFR BLD AUTO: 0.4 %
BUN SERPL-MCNC: 15 MG/DL (ref 6–23)
BUN SERPL-MCNC: 16 MG/DL (ref 6–23)
BURR CELLS BLD QL SMEAR: NORMAL
CALCIUM SERPL-MCNC: 8.1 MG/DL (ref 8.6–10.6)
CALCIUM SERPL-MCNC: 8.2 MG/DL (ref 8.6–10.6)
CHLORIDE SERPL-SCNC: 101 MMOL/L (ref 98–107)
CHLORIDE SERPL-SCNC: 99 MMOL/L (ref 98–107)
CO2 SERPL-SCNC: 23 MMOL/L (ref 21–32)
CO2 SERPL-SCNC: 24 MMOL/L (ref 21–32)
CREAT SERPL-MCNC: 0.59 MG/DL (ref 0.5–1.3)
CREAT SERPL-MCNC: 0.9 MG/DL (ref 0.5–1.3)
EGFRCR SERPLBLD CKD-EPI 2021: >90 ML/MIN/1.73M*2
EGFRCR SERPLBLD CKD-EPI 2021: >90 ML/MIN/1.73M*2
EOSINOPHIL # BLD AUTO: 0.03 X10*3/UL (ref 0–0.7)
EOSINOPHIL NFR BLD AUTO: 0 %
ERYTHROCYTE [DISTWIDTH] IN BLOOD BY AUTOMATED COUNT: 15.9 % (ref 11.5–14.5)
GLUCOSE SERPL-MCNC: 163 MG/DL (ref 74–99)
GLUCOSE SERPL-MCNC: 269 MG/DL (ref 74–99)
HCT VFR BLD AUTO: 41.5 % (ref 41–52)
HGB BLD-MCNC: 13.2 G/DL (ref 13.5–17.5)
IMM GRANULOCYTES # BLD AUTO: 0.21 X10*3/UL (ref 0–0.7)
IMM GRANULOCYTES NFR BLD AUTO: 0.3 % (ref 0–0.9)
LDH SERPL L TO P-CCNC: 142 U/L (ref 84–246)
LDH SERPL L TO P-CCNC: 209 U/L (ref 84–246)
LYMPHOCYTES # BLD AUTO: 72.51 X10*3/UL (ref 1.2–4.8)
LYMPHOCYTES NFR BLD AUTO: 88.3 %
MCH RBC QN AUTO: 27.6 PG (ref 26–34)
MCHC RBC AUTO-ENTMCNC: 31.8 G/DL (ref 32–36)
MCV RBC AUTO: 87 FL (ref 80–100)
MONOCYTES # BLD AUTO: 0.98 X10*3/UL (ref 0.1–1)
MONOCYTES NFR BLD AUTO: 1.2 %
NEUTROPHILS # BLD AUTO: 8.02 X10*3/UL (ref 1.2–7.7)
NEUTROPHILS NFR BLD AUTO: 9.8 %
NRBC BLD-RTO: 0 /100 WBCS (ref 0–0)
OVALOCYTES BLD QL SMEAR: NORMAL
PHOSPHATE SERPL-MCNC: 2.9 MG/DL (ref 2.5–4.9)
PHOSPHATE SERPL-MCNC: 4 MG/DL (ref 2.5–4.9)
PLATELET # BLD AUTO: 93 X10*3/UL (ref 150–450)
POTASSIUM SERPL-SCNC: 3.9 MMOL/L (ref 3.5–5.3)
POTASSIUM SERPL-SCNC: 4 MMOL/L (ref 3.5–5.3)
RBC # BLD AUTO: 4.79 X10*6/UL (ref 4.5–5.9)
RBC MORPH BLD: NORMAL
SODIUM SERPL-SCNC: 133 MMOL/L (ref 136–145)
SODIUM SERPL-SCNC: 137 MMOL/L (ref 136–145)
URATE SERPL-MCNC: 2.7 MG/DL (ref 4–7.5)
URATE SERPL-MCNC: 3.2 MG/DL (ref 4–7.5)
WBC # BLD AUTO: 82.1 X10*3/UL (ref 4.4–11.3)

## 2024-06-18 PROCEDURE — 84550 ASSAY OF BLOOD/URIC ACID: CPT | Performed by: PHYSICIAN ASSISTANT

## 2024-06-18 PROCEDURE — 80069 RENAL FUNCTION PANEL: CPT | Performed by: PHYSICIAN ASSISTANT

## 2024-06-18 PROCEDURE — 1170000001 HC PRIVATE ONCOLOGY ROOM DAILY

## 2024-06-18 PROCEDURE — 2500000005 HC RX 250 GENERAL PHARMACY W/O HCPCS: Performed by: PHYSICIAN ASSISTANT

## 2024-06-18 PROCEDURE — 85025 COMPLETE CBC W/AUTO DIFF WBC: CPT | Performed by: PHYSICIAN ASSISTANT

## 2024-06-18 PROCEDURE — 83615 LACTATE (LD) (LDH) ENZYME: CPT | Performed by: PHYSICIAN ASSISTANT

## 2024-06-18 PROCEDURE — 2500000002 HC RX 250 W HCPCS SELF ADMINISTERED DRUGS (ALT 637 FOR MEDICARE OP, ALT 636 FOR OP/ED): Performed by: PHYSICIAN ASSISTANT

## 2024-06-18 PROCEDURE — 97161 PT EVAL LOW COMPLEX 20 MIN: CPT | Mod: GP

## 2024-06-18 PROCEDURE — 99232 SBSQ HOSP IP/OBS MODERATE 35: CPT | Performed by: INTERNAL MEDICINE

## 2024-06-18 PROCEDURE — 2500000001 HC RX 250 WO HCPCS SELF ADMINISTERED DRUGS (ALT 637 FOR MEDICARE OP): Performed by: PHYSICIAN ASSISTANT

## 2024-06-18 PROCEDURE — 2500000004 HC RX 250 GENERAL PHARMACY W/ HCPCS (ALT 636 FOR OP/ED): Performed by: PHYSICIAN ASSISTANT

## 2024-06-18 RX ORDER — ALBUTEROL SULFATE 0.83 MG/ML
3 SOLUTION RESPIRATORY (INHALATION) AS NEEDED
Status: DISCONTINUED | OUTPATIENT
Start: 2024-06-18 | End: 2024-06-19 | Stop reason: HOSPADM

## 2024-06-18 RX ORDER — SODIUM CHLORIDE 9 MG/ML
150 INJECTION, SOLUTION INTRAVENOUS CONTINUOUS
Status: DISCONTINUED | OUTPATIENT
Start: 2024-06-18 | End: 2024-06-19 | Stop reason: HOSPADM

## 2024-06-18 RX ORDER — ALLOPURINOL 300 MG/1
300 TABLET ORAL DAILY
Status: DISCONTINUED | OUTPATIENT
Start: 2024-06-18 | End: 2024-06-18

## 2024-06-18 RX ORDER — ONDANSETRON HYDROCHLORIDE 2 MG/ML
8 INJECTION, SOLUTION INTRAVENOUS EVERY 8 HOURS PRN
Status: DISCONTINUED | OUTPATIENT
Start: 2024-06-18 | End: 2024-06-19 | Stop reason: HOSPADM

## 2024-06-18 RX ORDER — PROCHLORPERAZINE MALEATE 10 MG
10 TABLET ORAL EVERY 6 HOURS PRN
Status: DISCONTINUED | OUTPATIENT
Start: 2024-06-18 | End: 2024-06-19 | Stop reason: HOSPADM

## 2024-06-18 RX ORDER — DIPHENHYDRAMINE HYDROCHLORIDE 50 MG/ML
50 INJECTION INTRAMUSCULAR; INTRAVENOUS AS NEEDED
Status: DISCONTINUED | OUTPATIENT
Start: 2024-06-18 | End: 2024-06-19 | Stop reason: HOSPADM

## 2024-06-18 RX ORDER — SODIUM CHLORIDE 9 MG/ML
150 INJECTION, SOLUTION INTRAVENOUS CONTINUOUS
Status: DISCONTINUED | OUTPATIENT
Start: 2024-06-18 | End: 2024-06-18

## 2024-06-18 RX ORDER — FAMOTIDINE 10 MG/ML
20 INJECTION INTRAVENOUS AS NEEDED
Status: DISCONTINUED | OUTPATIENT
Start: 2024-06-18 | End: 2024-06-19 | Stop reason: HOSPADM

## 2024-06-18 RX ORDER — PROCHLORPERAZINE EDISYLATE 5 MG/ML
10 INJECTION INTRAMUSCULAR; INTRAVENOUS EVERY 6 HOURS PRN
Status: DISCONTINUED | OUTPATIENT
Start: 2024-06-18 | End: 2024-06-19 | Stop reason: HOSPADM

## 2024-06-18 RX ORDER — ONDANSETRON HYDROCHLORIDE 8 MG/1
8 TABLET, FILM COATED ORAL EVERY 8 HOURS PRN
Status: DISCONTINUED | OUTPATIENT
Start: 2024-06-18 | End: 2024-06-19 | Stop reason: HOSPADM

## 2024-06-18 RX ORDER — EPINEPHRINE 1 MG/ML
0.3 INJECTION, SOLUTION, CONCENTRATE INTRAVENOUS EVERY 5 MIN PRN
Status: DISCONTINUED | OUTPATIENT
Start: 2024-06-18 | End: 2024-06-19 | Stop reason: HOSPADM

## 2024-06-18 RX ORDER — ASPIRIN 81 MG/1
81 TABLET ORAL NIGHTLY
Status: DISCONTINUED | OUTPATIENT
Start: 2024-06-18 | End: 2024-06-19 | Stop reason: HOSPADM

## 2024-06-18 ASSESSMENT — COGNITIVE AND FUNCTIONAL STATUS - GENERAL
DAILY ACTIVITIY SCORE: 24
MOBILITY SCORE: 24
MOBILITY SCORE: 24

## 2024-06-18 ASSESSMENT — ACTIVITIES OF DAILY LIVING (ADL)
ADL_ASSISTANCE: INDEPENDENT
LACK_OF_TRANSPORTATION: NO

## 2024-06-18 ASSESSMENT — PAIN SCALES - GENERAL
PAINLEVEL_OUTOF10: 0 - NO PAIN
PAINLEVEL_OUTOF10: 0 - NO PAIN

## 2024-06-18 ASSESSMENT — PAIN - FUNCTIONAL ASSESSMENT
PAIN_FUNCTIONAL_ASSESSMENT: 0-10
PAIN_FUNCTIONAL_ASSESSMENT: 0-10

## 2024-06-18 NOTE — PROGRESS NOTES
06/18/24 1447   Discharge Planning   Living Arrangements Spouse/significant other   Support Systems Spouse/significant other   Assistance Needed none   Type of Residence Private residence   Number of Stairs to Enter Residence 3   Number of Stairs Within Residence 0   Home or Post Acute Services None   Patient expects to be discharged to: Home   Does the patient need discharge transport arranged? No   Financial Resource Strain   How hard is it for you to pay for the very basics like food, housing, medical care, and heating? Not very   Housing Stability   In the last 12 months, was there a time when you were not able to pay the mortgage or rent on time? N   In the last 12 months, how many places have you lived? 1   In the last 12 months, was there a time when you did not have a steady place to sleep or slept in a shelter (including now)? N   Transportation Needs   In the past 12 months, has lack of transportation kept you from medical appointments or from getting medications? no   In the past 12 months, has lack of transportation kept you from meetings, work, or from getting things needed for daily living? No     Pt with SLL/CLL was admitted for week 2 of Venetoclax ramp up.  He will likely be ready for discharge tomorrow and will return to home with his wife.  He is still ambulating independently (occasionally uses a cane.)  He will keep his midline catheter in place until his appt on 6/24/24.  The pt was set up with [facility] (pharmacy only) for flushes during his last hospital stay and he has enough flushes to last until 6/24.  No home care orders are needed- plan for the midline to be pulled at that appt.  His MD is Dr. Ev Luis.  Rose Gay RN, TCC

## 2024-06-18 NOTE — CARE PLAN
The patient's goals for the shift include  to visit with wife    The clinical goals for the shift include Patient will remain HDS with no acute events 6/18/24 till 1900    Over the shift, the patient did make progress toward the following goals. Patient remained HDS. VS stable, no reported pain, and remained safe      Problem: Pain - Adult  Goal: Verbalizes/displays adequate comfort level or baseline comfort level  Outcome: Progressing     Problem: Safety - Adult  Goal: Free from fall injury  Outcome: Progressing     Problem: Discharge Planning  Goal: Discharge to home or other facility with appropriate resources  Outcome: Progressing     Problem: Chronic Conditions and Co-morbidities  Goal: Patient's chronic conditions and co-morbidity symptoms are monitored and maintained or improved  Outcome: Progressing

## 2024-06-18 NOTE — PROGRESS NOTES
"Pharmacy Medication History Review    Demar Pittman \"Bud\" is a 63 y.o. male admitted for CLL (chronic lymphocytic leukemia) (Multi). Pharmacy reviewed the patient's [id number] medications and allergies for accuracy.    The list below reflects the updated PTA list. Comments regarding how patient may be taking medications differently can be found in the Admit Orders Activity  Prior to Admission Medications   Prescriptions Last Dose Informant Patient Reported? Taking?   acyclovir (Zovirax) 400 mg tablet  Self No No   Sig: Take 1 tablet (400 mg) by mouth 2 times a day.   allopurinol (Zyloprim) 300 mg tablet  Self No No   Sig: Take 1 tablet (300 mg) by mouth once daily.   amLODIPine (Norvasc) 5 mg tablet  Self No No   Sig: Take 1 tablet (5 mg) by mouth once daily.   aspirin 81 mg EC tablet  Self Yes No   Sig: Take 1 tablet (81 mg) by mouth once daily.   dexAMETHasone (Decadron) 2 mg tablet  Self No No   Sig: Take 1 tablet (2 mg) by mouth 2 times daily (morning and late afternoon) for 14 days.   diphenhydramine/Maalox/lidocaine (Magic Mouthwash) - Compounded - Outpatient  Self No No   Sig: Swish and spit 10ml of mouthwash 3 times per day after meals   metoprolol tartrate (Lopressor) 25 mg tablet  Self No No   Sig: Take 1 tablet (25 mg) by mouth 2 times a day.   multivitamin with folic acid (One Daily Multivitamin) 400 mcg tablet  Self Yes No   Sig: Take 1 tablet by mouth once daily.   nitroglycerin (Nitrostat) 0.4 mg SL tablet  Self Yes No   Sig: Place 1 tablet (0.4 mg) under the tongue every 5 minutes if needed.   nystatin (Mycostatin) 100,000 unit/mL suspension  Self No No   Sig: Swish and swallow 5 mL (500,000 Units) 4 times a day for 10 days.   ondansetron (Zofran) 8 mg tablet  Self No No   Sig: Take 1 tablet (8 mg) by mouth every 8 hours if needed for nausea or vomiting.   pantoprazole (ProtoNix) 40 mg EC tablet  Self No No   Sig: Take 1 tablet (40 mg) by mouth once daily.   prochlorperazine (Compazine) " 10 mg tablet  Self No No   Sig: Take 1 tablet (10 mg) by mouth every 6 hours if needed for nausea or vomiting.   venetoclax (Venclexta) 10 mg-50 mg- 100 mg tablet therapy pack  Self No No   Sig: Week 1: Take 20 mg (2 x 10 mg) by mouth once daily. Week 2: take 50 mg by mouth once daily. Week 3: take 100 mg by mouth once daily. Week 4: Take 200 mg (2 x 100 mg) by mouth once daily   venetoclax (Venclexta) 100 mg tablet  Self No No   Sig: Take 4 tablets (400 mg total) by mouth once daily.  Take with food.      Facility-Administered Medications: None        The list below reflects the updated allergy list. Please review each documented allergy for additional clarification and justification.  Allergies  Reviewed by Compa Ortiz PA-C on 6/17/2024   No Known Allergies         Patient accepts M2B at discharge. Pharmacy has been updated to [facility].    Sources used to confirm home medication list include: Patient interview with wife present at bedside, OARRS, Care Everywhere, medication fill history, Office Visit Note 6/17/24.    Medications added: None    Medications modified: None    Medications to be removed: None    Below are additional concerns with the patient's PTA list.  None to note.    Ivet Langley [facility]   Transitions of Care Pharmacist  [facility]  Please reach out via Secure Chat for questions, or if no response call [employer] or [employer]

## 2024-06-18 NOTE — PROGRESS NOTES
"Demar HOYT [place] \"Shraddha" is a 63 y.o. male with PMHx CABG x 3, A-Fib, XENA, HLD, HTN and CLL/SLL (originally dx 2/2015) with recent tongue biopsy showed CLL with tongue base swelling on dex and admitted (6/17) for week 2 Venetoclax ramp up (50 mg daily) and TLS monitoring.     Subjective   Oral mucositis/b/l tongue ulcerations improved per patient. Increased appetite. Denies odynophagia & dysphagia.   Denies recent sx of Ha, fever, chills, sinus pain, anorexia, odynophagia, CP, cough. Sob, dyspnea, abd pain, GERD, diarrhea, constipation, dysuria, bleeding, unexplained bruising, skin lesions, rashes, swelling, edema, paresthesia, & diff w balance, coordination, & gait.        Objective     Vitals:    06/18/24 0137 06/18/24 0523 06/18/24 0710 06/18/24 0843   BP: 148/90 142/85  143/70   BP Location: Right arm Right arm  Right arm   Patient Position: Lying Lying  Sitting   Pulse: 80 55  70   Resp: 18 18 18   Temp: 36.6 °C (97.9 °F) 36.8 °C (98.2 °F)  36.5 °C (97.7 °F)   TempSrc: Temporal Temporal  Temporal   SpO2: 94% 97%  98%   Weight:   (S) 80.2 kg (176 lb 12.9 oz)    Height:   (S) 1.693 m (5' 6.65\")         Physical Exam  Constitutional:       Appearance: Normal appearance.   HENT:      Head: Normocephalic.      Mouth/Throat:      Mouth: Mucous membranes are moist.      Pharynx: Posterior oropharyngeal erythema present.      Comments: B/l superficial ulcerations along lateral tongue  Eyes:      Extraocular Movements: Extraocular movements intact.      Pupils: Pupils are equal, round, and reactive to light.   Cardiovascular:      Rate and Rhythm: Normal rate and regular rhythm.      Heart sounds: Normal heart sounds.   Pulmonary:      Effort: Pulmonary effort is normal.      Breath sounds: Normal breath sounds.   Abdominal:      General: Bowel sounds are normal.      Palpations: Abdomen is soft.   Musculoskeletal:         General: Normal range of motion.      Cervical back: Normal range of motion.   Skin:     General: " Skin is warm and dry.      Comments: Well healed mid sternal surgical incision.   Neurological:      General: No focal deficit present.      Mental Status: He is alert and oriented to person, place, and time.   Psychiatric:         Mood and Affect: Mood normal.         Behavior: Behavior normal.     Scheduled medications  acyclovir, 400 mg, oral, BID  allopurinol, 300 mg, oral, Daily  amLODIPine, 5 mg, oral, Daily  aspirin, 81 mg, oral, Nightly  dexAMETHasone, 2 mg, oral, BID  docusate sodium, 100 mg, oral, BID  lidocaine-diphenhydrAMINE-Maalox 1:1:1, 10 mL, Swish & Spit, 4x daily  metoprolol tartrate, 25 mg, oral, BID  multivitamin with minerals, 1 tablet, oral, Daily  nystatin, 5 mL, Swish & Swallow, 4x daily  pantoprazole, 40 mg, oral, Daily  venetoclax, 50 mg, oral, q24h  venetoclax, 50 mg, oral, q24h    Continuous medications  sodium chloride 0.9%, 150 mL/hr, Last Rate: 150 mL/hr (06/18/24 1109)    PRN medications  PRN medications: albuterol, alteplase, dextrose, diphenhydrAMINE, EPINEPHrine HCl, famotidine, melatonin, methylPREDNISolone sodium succinate (PF), nitroglycerin, ondansetron, ondansetron, ondansetron, prochlorperazine, prochlorperazine, prochlorperazine, sodium chloride       Results from last 7 days   Lab Units 06/18/24  0520 06/17/24  1514 06/13/24  0417   WBC AUTO x10*3/uL 82.1* 125.5* 86.3*   HEMOGLOBIN g/dL 13.2* 14.4 12.0*   HEMATOCRIT % 41.5 44.2 38.2*   PLATELETS AUTO x10*3/uL 93* 116* 102*   NEUTROS PCT AUTO % 9.8 8.6 10.2   LYMPHS PCT AUTO % 88.3 89.9 88.5   MONOS PCT AUTO % 1.2 1.1 1.0   EOS PCT AUTO % 0.0 0.0 0.0     Results from last 7 days   Lab Units 06/18/24  0520 06/17/24  2218 06/17/24  1514 06/13/24  0417   SODIUM mmol/L 137 135* 136 138   POTASSIUM mmol/L 3.9 3.4* 3.4* 3.5   CHLORIDE mmol/L 101 99 100 105   CO2 mmol/L 24 25 26 24   BUN mg/dL 15 18 19 18   CREATININE mg/dL 0.59 0.65 0.66 0.57   CALCIUM mg/dL 8.1* 8.3* 8.2* 7.8*   PROTEIN TOTAL g/dL  --   --  5.5* 4.2*   BILIRUBIN  TOTAL mg/dL  --   --  0.6 0.4   ALK PHOS U/L  --   --  120 97   ALT U/L  --   --  125* 139*   AST U/L  --   --  24 20   GLUCOSE mg/dL 163* 204* 235* 177*     Results from last 7 days   Lab Units 06/17/24  2218 06/13/24  0417 06/12/24  0139   MAGNESIUM mg/dL 2.09 1.99 1.94     Lab Results   Component Value Date    CALCIUM 8.1 (L) 06/18/2024    PHOS 2.9 06/18/2024     Lab Results   Component Value Date    URICACID 3.2 (L) 06/18/2024     LDH : 142      Assessment/Plan :    ONC:  SLL/CLL originally dx 2/2015  -s/p BR (8/2020)  Increasing lymphocytosis s/p COVID infx 12/2023 and PET confirmed lymphadenopathy  --Declined Ibrutinib   PET Scan 3/2024: increased uptake at base of tongue   Tongue biopsy 05/28/24: CLL  Tongue swelling started on Decadron 4mg/BID orally, now 2 mg bid (6/13-6/27)  Completed week 1  Venetoclax dose pack 20mg/day x 7 days (start 6/10)     CHEMO:  Venetoclax ramp-up   --Start week 2 Venetoclax 50mg/day x 7 days (start 6/17)     HEME:  --Leucocytosis, thrombocytopenia  --Monitor counts daily, fibrinogen, coags  --Transfuse if Hgb<7 and/or Plt<10 (hold Aspirin if Plt<25k)     ID:  [id number]  --Leukocytosis secondary to malignancy, No evidence of active infection on admit  --Continue ACV on admit    --Plan Levofloxacin prophylaxis when neutropenic     FEN/GI:  Admit Wt: 81.3 kg  Hydration per TLS monitoring   --PPI prophy w/protonix on admit  --Monitor electrolytes, replace as needed  --Hypokalemia (3.4), give KCL 40 meq po x1 (6/17)     TLS Monitoring:  BID Labs  --Continue IVF NS at 150mL/hr  --Uric Acid on admit: 2.9  --G6PD (6/10) - normal  --cont Allopurinol       CARDIAC:  History of CAD s/p CABG x 3 (11/20/2019), HTN, HLD   --Continue home Amlodipine 5mg/day, Aspirin 81mg/day, Metoprolol 25mg/BID  --Home statins on hold per cardiology due to elevated LFTs improving   --Tele monitoring while monitoring for TLS   --PT consulted on admission     DISPO:  Full Code confirmed on admit  MEENUE Midline placed  6/10, plan to remove at out pt visit on 6/24  Blood Consent Signed (6/10/24)  Primary Onc: Dr. Ev uLis  F/up appt :  Mon 6/24 at 2:45 pm vanesa Bailon CNP     Patient examined & discussed with Dr. Ev Ortiz PA-C

## 2024-06-18 NOTE — NURSING NOTE
Patient remained safe and stable with no acute events. Pt received medications as ordered. Pt remains on continuous fluids. Patient visiting wife at bedside. Report given to Sukumar PORTILLO

## 2024-06-18 NOTE — PROGRESS NOTES
"Physical Therapy    Physical Therapy Evaluation    Patient Name: Demar Pittman \"Bud\"  MRN: [id number]  Today's Date: 6/18/2024   Time Calculation  Start Time: 1129  Stop Time: 1147  Time Calculation (min): 18 min    Assessment/Plan   PT Assessment  Rehab Prognosis: Good  Evaluation/Treatment Tolerance: Patient tolerated treatment well  Medical Staff Made Aware: Yes  End of Session Communication: Bedside nurse  Assessment Comment: Pt is a 63 year old male who presents for chemo. Pt demonstrates no deficits at this time however would benefit from continued therapy to maintain functional mobility and prevent deficits.  End of Session Patient Position: Up in room  IP OR SWING BED PT PLAN  Inpatient or Swing Bed: Inpatient  PT Plan  Treatment/Interventions: Bed mobility, Transfer training, Gait training, Stair training, Neuromuscular re-education, Balance training, Strengthening, Endurance training, Range of motion, Therapeutic activity, Therapeutic exercise, Home exercise program, Positioning, Postural re-education  PT Plan: Ongoing PT  PT Frequency: 1 time per week  PT Discharge Recommendations: No PT needed after discharge      Subjective   General Visit Information:  General  Reason for Referral: admitted (6/17) for week 2 Venetoclax ramp up (50 mg daily) and TLS monitoring.  Past Medical History Relevant to Rehab: CABG x 3, A-Fib, XENA, HLD, HTN and CLL/SLL (originally dx 2/2015) with recent tongue biopsy showed CLL with tongue base swelling on dex  Prior to Session Communication: Bedside nurse  Patient Position Received: Up in room  Preferred Learning Style: visual, verbal  General Comment: Pt is pleasant, cooperative, and willing to participate in therapy  Home Living:  Home Living  Type of Home: House  Lives With: Spouse (son with down syndrome and daughter)  Home Adaptive Equipment: None  Home Layout: One level  Home Access: Stairs to enter with rails  Entrance Stairs-Number of Steps: 5  Prior Level of " Function:  Prior Function Per Pt/Caregiver Report  Level of [place]: Independent with ADLs and functional transfers, Independent with homemaking with ambulation  ADL Assistance: Independent  Homemaking Assistance: Independent  Ambulatory Assistance: Independent  Vocational: Full time employment  Prior Function Comments: (+) drive, no hx of falls  Precautions:  Precautions  Medical Precautions: Fall precautions  Precautions Comment: Protective precautions    Objective   Pain:  Pain Assessment  Pain Assessment: 0-10  Pain Score: 0 - No pain  Cognition:  Cognition  Overall Cognitive Status: Within Functional Limits  Orientation Level: Oriented X4    General Assessments:       Sensation  Sensation Comment: denies numbness and tingling at this time       Static Sitting Balance  Static Sitting-Balance Support: Feet supported  Static Sitting-Comment/Number of Minutes: independent  Dynamic Sitting Balance  Dynamic Sitting-Balance Support: Feet supported  Dynamic Sitting-Comments: independent    Static Standing Balance  Static Standing-Balance Support: No upper extremity supported  Static Standing-Comment/Number of Minutes: SBA  Dynamic Standing Balance  Dynamic Standing-Balance Support: No upper extremity supported  Dynamic Standing-Comments: SBA  Functional Assessments:  Bed Mobility  Bed Mobility: No    Transfers  Transfer: Yes  Transfer 1  Transfer From 1: Sit to, Stand to  Transfer to 1: Stand, Sit  Technique 1: Sit to stand, Stand to sit  Transfer Level of Assistance 1: Minimal verbal cues (SBA)  Trials/Comments 1: VCs, good control of COM over BRYCE, multiple trials during session    Ambulation/Gait Training  Ambulation/Gait Training Performed: Yes  Ambulation/Gait Training 1  Surface 1: Level tile  Device 1: No device  Assistance 1: Close supervision, Minimal verbal cues  Comments/Distance (ft) 1: 1,440 ft, 30 ft    Stairs  Stairs: No  Extremity/Trunk Assessments:  RLE   RLE :  (>/= 3+/5 observed via  function)  LLE   LLE :  (>/= 3+/5 observed via function)  Outcome Measures:  [facility] Basic Mobility  Turning from your back to your side while in a flat bed without using bedrails: None  Moving from lying on your back to sitting on the side of a flat bed without using bedrails: None  Moving to and from bed to chair (including a wheelchair): None  Standing up from a chair using your arms (e.g. wheelchair or bedside chair): None  To walk in hospital room: None  Climbing 3-5 steps with railing: None  Basic Mobility - Total Score: 24    Encounter Problems       Encounter Problems (Active)       Mobility       STG - Patient will ambulate >1,800 ft independently with no LOB, progress as able and appropriate        Start:  06/18/24    Expected End:  07/02/24            STG - Patient will ascend and descend four to six stairs independently        Start:  06/18/24    Expected End:  07/02/24            Pt will ambulate 1,800 ft independently with no signs of fatigue or SOB        Start:  06/18/24    Expected End:  07/02/24               PT Transfers       STG - Patient will perform bed mobility independently        Start:  06/18/24    Expected End:  07/02/24            STG - Patient will transfer sit to and from stand independently        Start:  06/18/24    Expected End:  07/02/24                   Education Documentation  Precautions, taught by Any Moore PT at 6/18/2024  3:12 PM.  Learner: Patient  Readiness: Acceptance  Method: Explanation  Response: Verbalizes Understanding  Comment: bed mobility, transfers, gait, activity and symptom monitoring    Mobility Training, taught by Any Moore PT at 6/18/2024  3:12 PM.  Learner: Patient  Readiness: Acceptance  Method: Explanation  Response: Verbalizes Understanding  Comment: bed mobility, transfers, gait, activity and symptom monitoring    Education Comments  No comments found.          06/18/24 at 3:14 PM - Any Moore PT

## 2024-06-19 ENCOUNTER — APPOINTMENT (OUTPATIENT)
Dept: OTOLARYNGOLOGY | Facility: HOSPITAL | Age: 64
End: 2024-06-19
Payer: COMMERCIAL

## 2024-06-19 VITALS
WEIGHT: 180.12 LBS | SYSTOLIC BLOOD PRESSURE: 136 MMHG | DIASTOLIC BLOOD PRESSURE: 79 MMHG | BODY MASS INDEX: 28.27 KG/M2 | HEART RATE: 62 BPM | TEMPERATURE: 98.6 F | HEIGHT: 67 IN | OXYGEN SATURATION: 98 % | RESPIRATION RATE: 18 BRPM

## 2024-06-19 LAB
ALBUMIN SERPL BCP-MCNC: 3.3 G/DL (ref 3.4–5)
ANION GAP SERPL CALC-SCNC: 12 MMOL/L (ref 10–20)
BASOPHILS # BLD AUTO: 0.05 X10*3/UL (ref 0–0.1)
BASOPHILS NFR BLD AUTO: 0.1 %
BUN SERPL-MCNC: 15 MG/DL (ref 6–23)
BURR CELLS BLD QL SMEAR: NORMAL
CALCIUM SERPL-MCNC: 8 MG/DL (ref 8.6–10.6)
CHLORIDE SERPL-SCNC: 102 MMOL/L (ref 98–107)
CO2 SERPL-SCNC: 25 MMOL/L (ref 21–32)
CREAT SERPL-MCNC: 0.61 MG/DL (ref 0.5–1.3)
EGFRCR SERPLBLD CKD-EPI 2021: >90 ML/MIN/1.73M*2
EOSINOPHIL # BLD AUTO: 0.02 X10*3/UL (ref 0–0.7)
EOSINOPHIL NFR BLD AUTO: 0 %
ERYTHROCYTE [DISTWIDTH] IN BLOOD BY AUTOMATED COUNT: 15.9 % (ref 11.5–14.5)
GLUCOSE SERPL-MCNC: 185 MG/DL (ref 74–99)
HCT VFR BLD AUTO: 39.4 % (ref 41–52)
HGB BLD-MCNC: 12.5 G/DL (ref 13.5–17.5)
IMM GRANULOCYTES # BLD AUTO: 0.27 X10*3/UL (ref 0–0.7)
IMM GRANULOCYTES NFR BLD AUTO: 0.4 % (ref 0–0.9)
LDH SERPL L TO P-CCNC: 132 U/L (ref 84–246)
LYMPHOCYTES # BLD AUTO: 67.61 X10*3/UL (ref 1.2–4.8)
LYMPHOCYTES NFR BLD AUTO: 88.3 %
MCH RBC QN AUTO: 27.4 PG (ref 26–34)
MCHC RBC AUTO-ENTMCNC: 31.7 G/DL (ref 32–36)
MCV RBC AUTO: 86 FL (ref 80–100)
MONOCYTES # BLD AUTO: 0.57 X10*3/UL (ref 0.1–1)
MONOCYTES NFR BLD AUTO: 0.7 %
NEUTROPHILS # BLD AUTO: 8.09 X10*3/UL (ref 1.2–7.7)
NEUTROPHILS NFR BLD AUTO: 10.5 %
NRBC BLD-RTO: 0 /100 WBCS (ref 0–0)
PHOSPHATE SERPL-MCNC: 3.6 MG/DL (ref 2.5–4.9)
PLATELET # BLD AUTO: 85 X10*3/UL (ref 150–450)
POTASSIUM SERPL-SCNC: 3.6 MMOL/L (ref 3.5–5.3)
RBC # BLD AUTO: 4.57 X10*6/UL (ref 4.5–5.9)
RBC MORPH BLD: NORMAL
SODIUM SERPL-SCNC: 135 MMOL/L (ref 136–145)
URATE SERPL-MCNC: 2.7 MG/DL (ref 4–7.5)
WBC # BLD AUTO: 76.6 X10*3/UL (ref 4.4–11.3)

## 2024-06-19 PROCEDURE — 99238 HOSP IP/OBS DSCHRG MGMT 30/<: CPT | Performed by: INTERNAL MEDICINE

## 2024-06-19 PROCEDURE — 83615 LACTATE (LD) (LDH) ENZYME: CPT | Performed by: PHYSICIAN ASSISTANT

## 2024-06-19 PROCEDURE — 2500000004 HC RX 250 GENERAL PHARMACY W/ HCPCS (ALT 636 FOR OP/ED): Performed by: PHYSICIAN ASSISTANT

## 2024-06-19 PROCEDURE — 85025 COMPLETE CBC W/AUTO DIFF WBC: CPT | Performed by: PHYSICIAN ASSISTANT

## 2024-06-19 PROCEDURE — 80069 RENAL FUNCTION PANEL: CPT | Performed by: PHYSICIAN ASSISTANT

## 2024-06-19 PROCEDURE — 84550 ASSAY OF BLOOD/URIC ACID: CPT | Performed by: PHYSICIAN ASSISTANT

## 2024-06-19 PROCEDURE — 2500000001 HC RX 250 WO HCPCS SELF ADMINISTERED DRUGS (ALT 637 FOR MEDICARE OP): Performed by: PHYSICIAN ASSISTANT

## 2024-06-19 RX ORDER — DEXAMETHASONE 2 MG/1
1 TABLET ORAL DAILY
Start: 2024-06-19 | End: 2024-06-24 | Stop reason: ALTCHOICE

## 2024-06-19 RX ORDER — DEXAMETHASONE 2 MG/1
1 TABLET ORAL DAILY
Qty: 56 TABLET | Refills: 0 | Status: CANCELLED | OUTPATIENT
Start: 2024-06-19 | End: 2024-07-03

## 2024-06-19 ASSESSMENT — COGNITIVE AND FUNCTIONAL STATUS - GENERAL
MOBILITY SCORE: 24
DAILY ACTIVITIY SCORE: 24
MOBILITY SCORE: 24
DAILY ACTIVITIY SCORE: 24

## 2024-06-19 ASSESSMENT — PAIN - FUNCTIONAL ASSESSMENT
PAIN_FUNCTIONAL_ASSESSMENT: 0-10
PAIN_FUNCTIONAL_ASSESSMENT: 0-10

## 2024-06-19 ASSESSMENT — PAIN SCALES - GENERAL
PAINLEVEL_OUTOF10: 0 - NO PAIN
PAINLEVEL_OUTOF10: 0 - NO PAIN

## 2024-06-19 NOTE — NURSING NOTE
Patient discharged to home via private vehicle. Patient to discharge with midline per provider. Discharge instructions reviewed with patient by RN. Patient with no questions or concerns at time of discharge.

## 2024-06-19 NOTE — DISCHARGE SUMMARY
"Discharge Diagnosis  CLL (chronic lymphocytic leukemia) (Multi)    Issues Requiring Follow-Up  -Tumor lysis labs - out patient labs at [facility] (6/21/24)  -LUE Midline placed 6/10, plan to remove at out pt visit on 6/24      Test Results Pending At Discharge  Pending Labs       No current pending labs.            Hospital Course  Demar Pittman \"Shraddha" ([id number]) is a 63 y.o. male with PMHx CABG x 3, A-Fib, XENA, HLD, HTN and CLL/SLL (originally dx 2/2015) with recent tongue biopsy showed CLL with tongue base swelling on dex and admitted (6/17) for week 2 Venetoclax ramp up (50 mg daily) and TLS monitoring     Initiated (6/17/24) week 2 Venetoclax 50mg/day x 7 days.    Oral mucositis/b/l lateral tongue ulcerations improving per patient.  No laboratory evidence of tumor lysis to date.  Patient to go to [facility] on 6/21/24 for tumor lysis lab testing       DISPO:  Full Code confirmed on admit  LUE Midline placed 6/10, plan to remove at out pt visit on 6/24  Blood Consent Signed (6/10/24)  Primary Onc: Dr. Ev Luis  F/up appt :  Mon 6/24 at 2:45 pm w Mayelin Bailon CNP    Pertinent Physical Exam At Time of Discharge  Vitals:    06/19/24 0616 06/19/24 0729 06/19/24 0810 06/19/24 0912   BP: 131/79   136/79   BP Location: Right arm   Right arm   Patient Position: Lying   Sitting   Pulse: 55  65 62   Resp: 16   18   Temp: 36.6 °C (97.9 °F)   37 °C (98.6 °F)   TempSrc: Temporal   Temporal   SpO2: 97%   98%   Weight:  81.7 kg (180 lb 1.9 oz)     Height:          Physical Exam  Constitutional:       Appearance: Normal appearance.   HENT:      Head: Normocephalic.      Mouth/Throat:      Mouth: Mucous membranes are moist.      Pharynx: Posterior oropharyngeal erythema present.      Comments: B/l superficial ulcerations along lateral tongue  Eyes:      Extraocular Movements: Extraocular movements intact.      Pupils: Pupils are equal, round, and reactive to light.   Cardiovascular:      Rate and Rhythm: Normal rate and " regular rhythm.      Heart sounds: Normal heart sounds.   Pulmonary:      Effort: Pulmonary effort is normal.      Breath sounds: Normal breath sounds.   Abdominal:      General: Bowel sounds are normal.      Palpations: Abdomen is soft.   Musculoskeletal:         General: Normal range of motion.      Cervical back: Normal range of motion.   Skin:     General: Skin is warm and dry.      Comments: Well healed mid sternal surgical incision.   Neurological:      General: No focal deficit present.      Mental Status: He is alert and oriented to person, place, and time.   Psychiatric:         Mood and Affect: Mood normal.         Behavior: Behavior normal.    Labs:  Results from last 7 days   Lab Units 06/19/24  0151 06/18/24  0520 06/17/24  1514   WBC AUTO x10*3/uL 76.6* 82.1* 125.5*   HEMOGLOBIN g/dL 12.5* 13.2* 14.4   HEMATOCRIT % 39.4* 41.5 44.2   PLATELETS AUTO x10*3/uL 85* 93* 116*   NEUTROS PCT AUTO % 10.5 9.8 8.6   LYMPHS PCT AUTO % 88.3 88.3 89.9   MONOS PCT AUTO % 0.7 1.2 1.1   EOS PCT AUTO % 0.0 0.0 0.0     Results from last 7 days   Lab Units 06/19/24  0151 06/18/24  1724 06/18/24  0520 06/17/24  2218 06/17/24  1514 06/13/24  0417   SODIUM mmol/L 135* 133* 137   < > 136 138   POTASSIUM mmol/L 3.6 4.0 3.9   < > 3.4* 3.5   CHLORIDE mmol/L 102 99 101   < > 100 105   CO2 mmol/L 25 23 24   < > 26 24   BUN mg/dL 15 16 15   < > 19 18   CREATININE mg/dL 0.61 0.90 0.59   < > 0.66 0.57   CALCIUM mg/dL 8.0* 8.2* 8.1*   < > 8.2* 7.8*   PROTEIN TOTAL g/dL  --   --   --   --  5.5* 4.2*   BILIRUBIN TOTAL mg/dL  --   --   --   --  0.6 0.4   ALK PHOS U/L  --   --   --   --  120 97   ALT U/L  --   --   --   --  125* 139*   AST U/L  --   --   --   --  24 20   GLUCOSE mg/dL 185* 269* 163*   < > 235* 177*    < > = values in this interval not displayed.     Results from last 7 days   Lab Units 06/17/24 2218 06/13/24  0417   MAGNESIUM mg/dL 2.09 1.99     Lab Results   Component Value Date    CALCIUM 8.0 (L) 06/19/2024    PHOS 3.6  06/19/2024     Lab Results   Component Value Date    URICACID 2.7 (L) 06/19/2024       Home Medications     Medication List      START taking these medications     calcium carbonate  mg (750 mg) chewable tablet; Commonly known as:   Tums Extra Strength; Chew 2 tablets (1,500 mg) 2 times a day.     CHANGE how you take these medications     dexAMETHasone 2 mg tablet; Commonly known as: Decadron; Take 0.5 tablets   (1 mg) by mouth once daily. Take 0.5 tablet (1 mg) by mouth x 3 days, then   0.5 tablet every other day x 3 days, then discontinue.; What changed: how   much to take, when to take this, additional instructions   * Venclexta Starting Pack 10 mg-50 mg- 100 mg tablet therapy pack;   Generic drug: venetoclax; Week 1: Take 20 mg (2 x 10 mg) by mouth once   daily. Week 2: take 50 mg by mouth once daily. Week 3: take 100 mg by   mouth once daily. Week 4: Take 200 mg (2 x 100 mg) by mouth once daily;   What changed: Another medication with the same name was added. Make sure   you understand how and when to take each.   * venetoclax 100 mg tablet; Commonly known as: Venclexta; Take 4 tablets   (400 mg total) by mouth once daily.  Take with food.; Start taking on:   July 8, 2024; What changed: You were already taking a medication with the   same name, and this prescription was added. Make sure you understand how   and when to take each.  * This list has 2 medication(s) that are the same as other medications   prescribed for you. Read the directions carefully, and ask your doctor or   other care provider to review them with you.     CONTINUE taking these medications     acyclovir 400 mg tablet; Commonly known as: Zovirax; Take 1 tablet (400   mg) by mouth 2 times a day.   allopurinol 300 mg tablet; Commonly known as: Zyloprim; Take 1 tablet   (300 mg) by mouth once daily.   amLODIPine 5 mg tablet; Commonly known as: Norvasc; Take 1 tablet (5 mg)   by mouth once daily.   aspirin 81 mg EC tablet    diphenhydramine/Maalox/lidocaine (Magic Mouthwash) - Compounded -   Outpatient; Swish and spit 10ml of mouthwash 3 times per day after meals   metoprolol tartrate 25 mg tablet; Commonly known as: Lopressor; Take 1   tablet (25 mg) by mouth 2 times a day.   nitroglycerin 0.4 mg SL tablet; Commonly known as: Nitrostat   nystatin 100,000 unit/mL suspension; Commonly known as: Mycostatin;   Swish and swallow 5 mL (500,000 Units) 4 times a day for 10 days.   ondansetron 8 mg tablet; Commonly known as: Zofran; Take 1 tablet (8 mg)   by mouth every 8 hours if needed for nausea or vomiting.   One Daily Multivitamin tablet; Generic drug: multivitamin   pantoprazole 40 mg EC tablet; Commonly known as: ProtoNix; Take 1 tablet   (40 mg) by mouth once daily.   prochlorperazine 10 mg tablet; Commonly known as: Compazine; Take 1   tablet (10 mg) by mouth every 6 hours if needed for nausea or vomiting.       Outpatient Follow-Up  Future Appointments   Date Time Provider Department Center   6/24/2024  3:00 PM ANNELISE Gant-CNP [id number] [facility]   8/23/2024  3:00 PM Deedee Mcclendon MD [id number] [facility]   12/2/2024 10:30 AM Donny Fox MD [id number] [facility]       Compa Ortiz PA-C

## 2024-06-19 NOTE — HOSPITAL COURSE
"Demar LAI ButterfieldSteele \"Bud\" ([id number]) is a 63 y.o. male with PMHx CABG x 3, A-Fib, XENA, HLD, HTN and CLL/SLL (originally dx 2/2015) with recent tongue biopsy showed CLL with tongue base swelling on dex and admitted (6/17) for week 2 Venetoclax ramp up (50 mg daily) and TLS monitoring     Initiated (6/17/24) week 2 Venetoclax 50mg/day x 7 days.    Oral mucositis/b/l lateral tongue ulcerations improving per patient.  No laboratory evidence of tumor lysis to date.      DISPO:  Full Code confirmed on admit  LUE Midline placed 6/10, plan to remove at out pt visit on 6/24  Blood Consent Signed (6/10/24)  Primary Onc: Dr. Ev Luis  F/up appt :  Mon 6/24 at 2:45 pm vanesa Bailon CNP  "

## 2024-06-20 DIAGNOSIS — C91.10 CLL (CHRONIC LYMPHOCYTIC LEUKEMIA) (MULTI): Primary | ICD-10-CM

## 2024-06-20 RX ORDER — FAMOTIDINE 10 MG/ML
20 INJECTION INTRAVENOUS ONCE AS NEEDED
OUTPATIENT
Start: 2024-07-15

## 2024-06-20 RX ORDER — ACETAMINOPHEN 325 MG/1
650 TABLET ORAL ONCE
OUTPATIENT
Start: 2024-07-15

## 2024-06-20 RX ORDER — PROCHLORPERAZINE EDISYLATE 5 MG/ML
10 INJECTION INTRAMUSCULAR; INTRAVENOUS EVERY 6 HOURS PRN
OUTPATIENT
Start: 2024-07-15

## 2024-06-20 RX ORDER — DIPHENHYDRAMINE HYDROCHLORIDE 50 MG/ML
50 INJECTION INTRAMUSCULAR; INTRAVENOUS AS NEEDED
OUTPATIENT
Start: 2024-07-15

## 2024-06-20 RX ORDER — PROCHLORPERAZINE MALEATE 10 MG
10 TABLET ORAL EVERY 6 HOURS PRN
OUTPATIENT
Start: 2024-07-15

## 2024-06-20 RX ORDER — EPINEPHRINE 0.3 MG/.3ML
0.3 INJECTION SUBCUTANEOUS EVERY 5 MIN PRN
OUTPATIENT
Start: 2024-07-15

## 2024-06-20 RX ORDER — ALBUTEROL SULFATE 0.83 MG/ML
3 SOLUTION RESPIRATORY (INHALATION) AS NEEDED
OUTPATIENT
Start: 2024-07-15

## 2024-06-20 RX ORDER — DIPHENHYDRAMINE HCL 50 MG
50 CAPSULE ORAL ONCE
OUTPATIENT
Start: 2024-07-15

## 2024-06-20 ASSESSMENT — ENCOUNTER SYMPTOMS: APPETITE CHANGE: 0

## 2024-06-21 ENCOUNTER — LAB (OUTPATIENT)
Dept: LAB | Facility: LAB | Age: 64
End: 2024-06-21
Payer: COMMERCIAL

## 2024-06-21 ENCOUNTER — DOCUMENTATION (OUTPATIENT)
Dept: HEMATOLOGY/ONCOLOGY | Facility: HOSPITAL | Age: 64
End: 2024-06-21

## 2024-06-21 DIAGNOSIS — I25.10 CORONARY ARTERY DISEASE INVOLVING NATIVE CORONARY ARTERY OF NATIVE HEART WITHOUT ANGINA PECTORIS: ICD-10-CM

## 2024-06-21 DIAGNOSIS — C91.10 CLL (CHRONIC LYMPHOCYTIC LEUKEMIA) (MULTI): ICD-10-CM

## 2024-06-21 LAB
ALBUMIN SERPL BCP-MCNC: 3.5 G/DL (ref 3.4–5)
ALP SERPL-CCNC: 106 U/L (ref 33–136)
ALT SERPL W P-5'-P-CCNC: 113 U/L (ref 10–52)
ANION GAP SERPL CALC-SCNC: 14 MMOL/L (ref 10–20)
AST SERPL W P-5'-P-CCNC: 28 U/L (ref 9–39)
BASOPHILS # BLD MANUAL: 0 X10*3/UL (ref 0–0.1)
BASOPHILS NFR BLD MANUAL: 0 %
BILIRUB SERPL-MCNC: 0.8 MG/DL (ref 0–1.2)
BUN SERPL-MCNC: 16 MG/DL (ref 6–23)
CALCIUM SERPL-MCNC: 8.6 MG/DL (ref 8.6–10.3)
CHLORIDE SERPL-SCNC: 101 MMOL/L (ref 98–107)
CO2 SERPL-SCNC: 26 MMOL/L (ref 21–32)
CREAT SERPL-MCNC: 0.74 MG/DL (ref 0.5–1.3)
EGFRCR SERPLBLD CKD-EPI 2021: >90 ML/MIN/1.73M*2
EOSINOPHIL # BLD MANUAL: 0 X10*3/UL (ref 0–0.7)
EOSINOPHIL NFR BLD MANUAL: 0 %
ERYTHROCYTE [DISTWIDTH] IN BLOOD BY AUTOMATED COUNT: 16.4 % (ref 11.5–14.5)
GLUCOSE SERPL-MCNC: 144 MG/DL (ref 74–99)
HCT VFR BLD AUTO: 46.1 % (ref 41–52)
HGB BLD-MCNC: 14.9 G/DL (ref 13.5–17.5)
IMM GRANULOCYTES # BLD AUTO: 0.2 X10*3/UL (ref 0–0.7)
IMM GRANULOCYTES NFR BLD AUTO: 0.3 % (ref 0–0.9)
LDH SERPL L TO P-CCNC: 151 U/L (ref 84–246)
LYMPHOCYTES # BLD MANUAL: 64.84 X10*3/UL (ref 1.2–4.8)
LYMPHOCYTES NFR BLD MANUAL: 86 %
MAGNESIUM SERPL-MCNC: 1.96 MG/DL (ref 1.6–2.4)
MCH RBC QN AUTO: 28 PG (ref 26–34)
MCHC RBC AUTO-ENTMCNC: 32.3 G/DL (ref 32–36)
MCV RBC AUTO: 87 FL (ref 80–100)
MONOCYTES # BLD MANUAL: 2.26 X10*3/UL (ref 0.1–1)
MONOCYTES NFR BLD MANUAL: 3 %
NEUTS SEG # BLD MANUAL: 8.29 X10*3/UL (ref 1.2–7)
NEUTS SEG NFR BLD MANUAL: 11 %
NRBC BLD-RTO: 0 /100 WBCS (ref 0–0)
OVALOCYTES BLD QL SMEAR: ABNORMAL
PHOSPHATE SERPL-MCNC: 4.6 MG/DL (ref 2.5–4.9)
PLATELET # BLD AUTO: 83 X10*3/UL (ref 150–450)
POTASSIUM SERPL-SCNC: 3.5 MMOL/L (ref 3.5–5.3)
PROT SERPL-MCNC: 5.2 G/DL (ref 6.4–8.2)
RBC # BLD AUTO: 5.32 X10*6/UL (ref 4.5–5.9)
RBC MORPH BLD: ABNORMAL
SODIUM SERPL-SCNC: 137 MMOL/L (ref 136–145)
TOTAL CELLS COUNTED BLD: 100
URATE SERPL-MCNC: 2.7 MG/DL (ref 4–7.5)
WBC # BLD AUTO: 75.4 X10*3/UL (ref 4.4–11.3)

## 2024-06-21 PROCEDURE — 85007 BL SMEAR W/DIFF WBC COUNT: CPT

## 2024-06-21 PROCEDURE — 83615 LACTATE (LD) (LDH) ENZYME: CPT

## 2024-06-21 PROCEDURE — 84100 ASSAY OF PHOSPHORUS: CPT

## 2024-06-21 PROCEDURE — 85027 COMPLETE CBC AUTOMATED: CPT

## 2024-06-21 PROCEDURE — 83735 ASSAY OF MAGNESIUM: CPT

## 2024-06-21 PROCEDURE — 80053 COMPREHEN METABOLIC PANEL: CPT

## 2024-06-21 PROCEDURE — 84550 ASSAY OF BLOOD/URIC ACID: CPT

## 2024-06-21 NOTE — PROGRESS NOTES
"Patient ID: Demar Pittman \"Shraddha" is a 63 y.o. male.    Oncology History Overview Note   B-CLL diagnosed in 2015, SUÁREZ stage 0; WBC initially 16.3 with ALC 12.0, hemoglobin 15.1 and platelets 154,000.  The cells expressed CD5 and A light chains.   CD38 and  Zap-70 were negative. Cytogenetic studies by FISH were inconclusive.  WBC has slowly rising but he maintains good marrow reserve and has not required treatment.  CT chest done 3/9/17 showed extensive cervical, axillary and submental adenopathy; largest  node on the left measured 2.3 x 1.9 cm.  Small (less than 9 mm) pulmonology nodules were again seen; relatively stable.  Abdominal nodes slightly increased.    17: WBC 65.4, ALC 62.8; becoming more symptomatic.  17 : WBC 77.6.  ALC 72.2.  Hemoglobin and platelets were normal.  18 WBC: 69.3. A.9. Hgb 15.2. Plt: 148,000  18: WBC 88.2.  ALC 82.9.  Hemoglobin 14.2.  Platelets 170,000.  Adenopathy stable.  18: WBC 77.3.  ALC 66.5.  Hemoglobin 13.8.  Platelets 143,000.  Adenopathy slightly increased.  18: WBC 92.0.  ALC 86.5.  Hemoglobin 14.1.  Platelets 157,000.  Progressing symptomatic adenopathy.  Treatment options discussed.   18 completed rituximab weekly x 4.  10/9/19: CBC: WBC 16.5.  ALC 12.4.  Hemoglobin 14.6.  Platelets 148,000.  Worsening adenopathy.  3/17/20: Started bendamustine/rituximab after multiple thoracentesis for malignant pleural effusion with CLL  20: #2 bendamustine/rituximab.  20: #3 BR  20: WBC 4.7.  Hemoglobin 12.9.  Platelets 189,000.  20: #4 BR  20: #5 BR  20: WBC 1.7. hgb 11.9. Platelets 176.  20: #6/6 bendamustine/rituximab  2023, WBC count 62, hemoglobin 14.9, platelets 143, absolute lymphocyte count 50.2  January 15, 2024, WBC count 40.5, hemoglobin 15.0, platelets 175  2/10/24 , WBC count 31.5, hemoglobin 14.9, platelets 131  3/14/24 , WBC count 13.9, hemoglobin 14.9, platelets " 122  3/14/24, physical examination positive for bilateral cervical lymphadenopathy, bilateral axillary lymphadenopathy  PET imaging 3/29/2024 intense uptake in tongue base ( S/P) tonsillectomy. Multiple enlarged lymph nodes in cervical, parotid, nodes, multipl pleural based and parenchymal non FDG avid pulmonary nodes, enlarged axillary lymph nodes, mediastinal and bilateral hilar lymph nodes, periportal lymph nodes, iliac nodes, inguinal nodes.     -FISH panel  of peripheral blood 2024 Pos for del 11q, negative for t(11,14), trisomy 12, monosomy 13 and deletion of 17p.     -Needle biopsy of posterior tongue mass 24 involvement by chronic lymphocytic leukemia/small lymphocytic lymphoma no evidence of transformation     CLL (chronic lymphocytic leukemia) (Multi)   2023 Initial Diagnosis    CLL (chronic lymphocytic leukemia) (CMS/[facility])     1/15/2024 - 1/15/2024 Chemotherapy    Bendamustine + RiTUXimab, 28 Day Cycles     2024 - 2024 Chemotherapy    (INPT) Venetoclax, 28 Day Cycles      2024 -  Chemotherapy    Venetoclax + RiTUXimab, 28 Day Cycles        Past medical history: CLL/SLL (2015) as described above, status post rituximab, 6 cycles of bendamustine/rituximab (3/2020-2020).  Excellent response to treatment.       History of CABGx3 19, vertigo, XENA, remote tonsillectomy, retinal tear, stable pulmonary nodules, hyperlipidemia, hypertension, sinusitis.  Unremarkable cardiac catheterization 21. Hyperglycemia, torn retina, cataract. Hx of afib since CABG.      Family medical history: Mother  of myeloma in her 70s and his father  of Hodgkin lymphoma at age 39.     Social history: Rare alcohol intake.  Former smoker but quit in .  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 2 biological children, one son with downs syndrome.     Subjective    HPI    Patient of Dr. Wang with a long history of CLL initially seen  with his wife and son 5/9/24 for a second opinion for treatment of CLL.   He received BR completing in 8/2020.  In December 2023 after COVID infection and he had increased lymphocytosis which resolved, subsequently noted to have lymphadenopathy confirmed on PET scan.  Ibrutinib recommended in March 2024, but patient has not yet started this because he was  leary of side effects.      Due to discordance in uptake at base of tongue on PET imaging, tongue biopsy was done 5/28/24 which was consistent with CLL and had no evidence of large cell transformation.    Was hospitalized 6/10/24-6/13/24 for first venetoclax ramp-up.  Then was hospitalized 6/17/24-6/19/24 for second venetoclax ramp-up.     Demar presents to the clinic today (6/24/24) with his wife Quiana for follow up evaluation, blood work, and is due to start third venetoclax ramp today up in the outpatient setting.  Started taking venetoclax 100 mg daily today.  Energy level is lower since starting Venetoclax.  Baseline occasional night sweats about 1-2 times per week, not drenching.  Appetite is good.  Lost about 20-25 pounds in the last few months.      Continues to report soreness to his tongue and has sores to sides of tongue.  States that tongue soreness is getting better.  The tongue pain affects food choices, leans towards softer foods but pain doesn't prevent him from eating overall.  Drinks boost shakes a few times per week.  Drinking required 56 ounces or more of water daily since starting venetoclax.  Using BMX as needed. Completing 10 day course of nystatin today.  Completed 6 day course of dexamethasone taper around today.  Wife reports that Demar's voice is more hoarse and lighter but Demar doesn't notice any changes.  Has constipation and has not had a BM in about 3 days.  Has not tried any interventions to help constipation but will try to use stool softeners he has at home.  Is unable to feel any enlarged lymph nodes and swelling has improved  to neck but is not back to baseline yet.  Midline site left arm is doing okay.  States he will be going to [place] from June 26th-30th with daughter to visit sister-in-law.      Review of Systems   Constitutional:  Positive for diaphoresis, fatigue and unexpected weight change. Negative for appetite change, chills and fever.   HENT:   Positive for mouth sores (improving) and voice change. Negative for sore throat and trouble swallowing.    Respiratory: Negative.     Cardiovascular: Negative.    Gastrointestinal:  Positive for constipation.   Genitourinary: Negative.     Skin: Negative.    Neurological: Negative.    Hematological: Negative.    All other systems reviewed and are negative.    Objective    BSA: 1.93 meters squared  /65 (BP Location: Right arm, Patient Position: Sitting, BP Cuff Size: Adult)   Pulse 91   Temp 35.8 °C (96.4 °F) (Skin)   Resp 16   Wt 79.6 kg (175 lb 8 oz)   SpO2 98%   BMI 27.77 kg/m²      Physical Exam  Vitals reviewed.   Constitutional:       Appearance: Normal appearance.   HENT:      Head: Normocephalic.      Mouth/Throat:      Mouth: Mucous membranes are moist.      Comments: Multiple sites of mucositis on both sides of tongue that are improving per pt and wife.   Coating to tongue  Eyes:      Conjunctiva/sclera: Conjunctivae normal.   Cardiovascular:      Rate and Rhythm: Normal rate and regular rhythm.      Pulses: Normal pulses.      Heart sounds: Normal heart sounds.   Pulmonary:      Effort: Pulmonary effort is normal.      Breath sounds: Normal breath sounds. No wheezing, rhonchi or rales.   Abdominal:      General: Bowel sounds are normal.      Palpations: Abdomen is soft. There is no mass.      Tenderness: There is no abdominal tenderness.   Musculoskeletal:         General: Normal range of motion.      Cervical back: Normal range of motion and neck supple.      Right lower leg: No edema.      Left lower leg: No edema.   Lymphadenopathy:      Comments:  Approximately 3 cm sized mobile non-tender lymph node to right axilla.    Approximately pea sized, mobile submandibular lymph nodes bilaterally, non-tender.   Skin:     General: Skin is warm and dry.      Capillary Refill: Capillary refill takes less than 2 seconds.   Neurological:      General: No focal deficit present.      Mental Status: He is alert and oriented to person, place, and time. Mental status is at baseline.   Psychiatric:         Mood and Affect: Mood normal.         Behavior: Behavior normal.     Performance Status:  Karnofsky Score: 90 - Able to carry on normal activity; minor signs or symptoms of disease     Assessment/Plan       CLL (chronic lymphocytic leukemia) (Multi)    Chronic lymphocytic leukemia with symptomatic adenopathy, excellent response to bendamustine/rituximab; completed 6 cycles in August 2020.    Patient seen  for further management of bulky CLL.  PET imaging shows discordant uptake at the base of the tongue , pulmonary nodules which the patient states are longstanding. There is no evidence of Richters transformation by histology and FISH analysis shows del 11q. Due to symptoms of tongue swelling has started dexamethasone 4 mg po  bid for 10 days for sx relief.     6/3/24 Previously discussed treatment options and patient would prefer to received venetoclax as a time limited approach  and I have recommended the [place] regimen with escalating doses of venetoclax followed by monthly rituxan for 6  monthly doses  and a planned 2 year course of venetoclax. We reviewed side effects of venetoclax and rituxan signed chemo consent.  Given patients tumor burden have schedule inpatient hospitalization to monitor for TLS for at least first two dose ramp ups on 5/10 and 5/17/24  Started allopurinol for TLS prevention    Currently on Venetoclax 20 mg, admit today (06/17/24) for next ramp up to 50 mg daily x 7 days.   No signs of active Tumor Lysis today. Plan to proceed with admission. Next  ramp ups are planned to be done outpatient.   6/24/24:  Laboratory results from today: hgb 14.9, plt 83, WBC 40.7, ANC 8.29. WBC count is trending downward.  No signs of TLS.  Enlarged lymph nodes noted to right axilla and to bilateral submandibular lymph nodes.  However, swelling to neck is greatly improved.  Demar completed dexamethasone 6 day course today.  Started 3rd week venetoclax ramp-up today and is currently taking 100 mg for the next 7 days.  Plan to increase venetoclax dosing to 200 mg for 7 days for week 4 ramp up and 400 mg daily for week 5 ramp up.  Frequent blood work to monitor for TLS.  Obtaining recommended 56 oz of water.  Follow up visit visit on 7/15/24.    WBC   Date Value Ref Range Status   06/24/2024 40.7 (H) 4.4 - 11.3 x10*3/uL Final   06/21/2024 75.4 (HH) 4.4 - 11.3 x10*3/uL Final   06/19/2024 76.6 (HH) 4.4 - 11.3 x10*3/uL Final   06/18/2024 82.1 (HH) 4.4 - 11.3 x10*3/uL Final     Comment:     Previous result verified on 6/17/2024 1553 on specimen/case [id number] called with component WBC Auto for procedure CBC and Auto Differential with value 125.5 x10*3/uL.   06/17/2024 125.5 (HH) 4.4 - 11.3 x10*3/uL Final     Comment:     Previous result verified on 6/17/2024 1553 on specimen/case [id number] called with component WBC Auto for procedure CBC and Auto Differential with value 125.5 x10*3/uL.      Tongue lesion  Lesions on both sides of tongue are improving with time. Unknown etiology of lesions, but predate Venetoclax. Continue to use BMX solution PRN & Nystatin Swish & Swallow 4x daily.     CAD: status post CABG.       Transaminitis: PET imaging shows periportal mass vs lymphadenopathy, hepatitis serologies negative, ultrasound consistent with large periportal node, have ordered CT of liver with contrast as transaminitis worse today. Also sent CMV pcr on blood  6/24/24:  Improvement in liver enzymes: , AST 29, bilirubin 0.6.  Discontinued CT of liver as liver enzymes are  improving.    Central Line:  6/24/24:  Midline to left arm was removed during visit.     RTC:  6/24/24:  Venetoclax ramp up to 100 mg daily for 7 days.  7/2/24:  Venetoclax ramp up to 200 mg daily for 7 days.  7/8/24:  Venetoclax ramp up to 400 mg daily.  Advised patient to have count checks done on 6/28/24 (okay to skip this day due to trip to [place]), 7/1/24, 7/5/24, 7/8/24, and 7/12/24 to monitor for TLS.  7/15/24:  Follow up visit with provider and is due for C2D1 rituximab.    ANNELISE Gant-CNP

## 2024-06-24 ENCOUNTER — LAB (OUTPATIENT)
Dept: LAB | Facility: HOSPITAL | Age: 64
End: 2024-06-24
Payer: COMMERCIAL

## 2024-06-24 ENCOUNTER — OFFICE VISIT (OUTPATIENT)
Dept: HEMATOLOGY/ONCOLOGY | Facility: HOSPITAL | Age: 64
End: 2024-06-24
Payer: COMMERCIAL

## 2024-06-24 VITALS
WEIGHT: 175.5 LBS | DIASTOLIC BLOOD PRESSURE: 65 MMHG | SYSTOLIC BLOOD PRESSURE: 113 MMHG | TEMPERATURE: 96.4 F | OXYGEN SATURATION: 98 % | RESPIRATION RATE: 16 BRPM | BODY MASS INDEX: 27.77 KG/M2 | HEART RATE: 91 BPM

## 2024-06-24 DIAGNOSIS — R74.01 TRANSAMINITIS: ICD-10-CM

## 2024-06-24 DIAGNOSIS — C91.10 CLL (CHRONIC LYMPHOCYTIC LEUKEMIA) (MULTI): ICD-10-CM

## 2024-06-24 DIAGNOSIS — K12.31 MUCOSITIS DUE TO CHEMOTHERAPY: ICD-10-CM

## 2024-06-24 DIAGNOSIS — I25.10 CORONARY ARTERY DISEASE INVOLVING NATIVE HEART WITHOUT ANGINA PECTORIS, UNSPECIFIED VESSEL OR LESION TYPE: Primary | ICD-10-CM

## 2024-06-24 DIAGNOSIS — K14.8 TONGUE LESION: ICD-10-CM

## 2024-06-24 LAB
ALBUMIN SERPL BCP-MCNC: 4.1 G/DL (ref 3.4–5)
ALP SERPL-CCNC: 120 U/L (ref 33–136)
ALT SERPL W P-5'-P-CCNC: 107 U/L (ref 10–52)
ANION GAP SERPL CALC-SCNC: 15 MMOL/L (ref 10–20)
AST SERPL W P-5'-P-CCNC: 29 U/L (ref 9–39)
BASOPHILS # BLD AUTO: 0.01 X10*3/UL (ref 0–0.1)
BASOPHILS NFR BLD AUTO: 0 %
BILIRUB SERPL-MCNC: 0.6 MG/DL (ref 0–1.2)
BUN SERPL-MCNC: 14 MG/DL (ref 6–23)
BURR CELLS BLD QL SMEAR: NORMAL
CALCIUM SERPL-MCNC: 9.2 MG/DL (ref 8.6–10.3)
CHLORIDE SERPL-SCNC: 98 MMOL/L (ref 98–107)
CO2 SERPL-SCNC: 29 MMOL/L (ref 21–32)
CREAT SERPL-MCNC: 0.59 MG/DL (ref 0.5–1.3)
DACRYOCYTES BLD QL SMEAR: NORMAL
EGFRCR SERPLBLD CKD-EPI 2021: >90 ML/MIN/1.73M*2
EOSINOPHIL # BLD AUTO: 0.06 X10*3/UL (ref 0–0.7)
EOSINOPHIL NFR BLD AUTO: 0.1 %
ERYTHROCYTE [DISTWIDTH] IN BLOOD BY AUTOMATED COUNT: 16.7 % (ref 11.5–14.5)
GLUCOSE SERPL-MCNC: 137 MG/DL (ref 74–99)
HCT VFR BLD AUTO: 46.2 % (ref 41–52)
HGB BLD-MCNC: 15.7 G/DL (ref 13.5–17.5)
IMM GRANULOCYTES # BLD AUTO: 0.11 X10*3/UL (ref 0–0.7)
IMM GRANULOCYTES NFR BLD AUTO: 0.3 % (ref 0–0.9)
LDH SERPL L TO P-CCNC: 173 U/L (ref 84–246)
LYMPHOCYTES # BLD AUTO: 32.72 X10*3/UL (ref 1.2–4.8)
LYMPHOCYTES NFR BLD AUTO: 80.4 %
MCH RBC QN AUTO: 28.9 PG (ref 26–34)
MCHC RBC AUTO-ENTMCNC: 34 G/DL (ref 32–36)
MCV RBC AUTO: 85 FL (ref 80–100)
MONOCYTES # BLD AUTO: 0.79 X10*3/UL (ref 0.1–1)
MONOCYTES NFR BLD AUTO: 1.9 %
NEUTROPHILS # BLD AUTO: 7.01 X10*3/UL (ref 1.2–7.7)
NEUTROPHILS NFR BLD AUTO: 17.3 %
NRBC BLD-RTO: 0 /100 WBCS (ref 0–0)
OVALOCYTES BLD QL SMEAR: NORMAL
PHOSPHATE SERPL-MCNC: 3.9 MG/DL (ref 2.5–4.9)
PLATELET # BLD AUTO: 83 X10*3/UL (ref 150–450)
POTASSIUM SERPL-SCNC: 3.9 MMOL/L (ref 3.5–5.3)
PROT SERPL-MCNC: 6.2 G/DL (ref 6.4–8.2)
RBC # BLD AUTO: 5.44 X10*6/UL (ref 4.5–5.9)
RBC MORPH BLD: NORMAL
SODIUM SERPL-SCNC: 138 MMOL/L (ref 136–145)
URATE SERPL-MCNC: 2.8 MG/DL (ref 4–7.5)
WBC # BLD AUTO: 40.7 X10*3/UL (ref 4.4–11.3)

## 2024-06-24 PROCEDURE — 84550 ASSAY OF BLOOD/URIC ACID: CPT

## 2024-06-24 PROCEDURE — 3078F DIAST BP <80 MM HG: CPT

## 2024-06-24 PROCEDURE — 3008F BODY MASS INDEX DOCD: CPT

## 2024-06-24 PROCEDURE — 1126F AMNT PAIN NOTED NONE PRSNT: CPT

## 2024-06-24 PROCEDURE — 3074F SYST BP LT 130 MM HG: CPT

## 2024-06-24 PROCEDURE — 1111F DSCHRG MED/CURRENT MED MERGE: CPT

## 2024-06-24 PROCEDURE — 99215 OFFICE O/P EST HI 40 MIN: CPT

## 2024-06-24 PROCEDURE — 85025 COMPLETE CBC W/AUTO DIFF WBC: CPT

## 2024-06-24 PROCEDURE — 1159F MED LIST DOCD IN RCRD: CPT

## 2024-06-24 PROCEDURE — 83615 LACTATE (LD) (LDH) ENZYME: CPT

## 2024-06-24 PROCEDURE — 84100 ASSAY OF PHOSPHORUS: CPT

## 2024-06-24 PROCEDURE — 1160F RVW MEDS BY RX/DR IN RCRD: CPT

## 2024-06-24 PROCEDURE — 3044F HG A1C LEVEL LT 7.0%: CPT

## 2024-06-24 PROCEDURE — 80053 COMPREHEN METABOLIC PANEL: CPT

## 2024-06-24 PROCEDURE — 36415 COLL VENOUS BLD VENIPUNCTURE: CPT

## 2024-06-24 PROCEDURE — 1123F ACP DISCUSS/DSCN MKR DOCD: CPT

## 2024-06-24 PROCEDURE — 3061F NEG MICROALBUMINURIA REV: CPT

## 2024-06-24 PROCEDURE — 3048F LDL-C <100 MG/DL: CPT

## 2024-06-24 RX ORDER — NYSTATIN 100000 [USP'U]/ML
5 SUSPENSION ORAL 4 TIMES DAILY
Qty: 200 ML | Refills: 0 | Status: SHIPPED | OUTPATIENT
Start: 2024-06-24 | End: 2024-07-04

## 2024-06-24 ASSESSMENT — ENCOUNTER SYMPTOMS
TROUBLE SWALLOWING: 0
SORE THROAT: 0
FATIGUE: 1
DIAPHORESIS: 1
UNEXPECTED WEIGHT CHANGE: 1
NEUROLOGICAL NEGATIVE: 1
FEVER: 0
VOICE CHANGE: 1
RESPIRATORY NEGATIVE: 1
CHILLS: 0
HEMATOLOGIC/LYMPHATIC NEGATIVE: 1
CARDIOVASCULAR NEGATIVE: 1
CONSTIPATION: 1

## 2024-06-24 ASSESSMENT — PAIN SCALES - GENERAL: PAINLEVEL_OUTOF10: 0-NO PAIN

## 2024-06-24 NOTE — PATIENT INSTRUCTIONS
Please obtain blood work at any  laboratory on 6/28/24, 7/1/24, 7/5/24, 7/8/24, and 7/12/24 to monitor for tumor lysis syndrome.

## 2024-06-26 PROBLEM — R74.01 TRANSAMINITIS: Status: ACTIVE | Noted: 2024-06-26

## 2024-07-01 ENCOUNTER — LAB (OUTPATIENT)
Dept: LAB | Facility: LAB | Age: 64
End: 2024-07-01
Payer: COMMERCIAL

## 2024-07-01 DIAGNOSIS — C91.10 CLL (CHRONIC LYMPHOCYTIC LEUKEMIA) (MULTI): ICD-10-CM

## 2024-07-01 LAB
ALBUMIN SERPL BCP-MCNC: 3.8 G/DL (ref 3.4–5)
ALP SERPL-CCNC: 91 U/L (ref 33–136)
ALT SERPL W P-5'-P-CCNC: 58 U/L (ref 10–52)
ANION GAP SERPL CALC-SCNC: 11 MMOL/L (ref 10–20)
AST SERPL W P-5'-P-CCNC: 25 U/L (ref 9–39)
BASOPHILS # BLD MANUAL: 0 X10*3/UL (ref 0–0.1)
BASOPHILS NFR BLD MANUAL: 0 %
BILIRUB SERPL-MCNC: 1.1 MG/DL (ref 0–1.2)
BUN SERPL-MCNC: 10 MG/DL (ref 6–23)
BURR CELLS BLD QL SMEAR: ABNORMAL
CALCIUM SERPL-MCNC: 8.4 MG/DL (ref 8.6–10.3)
CHLORIDE SERPL-SCNC: 103 MMOL/L (ref 98–107)
CO2 SERPL-SCNC: 26 MMOL/L (ref 21–32)
CREAT SERPL-MCNC: 0.75 MG/DL (ref 0.5–1.3)
DACRYOCYTES BLD QL SMEAR: ABNORMAL
EGFRCR SERPLBLD CKD-EPI 2021: >90 ML/MIN/1.73M*2
EOSINOPHIL # BLD MANUAL: 0 X10*3/UL (ref 0–0.7)
EOSINOPHIL NFR BLD MANUAL: 0 %
ERYTHROCYTE [DISTWIDTH] IN BLOOD BY AUTOMATED COUNT: 15.7 % (ref 11.5–14.5)
GLUCOSE SERPL-MCNC: 175 MG/DL (ref 74–99)
HCT VFR BLD AUTO: 41.8 % (ref 41–52)
HGB BLD-MCNC: 14.2 G/DL (ref 13.5–17.5)
IMM GRANULOCYTES # BLD AUTO: 0.03 X10*3/UL (ref 0–0.7)
IMM GRANULOCYTES NFR BLD AUTO: 0.3 % (ref 0–0.9)
LDH SERPL L TO P-CCNC: 181 U/L (ref 84–246)
LYMPHOCYTES # BLD MANUAL: 8.93 X10*3/UL (ref 1.2–4.8)
LYMPHOCYTES NFR BLD MANUAL: 75 %
MCH RBC QN AUTO: 28.6 PG (ref 26–34)
MCHC RBC AUTO-ENTMCNC: 34 G/DL (ref 32–36)
MCV RBC AUTO: 84 FL (ref 80–100)
MONOCYTES # BLD MANUAL: 0.24 X10*3/UL (ref 0.1–1)
MONOCYTES NFR BLD MANUAL: 2 %
NEUTS SEG # BLD MANUAL: 2.74 X10*3/UL (ref 1.2–7)
NEUTS SEG NFR BLD MANUAL: 23 %
NRBC BLD-RTO: 0.2 /100 WBCS (ref 0–0)
OVALOCYTES BLD QL SMEAR: ABNORMAL
PHOSPHATE SERPL-MCNC: 2.1 MG/DL (ref 2.5–4.9)
PLATELET # BLD AUTO: 126 X10*3/UL (ref 150–450)
POTASSIUM SERPL-SCNC: 3.3 MMOL/L (ref 3.5–5.3)
PROT SERPL-MCNC: 5.4 G/DL (ref 6.4–8.2)
RBC # BLD AUTO: 4.97 X10*6/UL (ref 4.5–5.9)
RBC MORPH BLD: ABNORMAL
SODIUM SERPL-SCNC: 137 MMOL/L (ref 136–145)
TOTAL CELLS COUNTED BLD: 100
URATE SERPL-MCNC: 3.1 MG/DL (ref 4–7.5)
WBC # BLD AUTO: 11.9 X10*3/UL (ref 4.4–11.3)

## 2024-07-01 PROCEDURE — 85007 BL SMEAR W/DIFF WBC COUNT: CPT

## 2024-07-01 PROCEDURE — 83615 LACTATE (LD) (LDH) ENZYME: CPT

## 2024-07-01 PROCEDURE — 85027 COMPLETE CBC AUTOMATED: CPT

## 2024-07-01 PROCEDURE — 84550 ASSAY OF BLOOD/URIC ACID: CPT

## 2024-07-01 PROCEDURE — 80053 COMPREHEN METABOLIC PANEL: CPT

## 2024-07-01 PROCEDURE — 84100 ASSAY OF PHOSPHORUS: CPT

## 2024-07-03 ENCOUNTER — SPECIALTY PHARMACY (OUTPATIENT)
Dept: PHARMACY | Facility: CLINIC | Age: 64
End: 2024-07-03

## 2024-07-03 ENCOUNTER — PHARMACY VISIT (OUTPATIENT)
Dept: PHARMACY | Facility: CLINIC | Age: 64
End: 2024-07-03
Payer: COMMERCIAL

## 2024-07-03 PROCEDURE — RXMED WILLOW AMBULATORY MEDICATION CHARGE

## 2024-07-05 ENCOUNTER — LAB (OUTPATIENT)
Dept: LAB | Facility: LAB | Age: 64
End: 2024-07-05
Payer: COMMERCIAL

## 2024-07-05 DIAGNOSIS — C91.10 CLL (CHRONIC LYMPHOCYTIC LEUKEMIA) (MULTI): ICD-10-CM

## 2024-07-05 LAB
ALBUMIN SERPL BCP-MCNC: 3.6 G/DL (ref 3.4–5)
ALP SERPL-CCNC: 106 U/L (ref 33–136)
ALT SERPL W P-5'-P-CCNC: 46 U/L (ref 10–52)
ANION GAP SERPL CALC-SCNC: 11 MMOL/L (ref 10–20)
AST SERPL W P-5'-P-CCNC: 22 U/L (ref 9–39)
BASOPHILS # BLD AUTO: 0.01 X10*3/UL (ref 0–0.1)
BASOPHILS NFR BLD AUTO: 0.2 %
BILIRUB SERPL-MCNC: 0.7 MG/DL (ref 0–1.2)
BUN SERPL-MCNC: 9 MG/DL (ref 6–23)
CALCIUM SERPL-MCNC: 8.3 MG/DL (ref 8.6–10.3)
CHLORIDE SERPL-SCNC: 104 MMOL/L (ref 98–107)
CO2 SERPL-SCNC: 27 MMOL/L (ref 21–32)
CREAT SERPL-MCNC: 0.61 MG/DL (ref 0.5–1.3)
EGFRCR SERPLBLD CKD-EPI 2021: >90 ML/MIN/1.73M*2
EOSINOPHIL # BLD AUTO: 0.02 X10*3/UL (ref 0–0.7)
EOSINOPHIL NFR BLD AUTO: 0.3 %
ERYTHROCYTE [DISTWIDTH] IN BLOOD BY AUTOMATED COUNT: 15.3 % (ref 11.5–14.5)
GLUCOSE SERPL-MCNC: 109 MG/DL (ref 74–99)
HCT VFR BLD AUTO: 40.6 % (ref 41–52)
HGB BLD-MCNC: 13.7 G/DL (ref 13.5–17.5)
IMM GRANULOCYTES # BLD AUTO: 0.03 X10*3/UL (ref 0–0.7)
IMM GRANULOCYTES NFR BLD AUTO: 0.5 % (ref 0–0.9)
LDH SERPL L TO P-CCNC: 196 U/L (ref 84–246)
LYMPHOCYTES # BLD AUTO: 2.9 X10*3/UL (ref 1.2–4.8)
LYMPHOCYTES NFR BLD AUTO: 49.8 %
MCH RBC QN AUTO: 28.2 PG (ref 26–34)
MCHC RBC AUTO-ENTMCNC: 33.7 G/DL (ref 32–36)
MCV RBC AUTO: 84 FL (ref 80–100)
MONOCYTES # BLD AUTO: 0.66 X10*3/UL (ref 0.1–1)
MONOCYTES NFR BLD AUTO: 11.3 %
NEUTROPHILS # BLD AUTO: 2.2 X10*3/UL (ref 1.2–7.7)
NEUTROPHILS NFR BLD AUTO: 37.9 %
NRBC BLD-RTO: 0 /100 WBCS (ref 0–0)
PHOSPHATE SERPL-MCNC: 2.5 MG/DL (ref 2.5–4.9)
PLATELET # BLD AUTO: 146 X10*3/UL (ref 150–450)
POTASSIUM SERPL-SCNC: 3.3 MMOL/L (ref 3.5–5.3)
PROT SERPL-MCNC: 5.2 G/DL (ref 6.4–8.2)
RBC # BLD AUTO: 4.86 X10*6/UL (ref 4.5–5.9)
SODIUM SERPL-SCNC: 139 MMOL/L (ref 136–145)
URATE SERPL-MCNC: 2.6 MG/DL (ref 4–7.5)
WBC # BLD AUTO: 5.8 X10*3/UL (ref 4.4–11.3)

## 2024-07-05 PROCEDURE — 83615 LACTATE (LD) (LDH) ENZYME: CPT

## 2024-07-05 PROCEDURE — 85025 COMPLETE CBC W/AUTO DIFF WBC: CPT

## 2024-07-05 PROCEDURE — 36415 COLL VENOUS BLD VENIPUNCTURE: CPT

## 2024-07-05 PROCEDURE — 84550 ASSAY OF BLOOD/URIC ACID: CPT

## 2024-07-05 PROCEDURE — 80053 COMPREHEN METABOLIC PANEL: CPT

## 2024-07-05 PROCEDURE — 84100 ASSAY OF PHOSPHORUS: CPT

## 2024-07-08 ENCOUNTER — APPOINTMENT (OUTPATIENT)
Dept: OTOLARYNGOLOGY | Facility: CLINIC | Age: 64
End: 2024-07-08
Payer: COMMERCIAL

## 2024-07-08 ENCOUNTER — LAB (OUTPATIENT)
Dept: LAB | Facility: LAB | Age: 64
End: 2024-07-08
Payer: COMMERCIAL

## 2024-07-08 ENCOUNTER — TELEPHONE (OUTPATIENT)
Dept: HEMATOLOGY/ONCOLOGY | Facility: HOSPITAL | Age: 64
End: 2024-07-08

## 2024-07-08 DIAGNOSIS — E87.6 HYPOKALEMIA: ICD-10-CM

## 2024-07-08 DIAGNOSIS — E11.9 CONTROLLED TYPE 2 DIABETES MELLITUS WITHOUT COMPLICATION, WITHOUT LONG-TERM CURRENT USE OF INSULIN (MULTI): ICD-10-CM

## 2024-07-08 DIAGNOSIS — D69.6 THROMBOCYTOPENIA (CMS-HCC): ICD-10-CM

## 2024-07-08 DIAGNOSIS — I10 ESSENTIAL HYPERTENSION: ICD-10-CM

## 2024-07-08 DIAGNOSIS — C91.10 CLL (CHRONIC LYMPHOCYTIC LEUKEMIA) (MULTI): ICD-10-CM

## 2024-07-08 DIAGNOSIS — E87.6 HYPOKALEMIA: Primary | ICD-10-CM

## 2024-07-08 DIAGNOSIS — E78.2 MIXED HYPERLIPIDEMIA: ICD-10-CM

## 2024-07-08 DIAGNOSIS — E55.9 VITAMIN D DEFICIENCY: ICD-10-CM

## 2024-07-08 LAB
25(OH)D3 SERPL-MCNC: 37 NG/ML (ref 30–100)
ALBUMIN SERPL BCP-MCNC: 3.8 G/DL (ref 3.4–5)
ALP SERPL-CCNC: 101 U/L (ref 33–136)
ALT SERPL W P-5'-P-CCNC: 41 U/L (ref 10–52)
ANION GAP SERPL CALC-SCNC: 15 MMOL/L (ref 10–20)
AST SERPL W P-5'-P-CCNC: 20 U/L (ref 9–39)
BILIRUB SERPL-MCNC: 0.9 MG/DL (ref 0–1.2)
BUN SERPL-MCNC: 9 MG/DL (ref 6–23)
CALCIUM SERPL-MCNC: 8.8 MG/DL (ref 8.6–10.3)
CHLORIDE SERPL-SCNC: 102 MMOL/L (ref 98–107)
CHOLEST SERPL-MCNC: 151 MG/DL (ref 0–199)
CHOLESTEROL/HDL RATIO: 4.3
CO2 SERPL-SCNC: 25 MMOL/L (ref 21–32)
CREAT SERPL-MCNC: 0.66 MG/DL (ref 0.5–1.3)
EGFRCR SERPLBLD CKD-EPI 2021: >90 ML/MIN/1.73M*2
ERYTHROCYTE [DISTWIDTH] IN BLOOD BY AUTOMATED COUNT: 15.5 % (ref 11.5–14.5)
GLUCOSE SERPL-MCNC: 185 MG/DL (ref 74–99)
HCT VFR BLD AUTO: 40.8 % (ref 41–52)
HDLC SERPL-MCNC: 35.5 MG/DL
HGB BLD-MCNC: 13.7 G/DL (ref 13.5–17.5)
LDLC SERPL CALC-MCNC: 93 MG/DL
MCH RBC QN AUTO: 28.1 PG (ref 26–34)
MCHC RBC AUTO-ENTMCNC: 33.6 G/DL (ref 32–36)
MCV RBC AUTO: 84 FL (ref 80–100)
NON HDL CHOLESTEROL: 116 MG/DL (ref 0–149)
NRBC BLD-RTO: 0 /100 WBCS (ref 0–0)
PLATELET # BLD AUTO: 167 X10*3/UL (ref 150–450)
POTASSIUM SERPL-SCNC: 3.4 MMOL/L (ref 3.5–5.3)
PROT SERPL-MCNC: 5.4 G/DL (ref 6.4–8.2)
RBC # BLD AUTO: 4.88 X10*6/UL (ref 4.5–5.9)
SODIUM SERPL-SCNC: 139 MMOL/L (ref 136–145)
TRIGL SERPL-MCNC: 115 MG/DL (ref 0–149)
VLDL: 23 MG/DL (ref 0–40)
WBC # BLD AUTO: 5.6 X10*3/UL (ref 4.4–11.3)

## 2024-07-08 PROCEDURE — 82306 VITAMIN D 25 HYDROXY: CPT

## 2024-07-08 PROCEDURE — 83036 HEMOGLOBIN GLYCOSYLATED A1C: CPT

## 2024-07-08 PROCEDURE — 80053 COMPREHEN METABOLIC PANEL: CPT

## 2024-07-08 PROCEDURE — 83721 ASSAY OF BLOOD LIPOPROTEIN: CPT

## 2024-07-08 PROCEDURE — 36415 COLL VENOUS BLD VENIPUNCTURE: CPT

## 2024-07-08 PROCEDURE — 80061 LIPID PANEL: CPT

## 2024-07-08 PROCEDURE — 85027 COMPLETE CBC AUTOMATED: CPT

## 2024-07-08 RX ORDER — POTASSIUM CHLORIDE 20 MEQ/1
20 TABLET, EXTENDED RELEASE ORAL DAILY
Qty: 7 TABLET | Refills: 0 | Status: SHIPPED | OUTPATIENT
Start: 2024-07-08 | End: 2024-07-15

## 2024-07-09 LAB
EST. AVERAGE GLUCOSE BLD GHB EST-MCNC: 151 MG/DL
HBA1C MFR BLD: 6.9 %
LDLC SERPL DIRECT ASSAY-MCNC: 107 MG/DL (ref 0–129)

## 2024-07-11 ENCOUNTER — TELEPHONE (OUTPATIENT)
Dept: HEMATOLOGY/ONCOLOGY | Facility: HOSPITAL | Age: 64
End: 2024-07-11
Payer: COMMERCIAL

## 2024-07-11 DIAGNOSIS — C91.10 CLL (CHRONIC LYMPHOCYTIC LEUKEMIA) (MULTI): ICD-10-CM

## 2024-07-11 RX ORDER — ALLOPURINOL 300 MG/1
300 TABLET ORAL DAILY
Qty: 90 TABLET | Refills: 0 | Status: SHIPPED | OUTPATIENT
Start: 2024-07-11 | End: 2024-10-09

## 2024-07-12 ENCOUNTER — LAB (OUTPATIENT)
Dept: LAB | Facility: LAB | Age: 64
End: 2024-07-12
Payer: COMMERCIAL

## 2024-07-12 ENCOUNTER — TELEPHONE (OUTPATIENT)
Dept: ADMISSION | Facility: HOSPITAL | Age: 64
End: 2024-07-12

## 2024-07-12 NOTE — TELEPHONE ENCOUNTER
Bud went to have his labs drawn, however, the dates on the orders state 8/12 so they would not draw him. He decided to leave and is asking if OK to wait until Monday to have his labs drawn? He typically gets them every Monday & Friday.

## 2024-07-12 NOTE — TELEPHONE ENCOUNTER
Bud notified OK to wait until Monday per Dr. Luis. No further questions or concerns at this time.

## 2024-07-13 ENCOUNTER — HOME INFUSION (OUTPATIENT)
Dept: INFUSION THERAPY | Age: 64
End: 2024-07-13
Payer: COMMERCIAL

## 2024-07-13 NOTE — PROGRESS NOTES
Review of chart. Midline removed 6/24/24. Discharge from [facility] pharmacy service.

## 2024-07-14 RX ORDER — EPINEPHRINE 0.3 MG/.3ML
0.3 INJECTION SUBCUTANEOUS EVERY 5 MIN PRN
OUTPATIENT
Start: 2024-08-12

## 2024-07-14 RX ORDER — DIPHENHYDRAMINE HYDROCHLORIDE 50 MG/ML
50 INJECTION INTRAMUSCULAR; INTRAVENOUS AS NEEDED
OUTPATIENT
Start: 2024-08-12

## 2024-07-14 RX ORDER — PROCHLORPERAZINE MALEATE 10 MG
10 TABLET ORAL EVERY 6 HOURS PRN
OUTPATIENT
Start: 2024-08-12

## 2024-07-14 RX ORDER — PROCHLORPERAZINE EDISYLATE 5 MG/ML
10 INJECTION INTRAMUSCULAR; INTRAVENOUS EVERY 6 HOURS PRN
OUTPATIENT
Start: 2024-08-12

## 2024-07-14 RX ORDER — DIPHENHYDRAMINE HCL 50 MG
50 CAPSULE ORAL ONCE
OUTPATIENT
Start: 2024-08-12

## 2024-07-14 RX ORDER — ACETAMINOPHEN 325 MG/1
650 TABLET ORAL ONCE
OUTPATIENT
Start: 2024-08-12

## 2024-07-14 RX ORDER — FAMOTIDINE 10 MG/ML
20 INJECTION INTRAVENOUS ONCE AS NEEDED
OUTPATIENT
Start: 2024-08-12

## 2024-07-14 RX ORDER — ALBUTEROL SULFATE 0.83 MG/ML
3 SOLUTION RESPIRATORY (INHALATION) AS NEEDED
OUTPATIENT
Start: 2024-08-12

## 2024-07-14 ASSESSMENT — ENCOUNTER SYMPTOMS
CHILLS: 0
DIAPHORESIS: 1
CARDIOVASCULAR NEGATIVE: 1
RESPIRATORY NEGATIVE: 1
NEUROLOGICAL NEGATIVE: 1
FATIGUE: 1
FEVER: 0
HEMATOLOGIC/LYMPHATIC NEGATIVE: 1
UNEXPECTED WEIGHT CHANGE: 1
APPETITE CHANGE: 0

## 2024-07-14 NOTE — PROGRESS NOTES
"Patient ID: Demar Pittman \"Shraddha" is a 63 y.o. male.    Oncology History Overview Note   B-CLL diagnosed in 2015, SUÁREZ stage 0; WBC initially 16.3 with ALC 12.0, hemoglobin 15.1 and platelets 154,000.  The cells expressed CD5 and A light chains.   CD38 and  Zap-70 were negative. Cytogenetic studies by FISH were inconclusive.  WBC has slowly rising but he maintains good marrow reserve and has not required treatment.  CT chest done 3/9/17 showed extensive cervical, axillary and submental adenopathy; largest  node on the left measured 2.3 x 1.9 cm.  Small (less than 9 mm) pulmonology nodules were again seen; relatively stable.  Abdominal nodes slightly increased.    17: WBC 65.4, ALC 62.8; becoming more symptomatic.  17 : WBC 77.6.  ALC 72.2.  Hemoglobin and platelets were normal.  18 WBC: 69.3. A.9. Hgb 15.2. Plt: 148,000  18: WBC 88.2.  ALC 82.9.  Hemoglobin 14.2.  Platelets 170,000.  Adenopathy stable.  18: WBC 77.3.  ALC 66.5.  Hemoglobin 13.8.  Platelets 143,000.  Adenopathy slightly increased.  18: WBC 92.0.  ALC 86.5.  Hemoglobin 14.1.  Platelets 157,000.  Progressing symptomatic adenopathy.  Treatment options discussed.   18 completed rituximab weekly x 4.  10/9/19: CBC: WBC 16.5.  ALC 12.4.  Hemoglobin 14.6.  Platelets 148,000.  Worsening adenopathy.  3/17/20: Started bendamustine/rituximab after multiple thoracentesis for malignant pleural effusion with CLL  20: #2 bendamustine/rituximab.  20: #3 BR  20: WBC 4.7.  Hemoglobin 12.9.  Platelets 189,000.  20: #4 BR  20: #5 BR  20: WBC 1.7. hgb 11.9. Platelets 176.  20: #6/6 bendamustine/rituximab  2023, WBC count 62, hemoglobin 14.9, platelets 143, absolute lymphocyte count 50.2  January 15, 2024, WBC count 40.5, hemoglobin 15.0, platelets 175  2/10/24 , WBC count 31.5, hemoglobin 14.9, platelets 131  3/14/24 , WBC count 13.9, hemoglobin 14.9, platelets " 122  3/14/24, physical examination positive for bilateral cervical lymphadenopathy, bilateral axillary lymphadenopathy  PET imaging 3/29/2024 intense uptake in tongue base ( S/P) tonsillectomy. Multiple enlarged lymph nodes in cervical, parotid, nodes, multipl pleural based and parenchymal non FDG avid pulmonary nodes, enlarged axillary lymph nodes, mediastinal and bilateral hilar lymph nodes, periportal lymph nodes, iliac nodes, inguinal nodes.     -FISH panel  of peripheral blood 2024 Pos for del 11q, negative for t(11,14), trisomy 12, monosomy 13 and deletion of 17p.     -Needle biopsy of posterior tongue mass 24 involvement by chronic lymphocytic leukemia/small lymphocytic lymphoma no evidence of transformation     CLL (chronic lymphocytic leukemia) (Multi)   2023 Initial Diagnosis    CLL (chronic lymphocytic leukemia) (CMS/[facility])     1/15/2024 - 1/15/2024 Chemotherapy    Bendamustine + RiTUXimab, 28 Day Cycles     2024 - 2024 Chemotherapy    (INPT) Venetoclax, 28 Day Cycles      2024 -  Chemotherapy    Venetoclax + RiTUXimab, 28 Day Cycles        Past medical history: CLL/SLL (2015) as described above, status post rituximab, 6 cycles of bendamustine/rituximab (3/2020-2020).  Excellent response to treatment.       History of CABGx3 19, vertigo, XENA, remote tonsillectomy, retinal tear, stable pulmonary nodules, hyperlipidemia, hypertension, sinusitis.  Unremarkable cardiac catheterization 21. Hyperglycemia, torn retina, cataract. Hx of afib since CABG.      Family medical history: Mother  of myeloma in her 70s and his father  of Hodgkin lymphoma at age 39.     Social history: Rare alcohol intake.  Former smoker but quit in .  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 2 biological children, one son with downs syndrome.     Subjective    HPI    Patient of Dr. Wang with a long history of CLL initially seen  with his wife and son 5/9/24 for a second opinion for treatment of CLL.   He received BR completing in 8/2020.  In December 2023 after COVID infection and he had increased lymphocytosis which resolved, subsequently noted to have lymphadenopathy confirmed on PET scan.  Ibrutinib recommended in March 2024, but patient has not yet started this because he was  leary of side effects.      Due to discordance in uptake at base of tongue on PET imaging, tongue biopsy was done 5/28/24 which was consistent with CLL and had no evidence of large cell transformation.    Was hospitalized 6/10/24-6/13/24 for first venetoclax ramp-up.  Then was hospitalized 6/17/24-6/19/24 for second venetoclax ramp-up.     Demar presents to the clinic today (7/15/24) with his wife Quiana for follow up evaluation, blood work, and is scheduled to receive C1D1 rituximab.  He is on venetoclax 400 mg daily.  Receiving venetoclax okay from pharmacy.      Had good trip to [place] with daughter at the end of June. Energy level is lower at times, gets bursts of energy at times.  Sleeping more.  Appetite is good and has gained some weight back.  Drinks about 2-3 bottles of boosts per week when not feeling like eating breakfast.  Has throat soreness and has sore to left side of his mouth.  Went and saw oral surgeon last Friday and had adjustments to his partial so wouldn't irritate his mouth sore spots.  No issues swallowing food but has issues swallowing with larger pills like venetoclax and potassium chloride.  No voice hoarseness.  Swelling has greatly improved to neck and he does not feel any enlarged lymph nodes.      Review of Systems   Constitutional:  Positive for diaphoresis, fatigue and unexpected weight change. Negative for appetite change, chills and fever.   HENT:   Positive for mouth sores, sore throat and trouble swallowing. Negative for voice change.    Respiratory: Negative.     Cardiovascular: Negative.    Gastrointestinal: Negative.     Genitourinary: Negative.     Musculoskeletal: Negative.    Skin: Negative.    Neurological: Negative.    Hematological: Negative.    All other systems reviewed and are negative.    Objective    BSA: There is no height or weight on file to calculate BSA.  There were no vitals taken for this visit.     Physical Exam  Vitals reviewed.   Constitutional:       Appearance: Normal appearance.   HENT:      Head: Normocephalic.      Mouth/Throat:      Mouth: Mucous membranes are moist.      Comments: Mouth sore to left side of cheek.    Eyes:      Conjunctiva/sclera: Conjunctivae normal.   Cardiovascular:      Rate and Rhythm: Normal rate and regular rhythm.      Pulses: Normal pulses.      Heart sounds: Normal heart sounds.   Pulmonary:      Effort: Pulmonary effort is normal.      Breath sounds: Normal breath sounds. No wheezing, rhonchi or rales.   Abdominal:      General: Bowel sounds are normal.      Palpations: Abdomen is soft. There is no mass.      Tenderness: There is no abdominal tenderness.   Musculoskeletal:         General: Normal range of motion.      Cervical back: Normal range of motion and neck supple.      Right lower leg: No edema.      Left lower leg: No edema.   Lymphadenopathy:      Comments: Approximately 3 cm sized mobile non-tender lymph node to right axilla.    Approximately pea sized, mobile submandibular lymph nodes bilaterally, non-tender.   Skin:     General: Skin is warm and dry.      Capillary Refill: Capillary refill takes less than 2 seconds.   Neurological:      General: No focal deficit present.      Mental Status: He is alert and oriented to person, place, and time. Mental status is at baseline.   Psychiatric:         Mood and Affect: Mood normal.         Behavior: Behavior normal.     Performance Status:  Karnofsky Score: 90 - Able to carry on normal activity; minor signs or symptoms of disease     Assessment/Plan       CLL (chronic lymphocytic leukemia) (Multi)    Chronic lymphocytic  leukemia with symptomatic adenopathy, excellent response to bendamustine/rituximab; completed 6 cycles in August 2020.    Patient seen  for further management of bulky CLL.  PET imaging shows discordant uptake at the base of the tongue , pulmonary nodules which the patient states are longstanding. There is no evidence of Richters transformation by histology and FISH analysis shows del 11q. Due to symptoms of tongue swelling has started dexamethasone 4 mg po bid for 10 days for sx relief.     6/3/24 Previously discussed treatment options and patient would prefer to received venetoclax as a time limited approach and I have recommended the [place] regimen with escalating doses of venetoclax followed by monthly rituxan for 6  monthly doses  and a planned 2 year course of venetoclax. We reviewed side effects of venetoclax and rituxan signed chemo consent.  Given patients tumor burden have schedule inpatient hospitalization to monitor for TLS for at least first two dose ramp ups on 5/10 and 5/17/24  Started allopurinol for TLS prevention    6/17/24:  Hospital admit for Venetoclax ramp up to 50 mg daily x 7 days.   Next ramp ups are planned to be done outpatient.   6/24/24:  Demar completed dexamethasone 6 day course today.  Started 3rd week venetoclax ramp-up 100 mg for the next 7 days.  Plan to increase venetoclax dosing to 200 mg for 7 days for week 4 ramp up and 400 mg daily for week 5 ramp up.  Frequent blood work to monitor for TLS.  Obtaining recommended 56 oz of water.   7/15/24:  WBC count decreased to 6.3 today, down from 125.5 (6/17/24).  No signs of TLS. Swelling continues to decrease to neck, pea size left clavicular LN palpable.  Received C1D1 rituximab today with reaction of facial flushing, chills, and mild rigors.  Received famotidine 20 mg,  solu-medrol 40 mg, and benadryl 25 mg and was able to complete remaining rituximab dose without issues.  Continue venetoclax 400 mg daily.  Advised to stop  allopurinol once current prescription is complete.  Follow up visit on 8/12/24 for C2 rituximab.    WBC   Date Value Ref Range Status   07/15/2024 6.3 4.4 - 11.3 x10*3/uL Final   07/08/2024 5.6 4.4 - 11.3 x10*3/uL Final   07/05/2024 5.8 4.4 - 11.3 x10*3/uL Final   07/01/2024 11.9 (H) 4.4 - 11.3 x10*3/uL Final   06/24/2024 40.7 (H) 4.4 - 11.3 x10*3/uL Final      Mouth Sores:  Lesions on both sides of tongue are improving with time. Unknown etiology of lesions, but predate Venetoclax.   7/15/24:  Tongue lesions healed, noted lesion to left buccal surface.  Continue to use BMX solution PRN & Nystatin Swish & Swallow 4x daily.     CAD: status post CABG.       Transaminitis: PET imaging shows periportal mass vs lymphadenopathy, hepatitis serologies negative, ultrasound consistent with large periportal node, have ordered CT of liver with contrast as transaminitis worse today. Also sent CMV pcr on blood  6/24/24:  Improvement in liver enzymes: , AST 29, bilirubin 0.6.  Discontinued CT of liver as liver enzymes are improving.  7/15/24:  ALT and AST stabilized.    Hypokalemia:  7/15/24:  Potassium 3.4.  Prescribed potassium chloride 20 meq daily x 7 days. Discussed foods high in potassium.  Monitoring.     RTC:  8/12/24:  Follow up visit with provider and is due for C2D1 rituximab.    Mayelin Bailon, APRN-CNP

## 2024-07-15 ENCOUNTER — INFUSION (OUTPATIENT)
Dept: HEMATOLOGY/ONCOLOGY | Facility: HOSPITAL | Age: 64
End: 2024-07-15
Payer: COMMERCIAL

## 2024-07-15 ENCOUNTER — OFFICE VISIT (OUTPATIENT)
Dept: HEMATOLOGY/ONCOLOGY | Facility: HOSPITAL | Age: 64
End: 2024-07-15
Payer: COMMERCIAL

## 2024-07-15 VITALS
DIASTOLIC BLOOD PRESSURE: 69 MMHG | SYSTOLIC BLOOD PRESSURE: 127 MMHG | WEIGHT: 177.7 LBS | HEART RATE: 82 BPM | OXYGEN SATURATION: 98 % | RESPIRATION RATE: 16 BRPM | TEMPERATURE: 97.7 F | BODY MASS INDEX: 28.12 KG/M2

## 2024-07-15 DIAGNOSIS — Z95.1 S/P CABG X 3: ICD-10-CM

## 2024-07-15 DIAGNOSIS — C91.10 CLL (CHRONIC LYMPHOCYTIC LEUKEMIA) (MULTI): Primary | ICD-10-CM

## 2024-07-15 DIAGNOSIS — I25.10 CORONARY ARTERY DISEASE INVOLVING NATIVE HEART WITHOUT ANGINA PECTORIS, UNSPECIFIED VESSEL OR LESION TYPE: ICD-10-CM

## 2024-07-15 DIAGNOSIS — C91.10 CLL (CHRONIC LYMPHOCYTIC LEUKEMIA) (MULTI): ICD-10-CM

## 2024-07-15 DIAGNOSIS — K13.79 MOUTH SORE: ICD-10-CM

## 2024-07-15 DIAGNOSIS — E87.6 HYPOKALEMIA: ICD-10-CM

## 2024-07-15 LAB
ALBUMIN SERPL BCP-MCNC: 4.1 G/DL (ref 3.4–5)
ALP SERPL-CCNC: 102 U/L (ref 33–136)
ALT SERPL W P-5'-P-CCNC: 44 U/L (ref 10–52)
ANION GAP SERPL CALC-SCNC: 13 MMOL/L (ref 10–20)
AST SERPL W P-5'-P-CCNC: 23 U/L (ref 9–39)
BASOPHILS # BLD AUTO: 0.03 X10*3/UL (ref 0–0.1)
BASOPHILS NFR BLD AUTO: 0.5 %
BILIRUB SERPL-MCNC: 0.8 MG/DL (ref 0–1.2)
BUN SERPL-MCNC: 9 MG/DL (ref 6–23)
CALCIUM SERPL-MCNC: 9.2 MG/DL (ref 8.6–10.3)
CHLORIDE SERPL-SCNC: 99 MMOL/L (ref 98–107)
CO2 SERPL-SCNC: 28 MMOL/L (ref 21–32)
CREAT SERPL-MCNC: 0.65 MG/DL (ref 0.5–1.3)
EGFRCR SERPLBLD CKD-EPI 2021: >90 ML/MIN/1.73M*2
EOSINOPHIL # BLD AUTO: 0.01 X10*3/UL (ref 0–0.7)
EOSINOPHIL NFR BLD AUTO: 0.2 %
ERYTHROCYTE [DISTWIDTH] IN BLOOD BY AUTOMATED COUNT: 15.7 % (ref 11.5–14.5)
GLUCOSE SERPL-MCNC: 135 MG/DL (ref 74–99)
HCT VFR BLD AUTO: 38.5 % (ref 41–52)
HGB BLD-MCNC: 13.3 G/DL (ref 13.5–17.5)
IMM GRANULOCYTES # BLD AUTO: 0.03 X10*3/UL (ref 0–0.7)
IMM GRANULOCYTES NFR BLD AUTO: 0.5 % (ref 0–0.9)
LDH SERPL L TO P-CCNC: 182 U/L (ref 84–246)
LYMPHOCYTES # BLD AUTO: 1.45 X10*3/UL (ref 1.2–4.8)
LYMPHOCYTES NFR BLD AUTO: 22.9 %
MCH RBC QN AUTO: 28.7 PG (ref 26–34)
MCHC RBC AUTO-ENTMCNC: 34.5 G/DL (ref 32–36)
MCV RBC AUTO: 83 FL (ref 80–100)
MONOCYTES # BLD AUTO: 0.98 X10*3/UL (ref 0.1–1)
MONOCYTES NFR BLD AUTO: 15.5 %
NEUTROPHILS # BLD AUTO: 3.83 X10*3/UL (ref 1.2–7.7)
NEUTROPHILS NFR BLD AUTO: 60.4 %
NRBC BLD-RTO: 0 /100 WBCS (ref 0–0)
PHOSPHATE SERPL-MCNC: 2.5 MG/DL (ref 2.5–4.9)
PLATELET # BLD AUTO: 223 X10*3/UL (ref 150–450)
POTASSIUM SERPL-SCNC: 3.4 MMOL/L (ref 3.5–5.3)
PROT SERPL-MCNC: 6.4 G/DL (ref 6.4–8.2)
RBC # BLD AUTO: 4.63 X10*6/UL (ref 4.5–5.9)
SODIUM SERPL-SCNC: 137 MMOL/L (ref 136–145)
URATE SERPL-MCNC: 3.4 MG/DL (ref 4–7.5)
WBC # BLD AUTO: 6.3 X10*3/UL (ref 4.4–11.3)

## 2024-07-15 PROCEDURE — 96413 CHEMO IV INFUSION 1 HR: CPT

## 2024-07-15 PROCEDURE — 1123F ACP DISCUSS/DSCN MKR DOCD: CPT

## 2024-07-15 PROCEDURE — 3049F LDL-C 100-129 MG/DL: CPT

## 2024-07-15 PROCEDURE — 1111F DSCHRG MED/CURRENT MED MERGE: CPT

## 2024-07-15 PROCEDURE — 96375 TX/PRO/DX INJ NEW DRUG ADDON: CPT | Mod: INF

## 2024-07-15 PROCEDURE — 1160F RVW MEDS BY RX/DR IN RCRD: CPT

## 2024-07-15 PROCEDURE — 83615 LACTATE (LD) (LDH) ENZYME: CPT

## 2024-07-15 PROCEDURE — 96415 CHEMO IV INFUSION ADDL HR: CPT

## 2024-07-15 PROCEDURE — 99215 OFFICE O/P EST HI 40 MIN: CPT | Mod: 25

## 2024-07-15 PROCEDURE — 84100 ASSAY OF PHOSPHORUS: CPT

## 2024-07-15 PROCEDURE — 99215 OFFICE O/P EST HI 40 MIN: CPT

## 2024-07-15 PROCEDURE — 2500000004 HC RX 250 GENERAL PHARMACY W/ HCPCS (ALT 636 FOR OP/ED): Mod: JW | Performed by: INTERNAL MEDICINE

## 2024-07-15 PROCEDURE — 2500000001 HC RX 250 WO HCPCS SELF ADMINISTERED DRUGS (ALT 637 FOR MEDICARE OP): Performed by: INTERNAL MEDICINE

## 2024-07-15 PROCEDURE — 84550 ASSAY OF BLOOD/URIC ACID: CPT

## 2024-07-15 PROCEDURE — 3061F NEG MICROALBUMINURIA REV: CPT

## 2024-07-15 PROCEDURE — 1159F MED LIST DOCD IN RCRD: CPT

## 2024-07-15 PROCEDURE — 3044F HG A1C LEVEL LT 7.0%: CPT

## 2024-07-15 PROCEDURE — 80053 COMPREHEN METABOLIC PANEL: CPT

## 2024-07-15 PROCEDURE — 85025 COMPLETE CBC W/AUTO DIFF WBC: CPT

## 2024-07-15 PROCEDURE — 2500000004 HC RX 250 GENERAL PHARMACY W/ HCPCS (ALT 636 FOR OP/ED): Performed by: INTERNAL MEDICINE

## 2024-07-15 RX ORDER — FAMOTIDINE 10 MG/ML
20 INJECTION INTRAVENOUS ONCE AS NEEDED
Status: COMPLETED | OUTPATIENT
Start: 2024-07-15 | End: 2024-07-15

## 2024-07-15 RX ORDER — EPINEPHRINE 0.3 MG/.3ML
0.3 INJECTION SUBCUTANEOUS EVERY 5 MIN PRN
Status: DISCONTINUED | OUTPATIENT
Start: 2024-07-15 | End: 2024-07-15 | Stop reason: HOSPADM

## 2024-07-15 RX ORDER — ALBUTEROL SULFATE 0.83 MG/ML
3 SOLUTION RESPIRATORY (INHALATION) AS NEEDED
Status: DISCONTINUED | OUTPATIENT
Start: 2024-07-15 | End: 2024-07-15 | Stop reason: HOSPADM

## 2024-07-15 RX ORDER — PROCHLORPERAZINE EDISYLATE 5 MG/ML
10 INJECTION INTRAMUSCULAR; INTRAVENOUS EVERY 6 HOURS PRN
Status: DISCONTINUED | OUTPATIENT
Start: 2024-07-15 | End: 2024-07-15 | Stop reason: HOSPADM

## 2024-07-15 RX ORDER — PROCHLORPERAZINE MALEATE 10 MG
10 TABLET ORAL EVERY 6 HOURS PRN
Status: DISCONTINUED | OUTPATIENT
Start: 2024-07-15 | End: 2024-07-15 | Stop reason: HOSPADM

## 2024-07-15 RX ORDER — POTASSIUM CHLORIDE 750 MG/1
20 TABLET, FILM COATED, EXTENDED RELEASE ORAL DAILY
Qty: 14 TABLET | Refills: 0 | Status: SHIPPED | OUTPATIENT
Start: 2024-07-15 | End: 2024-07-22

## 2024-07-15 RX ORDER — DIPHENHYDRAMINE HCL 50 MG
50 CAPSULE ORAL ONCE
Status: COMPLETED | OUTPATIENT
Start: 2024-07-15 | End: 2024-07-15

## 2024-07-15 RX ORDER — DIPHENHYDRAMINE HYDROCHLORIDE 50 MG/ML
50 INJECTION INTRAMUSCULAR; INTRAVENOUS AS NEEDED
Status: COMPLETED | OUTPATIENT
Start: 2024-07-15 | End: 2024-07-15

## 2024-07-15 RX ORDER — ACETAMINOPHEN 325 MG/1
650 TABLET ORAL ONCE
Status: COMPLETED | OUTPATIENT
Start: 2024-07-15 | End: 2024-07-15

## 2024-07-15 ASSESSMENT — ENCOUNTER SYMPTOMS
GASTROINTESTINAL NEGATIVE: 1
MUSCULOSKELETAL NEGATIVE: 1
TROUBLE SWALLOWING: 1
VOICE CHANGE: 0
SORE THROAT: 1

## 2024-07-15 ASSESSMENT — PAIN SCALES - GENERAL: PAINLEVEL: 0-NO PAIN

## 2024-07-15 NOTE — PATIENT INSTRUCTIONS
Salt and Soda Mouth Rinse:  A salt and soda mouthwash can be made by mixing 1/4 teaspoon of baking soda and 1/8 teaspoon of salt into 1 cup of warm water. You can swish the mixture around in your mouth, gargle, and then spit it out. You can also rinse your mouth with plain water after swishing.

## 2024-07-15 NOTE — PROGRESS NOTES
Adverse Event Note     Name:Demar Pittman  : 1960  MRN: [id number]      Adverse Event:  Medication: Rituximab  Administered Date/Time: 933  Reactions/Symptoms Started Time:    Symptoms: (check all that apply)   [] Back Pain   [x] Erythema Face     [] Hypotension     [x] Rigors [] Other   [] Bleeding    [] Erythema Hands  [] Itching  [] Swelling/Edema [] Unknown   [] Chest Pain [] Hives/Urticaria     [] Low platelet Ct  [] Syncope   [] Cytopenia  [] Hypertension       [] Neutropenia    Severity: Mild   Provider Notified: Yes   Medications Given(Add all medications to the chart via , or treatment plan)   [] Acetaminophen-Tylenol       [x] Famotidine-Pepcid  [] Nitroglycerine-NTG   [] Albuterol                               [] Hydrocortisone       [] Ondansetron-Zofran   [x] Diphenhydramine-Benadryl  [] Lorazepam-Ativan  [] Naloxone-Narcan   [] Epinephrine-Epi                     [x] Methylprednisone   Additional Details/ Comments:   10:53 Mayelin SERRATO NP at chairside.   10:55 Solumedrol 40 mg and pepcid 20 mg IV given.  11:00 Benadryl 25 mg IV given.    1103: rigors resolved

## 2024-07-15 NOTE — PROGRESS NOTES
[facility] Nursing Note  07/15/24    Demar Pittman is a 63 y.o. year old male patient presenting to outpatient infusion for cycle 2 day 1 (but first Rituximab today-- had it a few years ago) of the following regimen:    Treatment Plans       Name Type Plan Dates Plan Provider         Active    Venetoclax + RiTUXimab, 28 Day Cycles Oncology Treatment  6/24/2024 - Present Ev Luis MD                  Since the last visit, he reports doing well. Overall, he states that energy level is energy level is good. The patient is able to perform all ADLs. . Appetite has been unchanged and good. he reports fatigue and sore throat-- has been going on since May, comes and goes .     Line type: PIV- 22 L forearm **VERY ANXIOUS WITH IV*  Line removed/maintained prior to discharge: removed    Administrations This Visit       acetaminophen (Tylenol) tablet 650 mg       Admin Date  07/15/2024 Action  Given Dose  650 mg Route  oral Documented By  Lydia Hu RN              diphenhydrAMINE (BENADryl) capsule 50 mg       Admin Date  07/15/2024 Action  Given Dose  50 mg Route  oral Documented By  Lydia Hu RN              riTUXimab-pvvr (Ruxience) 761 mg in sodium chloride 0.9% 343.1 mL IV       Admin Date  07/15/2024 Action  New Bag Dose  761 mg Rate  22 mL/hr Route  intravenous Documented By  Lydia Hu RN               Admin Date  07/15/2024 Action  Rate/Dose Change Dose   Rate  44 mL/hr Route  intravenous Documented By  Lydia Hu RN                  Hypersensitivity reaction noted: Yes - see ADR note  Patient tolerated treatment fair. Discharged home in stable   condition.    Follow-up Plan: 8/12 infusion    Lydia Hu RN

## 2024-07-16 RX ORDER — NYSTATIN 100000 [USP'U]/ML
500000 SUSPENSION ORAL 4 TIMES DAILY
Qty: 280 ML | Refills: 0 | Status: SHIPPED | OUTPATIENT
Start: 2024-07-16 | End: 2024-07-30

## 2024-07-17 PROBLEM — K13.79 MOUTH SORES: Status: ACTIVE | Noted: 2024-07-17

## 2024-07-17 PROBLEM — K13.79 MOUTH SORE: Status: ACTIVE | Noted: 2024-07-17

## 2024-07-18 ENCOUNTER — TELEPHONE (OUTPATIENT)
Dept: PRIMARY CARE | Facility: CLINIC | Age: 64
End: 2024-07-18
Payer: COMMERCIAL

## 2024-07-18 NOTE — TELEPHONE ENCOUNTER
Patient called in and stated that he needs new lab orders. The patient got his lab drawn for Dr. Benavidez but your orders were used instead of Dr. Luis's.

## 2024-07-23 ENCOUNTER — TELEPHONE (OUTPATIENT)
Dept: HEMATOLOGY/ONCOLOGY | Facility: HOSPITAL | Age: 64
End: 2024-07-23
Payer: COMMERCIAL

## 2024-07-23 NOTE — TELEPHONE ENCOUNTER
Pt called explaining that he is still having sores in his mouth that are getting worse. He is not having any trouble breathing and is able to drink water, but that is all at this time. Pt requesting a call back to discuss different medication options. I explained that our phones are acting up

## 2024-07-24 ENCOUNTER — NURSE TRIAGE (OUTPATIENT)
Dept: HEMATOLOGY/ONCOLOGY | Facility: HOSPITAL | Age: 64
End: 2024-07-24
Payer: COMMERCIAL

## 2024-07-24 DIAGNOSIS — K13.79 MOUTH SORES: Primary | ICD-10-CM

## 2024-07-24 RX ORDER — VALACYCLOVIR HYDROCHLORIDE 1 G/1
1000 TABLET, FILM COATED ORAL 3 TIMES DAILY
Qty: 30 TABLET | Refills: 0 | Status: SHIPPED | OUTPATIENT
Start: 2024-07-24 | End: 2024-08-01 | Stop reason: ALTCHOICE

## 2024-07-24 ASSESSMENT — ENCOUNTER SYMPTOMS
FEVER: 0
DIAPHORESIS: 1
RESPIRATORY NEGATIVE: 1
UNEXPECTED WEIGHT CHANGE: 1
FATIGUE: 1
HEMATOLOGIC/LYMPHATIC NEGATIVE: 1
CHILLS: 0
CARDIOVASCULAR NEGATIVE: 1
VOICE CHANGE: 0
TROUBLE SWALLOWING: 1
NEUROLOGICAL NEGATIVE: 1
SORE THROAT: 1
MUSCULOSKELETAL NEGATIVE: 1

## 2024-07-24 NOTE — TELEPHONE ENCOUNTER
Patient following up to previous message. Patient states he is now experiencing voice hoarseness. Patient has yet to hear from team. Made attempt to connect to nurse triage. Line going to . Please contact pt with recommendation.

## 2024-07-24 NOTE — TELEPHONE ENCOUNTER
"Patient states these symptoms have been ongoing since May 2024 oncology team aware, triage of symptoms completed. Patient has been seen by a dentist referred to oral surgeon mid July (around July 12th per patient), \"at the time the sores were getting better. Now they seem worse.\"   Encouraged patient to call oral surgeon, follow up is currently scheduled August 2024, update provider that sores are present and relay symptoms. Patient verbalized understanding. Patient states he still has RX for nystatin at home and intermittently uses baking soda rinses for relief.     Summary of call sent to provider team to inquire if any additional interventions are recommended.   Additional Information   Negative: Are there daily activities that you're having trouble with such as getting dressed or making meals?     Fatigue present. Rates 6-7 our of 10  \"I dont really want to do anything.\"   Wakes 2-3 times a night to urinate.   Doesn't wake tired, present around mid day.   Doesn't take naps.   Patient is still working.   Commented on: Tell me if you have any of these problems and (if appropriate) where are they, e.g. your lips, tongue, gums or teeth?     Since may 2024   Commented on: If yes to pain, on a scale of 0 to 10 (0 being no pain and 10 being the worst possible) how would you rate your pain?     Waxes and wanes depending activity.     Eating, drinking, temperature changes worsens pain.   Commented on: Any sore throat pain? If yes, on a scale of 0 to 10 with being no pain and 10 being the worst pain ever, how would you rate your pain?     Sore throat started coming back 7/20/2024   Commented on: Do ice chips, cold water rinses, mouth wash or medicines help?     Baking soda rinses help, but temporarily    Nystatin stings now with use, prior did not have a problem   Commented on: When did these problems start?     Acute on chronic, issue has been on and off since may 2024   Commented on: How often do you brush your teeth " and use mouthwash?     Gums bleeding when brushing,   Patient wears dentures normally. However unable to wear dentures due to pain and blisters   Commented on: What else do you want to tell me about this problem?     Last time patient went to dentist beginning of July, referred to oral surgeon mid July. Follow up in August.    Protocols used: Oral Mucositis (Mouth Sores)

## 2024-07-24 NOTE — PROGRESS NOTES
"Patient ID: Demar Pittman \"Shraddha" is a 63 y.o. male.    Oncology History Overview Note   B-CLL diagnosed in 2015, SUÁREZ stage 0; WBC initially 16.3 with ALC 12.0, hemoglobin 15.1 and platelets 154,000.  The cells expressed CD5 and A light chains.   CD38 and  Zap-70 were negative. Cytogenetic studies by FISH were inconclusive.  WBC has slowly rising but he maintains good marrow reserve and has not required treatment.  CT chest done 3/9/17 showed extensive cervical, axillary and submental adenopathy; largest  node on the left measured 2.3 x 1.9 cm.  Small (less than 9 mm) pulmonology nodules were again seen; relatively stable.  Abdominal nodes slightly increased.    17: WBC 65.4, ALC 62.8; becoming more symptomatic.  17 : WBC 77.6.  ALC 72.2.  Hemoglobin and platelets were normal.  18 WBC: 69.3. A.9. Hgb 15.2. Plt: 148,000  18: WBC 88.2.  ALC 82.9.  Hemoglobin 14.2.  Platelets 170,000.  Adenopathy stable.  18: WBC 77.3.  ALC 66.5.  Hemoglobin 13.8.  Platelets 143,000.  Adenopathy slightly increased.  18: WBC 92.0.  ALC 86.5.  Hemoglobin 14.1.  Platelets 157,000.  Progressing symptomatic adenopathy.  Treatment options discussed.   18 completed rituximab weekly x 4.  10/9/19: CBC: WBC 16.5.  ALC 12.4.  Hemoglobin 14.6.  Platelets 148,000.  Worsening adenopathy.  3/17/20: Started bendamustine/rituximab after multiple thoracentesis for malignant pleural effusion with CLL  20: #2 bendamustine/rituximab.  20: #3 BR  20: WBC 4.7.  Hemoglobin 12.9.  Platelets 189,000.  20: #4 BR  20: #5 BR  20: WBC 1.7. hgb 11.9. Platelets 176.  20: #6/6 bendamustine/rituximab  2023, WBC count 62, hemoglobin 14.9, platelets 143, absolute lymphocyte count 50.2  January 15, 2024, WBC count 40.5, hemoglobin 15.0, platelets 175  2/10/24 , WBC count 31.5, hemoglobin 14.9, platelets 131  3/14/24 , WBC count 13.9, hemoglobin 14.9, platelets " 122  3/14/24, physical examination positive for bilateral cervical lymphadenopathy, bilateral axillary lymphadenopathy  PET imaging 3/29/2024 intense uptake in tongue base ( S/P) tonsillectomy. Multiple enlarged lymph nodes in cervical, parotid, nodes, multipl pleural based and parenchymal non FDG avid pulmonary nodes, enlarged axillary lymph nodes, mediastinal and bilateral hilar lymph nodes, periportal lymph nodes, iliac nodes, inguinal nodes.     -FISH panel  of peripheral blood 2024 Pos for del 11q, negative for t(11,14), trisomy 12, monosomy 13 and deletion of 17p.     -Needle biopsy of posterior tongue mass 24 involvement by chronic lymphocytic leukemia/small lymphocytic lymphoma no evidence of transformation     CLL (chronic lymphocytic leukemia) (Multi)   2023 Initial Diagnosis    CLL (chronic lymphocytic leukemia) (CMS/[facility])     1/15/2024 - 1/15/2024 Chemotherapy    Bendamustine + RiTUXimab, 28 Day Cycles     2024 - 2024 Chemotherapy    (INPT) Venetoclax, 28 Day Cycles      2024 -  Chemotherapy    Venetoclax + RiTUXimab, 28 Day Cycles        Past medical history: CLL/SLL (2015) as described above, status post rituximab, 6 cycles of bendamustine/rituximab (3/2020-2020).  Excellent response to treatment.       History of CABGx3 19, vertigo, XENA, remote tonsillectomy, retinal tear, stable pulmonary nodules, hyperlipidemia, hypertension, sinusitis.  Unremarkable cardiac catheterization 21. Hyperglycemia, torn retina, cataract. Hx of afib since CABG.      Family medical history: Mother  of myeloma in her 70s and his father  of Hodgkin lymphoma at age 39.     Social history: Rare alcohol intake.  Former smoker but quit in .  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 2 biological children, one son with downs syndrome.     Subjective    HPI    Patient of Dr. Wang with a long history of CLL initially seen  with his wife and son 5/9/24 for a second opinion for treatment of CLL.   He received BR completing in 8/2020.  In December 2023 after COVID infection and he had increased lymphocytosis which resolved, subsequently noted to have lymphadenopathy confirmed on PET scan.  Ibrutinib recommended in March 2024, but patient has not yet started this because he was  leary of side effects.      Due to discordance in uptake at base of tongue on PET imaging, tongue biopsy was done 5/28/24 which was consistent with CLL and had no evidence of large cell transformation.    Was hospitalized 6/10/24-6/13/24 for first venetoclax ramp-up.  Then was hospitalized 6/17/24-6/19/24 for second venetoclax ramp-up.     Demar presents to the clinic today (7/25/24) here with wife and son for follow up evaluation to assess mouth sores, and blood work.  Received C1 rituximab on 7/15/24 and remains on venetoclax 400 mg daily.  He is receiving venetoclax okay from pharmacy.      Reports he is doing okay but his mouth sores have worsened.  Explains his mouth sores have come and gone since May.  Currently not wear dentures.  Recently saw oral surgeon for adjustments to his partial so wouldn't irritate his mouth sore spots. Voice was raspy yesterday but is better today.  Appetite is lower because of mouth sores.  Trying to eat softer foods.  Has lost some weight from last visit.  Eating smaller meals.  Having 1-2 boosts per day.  Drinking fluids, over 54 ounces per day.  Declines wanting to see nutritionist today.  Stools have been harder, goes about every day or two.  Swelling has greatly improved to neck, states is a little puffier than baseline.  Does not feel any enlarged lymph nodes at this time.       Review of Systems   Constitutional:  Positive for appetite change, diaphoresis, fatigue and unexpected weight change. Negative for chills and fever.   HENT:   Positive for mouth sores, sore throat and trouble swallowing. Negative for voice change.     Respiratory: Negative.     Cardiovascular: Negative.    Gastrointestinal:  Positive for constipation.   Genitourinary: Negative.     Musculoskeletal: Negative.    Skin: Negative.    Neurological: Negative.    Hematological: Negative.    All other systems reviewed and are negative.    Objective    BSA: 1.91 meters squared  /77   Pulse 70   Temp 36.4 °C (97.5 °F)   Resp 17   Wt 77.6 kg (171 lb 1.2 oz)   SpO2 100%   BMI 27.07 kg/m²      Physical Exam  Vitals reviewed.   Constitutional:       Appearance: Normal appearance.   HENT:      Head: Normocephalic.      Mouth/Throat:      Mouth: Mucous membranes are moist.      Comments: Multiple white lesions to sides of tongue and bilateral cheeks, multiple small sores to roof of mouth and cheeks.    Eyes:      Conjunctiva/sclera: Conjunctivae normal.   Cardiovascular:      Rate and Rhythm: Normal rate and regular rhythm.      Pulses: Normal pulses.      Heart sounds: Normal heart sounds.   Pulmonary:      Effort: Pulmonary effort is normal.      Breath sounds: Normal breath sounds.   Abdominal:      General: Bowel sounds are normal.      Palpations: Abdomen is soft. There is no mass.      Tenderness: There is no abdominal tenderness.   Musculoskeletal:         General: No swelling. Normal range of motion.   Lymphadenopathy:      Comments: Approximately pea sized, mobile submandibular lymph nodes bilaterally, non-tender.   Skin:     General: Skin is warm and dry.      Capillary Refill: Capillary refill takes less than 2 seconds.   Neurological:      General: No focal deficit present.      Mental Status: He is alert and oriented to person, place, and time. Mental status is at baseline.   Psychiatric:         Mood and Affect: Mood normal.         Behavior: Behavior normal.     Performance Status:  Karnofsky Score: 90 - Able to carry on normal activity; minor signs or symptoms of disease     Assessment/Plan       CLL (chronic lymphocytic leukemia) (Multi)    Chronic  lymphocytic leukemia with symptomatic adenopathy, excellent response to bendamustine/rituximab; completed 6 cycles in August 2020.    Patient seen  for further management of bulky CLL.  PET imaging shows discordant uptake at the base of the tongue , pulmonary nodules which the patient states are longstanding. There is no evidence of Richters transformation by histology and FISH analysis shows del 11q. Due to symptoms of tongue swelling has started dexamethasone 4 mg po bid for 10 days for sx relief.     6/3/24 Previously discussed treatment options and patient would prefer to received venetoclax as a time limited approach and I have recommended the [place] regimen with escalating doses of venetoclax followed by monthly rituxan for 6  monthly doses  and a planned 2 year course of venetoclax. We reviewed side effects of venetoclax and rituxan signed chemo consent.  Given patients tumor burden have schedule inpatient hospitalization to monitor for TLS for at least first two dose ramp ups on 5/10 and 5/17/24  Started allopurinol for TLS prevention    6/17/24:  Hospital admit for Venetoclax ramp up to 50 mg daily x 7 days.   Next ramp ups are planned to be done outpatient.   6/24/24:  Demar completed dexamethasone 6 day course today.  Started 3rd week venetoclax ramp-up 100 mg for the next 7 days.  Plan to increase venetoclax dosing to 200 mg for 7 days for week 4 ramp up and 400 mg daily for week 5 ramp up.  Frequent blood work to monitor for TLS.  Obtaining recommended 56 oz of water.   7/25/24:  CBC results stable.  No signs of TLS.  Swelling continues to decrease to neck and is almost back to baseline. Received C1D1 rituximab on 7/15/24 with reaction of facial flushing, chills, and mild rigors. Added solu-medrol to premedications. Continue venetoclax 400 mg daily.  Allopurinol stopped.  Follow up visit on 8/12/24 for C2 rituximab.    WBC   Date Value Ref Range Status   07/25/2024 5.4 4.4 - 11.3 x10*3/uL Final    07/15/2024 6.3 4.4 - 11.3 x10*3/uL Final   07/08/2024 5.6 4.4 - 11.3 x10*3/uL Final   07/05/2024 5.8 4.4 - 11.3 x10*3/uL Final   07/01/2024 11.9 (H) 4.4 - 11.3 x10*3/uL Final      Mouth Sores:  Lesions on both sides of tongue are improving with time. Unknown etiology of lesions, but predate Venetoclax.   7/15/24:  Tongue lesions healed, noted lesion to left buccal surface.  Continue to use BMX solution PRN & Nystatin Swish & Swallow 4x daily.   7/25/24:  Worsening mouth sores and white lesions to sides of tongue, roof of mouth, and bilateral cheeks.  Demar reports that he has had mouth sores off and on since May 2024.  Unable to wear dentures at this time.  Swabbed for HSV.  Dr. Luis prescribed valcyclovir 1,000 mg TID x10 day.  Advised patient to stop acyclovir while one valcyclovir and to restart acyclovir after completes 10 day cycle of valcyclovir.  Advised to contact if mouth sores worsen or do not improve after 10 day course of valcyclovir as we may need to treat with another 10 day course.  Advised Demar to continue to use salt and soda rinse.  He declined wanting refill for BMX.      Weight Loss:  7/25/24:  Continues to have weight loss, weight today 77.6 kg. Weight 87.6 kg (12/29/24).  Weight loss likely related to mouth sores.  Currently eating soft foods and drinking 1-2 protein supplements per day.  Advised to continue 2 protein supplements per day.  Declined wanting to see dietician today.  Monitoring weight.      CAD:  status post CABG.       Transaminitis: PET imaging shows periportal mass vs lymphadenopathy, hepatitis serologies negative, ultrasound consistent with large periportal node, have ordered CT of liver with contrast as transaminitis worse today. Also sent CMV pcr on blood  6/24/24:  Improvement in liver enzymes: , AST 29, bilirubin 0.6.  Discontinued CT of liver as liver enzymes are improving.  7/25/24:  ALT and AST stabilized.     RTC:  8/12/24:  Follow up visit with provider  and is due for C2D1 rituximab.    Mayelin Bailon, APRN-CNP

## 2024-07-24 NOTE — TELEPHONE ENCOUNTER
Per provider  Patient needs additional testing, okay to add on to Mayelin NP schedule.     Discussed with patient, agreed to 12pm visit. Scheduling completed.

## 2024-07-25 ENCOUNTER — ONCOLOGY MEDICATION OUTREACH (OUTPATIENT)
Dept: HEMATOLOGY/ONCOLOGY | Facility: HOSPITAL | Age: 64
End: 2024-07-25
Payer: COMMERCIAL

## 2024-07-25 ENCOUNTER — OFFICE VISIT (OUTPATIENT)
Dept: HEMATOLOGY/ONCOLOGY | Facility: HOSPITAL | Age: 64
End: 2024-07-25
Payer: COMMERCIAL

## 2024-07-25 ENCOUNTER — LAB (OUTPATIENT)
Dept: LAB | Facility: HOSPITAL | Age: 64
End: 2024-07-25
Payer: COMMERCIAL

## 2024-07-25 VITALS
HEART RATE: 70 BPM | TEMPERATURE: 97.5 F | DIASTOLIC BLOOD PRESSURE: 77 MMHG | OXYGEN SATURATION: 100 % | WEIGHT: 171.08 LBS | BODY MASS INDEX: 27.07 KG/M2 | SYSTOLIC BLOOD PRESSURE: 126 MMHG | RESPIRATION RATE: 17 BRPM

## 2024-07-25 DIAGNOSIS — C91.10 CLL (CHRONIC LYMPHOCYTIC LEUKEMIA) (MULTI): Primary | ICD-10-CM

## 2024-07-25 DIAGNOSIS — K13.79 MOUTH SORES: ICD-10-CM

## 2024-07-25 DIAGNOSIS — K13.79 MOUTH SORE: ICD-10-CM

## 2024-07-25 DIAGNOSIS — C91.10 CLL (CHRONIC LYMPHOCYTIC LEUKEMIA) (MULTI): ICD-10-CM

## 2024-07-25 DIAGNOSIS — I25.10 CORONARY ARTERY DISEASE INVOLVING NATIVE HEART WITHOUT ANGINA PECTORIS, UNSPECIFIED VESSEL OR LESION TYPE: ICD-10-CM

## 2024-07-25 DIAGNOSIS — R63.4 WEIGHT LOSS: ICD-10-CM

## 2024-07-25 LAB
ALBUMIN SERPL BCP-MCNC: 4.3 G/DL (ref 3.4–5)
ALP SERPL-CCNC: 93 U/L (ref 33–136)
ALT SERPL W P-5'-P-CCNC: 32 U/L (ref 10–52)
ANION GAP SERPL CALC-SCNC: 14 MMOL/L (ref 10–20)
AST SERPL W P-5'-P-CCNC: 21 U/L (ref 9–39)
BASOPHILS # BLD AUTO: 0.02 X10*3/UL (ref 0–0.1)
BASOPHILS NFR BLD AUTO: 0.4 %
BILIRUB SERPL-MCNC: 1.1 MG/DL (ref 0–1.2)
BUN SERPL-MCNC: 9 MG/DL (ref 6–23)
CALCIUM SERPL-MCNC: 9.3 MG/DL (ref 8.6–10.3)
CHLORIDE SERPL-SCNC: 102 MMOL/L (ref 98–107)
CO2 SERPL-SCNC: 28 MMOL/L (ref 21–32)
CREAT SERPL-MCNC: 0.7 MG/DL (ref 0.5–1.3)
EGFRCR SERPLBLD CKD-EPI 2021: >90 ML/MIN/1.73M*2
EOSINOPHIL # BLD AUTO: 0.02 X10*3/UL (ref 0–0.7)
EOSINOPHIL NFR BLD AUTO: 0.4 %
ERYTHROCYTE [DISTWIDTH] IN BLOOD BY AUTOMATED COUNT: 17.2 % (ref 11.5–14.5)
GLUCOSE SERPL-MCNC: 104 MG/DL (ref 74–99)
HCT VFR BLD AUTO: 41.1 % (ref 41–52)
HGB BLD-MCNC: 13.7 G/DL (ref 13.5–17.5)
IMM GRANULOCYTES # BLD AUTO: 0.05 X10*3/UL (ref 0–0.7)
IMM GRANULOCYTES NFR BLD AUTO: 0.9 % (ref 0–0.9)
LDH SERPL L TO P-CCNC: 171 U/L (ref 84–246)
LYMPHOCYTES # BLD AUTO: 0.67 X10*3/UL (ref 1.2–4.8)
LYMPHOCYTES NFR BLD AUTO: 12.3 %
MCH RBC QN AUTO: 28.3 PG (ref 26–34)
MCHC RBC AUTO-ENTMCNC: 33.3 G/DL (ref 32–36)
MCV RBC AUTO: 85 FL (ref 80–100)
MONOCYTES # BLD AUTO: 0.86 X10*3/UL (ref 0.1–1)
MONOCYTES NFR BLD AUTO: 15.8 %
NEUTROPHILS # BLD AUTO: 3.82 X10*3/UL (ref 1.2–7.7)
NEUTROPHILS NFR BLD AUTO: 70.2 %
NRBC BLD-RTO: 0 /100 WBCS (ref 0–0)
PHOSPHATE SERPL-MCNC: 3.2 MG/DL (ref 2.5–4.9)
PLATELET # BLD AUTO: 156 X10*3/UL (ref 150–450)
POTASSIUM SERPL-SCNC: 4.1 MMOL/L (ref 3.5–5.3)
PROT SERPL-MCNC: 6.4 G/DL (ref 6.4–8.2)
RBC # BLD AUTO: 4.84 X10*6/UL (ref 4.5–5.9)
SODIUM SERPL-SCNC: 140 MMOL/L (ref 136–145)
URATE SERPL-MCNC: 4.3 MG/DL (ref 4–7.5)
WBC # BLD AUTO: 5.4 X10*3/UL (ref 4.4–11.3)

## 2024-07-25 PROCEDURE — 80053 COMPREHEN METABOLIC PANEL: CPT

## 2024-07-25 PROCEDURE — 3049F LDL-C 100-129 MG/DL: CPT

## 2024-07-25 PROCEDURE — 84550 ASSAY OF BLOOD/URIC ACID: CPT

## 2024-07-25 PROCEDURE — 99214 OFFICE O/P EST MOD 30 MIN: CPT

## 2024-07-25 PROCEDURE — 3074F SYST BP LT 130 MM HG: CPT

## 2024-07-25 PROCEDURE — 1125F AMNT PAIN NOTED PAIN PRSNT: CPT

## 2024-07-25 PROCEDURE — 3061F NEG MICROALBUMINURIA REV: CPT

## 2024-07-25 PROCEDURE — 36415 COLL VENOUS BLD VENIPUNCTURE: CPT

## 2024-07-25 PROCEDURE — 83615 LACTATE (LD) (LDH) ENZYME: CPT

## 2024-07-25 PROCEDURE — 1159F MED LIST DOCD IN RCRD: CPT

## 2024-07-25 PROCEDURE — 1036F TOBACCO NON-USER: CPT

## 2024-07-25 PROCEDURE — 85025 COMPLETE CBC W/AUTO DIFF WBC: CPT

## 2024-07-25 PROCEDURE — 1123F ACP DISCUSS/DSCN MKR DOCD: CPT

## 2024-07-25 PROCEDURE — 3078F DIAST BP <80 MM HG: CPT

## 2024-07-25 PROCEDURE — 87529 HSV DNA AMP PROBE: CPT

## 2024-07-25 PROCEDURE — 84100 ASSAY OF PHOSPHORUS: CPT

## 2024-07-25 PROCEDURE — 1160F RVW MEDS BY RX/DR IN RCRD: CPT

## 2024-07-25 PROCEDURE — 3044F HG A1C LEVEL LT 7.0%: CPT

## 2024-07-25 ASSESSMENT — ENCOUNTER SYMPTOMS
APPETITE CHANGE: 1
CONSTIPATION: 1

## 2024-07-25 ASSESSMENT — PAIN SCALES - GENERAL: PAINLEVEL: 6

## 2024-07-25 NOTE — PATIENT INSTRUCTIONS
Stop taking acyclovir while taking valacyclovir for 10 days.  Restart acyclovir after completes valacyclovir.

## 2024-07-25 NOTE — PROGRESS NOTES
"[facility] Clinical Note    Demar Pittman \"Bud\" is a 63 y.o. male, who is on the specialty pharmacy service for management of: Oncology Core with status of: (Enrolled)     Demar was contacted on 7/25/2024.    Refer to the encounter summary report for documentation details about patient counseling and education.      Medication Adherence  The importance of adherence was discussed with the patient and they were advised to take the medication as prescribed by their provider. Demar was encouraged to call his physician's office if they have a question regarding a missed dose.       Patient advised to contact the pharmacy if there are any changes to her medication list, including prescriptions, OTC medications, herbal products, or supplements. Patient was advised of [facility]’s dispensing process, refill timeline, contact information ([phone]), and patient management follow up. Patient confirmed understanding of education conducted during assessment. All patient questions and concerns were addressed to the best of my ability. Patient was encouraged to contact the specialty pharmacy with any questions or concerns.    Confirmed follow-up outreaches are properly scheduled. Reviewed goals of therapy in the program targets.    Pedro Jim, PharmD  "

## 2024-07-26 LAB
HSV1 DNA SKIN QL NAA+PROBE: NOT DETECTED
HSV2 DNA SKIN QL NAA+PROBE: NOT DETECTED

## 2024-07-26 PROCEDURE — RXMED WILLOW AMBULATORY MEDICATION CHARGE

## 2024-07-29 ENCOUNTER — SPECIALTY PHARMACY (OUTPATIENT)
Dept: PHARMACY | Facility: CLINIC | Age: 64
End: 2024-07-29

## 2024-07-31 ENCOUNTER — PHARMACY VISIT (OUTPATIENT)
Dept: PHARMACY | Facility: CLINIC | Age: 64
End: 2024-07-31
Payer: COMMERCIAL

## 2024-08-01 ENCOUNTER — DOCUMENTATION (OUTPATIENT)
Dept: HEMATOLOGY/ONCOLOGY | Facility: HOSPITAL | Age: 64
End: 2024-08-01
Payer: COMMERCIAL

## 2024-08-01 DIAGNOSIS — C91.10 CLL (CHRONIC LYMPHOCYTIC LEUKEMIA) (MULTI): ICD-10-CM

## 2024-08-01 DIAGNOSIS — K13.79 MOUTH SORES: Primary | ICD-10-CM

## 2024-08-01 RX ORDER — ACYCLOVIR 400 MG/1
400 TABLET ORAL 2 TIMES DAILY
Qty: 60 TABLET | Refills: 3 | Status: SHIPPED | OUTPATIENT
Start: 2024-08-01 | End: 2024-11-29

## 2024-08-01 NOTE — PROGRESS NOTES
Called Mr. Pittman to inform of negative HSV testing.  Informed that he can stop taking valacyclovir and to resume taking acyclovir 400 mg BID prophylaxis.  Mr. Pittman stated that his mouth sores are improving and is able to wear his dentures.  Next plan to to consult ENT for further evaluation of cause of mouth sores.  Instructed to call [phone] to schedule ENT appt.  Mr. Pittman states that he has an appt with his oral surgeon scheduled for next Friday.  Advised to contact with worsening symptoms, especially if he is having difficulty eating and drinking.  Follow up appt on 8/12/24.  Understanding verbalized.

## 2024-08-10 ASSESSMENT — ENCOUNTER SYMPTOMS
CARDIOVASCULAR NEGATIVE: 1
HEMATOLOGIC/LYMPHATIC NEGATIVE: 1
TROUBLE SWALLOWING: 1
MUSCULOSKELETAL NEGATIVE: 1
FEVER: 0
APPETITE CHANGE: 1
CHILLS: 0
CONSTIPATION: 1
NEUROLOGICAL NEGATIVE: 1
SORE THROAT: 1
FATIGUE: 1
VOICE CHANGE: 0
RESPIRATORY NEGATIVE: 1

## 2024-08-11 RX ORDER — PROCHLORPERAZINE EDISYLATE 5 MG/ML
10 INJECTION INTRAMUSCULAR; INTRAVENOUS EVERY 6 HOURS PRN
OUTPATIENT
Start: 2024-09-09

## 2024-08-11 RX ORDER — EPINEPHRINE 0.3 MG/.3ML
0.3 INJECTION SUBCUTANEOUS EVERY 5 MIN PRN
OUTPATIENT
Start: 2024-09-09

## 2024-08-11 RX ORDER — ACETAMINOPHEN 325 MG/1
650 TABLET ORAL ONCE
OUTPATIENT
Start: 2024-09-09

## 2024-08-11 RX ORDER — FAMOTIDINE 10 MG/ML
20 INJECTION INTRAVENOUS ONCE AS NEEDED
OUTPATIENT
Start: 2024-09-09

## 2024-08-11 RX ORDER — PROCHLORPERAZINE MALEATE 10 MG
10 TABLET ORAL EVERY 6 HOURS PRN
OUTPATIENT
Start: 2024-09-09

## 2024-08-11 RX ORDER — ALBUTEROL SULFATE 0.83 MG/ML
3 SOLUTION RESPIRATORY (INHALATION) AS NEEDED
OUTPATIENT
Start: 2024-09-09

## 2024-08-11 RX ORDER — DIPHENHYDRAMINE HYDROCHLORIDE 50 MG/ML
50 INJECTION INTRAMUSCULAR; INTRAVENOUS AS NEEDED
OUTPATIENT
Start: 2024-09-09

## 2024-08-11 RX ORDER — DIPHENHYDRAMINE HCL 50 MG
50 CAPSULE ORAL ONCE
OUTPATIENT
Start: 2024-09-09

## 2024-08-11 NOTE — PROGRESS NOTES
"Patient ID: Demar Pittman \"Shraddha" is a 64 y.o. male.    Oncology History Overview Note   B-CLL diagnosed in 2015, USÁREZ stage 0; WBC initially 16.3 with ALC 12.0, hemoglobin 15.1 and platelets 154,000.  The cells expressed CD5 and A light chains.   CD38 and  Zap-70 were negative. Cytogenetic studies by FISH were inconclusive.  WBC has slowly rising but he maintains good marrow reserve and has not required treatment.  CT chest done 3/9/17 showed extensive cervical, axillary and submental adenopathy; largest  node on the left measured 2.3 x 1.9 cm.  Small (less than 9 mm) pulmonology nodules were again seen; relatively stable.  Abdominal nodes slightly increased.    17: WBC 65.4, ALC 62.8; becoming more symptomatic.  17 : WBC 77.6.  ALC 72.2.  Hemoglobin and platelets were normal.  18 WBC: 69.3. A.9. Hgb 15.2. Plt: 148,000  18: WBC 88.2.  ALC 82.9.  Hemoglobin 14.2.  Platelets 170,000.  Adenopathy stable.  18: WBC 77.3.  ALC 66.5.  Hemoglobin 13.8.  Platelets 143,000.  Adenopathy slightly increased.  18: WBC 92.0.  ALC 86.5.  Hemoglobin 14.1.  Platelets 157,000.  Progressing symptomatic adenopathy.  Treatment options discussed.   18 completed rituximab weekly x 4.  10/9/19: CBC: WBC 16.5.  ALC 12.4.  Hemoglobin 14.6.  Platelets 148,000.  Worsening adenopathy.  3/17/20: Started bendamustine/rituximab after multiple thoracentesis for malignant pleural effusion with CLL  20: #2 bendamustine/rituximab.  20: #3 BR  20: WBC 4.7.  Hemoglobin 12.9.  Platelets 189,000.  20: #4 BR  20: #5 BR  20: WBC 1.7. hgb 11.9. Platelets 176.  20: #6/6 bendamustine/rituximab  2023, WBC count 62, hemoglobin 14.9, platelets 143, absolute lymphocyte count 50.2  January 15, 2024, WBC count 40.5, hemoglobin 15.0, platelets 175  2/10/24 , WBC count 31.5, hemoglobin 14.9, platelets 131  3/14/24 , WBC count 13.9, hemoglobin 14.9, platelets " 122  3/14/24, physical examination positive for bilateral cervical lymphadenopathy, bilateral axillary lymphadenopathy  PET imaging 3/29/2024 intense uptake in tongue base ( S/P) tonsillectomy. Multiple enlarged lymph nodes in cervical, parotid, nodes, multipl pleural based and parenchymal non FDG avid pulmonary nodes, enlarged axillary lymph nodes, mediastinal and bilateral hilar lymph nodes, periportal lymph nodes, iliac nodes, inguinal nodes.     -FISH panel  of peripheral blood 2024 Pos for del 11q, negative for t(11,14), trisomy 12, monosomy 13 and deletion of 17p.     -Needle biopsy of posterior tongue mass 24 involvement by chronic lymphocytic leukemia/small lymphocytic lymphoma no evidence of transformation     CLL (chronic lymphocytic leukemia) (Multi)   2023 Initial Diagnosis    CLL (chronic lymphocytic leukemia) (CMS/[facility])     1/15/2024 - 1/15/2024 Chemotherapy    Bendamustine + RiTUXimab, 28 Day Cycles     2024 - 2024 Chemotherapy    (INPT) Venetoclax, 28 Day Cycles      2024 -  Chemotherapy    Venetoclax + RiTUXimab, 28 Day Cycles        Past medical history: CLL/SLL (2015) as described above, status post rituximab, 6 cycles of bendamustine/rituximab (3/2020-2020).  Excellent response to treatment.       History of CABGx3 19, vertigo, XENA, remote tonsillectomy, retinal tear, stable pulmonary nodules, hyperlipidemia, hypertension, sinusitis.  Unremarkable cardiac catheterization 21. Hyperglycemia, torn retina, cataract. Hx of afib since CABG.      Family medical history: Mother  of myeloma in her 70s and his father  of Hodgkin lymphoma at age 39.     Social history: Rare alcohol intake.  Former smoker but quit in .  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 2 biological children, one son with downs syndrome.     Subjective    HPI  Patient of Dr. Wang with a long history of CLL initially seen  "with his wife and son 5/9/24 for a second opinion for treatment of CLL.   He received BR completing in 8/2020.  In December 2023 after COVID infection and he had increased lymphocytosis which resolved, subsequently noted to have lymphadenopathy confirmed on PET scan.  Ibrutinib recommended in March 2024, but patient has not yet started this because he was  leary of side effects.      Due to discordance in uptake at base of tongue on PET imaging, tongue biopsy was done 5/28/24 which was consistent with CLL and had no evidence of large cell transformation.    Was hospitalized 6/10/24-6/13/24 for first venetoclax ramp-up.  Then was hospitalized 6/17/24-6/19/24 for second venetoclax ramp-up.     C1 rituximab - 7/15/24. Tolerated well with some chills, received steroid, benadryl. Continued on venetoclax 400 mg daily. Persisted mouth sores -swabbed, HSV negative.     8/12/24:   Doing \"eh\". Mouth sores - comes and goes, old lesions resolving, new lesions appearing. Saw oral surgeon last Friday - get biopsy, has ENT this Friday.   Swelling over right elbow for the last 2 weeks. Swelling decreasing now, no obvious trauma, not painful, no restriction of movement.   Sore throat - middle of last week - difficulty swallowing+ feeling of something stuck in throat.   Voice raspy occasionally  Appetite fine. Avoiding spicy foods, pickles.   Eating smaller meals.  Having 1-2 boosts per day.  Drinking fluids, over 54 ounces per day.    Stools have been harder, goes about every day or two. Takes miralax or dulcolax as needed, advised to take on a regular basis and cut down if getting looser.   Energy levels - low, tired all the time since Dec.   Still working full time 7-3.30 PM  Swelling has greatly improved to neck, states is a little puffier than baseline.  Does not feel any enlarged lymph nodes at this time.       No fevers, night sweats.     Review of Systems   Constitutional:  Positive for appetite change and fatigue. Negative " for chills and fever.   HENT:   Positive for mouth sores, sore throat and trouble swallowing. Negative for voice change.    Respiratory: Negative.     Cardiovascular: Negative.    Gastrointestinal:  Positive for constipation. Negative for nausea and vomiting.   Genitourinary: Negative.     Musculoskeletal: Negative.    Skin: Negative.    Neurological: Negative.    Hematological: Negative.    All other systems reviewed and are negative.    Objective    BSA: 1.94 meters squared  /74   Pulse 63   Temp 36.1 °C (97 °F)   Resp 16   Wt 79.8 kg (175 lb 14.8 oz) Comment: pt refused to take shoes off for a weight  SpO2 99%   BMI 27.84 kg/m²   Wt Readings from Last 5 Encounters:   08/12/24 79.8 kg (175 lb 14.8 oz)   07/25/24 77.6 kg (171 lb 1.2 oz)   07/15/24 80.6 kg (177 lb 11.2 oz)   06/24/24 79.6 kg (175 lb 8 oz)   06/19/24 81.7 kg (180 lb 1.9 oz)     Physical Exam  Vitals reviewed.   Constitutional:       Appearance: Normal appearance.   HENT:      Head: Normocephalic.      Mouth/Throat:      Mouth: Mucous membranes are moist.      Comments: Multiple white lesions to sides of tongue, under the tongue and left cheeks, multiple small sores to roof of mouth and cheeks.    Eyes:      Conjunctiva/sclera: Conjunctivae normal.   Cardiovascular:      Rate and Rhythm: Normal rate and regular rhythm.      Pulses: Normal pulses.      Heart sounds: Normal heart sounds.   Pulmonary:      Effort: Pulmonary effort is normal.      Breath sounds: Normal breath sounds.   Abdominal:      General: Bowel sounds are normal.      Palpations: Abdomen is soft. There is no mass.      Tenderness: There is no abdominal tenderness.   Musculoskeletal:         General: No swelling. Normal range of motion.   Lymphadenopathy:      Comments: Approximately pea sized, mobile submandibular lymph nodes bilaterally, non-tender.   Skin:     General: Skin is warm and dry.      Capillary Refill: Capillary refill takes less than 2 seconds.    Neurological:      General: No focal deficit present.      Mental Status: He is alert and oriented to person, place, and time. Mental status is at baseline.   Psychiatric:         Mood and Affect: Mood normal.         Behavior: Behavior normal.     Performance Status:  Karnofsky Score: 90 - Able to carry on normal activity; minor signs or symptoms of disease     Assessment/Plan       CLL (chronic lymphocytic leukemia) (Multi)  Chronic lymphocytic leukemia with symptomatic adenopathy, excellent response to bendamustine/rituximab; completed 6 cycles in August 2020.    Patient seen  for further management of bulky CLL.  PET imaging shows discordant uptake at the base of the tongue , pulmonary nodules which the patient states are longstanding. There is no evidence of Richters transformation by histology and FISH analysis shows del 11q. Due to symptoms of tongue swelling has started dexamethasone 4 mg po bid for 10 days for sx relief.     6/3/24 Previously discussed treatment options and patient would prefer to receive venetoclax as a time limited approach and I have recommended the [place] regimen with escalating doses of venetoclax followed by monthly rituxan for 6  monthly doses  and a planned 2 year course of venetoclax. We reviewed side effects of venetoclax and rituxan signed chemo consent.  Given patients tumor burden have schedule inpatient hospitalization to monitor for TLS for at least first two dose ramp ups on 5/10 and 5/17/24  Started allopurinol for TLS prevention  6/17/24:  Hospital admit for Venetoclax ramp up to 50 mg daily x 7 days. Next ramp ups are planned to be done outpatient.   6/24/24:  Demar completed dexamethasone 6 day course today.  Started 3rd week venetoclax ramp-up 100 mg for the next 7 days.  Plan to increase venetoclax dosing to 200 mg for 7 days for week 4 ramp up and 400 mg daily for week 5 ramp up.  Frequent blood work to monitor for TLS.  Obtaining recommended 56 oz of water.    7/25/24:  CBC results stable.  No signs of TLS.  Swelling continues to decrease to neck and is almost back to baseline. Received C1D1 rituximab on 7/15/24 with reaction of facial flushing, chills, and mild rigors. Added solu-medrol to premedications. Continue venetoclax 400 mg daily.  Allopurinol stopped.  HSV negative on mouth sore swab.   8/12/24: CBC stable, no TLS. Planned for C2 rituximab today - with methylpred and benadryl premed. Continue venetoclax 400 mg daily.     WBC   Date Value Ref Range Status   07/25/2024 5.4 4.4 - 11.3 x10*3/uL Final   07/15/2024 6.3 4.4 - 11.3 x10*3/uL Final   07/08/2024 5.6 4.4 - 11.3 x10*3/uL Final   07/05/2024 5.8 4.4 - 11.3 x10*3/uL Final   07/01/2024 11.9 (H) 4.4 - 11.3 x10*3/uL Final      Mouth Sores:  Lesions on both sides of tongue are improving with time. Unknown etiology of lesions, but predate Venetoclax.   7/15/24:  Tongue lesions healed, noted lesion to left buccal surface.  Continue to use BMX solution PRN & Nystatin Swish & Swallow 4x daily.   7/25/24:  Worsening mouth sores and white lesions to sides of tongue, roof of mouth, and bilateral cheeks.  Demar reports that he has had mouth sores off and on since May 2024.  Unable to wear dentures at this time.  Swabbed for HSV.  Dr. Lius prescribed valcyclovir 1,000 mg TID x10 day.  Advised patient to stop acyclovir while one valcyclovir and to restart acyclovir after completes 10 day cycle of valcyclovir.  Advised to contact if mouth sores worsen or do not improve after 10 day course of valcyclovir as we may need to treat with another 10 day course.  Advised Demar to continue to use salt and soda rinse.  He declined wanting refill for BMX.    8/12/24: dex s/s sent. Seeing ENT on Friday for posisble biopsy, start dex s/s after that.     Weight Loss:  7/25/24:  Continues to have weight loss, weight today 77.6 kg. Weight 87.6 kg (12/29/24).  Weight loss likely related to mouth sores.  Currently eating soft foods and  drinking 1-2 protein supplements per day.  Advised to continue 2 protein supplements per day.  Declined wanting to see dietician today.  Monitoring weight.    8/12/24: discussed foods, continue monitoring weight, stable today.     CAD:  status post CABG.       Transaminitis: PET imaging shows periportal mass vs lymphadenopathy, hepatitis serologies negative, ultrasound consistent with large periportal node, have ordered CT of liver with contrast as transaminitis worse today. Also sent CMV pcr on blood  6/24/24:  Improvement in liver enzymes: , AST 29, bilirubin 0.6.  Discontinued CT of liver as liver enzymes are improving.  7/25/24:  ALT and AST stabilized.     RTC:  9/9/24:  Follow up visit with provider and is due for C3D1 rituximab.    Patient discussed and seen with Dr Toby Dunlap MD

## 2024-08-12 ENCOUNTER — OFFICE VISIT (OUTPATIENT)
Dept: HEMATOLOGY/ONCOLOGY | Facility: HOSPITAL | Age: 64
End: 2024-08-12
Payer: COMMERCIAL

## 2024-08-12 ENCOUNTER — LAB (OUTPATIENT)
Dept: LAB | Facility: HOSPITAL | Age: 64
End: 2024-08-12
Payer: COMMERCIAL

## 2024-08-12 ENCOUNTER — INFUSION (OUTPATIENT)
Dept: HEMATOLOGY/ONCOLOGY | Facility: HOSPITAL | Age: 64
End: 2024-08-12
Payer: COMMERCIAL

## 2024-08-12 VITALS
DIASTOLIC BLOOD PRESSURE: 74 MMHG | BODY MASS INDEX: 27.84 KG/M2 | HEART RATE: 63 BPM | OXYGEN SATURATION: 99 % | TEMPERATURE: 97 F | RESPIRATION RATE: 16 BRPM | SYSTOLIC BLOOD PRESSURE: 127 MMHG | WEIGHT: 175.93 LBS

## 2024-08-12 DIAGNOSIS — C91.10 CLL (CHRONIC LYMPHOCYTIC LEUKEMIA) (MULTI): Primary | ICD-10-CM

## 2024-08-12 DIAGNOSIS — E87.6 HYPOKALEMIA: ICD-10-CM

## 2024-08-12 DIAGNOSIS — C91.10 CLL (CHRONIC LYMPHOCYTIC LEUKEMIA) (MULTI): ICD-10-CM

## 2024-08-12 DIAGNOSIS — K13.79 MOUTH SORES: ICD-10-CM

## 2024-08-12 LAB
ALBUMIN SERPL BCP-MCNC: 3.9 G/DL (ref 3.4–5)
ALP SERPL-CCNC: 83 U/L (ref 33–136)
ALT SERPL W P-5'-P-CCNC: 24 U/L (ref 10–52)
ANION GAP SERPL CALC-SCNC: 12 MMOL/L (ref 10–20)
AST SERPL W P-5'-P-CCNC: 18 U/L (ref 9–39)
BASOPHILS # BLD AUTO: 0 X10*3/UL (ref 0–0.1)
BASOPHILS NFR BLD AUTO: 0 %
BILIRUB SERPL-MCNC: 1 MG/DL (ref 0–1.2)
BUN SERPL-MCNC: 10 MG/DL (ref 6–23)
CALCIUM SERPL-MCNC: 8.5 MG/DL (ref 8.6–10.3)
CHLORIDE SERPL-SCNC: 104 MMOL/L (ref 98–107)
CO2 SERPL-SCNC: 27 MMOL/L (ref 21–32)
CREAT SERPL-MCNC: 0.57 MG/DL (ref 0.5–1.3)
EGFRCR SERPLBLD CKD-EPI 2021: >90 ML/MIN/1.73M*2
EOSINOPHIL # BLD AUTO: 0 X10*3/UL (ref 0–0.7)
EOSINOPHIL NFR BLD AUTO: 0 %
ERYTHROCYTE [DISTWIDTH] IN BLOOD BY AUTOMATED COUNT: 19.1 % (ref 11.5–14.5)
GLUCOSE SERPL-MCNC: 165 MG/DL (ref 74–99)
HCT VFR BLD AUTO: 32.3 % (ref 41–52)
HGB BLD-MCNC: 11.4 G/DL (ref 13.5–17.5)
IMM GRANULOCYTES # BLD AUTO: 0.01 X10*3/UL (ref 0–0.7)
IMM GRANULOCYTES NFR BLD AUTO: 0.3 % (ref 0–0.9)
LDH SERPL L TO P-CCNC: 142 U/L (ref 84–246)
LYMPHOCYTES # BLD AUTO: 0.48 X10*3/UL (ref 1.2–4.8)
LYMPHOCYTES NFR BLD AUTO: 16.8 %
MCH RBC QN AUTO: 30.4 PG (ref 26–34)
MCHC RBC AUTO-ENTMCNC: 35.3 G/DL (ref 32–36)
MCV RBC AUTO: 86 FL (ref 80–100)
MONOCYTES # BLD AUTO: 0.23 X10*3/UL (ref 0.1–1)
MONOCYTES NFR BLD AUTO: 8 %
NEUTROPHILS # BLD AUTO: 2.14 X10*3/UL (ref 1.2–7.7)
NEUTROPHILS NFR BLD AUTO: 74.9 %
NRBC BLD-RTO: 0 /100 WBCS (ref 0–0)
PLATELET # BLD AUTO: 85 X10*3/UL (ref 150–450)
POTASSIUM SERPL-SCNC: 3.3 MMOL/L (ref 3.5–5.3)
PROT SERPL-MCNC: 5.7 G/DL (ref 6.4–8.2)
RBC # BLD AUTO: 3.75 X10*6/UL (ref 4.5–5.9)
SODIUM SERPL-SCNC: 140 MMOL/L (ref 136–145)
URATE SERPL-MCNC: 3.9 MG/DL (ref 4–7.5)
WBC # BLD AUTO: 2.9 X10*3/UL (ref 4.4–11.3)

## 2024-08-12 PROCEDURE — 96413 CHEMO IV INFUSION 1 HR: CPT

## 2024-08-12 PROCEDURE — 99214 OFFICE O/P EST MOD 30 MIN: CPT | Performed by: INTERNAL MEDICINE

## 2024-08-12 PROCEDURE — 1126F AMNT PAIN NOTED NONE PRSNT: CPT | Performed by: INTERNAL MEDICINE

## 2024-08-12 PROCEDURE — 85025 COMPLETE CBC W/AUTO DIFF WBC: CPT

## 2024-08-12 PROCEDURE — 84550 ASSAY OF BLOOD/URIC ACID: CPT

## 2024-08-12 PROCEDURE — 2500000001 HC RX 250 WO HCPCS SELF ADMINISTERED DRUGS (ALT 637 FOR MEDICARE OP): Performed by: INTERNAL MEDICINE

## 2024-08-12 PROCEDURE — 3044F HG A1C LEVEL LT 7.0%: CPT | Performed by: INTERNAL MEDICINE

## 2024-08-12 PROCEDURE — 3078F DIAST BP <80 MM HG: CPT | Performed by: INTERNAL MEDICINE

## 2024-08-12 PROCEDURE — 3049F LDL-C 100-129 MG/DL: CPT | Performed by: INTERNAL MEDICINE

## 2024-08-12 PROCEDURE — 2500000004 HC RX 250 GENERAL PHARMACY W/ HCPCS (ALT 636 FOR OP/ED): Performed by: INTERNAL MEDICINE

## 2024-08-12 PROCEDURE — 96415 CHEMO IV INFUSION ADDL HR: CPT

## 2024-08-12 PROCEDURE — 3074F SYST BP LT 130 MM HG: CPT | Performed by: INTERNAL MEDICINE

## 2024-08-12 PROCEDURE — 36415 COLL VENOUS BLD VENIPUNCTURE: CPT

## 2024-08-12 PROCEDURE — 83615 LACTATE (LD) (LDH) ENZYME: CPT

## 2024-08-12 PROCEDURE — 2500000002 HC RX 250 W HCPCS SELF ADMINISTERED DRUGS (ALT 637 FOR MEDICARE OP, ALT 636 FOR OP/ED): Performed by: STUDENT IN AN ORGANIZED HEALTH CARE EDUCATION/TRAINING PROGRAM

## 2024-08-12 PROCEDURE — 3061F NEG MICROALBUMINURIA REV: CPT | Performed by: INTERNAL MEDICINE

## 2024-08-12 PROCEDURE — 96375 TX/PRO/DX INJ NEW DRUG ADDON: CPT | Mod: INF

## 2024-08-12 PROCEDURE — 2500000004 HC RX 250 GENERAL PHARMACY W/ HCPCS (ALT 636 FOR OP/ED)

## 2024-08-12 PROCEDURE — 99214 OFFICE O/P EST MOD 30 MIN: CPT | Mod: 25 | Performed by: INTERNAL MEDICINE

## 2024-08-12 PROCEDURE — 1123F ACP DISCUSS/DSCN MKR DOCD: CPT | Performed by: INTERNAL MEDICINE

## 2024-08-12 PROCEDURE — 80053 COMPREHEN METABOLIC PANEL: CPT

## 2024-08-12 PROCEDURE — 1159F MED LIST DOCD IN RCRD: CPT | Performed by: INTERNAL MEDICINE

## 2024-08-12 RX ORDER — DEXAMETHASONE 0.5 MG/5ML
1 SOLUTION ORAL 2 TIMES DAILY PRN
Qty: 100 ML | Refills: 0 | Status: SHIPPED | OUTPATIENT
Start: 2024-08-12 | End: 2024-08-22

## 2024-08-12 RX ORDER — POTASSIUM CHLORIDE 750 MG/1
20 TABLET, FILM COATED, EXTENDED RELEASE ORAL ONCE
Status: CANCELLED | OUTPATIENT
Start: 2024-08-12 | End: 2024-08-12

## 2024-08-12 RX ORDER — ALBUTEROL SULFATE 0.83 MG/ML
3 SOLUTION RESPIRATORY (INHALATION) AS NEEDED
Status: DISCONTINUED | OUTPATIENT
Start: 2024-08-12 | End: 2024-08-12 | Stop reason: HOSPADM

## 2024-08-12 RX ORDER — DIPHENHYDRAMINE HCL 50 MG
50 CAPSULE ORAL ONCE
Status: COMPLETED | OUTPATIENT
Start: 2024-08-12 | End: 2024-08-12

## 2024-08-12 RX ORDER — EPINEPHRINE 0.3 MG/.3ML
0.3 INJECTION SUBCUTANEOUS EVERY 5 MIN PRN
Status: DISCONTINUED | OUTPATIENT
Start: 2024-08-12 | End: 2024-08-12 | Stop reason: HOSPADM

## 2024-08-12 RX ORDER — PROCHLORPERAZINE EDISYLATE 5 MG/ML
10 INJECTION INTRAMUSCULAR; INTRAVENOUS EVERY 6 HOURS PRN
Status: DISCONTINUED | OUTPATIENT
Start: 2024-08-12 | End: 2024-08-12 | Stop reason: HOSPADM

## 2024-08-12 RX ORDER — POTASSIUM CHLORIDE 750 MG/1
20 TABLET, FILM COATED, EXTENDED RELEASE ORAL ONCE
Status: COMPLETED | OUTPATIENT
Start: 2024-08-12 | End: 2024-08-12

## 2024-08-12 RX ORDER — DIPHENHYDRAMINE HYDROCHLORIDE 50 MG/ML
50 INJECTION INTRAMUSCULAR; INTRAVENOUS AS NEEDED
Status: DISCONTINUED | OUTPATIENT
Start: 2024-08-12 | End: 2024-08-12 | Stop reason: HOSPADM

## 2024-08-12 RX ORDER — POTASSIUM CHLORIDE 20 MEQ/1
20 TABLET, EXTENDED RELEASE ORAL DAILY
Qty: 30 TABLET | Refills: 0 | Status: SHIPPED | OUTPATIENT
Start: 2024-08-12 | End: 2024-09-11

## 2024-08-12 RX ORDER — PROCHLORPERAZINE MALEATE 10 MG
10 TABLET ORAL EVERY 6 HOURS PRN
Status: DISCONTINUED | OUTPATIENT
Start: 2024-08-12 | End: 2024-08-12 | Stop reason: HOSPADM

## 2024-08-12 RX ORDER — FAMOTIDINE 10 MG/ML
20 INJECTION INTRAVENOUS ONCE AS NEEDED
Status: DISCONTINUED | OUTPATIENT
Start: 2024-08-12 | End: 2024-08-12 | Stop reason: HOSPADM

## 2024-08-12 RX ORDER — ACETAMINOPHEN 325 MG/1
650 TABLET ORAL ONCE
Status: COMPLETED | OUTPATIENT
Start: 2024-08-12 | End: 2024-08-12

## 2024-08-12 ASSESSMENT — PAIN SCALES - GENERAL
PAINLEVEL_OUTOF10: 0 - NO PAIN
PAINLEVEL: 0-NO PAIN

## 2024-08-12 ASSESSMENT — ENCOUNTER SYMPTOMS
NAUSEA: 0
VOMITING: 0

## 2024-08-12 NOTE — PROGRESS NOTES
[facility] Infusion Nursing Note  08/12/24    Demar Pittman is a 64 y.o. year old male patient presenting to outpatient infusion for cycle 3 day 1 of the following regimen:    Treatment Plans       Name Type Plan Dates Plan Provider         Active    Venetoclax + RiTUXimab, 28 Day Cycles Oncology Treatment  6/24/2024 - Present Ev Luis MD                  Since the last visit, he reports doing well. Overall, he states that energy level is energy level is good. Appetite has been unchanged. he reports constipation and ongoing throat issues, pt feels like something is stuck in throat, seeing ENT Friday .     Gave 20 PO K for potassium 3.3    Line type: PIV *anxious with IVs*  Line removed/maintained prior to discharge: PIV removed    Administrations This Visit       acetaminophen (Tylenol) tablet 650 mg       Admin Date  08/12/2024 Action  Given Dose  650 mg Route  oral Documented By  Breanne Gomes RN              diphenhydrAMINE (BENADryl) capsule 50 mg       Admin Date  08/12/2024 Action  Given Dose  50 mg Route  oral Documented By  Breanne Gomes RN              methylPREDNISolone sod succinate (SOLU-Medrol) 40 mg/mL injection 40 mg       Admin Date  08/12/2024 Action  Given Dose  40 mg Route  intravenous Documented By  Breanne Gomes RN              potassium chloride CR (Klor-Con) ER tablet 20 mEq       Admin Date  08/12/2024 Action  Given Dose  20 mEq Route  oral Documented By  Lydia Hu RN              riTUXimab-pvvr (Ruxience) 1,000 mg in sodium chloride 0.9% 367 mL IV       Admin Date  08/12/2024 Action  New Bag Dose  1,000 mg Rate  18 mL/hr Route  intravenous Documented By  Lydia Hu RN               Admin Date  08/12/2024 Action  Rate/Dose Change Dose   Rate  36 mL/hr Route  intravenous Documented By  Lydia Hu RN               Admin Date  08/12/2024 Action  Rate/Dose Change Dose   Rate  54 mL/hr Route  intravenous Documented By  Lydia Hu RN               Admin  Date  08/12/2024 Action  Rate/Dose Change Dose   Rate  72 mL/hr Route  intravenous Documented By  Lydia Hu RN               Admin Date  08/12/2024 Action  Rate/Dose Change Dose   Rate  90 mL/hr Route  intravenous Documented By  Lydia Hu RN               Admin Date  08/12/2024 Action  Rate/Dose Change Dose   Rate  108 mL/hr Route  intravenous Documented By  Lydia Hu RN               Admin Date  08/12/2024 Action  Rate/Dose Change Dose   Rate  126 mL/hr Route  intravenous Documented By  Lydia Hu RN               Admin Date  08/12/2024 Action  Rate/Dose Change Dose   Rate  144 mL/hr Route  intravenous Documented By  Lydia Hu RN                  Hypersensitivity reaction noted: No  Patient tolerated treatment well. Discharged home in stable condition.    Follow-up Plan: 9/9    SUNG HAHN RN

## 2024-08-12 NOTE — PROGRESS NOTES
Per Dr. Luis, cycle 3 Rituximab should be run at first dose rates due to reaction with cycle 2-- starting at 50 mg/hr and increasing q 30 minutes to a maximum of 400 mg/hr.    Rates for cycle 3, day 1 Rituximab verified by Farooq Aragon RN and are as follows:  18 ml/hr  36 ml/hr  54 ml/hr  72 ml/hr  90 ml/hr  108 ml/hr  126 ml/hr  144 ml/hr    Lydia Hu RN

## 2024-08-13 DIAGNOSIS — R73.9 HYPERGLYCEMIA: Primary | ICD-10-CM

## 2024-08-13 LAB
CMV DNA SERPL NAA+PROBE-LOG IU: NORMAL {LOG_IU}/ML
LABORATORY COMMENT REPORT: NOT DETECTED

## 2024-08-16 ENCOUNTER — TELEPHONE (OUTPATIENT)
Dept: PRIMARY CARE | Facility: CLINIC | Age: 64
End: 2024-08-16

## 2024-08-16 ENCOUNTER — OFFICE VISIT (OUTPATIENT)
Dept: OTOLARYNGOLOGY | Facility: HOSPITAL | Age: 64
End: 2024-08-16
Payer: COMMERCIAL

## 2024-08-16 ENCOUNTER — APPOINTMENT (OUTPATIENT)
Dept: OTOLARYNGOLOGY | Facility: HOSPITAL | Age: 64
End: 2024-08-16
Payer: COMMERCIAL

## 2024-08-16 VITALS — TEMPERATURE: 97.7 F | BODY MASS INDEX: 27.31 KG/M2 | HEIGHT: 67 IN | WEIGHT: 174 LBS

## 2024-08-16 DIAGNOSIS — C91.10 CLL (CHRONIC LYMPHOCYTIC LEUKEMIA) (MULTI): ICD-10-CM

## 2024-08-16 DIAGNOSIS — K14.8 TONGUE LESION: ICD-10-CM

## 2024-08-16 DIAGNOSIS — R13.12 OROPHARYNGEAL DYSPHAGIA: Primary | ICD-10-CM

## 2024-08-16 DIAGNOSIS — C91.10 CLL (CHRONIC LYMPHOCYTIC LEUKEMIA) (MULTI): Primary | ICD-10-CM

## 2024-08-16 PROCEDURE — 99214 OFFICE O/P EST MOD 30 MIN: CPT | Performed by: STUDENT IN AN ORGANIZED HEALTH CARE EDUCATION/TRAINING PROGRAM

## 2024-08-16 PROCEDURE — 3049F LDL-C 100-129 MG/DL: CPT | Performed by: STUDENT IN AN ORGANIZED HEALTH CARE EDUCATION/TRAINING PROGRAM

## 2024-08-16 PROCEDURE — 3008F BODY MASS INDEX DOCD: CPT | Performed by: STUDENT IN AN ORGANIZED HEALTH CARE EDUCATION/TRAINING PROGRAM

## 2024-08-16 PROCEDURE — 1159F MED LIST DOCD IN RCRD: CPT | Performed by: STUDENT IN AN ORGANIZED HEALTH CARE EDUCATION/TRAINING PROGRAM

## 2024-08-16 PROCEDURE — 3044F HG A1C LEVEL LT 7.0%: CPT | Performed by: STUDENT IN AN ORGANIZED HEALTH CARE EDUCATION/TRAINING PROGRAM

## 2024-08-16 PROCEDURE — 3061F NEG MICROALBUMINURIA REV: CPT | Performed by: STUDENT IN AN ORGANIZED HEALTH CARE EDUCATION/TRAINING PROGRAM

## 2024-08-16 PROCEDURE — 1123F ACP DISCUSS/DSCN MKR DOCD: CPT | Performed by: STUDENT IN AN ORGANIZED HEALTH CARE EDUCATION/TRAINING PROGRAM

## 2024-08-16 ASSESSMENT — PATIENT HEALTH QUESTIONNAIRE - PHQ9
SUM OF ALL RESPONSES TO PHQ9 QUESTIONS 1 & 2: 0
2. FEELING DOWN, DEPRESSED OR HOPELESS: NOT AT ALL
1. LITTLE INTEREST OR PLEASURE IN DOING THINGS: NOT AT ALL

## 2024-08-16 NOTE — TELEPHONE ENCOUNTER
Patient called in and stated that he usually gets a chest x-ray done yearly. The patient would like a chest x-ray done before his upcoming appointment , which is 08/23/2024.     The patient left this on the voice mail today at 4:15pm.    Look like the patient has chest pressure. History of CLL (Chronic lymphocytic leukemia) .

## 2024-08-16 NOTE — PROGRESS NOTES
"Head and Neck Surgery Outpatient Follow-Up    Chief Concern:  Tongue Lesions    History Of Present Illness  Demar Pittman \"Shraddha" is a 64 y.o. male with a history of  CLL presenting for evaluation of oral cavity lesions. He is known to our service, having previously been seen by Dr. Chirinos for a tongue base mass which was ultimately biopsied in the OR in May 2024 with path showing CLL. He presents today for a different concern.     He reports that since April or so of this year he has had intermittent painful lesions of this oral cavity and tongue. This has cause him some trouble swallowing. He feels as though they wax and wane, and do completely resolve before returning. He is currently on treatment for his CLL receiving rituximab and venetoclax  He thinks maybe the lesions resolve quicker after his rituximab infusions. The lesions predate his current treatment regimen. He has no new neck masses, hemoptysis, dyspnea, or dysphonia, but does have stable cervical LAD for greater than a year related to his CLL. He is a non-smoker with minimal alcohol intake.     Past Medical History  He has a past medical history of Diffuse otitis externa, bilateral (10/11/2021), Hypertrophy of tonsils (12/05/2019), Localized enlarged lymph nodes (04/30/2020), Personal history of diseases of the skin and subcutaneous tissue (03/09/2020), Personal history of other diseases of the circulatory system, Personal history of other diseases of the circulatory system, Personal history of other diseases of the circulatory system, Personal history of other diseases of the circulatory system, and Personal history of other specified conditions (12/11/2019).    Patient Active Problem List   Diagnosis    CAD (coronary artery disease)    CLL (chronic lymphocytic leukemia) (Multi)    Diastasis recti    Dry eyes, bilateral    Essential hypertension    Controlled type 2 diabetes mellitus without complication, without long-term current use of insulin " (Multi)    Mixed hyperlipidemia    XENA on CPAP    Paroxysmal atrial fibrillation (Multi)    S/P CABG x 3    Sensorineural hearing loss (SNHL) of both ears    Thrombocytopenia (CMS-HCC)    Tinnitus of both ears    Umbilical hernia without obstruction and without gangrene    Vitamin D deficiency    Annual physical exam    Hypokalemia    Class 1 obesity due to excess calories with serious comorbidity and body mass index (BMI) of 30.0 to 30.9 in adult    Low platelet count (CMS-HCC)    Tongue lesion    Difficult intubation    Transaminitis    Mouth sores    Mouth sore    Weight loss       Surgical History  He has a past surgical history that includes Other surgical history (10/24/2019); Other surgical history (10/24/2019); Other surgical history (12/13/2019); and Other surgical history (12/29/2019).     Social History  He reports that he quit smoking about 39 years ago. His smoking use included cigarettes. He has never used smokeless tobacco. He reports that he does not currently use alcohol after a past usage of about 2.0 standard drinks of alcohol per week. He reports that he does not use drugs.    Family History  No family history on file.     Allergies  Rituximab-pvvr    Last Recorded Vitals  There were no vitals taken for this visit.     Physical Exam:  Constitutional:  No acute distress  Voice:  No hoarseness or other abnormality  Respiration:  Breathing comfortably, no stridors  Eyes:  EOM intact, sclera normal  Neuro:  Alert and oriented times 3, Cranial nerves II-XII grossly intact and symmetric bilaterally  Head and Face:  Symmetric facial features, no masses or lesions, sinuses non-tender to palpation  Salivary Glands:  Parotid and submandibular glands normal bilaterally  Oral Cavity/Oropharynx/Lips: Mildly tender plaque-like lesionn scattered over the buccal mucosa and lateral tongue, all less than 1 cm in size with a fibrinous appearing surface.   Pharynx: no masses or lesions  Neck/Lymph:  L>R small mobile  level II nodes, non tender  Skin:  Neck skin is without scar or injury  Psych:  Alert and oriented with appropriate mood and affect      Medications:  Medication Documentation Review Audit       Reviewed by BERLIN Weathers (Patient Care Technician) on 08/12/24 at 0958      Medication Order Taking? Sig Documenting Provider Last Dose Status   acyclovir (Zovirax) 400 mg tablet [id number] Yes Take 1 tablet (400 mg) by mouth 2 times a day. Mayelin Bailon APRN-CNP Taking Active   amLODIPine (Norvasc) 5 mg tablet [id number] Yes Take 1 tablet (5 mg) by mouth once daily. Kristen Conner APRN-CNP Taking Active   aspirin 81 mg EC tablet [id number] Yes Take 1 tablet (81 mg) by mouth once daily. Historical Provider, MD Taking Active   calcium carbonate EX (Tums Extra Strength) 300 mg (750 mg) chewable tablet [id number] Yes Chew 2 tablets (1,500 mg) 2 times a day. Compa Ortiz PA-C Taking Active   metoprolol tartrate (Lopressor) 25 mg tablet [id number] Yes Take 1 tablet (25 mg) by mouth 2 times a day. Kristen Conner APRN-CNP Taking Active   multivitamin with folic acid (One Daily Multivitamin) 400 mcg tablet [id number] Yes Take 1 tablet by mouth once daily. Historical Provider, MD Taking Active   nitroglycerin (Nitrostat) 0.4 mg SL tablet [id number] Yes Place 1 tablet (0.4 mg) under the tongue every 5 minutes if needed. Historical Provider, MD Taking Active   ondansetron (Zofran) 8 mg tablet [id number] Yes Take 1 tablet (8 mg) by mouth every 8 hours if needed for nausea or vomiting. Ev Luis MD Taking Active   pantoprazole (ProtoNix) 40 mg EC tablet [id number] Yes Take 1 tablet (40 mg) by mouth once daily. Deedee Mcclendon MD Taking Active   prochlorperazine (Compazine) 10 mg tablet [id number] Yes Take 1 tablet (10 mg) by mouth every 6 hours if needed for nausea or vomiting. Ev Luis MD Taking Active   venetoclax (Venclexta) 100 mg tablet [id number] Yes Take 4 tablets (400 mg total) by mouth once daily.   Take with food. Ev Luis MD Taking Active                  Imaging:  I personally reviewed the PET CT from 3/14/24.       Assessment/Plan   65yo M w/ long hx of CLL and multiple varied treatment courses seen today for intermittent oral cavity lesions. He has previously had biopsy proven CLL involvement at his tongue base. He is currently on treatment with ritux and venetoclax, but the lesions in question seme to predate this. My concern for separate, unrelated oral cavity malignancy is low given his lack of alcohol and tobacco use, his previously demonstrated oropharyngeal CLL involvement, and his ongoing systemic treatment which could contribute to mucosal changes.     I did however offer him an in-office biopsy today to perhaps provide a definitive diagnosis and/or reassurance that there was no other malignant process. He declined this intervention and would like to trial oral steroid rinse as recommended by his oncologist. He will follow-up with me in 2 weeks, and if there are any residual lesions we will biopsy at that time.

## 2024-08-19 ENCOUNTER — HOSPITAL ENCOUNTER (OUTPATIENT)
Dept: RADIOLOGY | Facility: HOSPITAL | Age: 64
Discharge: HOME | End: 2024-08-19
Payer: COMMERCIAL

## 2024-08-19 DIAGNOSIS — C91.10 CLL (CHRONIC LYMPHOCYTIC LEUKEMIA) (MULTI): ICD-10-CM

## 2024-08-19 PROCEDURE — 71046 X-RAY EXAM CHEST 2 VIEWS: CPT | Performed by: RADIOLOGY

## 2024-08-19 PROCEDURE — 71046 X-RAY EXAM CHEST 2 VIEWS: CPT

## 2024-08-21 PROBLEM — Z95.5 PRESENCE OF CORONARY ANGIOPLASTY IMPLANT AND GRAFT: Status: ACTIVE | Noted: 2023-08-22

## 2024-08-21 PROBLEM — I45.10 UNSPECIFIED RIGHT BUNDLE-BRANCH BLOCK: Status: ACTIVE | Noted: 2023-08-22

## 2024-08-21 PROBLEM — K21.9 GASTRO-ESOPHAGEAL REFLUX DISEASE WITHOUT ESOPHAGITIS: Status: ACTIVE | Noted: 2023-08-22

## 2024-08-21 PROBLEM — I25.2 OLD MYOCARDIAL INFARCTION: Status: ACTIVE | Noted: 2023-08-22

## 2024-08-22 PROBLEM — K13.79 MOUTH SORES: Status: RESOLVED | Noted: 2024-07-17 | Resolved: 2024-08-22

## 2024-08-22 PROBLEM — D61.818 PANCYTOPENIA (MULTI): Status: ACTIVE | Noted: 2023-08-16

## 2024-08-22 PROBLEM — K13.79 MOUTH SORE: Status: RESOLVED | Noted: 2024-07-17 | Resolved: 2024-08-22

## 2024-08-22 PROBLEM — R74.01 TRANSAMINITIS: Status: RESOLVED | Noted: 2024-06-26 | Resolved: 2024-08-22

## 2024-08-22 NOTE — PATIENT INSTRUCTIONS
I recommend yearly dilated eye exam with your ophthalmologist or optometrist.    I would like you to follow up in 6 months  Please have all labs that were ordered done at least 1 week prior to your visit.

## 2024-08-22 NOTE — PROGRESS NOTES
"Subjective   Patient ID: Demar Pittman \"Bud\" is a 64 y.o. male who presents for Annual Exam.  HPI  Patient presents today for Annual Physical. Having chronic mouth sores - recently saw ENT thinks related to CLL. Getting infusion for CLL q4 weeks. No shortness of breath or chronic cough.  Patient otherwise feels well. No other complaints or concerns.    The patient's relevant past medical, surgical, family, and social history was reviewed in [facility].  All pertinent lab work and results for this visit were reviewed with patient.    Lab on 08/12/2024   Component Date Value Ref Range Status    WBC 08/12/2024 2.9 (L)  4.4 - 11.3 x10*3/uL Final    nRBC 08/12/2024 0.0  0.0 - 0.0 /100 WBCs Final    RBC 08/12/2024 3.75 (L)  4.50 - 5.90 x10*6/uL Final    Hemoglobin 08/12/2024 11.4 (L)  13.5 - 17.5 g/dL Final    Hematocrit 08/12/2024 32.3 (L)  41.0 - 52.0 % Final    MCV 08/12/2024 86  80 - 100 fL Final    MCH 08/12/2024 30.4  26.0 - 34.0 pg Final    MCHC 08/12/2024 35.3  32.0 - 36.0 g/dL Final    RDW 08/12/2024 19.1 (H)  11.5 - 14.5 % Final    Platelets 08/12/2024 85 (L)  150 - 450 x10*3/uL Final    Neutrophils % 08/12/2024 74.9  40.0 - 80.0 % Final    Immature Granulocytes %, Automated 08/12/2024 0.3  0.0 - 0.9 % Final    Immature Granulocyte Count (IG) includes promyelocytes, myelocytes and metamyelocytes but does not include bands. Percent differential counts (%) should be interpreted in the context of the absolute cell counts (cells/UL).    Lymphocytes % 08/12/2024 16.8  13.0 - 44.0 % Final    Monocytes % 08/12/2024 8.0  2.0 - 10.0 % Final    Eosinophils % 08/12/2024 0.0  0.0 - 6.0 % Final    Basophils % 08/12/2024 0.0  0.0 - 2.0 % Final    Neutrophils Absolute 08/12/2024 2.14  1.20 - 7.70 x10*3/uL Final    Percent differential counts (%) should be interpreted in the context of the absolute cell counts (cells/uL).    Immature Granulocytes Absolute, Au* 08/12/2024 0.01  0.00 - 0.70 x10*3/uL Final    Lymphocytes Absolute " 08/12/2024 0.48 (L)  1.20 - 4.80 x10*3/uL Final    Monocytes Absolute 08/12/2024 0.23  0.10 - 1.00 x10*3/uL Final    Eosinophils Absolute 08/12/2024 0.00  0.00 - 0.70 x10*3/uL Final    Basophils Absolute 08/12/2024 0.00  0.00 - 0.10 x10*3/uL Final    Glucose 08/12/2024 165 (H)  74 - 99 mg/dL Final    Sodium 08/12/2024 140  136 - 145 mmol/L Final    Potassium 08/12/2024 3.3 (L)  3.5 - 5.3 mmol/L Final    Chloride 08/12/2024 104  98 - 107 mmol/L Final    Bicarbonate 08/12/2024 27  21 - 32 mmol/L Final    Anion Gap 08/12/2024 12  10 - 20 mmol/L Final    Urea Nitrogen 08/12/2024 10  6 - 23 mg/dL Final    Creatinine 08/12/2024 0.57  0.50 - 1.30 mg/dL Final    eGFR 08/12/2024 >90  >60 mL/min/1.73m*2 Final    Calculations of estimated GFR are performed using the 2021 CKD-EPI Study Refit equation without the race variable for the IDMS-Traceable creatinine methods.  https://jasn.asnjournals.org/content/early/2021/09/22/ASN.7650992169    Calcium 08/12/2024 8.5 (L)  8.6 - 10.3 mg/dL Final    Albumin 08/12/2024 3.9  3.4 - 5.0 g/dL Final    Alkaline Phosphatase 08/12/2024 83  33 - 136 U/L Final    Total Protein 08/12/2024 5.7 (L)  6.4 - 8.2 g/dL Final    AST 08/12/2024 18  9 - 39 U/L Final    Bilirubin, Total 08/12/2024 1.0  0.0 - 1.2 mg/dL Final    ALT 08/12/2024 24  10 - 52 U/L Final    Patients treated with Sulfasalazine may generate falsely decreased results for ALT.    LDH 08/12/2024 142  84 - 246 U/L Final    Uric Acid 08/12/2024 3.9 (L)  4.0 - 7.5 mg/dL Final    Venipuncture immediately after or during the administration of Metamizole may lead to falsely low results. Testing should be performed immediately  prior to Metamizole dosing.    Cytomegalovirus DNA, PCR Log IU/ML 08/12/2024    Final    Not calculated    CMV DNA Result 08/12/2024 Not Detected  Not Detected Final   Lab on 07/25/2024   Component Date Value Ref Range Status    WBC 07/25/2024 5.4  4.4 - 11.3 x10*3/uL Final    nRBC 07/25/2024 0.0  0.0 - 0.0 /100 WBCs  Final    RBC 07/25/2024 4.84  4.50 - 5.90 x10*6/uL Final    Hemoglobin 07/25/2024 13.7  13.5 - 17.5 g/dL Final    Hematocrit 07/25/2024 41.1  41.0 - 52.0 % Final    MCV 07/25/2024 85  80 - 100 fL Final    MCH 07/25/2024 28.3  26.0 - 34.0 pg Final    MCHC 07/25/2024 33.3  32.0 - 36.0 g/dL Final    RDW 07/25/2024 17.2 (H)  11.5 - 14.5 % Final    Platelets 07/25/2024 156  150 - 450 x10*3/uL Final    Neutrophils % 07/25/2024 70.2  40.0 - 80.0 % Final    Immature Granulocytes %, Automated 07/25/2024 0.9  0.0 - 0.9 % Final    Immature Granulocyte Count (IG) includes promyelocytes, myelocytes and metamyelocytes but does not include bands. Percent differential counts (%) should be interpreted in the context of the absolute cell counts (cells/UL).    Lymphocytes % 07/25/2024 12.3  13.0 - 44.0 % Final    Monocytes % 07/25/2024 15.8  2.0 - 10.0 % Final    Eosinophils % 07/25/2024 0.4  0.0 - 6.0 % Final    Basophils % 07/25/2024 0.4  0.0 - 2.0 % Final    Neutrophils Absolute 07/25/2024 3.82  1.20 - 7.70 x10*3/uL Final    Percent differential counts (%) should be interpreted in the context of the absolute cell counts (cells/uL).    Immature Granulocytes Absolute, Au* 07/25/2024 0.05  0.00 - 0.70 x10*3/uL Final    Lymphocytes Absolute 07/25/2024 0.67 (L)  1.20 - 4.80 x10*3/uL Final    Monocytes Absolute 07/25/2024 0.86  0.10 - 1.00 x10*3/uL Final    Eosinophils Absolute 07/25/2024 0.02  0.00 - 0.70 x10*3/uL Final    Basophils Absolute 07/25/2024 0.02  0.00 - 0.10 x10*3/uL Final    Glucose 07/25/2024 104 (H)  74 - 99 mg/dL Final    Sodium 07/25/2024 140  136 - 145 mmol/L Final    Potassium 07/25/2024 4.1  3.5 - 5.3 mmol/L Final    Chloride 07/25/2024 102  98 - 107 mmol/L Final    Bicarbonate 07/25/2024 28  21 - 32 mmol/L Final    Anion Gap 07/25/2024 14  10 - 20 mmol/L Final    Urea Nitrogen 07/25/2024 9  6 - 23 mg/dL Final    Creatinine 07/25/2024 0.70  0.50 - 1.30 mg/dL Final    eGFR 07/25/2024 >90  >60 mL/min/1.73m*2 Final     Calculations of estimated GFR are performed using the 2021 CKD-EPI Study Refit equation without the race variable for the IDMS-Traceable creatinine methods.  https://jasn.asnjournals.org/content/early/2021/09/22/ASN.0316326513    Calcium 07/25/2024 9.3  8.6 - 10.3 mg/dL Final    Albumin 07/25/2024 4.3  3.4 - 5.0 g/dL Final    Alkaline Phosphatase 07/25/2024 93  33 - 136 U/L Final    Total Protein 07/25/2024 6.4  6.4 - 8.2 g/dL Final    AST 07/25/2024 21  9 - 39 U/L Final    Bilirubin, Total 07/25/2024 1.1  0.0 - 1.2 mg/dL Final    ALT 07/25/2024 32  10 - 52 U/L Final    Patients treated with Sulfasalazine may generate falsely decreased results for ALT.    LDH 07/25/2024 171  84 - 246 U/L Final    Uric Acid 07/25/2024 4.3  4.0 - 7.5 mg/dL Final    Venipuncture immediately after or during the administration of Metamizole may lead to falsely low results. Testing should be performed immediately  prior to Metamizole dosing.    Phosphorus 07/25/2024 3.2  2.5 - 4.9 mg/dL Final    The performance characteristics of phosphorus testing in heparinized plasma have been validated by the individual  laboratory site where testing is performed. Testing on heparinized plasma is not approved by the FDA; however, such approval is not necessary.   Office Visit on 07/25/2024   Component Date Value Ref Range Status    Herpes simplex virus 1 PCR, Skin/M* 07/25/2024 Not Detected  Not Detected Final    Herpes simplex virus 2 PCR, Skin/M* 07/25/2024 Not Detected  Not Detected Final   Infusion on 07/15/2024   Component Date Value Ref Range Status    LDH 07/15/2024 182  84 - 246 U/L Final    Uric Acid 07/15/2024 3.4 (L)  4.0 - 7.5 mg/dL Final    Venipuncture immediately after or during the administration of Metamizole may lead to falsely low results. Testing should be performed immediately  prior to Metamizole dosing.    WBC 07/15/2024 6.3  4.4 - 11.3 x10*3/uL Final    nRBC 07/15/2024 0.0  0.0 - 0.0 /100 WBCs Final    RBC 07/15/2024 4.63   4.50 - 5.90 x10*6/uL Final    Hemoglobin 07/15/2024 13.3 (L)  13.5 - 17.5 g/dL Final    Hematocrit 07/15/2024 38.5 (L)  41.0 - 52.0 % Final    MCV 07/15/2024 83  80 - 100 fL Final    MCH 07/15/2024 28.7  26.0 - 34.0 pg Final    MCHC 07/15/2024 34.5  32.0 - 36.0 g/dL Final    RDW 07/15/2024 15.7 (H)  11.5 - 14.5 % Final    Platelets 07/15/2024 223  150 - 450 x10*3/uL Final    Neutrophils % 07/15/2024 60.4  40.0 - 80.0 % Final    Immature Granulocytes %, Automated 07/15/2024 0.5  0.0 - 0.9 % Final    Immature Granulocyte Count (IG) includes promyelocytes, myelocytes and metamyelocytes but does not include bands. Percent differential counts (%) should be interpreted in the context of the absolute cell counts (cells/UL).    Lymphocytes % 07/15/2024 22.9  13.0 - 44.0 % Final    Monocytes % 07/15/2024 15.5  2.0 - 10.0 % Final    Eosinophils % 07/15/2024 0.2  0.0 - 6.0 % Final    Basophils % 07/15/2024 0.5  0.0 - 2.0 % Final    Neutrophils Absolute 07/15/2024 3.83  1.20 - 7.70 x10*3/uL Final    Percent differential counts (%) should be interpreted in the context of the absolute cell counts (cells/uL).    Immature Granulocytes Absolute, Au* 07/15/2024 0.03  0.00 - 0.70 x10*3/uL Final    Lymphocytes Absolute 07/15/2024 1.45  1.20 - 4.80 x10*3/uL Final    Monocytes Absolute 07/15/2024 0.98  0.10 - 1.00 x10*3/uL Final    Eosinophils Absolute 07/15/2024 0.01  0.00 - 0.70 x10*3/uL Final    Basophils Absolute 07/15/2024 0.03  0.00 - 0.10 x10*3/uL Final    Glucose 07/15/2024 135 (H)  74 - 99 mg/dL Final    Sodium 07/15/2024 137  136 - 145 mmol/L Final    Potassium 07/15/2024 3.4 (L)  3.5 - 5.3 mmol/L Final    Chloride 07/15/2024 99  98 - 107 mmol/L Final    Bicarbonate 07/15/2024 28  21 - 32 mmol/L Final    Anion Gap 07/15/2024 13  10 - 20 mmol/L Final    Urea Nitrogen 07/15/2024 9  6 - 23 mg/dL Final    Creatinine 07/15/2024 0.65  0.50 - 1.30 mg/dL Final    eGFR 07/15/2024 >90  >60 mL/min/1.73m*2 Final    Calculations of  "estimated GFR are performed using the 2021 CKD-EPI Study Refit equation without the race variable for the IDMS-Traceable creatinine methods.  https://jasn.asnjournals.org/content/early/2021/09/22/ASN.7016026948    Calcium 07/15/2024 9.2  8.6 - 10.3 mg/dL Final    Albumin 07/15/2024 4.1  3.4 - 5.0 g/dL Final    Alkaline Phosphatase 07/15/2024 102  33 - 136 U/L Final    Total Protein 07/15/2024 6.4  6.4 - 8.2 g/dL Final    AST 07/15/2024 23  9 - 39 U/L Final    Bilirubin, Total 07/15/2024 0.8  0.0 - 1.2 mg/dL Final    ALT 07/15/2024 44  10 - 52 U/L Final    Patients treated with Sulfasalazine may generate falsely decreased results for ALT.    Phosphorus 07/15/2024 2.5  2.5 - 4.9 mg/dL Final    The performance characteristics of phosphorus testing in heparinized plasma have been validated by the individual  laboratory site where testing is performed. Testing on heparinized plasma is not approved by the FDA; however, such approval is not necessary.           Review of Systems   A complete review of systems was performed and all systems were normal except what is noted in the HPI.        Objective   /75   Pulse 74   Temp 36.2 °C (97.1 °F)   Ht 1.702 m (5' 7\")   Wt 81.4 kg (179 lb 6.4 oz)   SpO2 96%   BMI 28.10 kg/m²    Physical Exam  Constitutional:       Appearance: Normal appearance.   HENT:      Head: Normocephalic and atraumatic.   Neck:      Vascular: No carotid bruit.   Cardiovascular:      Rate and Rhythm: Normal rate and regular rhythm.      Heart sounds: Normal heart sounds.   Pulmonary:      Effort: Pulmonary effort is normal.      Breath sounds: Normal breath sounds. No wheezing, rhonchi or rales.   Abdominal:      General: Abdomen is flat. Bowel sounds are normal.      Palpations: Abdomen is soft.      Tenderness: There is no abdominal tenderness. There is no guarding.   Musculoskeletal:         General: Normal range of motion.      Right lower leg: No edema.      Left lower leg: No edema. "   Skin:     General: Skin is dry.   Neurological:      General: No focal deficit present.      Mental Status: He is alert and oriented to person, place, and time.   Psychiatric:         Mood and Affect: Mood normal.         Behavior: Behavior normal.         Thought Content: Thought content normal.         Health Maintenance Due   Topic Date Due    Yearly Adult Physical  Never done    MMR Vaccines (1 of 1 - Standard series) Never done    Diabetes: Retinopathy Screening  Never done    Zoster Vaccines (1 of 2) Never done    DTaP/Tdap/Td Vaccines (1 - Tdap) Never done    RSV Pregnant patients and/or  patients aged 60+ years (1 - 1-dose 60+ series) Never done    COVID-19 Vaccine (3 - Pfizer risk series) 03/19/2022    Abdominal Aortic Aneurysm (AAA) Screening  07/28/2023    Influenza Vaccine (1) 09/01/2024    Colorectal Cancer Screening  09/12/2024    Diabetes: Hemoglobin A1C  10/08/2024        Assessment/Plan   Problem List Items Addressed This Visit       CLL (chronic lymphocytic leukemia) (Multi)     Currently on Venetoclax   Monitored closely by oncology - has follow up next month  Recent CXR results forwarded to oncology team         Relevant Orders    CT chest w IV contrast    Essential hypertension     Well controlled. Continue current medicine and recheck in 6 months.           Controlled type 2 diabetes mellitus without complication, without long-term current use of insulin (Multi)     A1c up from 6.8 to 6.9  Work on diet reviewed with patient.  Statin  Recheck 6 months          Mixed hyperlipidemia     Recheck 6 months          XENA on CPAP     Patient is compliant with and benefiting from CPAP - will continue use.           Paroxysmal atrial fibrillation (Multi)     Rate and rhythm controlled.  Continue current medication  Has follow up cardiology 12/24         Pancytopenia (Multi)     Due to CLL and treatment  Monitored per oncology  has follow up 9/24         Vitamin D deficiency     within normal  limits  Recheck 6 months            Annual physical exam - Primary     Yearly physical done.  PSA 2/24  Prevnar 20 8/23  Shingrix, Boostrix, RSV declined  colonoscopy 9/14 - Cologuard ordered  AAA screen 10/19  Former smoker quit 1985  nonsmoker         Hypokalemia     K slightly low - missed some doses  Will take as directed  Recheck 6 months          Lung nodule     CXR forwarded to oncology  CT chest ordered         Relevant Orders    CT chest w IV contrast     Other Visit Diagnoses       Encounter for screening for malignant neoplasm of colon        Relevant Orders    Cologuard® colon cancer screening              Patient understands and agrees with care plan.           Deedee Mcclendon MD

## 2024-08-22 NOTE — ASSESSMENT & PLAN NOTE
Currently on Venetoclax   Monitored closely by oncology - has follow up next month  Recent CXR results forwarded to oncology team

## 2024-08-22 NOTE — ASSESSMENT & PLAN NOTE
Yearly physical done.  PSA 2/24  Prevnar 20 8/23  Shingrix, Boostrix, RSV declined  colonoscopy 9/14 - Cologuard ordered  AAA screen 10/19  Former smoker quit 1985  nonsmoker

## 2024-08-23 ENCOUNTER — APPOINTMENT (OUTPATIENT)
Dept: PRIMARY CARE | Facility: CLINIC | Age: 64
End: 2024-08-23
Payer: COMMERCIAL

## 2024-08-23 VITALS
TEMPERATURE: 97.1 F | DIASTOLIC BLOOD PRESSURE: 75 MMHG | BODY MASS INDEX: 28.16 KG/M2 | SYSTOLIC BLOOD PRESSURE: 121 MMHG | HEIGHT: 67 IN | OXYGEN SATURATION: 96 % | HEART RATE: 74 BPM | WEIGHT: 179.4 LBS

## 2024-08-23 DIAGNOSIS — Z12.11 ENCOUNTER FOR SCREENING FOR MALIGNANT NEOPLASM OF COLON: ICD-10-CM

## 2024-08-23 DIAGNOSIS — I10 ESSENTIAL HYPERTENSION: ICD-10-CM

## 2024-08-23 DIAGNOSIS — E11.9 CONTROLLED TYPE 2 DIABETES MELLITUS WITHOUT COMPLICATION, WITHOUT LONG-TERM CURRENT USE OF INSULIN (MULTI): ICD-10-CM

## 2024-08-23 DIAGNOSIS — Z00.00 ANNUAL PHYSICAL EXAM: Primary | ICD-10-CM

## 2024-08-23 DIAGNOSIS — R91.1 LUNG NODULE: ICD-10-CM

## 2024-08-23 DIAGNOSIS — G47.33 OSA ON CPAP: ICD-10-CM

## 2024-08-23 DIAGNOSIS — D61.818 PANCYTOPENIA (MULTI): ICD-10-CM

## 2024-08-23 DIAGNOSIS — E55.9 VITAMIN D DEFICIENCY: ICD-10-CM

## 2024-08-23 DIAGNOSIS — I48.0 PAROXYSMAL ATRIAL FIBRILLATION (MULTI): ICD-10-CM

## 2024-08-23 DIAGNOSIS — E78.2 MIXED HYPERLIPIDEMIA: ICD-10-CM

## 2024-08-23 DIAGNOSIS — E87.6 HYPOKALEMIA: ICD-10-CM

## 2024-08-23 DIAGNOSIS — C91.10 CLL (CHRONIC LYMPHOCYTIC LEUKEMIA) (MULTI): ICD-10-CM

## 2024-08-23 PROCEDURE — 1160F RVW MEDS BY RX/DR IN RCRD: CPT | Performed by: FAMILY MEDICINE

## 2024-08-23 PROCEDURE — 3008F BODY MASS INDEX DOCD: CPT | Performed by: FAMILY MEDICINE

## 2024-08-23 PROCEDURE — 3074F SYST BP LT 130 MM HG: CPT | Performed by: FAMILY MEDICINE

## 2024-08-23 PROCEDURE — 1124F ACP DISCUSS-NO DSCNMKR DOCD: CPT | Performed by: FAMILY MEDICINE

## 2024-08-23 PROCEDURE — 3049F LDL-C 100-129 MG/DL: CPT | Performed by: FAMILY MEDICINE

## 2024-08-23 PROCEDURE — 3061F NEG MICROALBUMINURIA REV: CPT | Performed by: FAMILY MEDICINE

## 2024-08-23 PROCEDURE — 3078F DIAST BP <80 MM HG: CPT | Performed by: FAMILY MEDICINE

## 2024-08-23 PROCEDURE — 1159F MED LIST DOCD IN RCRD: CPT | Performed by: FAMILY MEDICINE

## 2024-08-23 PROCEDURE — 1036F TOBACCO NON-USER: CPT | Performed by: FAMILY MEDICINE

## 2024-08-23 PROCEDURE — 99397 PER PM REEVAL EST PAT 65+ YR: CPT | Performed by: FAMILY MEDICINE

## 2024-08-23 PROCEDURE — 99214 OFFICE O/P EST MOD 30 MIN: CPT | Performed by: FAMILY MEDICINE

## 2024-08-23 PROCEDURE — 3044F HG A1C LEVEL LT 7.0%: CPT | Performed by: FAMILY MEDICINE

## 2024-08-23 ASSESSMENT — PATIENT HEALTH QUESTIONNAIRE - PHQ9
1. LITTLE INTEREST OR PLEASURE IN DOING THINGS: NOT AT ALL
SUM OF ALL RESPONSES TO PHQ9 QUESTIONS 1 AND 2: 0
2. FEELING DOWN, DEPRESSED OR HOPELESS: NOT AT ALL

## 2024-08-25 ENCOUNTER — TELEPHONE (OUTPATIENT)
Dept: PRIMARY CARE | Facility: CLINIC | Age: 64
End: 2024-08-25
Payer: COMMERCIAL

## 2024-08-25 RX ORDER — ATORVASTATIN CALCIUM 20 MG/1
20 TABLET, FILM COATED ORAL NIGHTLY
Start: 2024-08-25 | End: 2025-08-20

## 2024-08-25 NOTE — TELEPHONE ENCOUNTER
Would like patient to restart statin  ok with Dr. Fox. Let me know if he needs a refill. Recheck liver enzymes in  weeks  order in chart

## 2024-08-26 PROCEDURE — RXMED WILLOW AMBULATORY MEDICATION CHARGE

## 2024-08-28 ENCOUNTER — SPECIALTY PHARMACY (OUTPATIENT)
Dept: PHARMACY | Facility: CLINIC | Age: 64
End: 2024-08-28

## 2024-08-29 ENCOUNTER — PHARMACY VISIT (OUTPATIENT)
Dept: PHARMACY | Facility: CLINIC | Age: 64
End: 2024-08-29
Payer: COMMERCIAL

## 2024-08-30 ENCOUNTER — OFFICE VISIT (OUTPATIENT)
Dept: OTOLARYNGOLOGY | Facility: HOSPITAL | Age: 64
End: 2024-08-30
Payer: COMMERCIAL

## 2024-08-30 VITALS — WEIGHT: 179.3 LBS | HEIGHT: 67 IN | TEMPERATURE: 96.3 F | BODY MASS INDEX: 28.14 KG/M2

## 2024-08-30 DIAGNOSIS — K13.79 MOUTH SORES: ICD-10-CM

## 2024-08-30 DIAGNOSIS — C91.10 CLL (CHRONIC LYMPHOCYTIC LEUKEMIA) (MULTI): ICD-10-CM

## 2024-08-30 PROCEDURE — 3061F NEG MICROALBUMINURIA REV: CPT | Performed by: STUDENT IN AN ORGANIZED HEALTH CARE EDUCATION/TRAINING PROGRAM

## 2024-08-30 PROCEDURE — 99213 OFFICE O/P EST LOW 20 MIN: CPT | Performed by: STUDENT IN AN ORGANIZED HEALTH CARE EDUCATION/TRAINING PROGRAM

## 2024-08-30 PROCEDURE — 3008F BODY MASS INDEX DOCD: CPT | Performed by: STUDENT IN AN ORGANIZED HEALTH CARE EDUCATION/TRAINING PROGRAM

## 2024-08-30 PROCEDURE — 1159F MED LIST DOCD IN RCRD: CPT | Performed by: STUDENT IN AN ORGANIZED HEALTH CARE EDUCATION/TRAINING PROGRAM

## 2024-08-30 PROCEDURE — 3044F HG A1C LEVEL LT 7.0%: CPT | Performed by: STUDENT IN AN ORGANIZED HEALTH CARE EDUCATION/TRAINING PROGRAM

## 2024-08-30 PROCEDURE — 3049F LDL-C 100-129 MG/DL: CPT | Performed by: STUDENT IN AN ORGANIZED HEALTH CARE EDUCATION/TRAINING PROGRAM

## 2024-08-30 PROCEDURE — 1123F ACP DISCUSS/DSCN MKR DOCD: CPT | Performed by: STUDENT IN AN ORGANIZED HEALTH CARE EDUCATION/TRAINING PROGRAM

## 2024-08-30 RX ORDER — DEXAMETHASONE 0.5 MG/5ML
1 SOLUTION ORAL 2 TIMES DAILY PRN
Qty: 600 ML | Refills: 0 | Status: SHIPPED | OUTPATIENT
Start: 2024-08-30 | End: 2024-09-29

## 2024-08-30 ASSESSMENT — PATIENT HEALTH QUESTIONNAIRE - PHQ9
2. FEELING DOWN, DEPRESSED OR HOPELESS: NOT AT ALL
1. LITTLE INTEREST OR PLEASURE IN DOING THINGS: NOT AT ALL
SUM OF ALL RESPONSES TO PHQ9 QUESTIONS 1 AND 2: 0

## 2024-08-30 NOTE — PROGRESS NOTES
"Head and Neck Surgery Outpatient Follow-Up    Chief Concern:  Tongue Lesions    History Of Present Illness  Demar Pittman \"Shraddha" is a 64 y.o. male with a history of  CLL presenting for evaluation of oral cavity lesions. He is known to our service, having previously been seen by Dr. Chirinos for a tongue base mass which was ultimately biopsied in the OR in May 2024 with path showing CLL. Since April 2024 he has had intermittent painful lesions of this oral cavity and tongue. This has cause him some trouble swallowing. He feels as though they wax and wane, and do completely resolve before returning. He is currently on treatment for his CLL receiving rituximab and venetoclax  He thinks maybe the lesions resolve quicker after his rituximab infusions. The lesions predate his current treatment regimen. He has no new neck masses, hemoptysis, dyspnea, or dysphonia, but does have stable cervical LAD for greater than a year related to his CLL. He is a non-smoker with minimal alcohol intake.     He was last seen by me 8/16/24 where we elected to differ bx and trial dexamethasone oral rinses.     Interval Hx - 8/30/24  Rinses helping oral cavity pain, eating better  Lesions still persist  No new lesions, no new neck masses    Past Medical History  He has a past medical history of Diffuse otitis externa, bilateral (10/11/2021), Hypertrophy of tonsils (12/05/2019), Localized enlarged lymph nodes (04/30/2020), Personal history of diseases of the skin and subcutaneous tissue (03/09/2020), Personal history of other diseases of the circulatory system, Personal history of other diseases of the circulatory system, Personal history of other diseases of the circulatory system, Personal history of other diseases of the circulatory system, Personal history of other specified conditions (12/11/2019), and Transaminitis (06/26/2024).    Patient Active Problem List   Diagnosis    CAD (coronary artery disease)    CLL (chronic lymphocytic " "leukemia) (Multi)    Diastasis recti    Dry eyes, bilateral    Essential hypertension    Controlled type 2 diabetes mellitus without complication, without long-term current use of insulin (Multi)    Mixed hyperlipidemia    XENA on CPAP    Paroxysmal atrial fibrillation (Multi)    S/P CABG x 3    Sensorineural hearing loss (SNHL) of both ears    Pancytopenia (Multi)    Tinnitus of both ears    Umbilical hernia without obstruction and without gangrene    Vitamin D deficiency    Annual physical exam    Hypokalemia    Class 1 obesity due to excess calories with serious comorbidity and body mass index (BMI) of 30.0 to 30.9 in adult    Tongue lesion    Difficult intubation    Weight loss    Gastro-esophageal reflux disease without esophagitis    Old myocardial infarction    Presence of coronary angioplasty implant and graft    Unspecified right bundle-branch block    Lung nodule       Surgical History  He has a past surgical history that includes Other surgical history (10/24/2019); Other surgical history (10/24/2019); Other surgical history (12/13/2019); and Other surgical history (12/29/2019).     Social History  He reports that he quit smoking about 39 years ago. His smoking use included cigarettes. He has never used smokeless tobacco. He reports that he does not currently use alcohol after a past usage of about 2.0 standard drinks of alcohol per week. He reports that he does not use drugs.    Family History  No family history on file.     Allergies  Rituximab-pvvr    Last Recorded Vitals  Temperature 35.7 °C (96.3 °F), height 1.702 m (5' 7\"), weight 81.3 kg (179 lb 4.8 oz).     Physical Exam:  Constitutional:  No acute distress  Voice:  No hoarseness or other abnormality  Respiration:  Breathing comfortably, no stridors  Eyes:  EOM intact, sclera normal  Neuro:  Alert and oriented times 3, Cranial nerves II-XII grossly intact and symmetric bilaterally  Head and Face:  Symmetric facial features, no masses or lesions, " sinuses non-tender to palpation  Salivary Glands:  Parotid and submandibular glands normal bilaterally  Oral Cavity/Oropharynx/Lips: Non-tender plaque-like lesions scattered over the buccal mucosa and lateral tongue, all less than 1 cm in size with a fibrinous appearing surface.   Pharynx: no masses or lesions  Neck/Lymph:  L>R small mobile level II nodes, non tender  Skin:  Neck skin is without scar or injury  Psych:  Alert and oriented with appropriate mood and affect      Medications:  Medication Documentation Review Audit       Reviewed by Jose Hodge MA (Medical Assistant) on 08/30/24 at 1544      Medication Order Taking? Sig Documenting Provider Last Dose Status   acyclovir (Zovirax) 400 mg tablet [id number] Yes Take 1 tablet (400 mg) by mouth 2 times a day. ANNELISE Gant-CNP Taking Active   amLODIPine (Norvasc) 5 mg tablet [id number] Yes Take 1 tablet (5 mg) by mouth once daily. ANNELISE Espinoza-CNP Taking Active   aspirin 81 mg EC tablet [id number] Yes Take 1 tablet (81 mg) by mouth once daily. Historical Provider, MD Taking Active   atorvastatin (Lipitor) 20 mg tablet [id number] Yes Take 1 tablet (20 mg) by mouth once daily at bedtime. Deedee Mcclendon MD Taking Active   calcium carbonate EX (Tums Extra Strength) 300 mg (750 mg) chewable tablet [id number] Yes Chew 2 tablets (1,500 mg) 2 times a day. Compa Ortiz PA-C Taking Active   dexAMETHasone 0.5 mg/5 mL oral liquid [id number]  Take 10 mL (1 mg) by mouth 2 times a day as needed (mouth sores) for up to 10 days. Start after seeing ENT Ev Luis MD  Active   metoprolol tartrate (Lopressor) 25 mg tablet [id number] Yes Take 1 tablet (25 mg) by mouth 2 times a day. ANNELISE Espinoza-CNP Taking Active   multivitamin with folic acid (One Daily Multivitamin) 400 mcg tablet [id number] Yes Take 1 tablet by mouth once daily. Historical Provider, MD Taking Active   nitroglycerin (Nitrostat) 0.4 mg SL tablet [id number] Yes Place 1 tablet  (0.4 mg) under the tongue every 5 minutes if needed. Historical Provider, MD Taking Active   ondansetron (Zofran) 8 mg tablet [id number] Yes Take 1 tablet (8 mg) by mouth every 8 hours if needed for nausea or vomiting. Ev Luis MD Taking Active   pantoprazole (ProtoNix) 40 mg EC tablet [id number] Yes Take 1 tablet (40 mg) by mouth once daily. Deedee Mcclendon MD Taking Active   potassium chloride CR (Klor-Con M20) 20 mEq ER tablet [id number] Yes Take 1 tablet (20 mEq) by mouth once daily. Do not crush or chew. Ev Luis MD Taking Active   prochlorperazine (Compazine) 10 mg tablet [id number] Yes Take 1 tablet (10 mg) by mouth every 6 hours if needed for nausea or vomiting. Ev Luis MD Taking Active   venetoclax (Venclexta) 100 mg tablet [id number] Yes Take 4 tablets (400 mg total) by mouth once daily.  Take with food. Ev Luis MD Taking Active                  Imaging:  I personally reviewed the PET CT from 3/14/24.     Assessment/Plan   63yo M w/ long hx of CLL and multiple varied treatment courses seen today for intermittent oral cavity lesions. He has previously had biopsy proven CLL involvement at his tongue base. He is currently on treatment with ritux and venetoclax, but the lesions in question seme to predate this.     His symptoms of pain and dysphagia improved w/ oral dex but lesions still there. They have note grown larger and there are no new nodes. My concern for separate, unrelated oral cavity malignancy still remains low given his lack of alcohol and tobacco use, the multifocal nature of the lesions, his previously demonstrated oropharyngeal CLL involvement, and his ongoing systemic treatment which could contribute to mucosal changes.     He would like to trial dex longer before consenting to biopsy.    Will see back in 4-5 weeks and will plan for definitive bx if lesions still present at that time.

## 2024-09-06 ENCOUNTER — HOSPITAL ENCOUNTER (OUTPATIENT)
Dept: RADIOLOGY | Facility: CLINIC | Age: 64
Discharge: HOME | End: 2024-09-06
Payer: COMMERCIAL

## 2024-09-06 DIAGNOSIS — R91.1 LUNG NODULE: ICD-10-CM

## 2024-09-06 DIAGNOSIS — C91.10 CLL (CHRONIC LYMPHOCYTIC LEUKEMIA) (MULTI): Primary | ICD-10-CM

## 2024-09-06 PROCEDURE — 71260 CT THORAX DX C+: CPT

## 2024-09-06 PROCEDURE — 2550000001 HC RX 255 CONTRASTS: Performed by: FAMILY MEDICINE

## 2024-09-08 RX ORDER — EPINEPHRINE 0.3 MG/.3ML
0.3 INJECTION SUBCUTANEOUS EVERY 5 MIN PRN
OUTPATIENT
Start: 2024-10-07

## 2024-09-08 RX ORDER — ALBUTEROL SULFATE 0.83 MG/ML
3 SOLUTION RESPIRATORY (INHALATION) AS NEEDED
OUTPATIENT
Start: 2024-10-07

## 2024-09-08 RX ORDER — PROCHLORPERAZINE MALEATE 5 MG
10 TABLET ORAL EVERY 6 HOURS PRN
OUTPATIENT
Start: 2024-10-07

## 2024-09-08 RX ORDER — FAMOTIDINE 10 MG/ML
20 INJECTION INTRAVENOUS ONCE AS NEEDED
OUTPATIENT
Start: 2024-10-07

## 2024-09-08 RX ORDER — ACETAMINOPHEN 325 MG/1
650 TABLET ORAL ONCE
OUTPATIENT
Start: 2024-10-07

## 2024-09-08 RX ORDER — DIPHENHYDRAMINE HYDROCHLORIDE 50 MG/ML
50 INJECTION INTRAMUSCULAR; INTRAVENOUS AS NEEDED
OUTPATIENT
Start: 2024-10-07

## 2024-09-08 RX ORDER — PROCHLORPERAZINE EDISYLATE 5 MG/ML
10 INJECTION INTRAMUSCULAR; INTRAVENOUS EVERY 6 HOURS PRN
OUTPATIENT
Start: 2024-10-07

## 2024-09-08 RX ORDER — DIPHENHYDRAMINE HCL 50 MG
50 CAPSULE ORAL ONCE
OUTPATIENT
Start: 2024-10-07

## 2024-09-08 ASSESSMENT — ENCOUNTER SYMPTOMS
CONSTIPATION: 1
VOMITING: 0
NEUROLOGICAL NEGATIVE: 1
MUSCULOSKELETAL NEGATIVE: 1
HEMATOLOGIC/LYMPHATIC NEGATIVE: 1
FEVER: 0
RESPIRATORY NEGATIVE: 1
CHILLS: 0
CARDIOVASCULAR NEGATIVE: 1
VOICE CHANGE: 0

## 2024-09-08 NOTE — PROGRESS NOTES
"Patient ID: Demar Pittman \"Shraddha" is a 64 y.o. male.    Oncology History Overview Note   B-CLL diagnosed in 2015, SUÁREZ stage 0; WBC initially 16.3 with ALC 12.0, hemoglobin 15.1 and platelets 154,000.  The cells expressed CD5 and A light chains.   CD38 and  Zap-70 were negative. Cytogenetic studies by FISH were inconclusive.  WBC has slowly rising but he maintains good marrow reserve and has not required treatment.  CT chest done 3/9/17 showed extensive cervical, axillary and submental adenopathy; largest  node on the left measured 2.3 x 1.9 cm.  Small (less than 9 mm) pulmonology nodules were again seen; relatively stable.  Abdominal nodes slightly increased.    17: WBC 65.4, ALC 62.8; becoming more symptomatic.  17 : WBC 77.6.  ALC 72.2.  Hemoglobin and platelets were normal.  18 WBC: 69.3. A.9. Hgb 15.2. Plt: 148,000  18: WBC 88.2.  ALC 82.9.  Hemoglobin 14.2.  Platelets 170,000.  Adenopathy stable.  18: WBC 77.3.  ALC 66.5.  Hemoglobin 13.8.  Platelets 143,000.  Adenopathy slightly increased.  18: WBC 92.0.  ALC 86.5.  Hemoglobin 14.1.  Platelets 157,000.  Progressing symptomatic adenopathy.  Treatment options discussed.   18 completed rituximab weekly x 4.  10/9/19: CBC: WBC 16.5.  ALC 12.4.  Hemoglobin 14.6.  Platelets 148,000.  Worsening adenopathy.  3/17/20: Started bendamustine/rituximab after multiple thoracentesis for malignant pleural effusion with CLL  20: #2 bendamustine/rituximab.  20: #3 BR  20: WBC 4.7.  Hemoglobin 12.9.  Platelets 189,000.  20: #4 BR  20: #5 BR  20: WBC 1.7. hgb 11.9. Platelets 176.  20: #6/6 bendamustine/rituximab  2023, WBC count 62, hemoglobin 14.9, platelets 143, absolute lymphocyte count 50.2  January 15, 2024, WBC count 40.5, hemoglobin 15.0, platelets 175  2/10/24 , WBC count 31.5, hemoglobin 14.9, platelets 131  3/14/24 , WBC count 13.9, hemoglobin 14.9, platelets " 122  3/14/24, physical examination positive for bilateral cervical lymphadenopathy, bilateral axillary lymphadenopathy  PET imaging 3/29/2024 intense uptake in tongue base ( S/P) tonsillectomy. Multiple enlarged lymph nodes in cervical, parotid, nodes, multipl pleural based and parenchymal non FDG avid pulmonary nodes, enlarged axillary lymph nodes, mediastinal and bilateral hilar lymph nodes, periportal lymph nodes, iliac nodes, inguinal nodes.     -FISH panel  of peripheral blood 2024 Pos for del 11q, negative for t(11,14), trisomy 12, monosomy 13 and deletion of 17p.     -Needle biopsy of posterior tongue mass 24 involvement by chronic lymphocytic leukemia/small lymphocytic lymphoma no evidence of transformation     CLL (chronic lymphocytic leukemia) (Multi)   2023 Initial Diagnosis    CLL (chronic lymphocytic leukemia) (CMS/[facility])     1/15/2024 - 1/15/2024 Chemotherapy    Bendamustine + RiTUXimab, 28 Day Cycles     2024 - 2024 Chemotherapy    (INPT) Venetoclax, 28 Day Cycles      2024 -  Chemotherapy    Venetoclax + RiTUXimab, 28 Day Cycles        Past medical history: CLL/SLL (2015) as described above, status post rituximab, 6 cycles of bendamustine/rituximab (3/2020-2020).  Excellent response to treatment.       History of CABGx3 19, vertigo, XENA, remote tonsillectomy, retinal tear, stable pulmonary nodules, hyperlipidemia, hypertension, sinusitis.  Unremarkable cardiac catheterization 21. Hyperglycemia, torn retina, cataract. Hx of afib since CABG.      Family medical history: Mother  of myeloma in her 70s and his father  of Hodgkin lymphoma at age 39.     Social history: Rare alcohol intake.  Former smoker but quit in .  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 2 biological children, one son with downs syndrome.     Subjective    HPI    Patient of Dr. Wang with a long history of CLL initially seen  with his wife and son 5/9/24 for a second opinion for treatment of CLL.   He received BR completing in 8/2020.  In December 2023 after COVID infection and he had increased lymphocytosis which resolved, subsequently noted to have lymphadenopathy confirmed on PET scan.  Ibrutinib recommended in March 2024, but patient has not yet started this because he was leary of side effects.      Due to discordance in uptake at base of tongue on PET imaging, tongue biopsy was done 5/28/24 which was consistent with CLL and had no evidence of large cell transformation.    Was hospitalized 6/10/24-6/13/24 for first venetoclax ramp-up.  Then was hospitalized 6/17/24-6/19/24 for second venetoclax ramp-up. C1 rituximab - 7/15/24.    Demar presents to the clinic today (9/9/24) with his wife for follow up evaluation of his CLL and count checks.  He is due for C3 rituxan and remains on venetoclax 400 mg daily.    Energy level is okay.  Appetite is good.  Has been eating better, uses boost as needed.  Gained about 2 pounds since last visit.  Had a fall yesterday while hiking with his daughter, stepped on log.  Landing on chest, didn't hit head, and didn't lose consciousness.  No injury obtained, just feels a little sore.      Continues to have swelling to right elbow that started about 1 and a 1/2 months ago.  No injury.    Has been having more nausea after taking medications.  Constipation at times taking stool softener as needed.  Continues to have mouth sores, saw ENT and Demar requested to try dexamethasone rinse first before having a biopsy performed.  Mouth sores are not affecting his oral food intake.  Mild discomfort noted.  No voice changes at this time.  No throat soreness.     Review of Systems   Constitutional:  Negative for appetite change, chills, fatigue, fever and unexpected weight change.   HENT:   Positive for mouth sores. Negative for sore throat, trouble swallowing and voice change.    Respiratory: Negative.      Cardiovascular: Negative.    Gastrointestinal:  Positive for constipation and nausea. Negative for vomiting.   Genitourinary: Negative.     Musculoskeletal: Negative.    Skin: Negative.    Neurological: Negative.    Hematological: Negative.    All other systems reviewed and are negative.    Objective    BSA: There is no height or weight on file to calculate BSA.  There were no vitals taken for this visit.  Wt Readings from Last 5 Encounters:   09/09/24 82.3 kg (181 lb 7 oz)   08/30/24 81.3 kg (179 lb 4.8 oz)   08/23/24 81.4 kg (179 lb 6.4 oz)   08/16/24 78.9 kg (174 lb)   08/12/24 79.8 kg (175 lb 14.8 oz)     Physical Exam  Vitals reviewed.   Constitutional:       Appearance: Normal appearance.   HENT:      Head: Normocephalic.      Mouth/Throat:      Mouth: Mucous membranes are moist.      Comments: Multiple small pink lesions to sides of tongue, under the tongue and left cheek, roof of mouth.    Eyes:      Conjunctiva/sclera: Conjunctivae normal.   Cardiovascular:      Rate and Rhythm: Normal rate and regular rhythm.      Pulses: Normal pulses.      Heart sounds: Normal heart sounds.   Pulmonary:      Effort: Pulmonary effort is normal.      Breath sounds: Normal breath sounds.   Abdominal:      General: Bowel sounds are normal.      Palpations: Abdomen is soft. There is no mass.      Tenderness: There is no abdominal tenderness.   Musculoskeletal:         General: Normal range of motion.      Comments: Swelling to right elbow, mild tenderness on palpation.     Lymphadenopathy:      Comments: No lymphadenopathy   Skin:     General: Skin is warm and dry.      Capillary Refill: Capillary refill takes less than 2 seconds.   Neurological:      General: No focal deficit present.      Mental Status: He is alert and oriented to person, place, and time. Mental status is at baseline.   Psychiatric:         Mood and Affect: Mood normal.         Behavior: Behavior normal.     Performance Status:  Karnofsky Score: 90 - Able  to carry on normal activity; minor signs or symptoms of disease     Assessment/Plan       CLL (chronic lymphocytic leukemia) (Multi)  Chronic lymphocytic leukemia with symptomatic adenopathy, excellent response to bendamustine/rituximab; completed 6 cycles in August 2020.    Patient seen  for further management of bulky CLL.  PET imaging shows discordant uptake at the base of the tongue , pulmonary nodules which the patient states are longstanding. There is no evidence of Richters transformation by histology and FISH analysis shows del 11q. Due to symptoms of tongue swelling has started dexamethasone 4 mg po bid for 10 days for sx relief.     6/3/24 Previously discussed treatment options and patient would prefer to receive venetoclax as a time limited approach and I have recommended the [place] regimen with escalating doses of venetoclax followed by monthly rituxan for 6  monthly doses  and a planned 2 year course of venetoclax. We reviewed side effects of venetoclax and rituxan signed chemo consent.  Given patients tumor burden have schedule inpatient hospitalization to monitor for TLS for at least first two dose ramp ups on 5/10 and 5/17/24  Started allopurinol for TLS prevention  6/17/24:  Hospital admit for Venetoclax ramp up to 50 mg daily x 7 days. Next ramp ups are planned to be done outpatient.     9/9/24:  CBC stable, no TLS. No palpable lymph nodes at this time.  Planned for C3 rituximab today - with methylpred and benadryl premed.  Continue venetoclax 400 mg daily.  Follow up visit in 1 month for C4 rituximab.  Patient recently had CT chest (9/6/24) for monitoring of pulmonary nodule which showed significant interval decrease in size of multiple cervical, axillary, mediastinal, and upper abdominal lymph nodes.  Will postpone imaging at this time.      WBC   Date Value Ref Range Status   09/09/2024 3.6 (L) 4.4 - 11.3 x10*3/uL Final   08/12/2024 2.9 (L) 4.4 - 11.3 x10*3/uL Final   07/25/2024 5.4 4.4 - 11.3  x10*3/uL Final   07/15/2024 6.3 4.4 - 11.3 x10*3/uL Final   07/08/2024 5.6 4.4 - 11.3 x10*3/uL Final      Mouth Sores:  Lesions on both sides of tongue are improving with time. Unknown etiology of lesions, but predate Venetoclax.   7/15/24:  Tongue lesions healed, noted lesion to left buccal surface.  Continue to use BMX solution PRN & Nystatin Swish & Swallow 4x daily.   7/25/24:  Worsening mouth sores and white lesions to sides of tongue, roof of mouth, and bilateral cheeks.  Demar reports that he has had mouth sores off and on since May 2024.  Unable to wear dentures at this time.  Swabbed for HSV.  Dr. Luis prescribed valcyclovir 1,000 mg TID x10 day.  Advised patient to stop acyclovir while one valcyclovir and to restart acyclovir after completes 10 day cycle of valcyclovir.  Advised to contact if mouth sores worsen or do not improve after 10 day course of valcyclovir as we may need to treat with another 10 day course.  Advised Demar to continue to use salt and soda rinse.  He declined wanting refill for BMX.    9/9/24:  Continues to have mouth sores, but reports they wax and wane but overall improving.  Swab for HSV was negative (7/25/24).  Not affecting oral intake.  Continuing dexamethasone rinse.  Following with ENT, but patient wants to wait on having biopsy at this time. Monitoring.    Weight Loss:  7/25/24:  Continues to have weight loss, weight today 77.6 kg. Weight 87.6 kg (12/29/24).  Weight loss likely related to mouth sores.  Currently eating soft foods and drinking 1-2 protein supplements per day.  Advised to continue 2 protein supplements per day.  Declined wanting to see dietician today.  Monitoring weight.    9/9/24:  Weight slowly trending upwards, is up 2 pounds from prior visit.  Discussed foods, continue monitoring weight.      CAD:  status post CABG.       Transaminitis: PET imaging shows periportal mass vs lymphadenopathy, hepatitis serologies negative, ultrasound consistent with large  periportal node, have ordered CT of liver with contrast as transaminitis worse today. Also sent CMV pcr on blood  24:  Improvement in liver enzymes: , AST 29, bilirubin 0.6.  Discontinued CT of liver as liver enzymes are improving.  24:  ALT and AST stabilized.    Pulmonary Nodule:  Chest x-ray (24) showing RUL pulmonary nodule-potentially neoplastic.    CT Chest order by PCP (24) showing stable pulmonary nodule with recommendation for follow up imaging in 1 year.    Discussed with Dr. Luis, plan to monitor with future imaging.    Right Elbow Swellin24:  Continues to report swelling and mild tenderness to right elbow.  Swelling has improved since last visit.  Discussed with Dr. Luis, who feels findings are most likely bursitis.  Monitoring.       RTC:  10/7/24:  Follow up visit with provider and is due for C4D1 rituximab.  --------------------------------------------------------------------------------------------------------  HELDER Gant

## 2024-09-09 ENCOUNTER — OFFICE VISIT (OUTPATIENT)
Dept: HEMATOLOGY/ONCOLOGY | Facility: HOSPITAL | Age: 64
End: 2024-09-09
Payer: COMMERCIAL

## 2024-09-09 ENCOUNTER — INFUSION (OUTPATIENT)
Dept: HEMATOLOGY/ONCOLOGY | Facility: HOSPITAL | Age: 64
End: 2024-09-09
Payer: COMMERCIAL

## 2024-09-09 VITALS
TEMPERATURE: 97.7 F | HEART RATE: 63 BPM | RESPIRATION RATE: 18 BRPM | BODY MASS INDEX: 28.42 KG/M2 | WEIGHT: 181.44 LBS | OXYGEN SATURATION: 97 % | DIASTOLIC BLOOD PRESSURE: 69 MMHG | SYSTOLIC BLOOD PRESSURE: 117 MMHG

## 2024-09-09 DIAGNOSIS — E87.6 HYPOKALEMIA: ICD-10-CM

## 2024-09-09 DIAGNOSIS — C91.10 CLL (CHRONIC LYMPHOCYTIC LEUKEMIA) (MULTI): ICD-10-CM

## 2024-09-09 DIAGNOSIS — C91.10 CLL (CHRONIC LYMPHOCYTIC LEUKEMIA) (MULTI): Primary | ICD-10-CM

## 2024-09-09 DIAGNOSIS — K13.79 MOUTH SORES: ICD-10-CM

## 2024-09-09 DIAGNOSIS — R91.1 LUNG NODULE: ICD-10-CM

## 2024-09-09 DIAGNOSIS — M25.421 ELBOW SWELLING, RIGHT: ICD-10-CM

## 2024-09-09 DIAGNOSIS — R63.4 WEIGHT LOSS: ICD-10-CM

## 2024-09-09 DIAGNOSIS — I25.10 CORONARY ARTERY DISEASE INVOLVING NATIVE HEART WITHOUT ANGINA PECTORIS, UNSPECIFIED VESSEL OR LESION TYPE: ICD-10-CM

## 2024-09-09 LAB
ALBUMIN SERPL BCP-MCNC: 4.5 G/DL (ref 3.4–5)
ALP SERPL-CCNC: 111 U/L (ref 33–136)
ALT SERPL W P-5'-P-CCNC: 35 U/L (ref 10–52)
ANION GAP SERPL CALC-SCNC: 14 MMOL/L (ref 10–20)
AST SERPL W P-5'-P-CCNC: 21 U/L (ref 9–39)
BASOPHILS # BLD AUTO: 0.02 X10*3/UL (ref 0–0.1)
BASOPHILS NFR BLD AUTO: 0.6 %
BILIRUB SERPL-MCNC: 0.8 MG/DL (ref 0–1.2)
BUN SERPL-MCNC: 10 MG/DL (ref 6–23)
CALCIUM SERPL-MCNC: 9.4 MG/DL (ref 8.6–10.3)
CHLORIDE SERPL-SCNC: 101 MMOL/L (ref 98–107)
CO2 SERPL-SCNC: 28 MMOL/L (ref 21–32)
CREAT SERPL-MCNC: 0.7 MG/DL (ref 0.5–1.3)
EGFRCR SERPLBLD CKD-EPI 2021: >90 ML/MIN/1.73M*2
EOSINOPHIL # BLD AUTO: 0.01 X10*3/UL (ref 0–0.7)
EOSINOPHIL NFR BLD AUTO: 0.3 %
ERYTHROCYTE [DISTWIDTH] IN BLOOD BY AUTOMATED COUNT: 15 % (ref 11.5–14.5)
GLUCOSE SERPL-MCNC: 126 MG/DL (ref 74–99)
HCT VFR BLD AUTO: 40.5 % (ref 41–52)
HGB BLD-MCNC: 14.6 G/DL (ref 13.5–17.5)
IMM GRANULOCYTES # BLD AUTO: 0.02 X10*3/UL (ref 0–0.7)
IMM GRANULOCYTES NFR BLD AUTO: 0.6 % (ref 0–0.9)
LDH SERPL L TO P-CCNC: 186 U/L (ref 84–246)
LYMPHOCYTES # BLD AUTO: 0.4 X10*3/UL (ref 1.2–4.8)
LYMPHOCYTES NFR BLD AUTO: 11 %
MCH RBC QN AUTO: 31.7 PG (ref 26–34)
MCHC RBC AUTO-ENTMCNC: 36 G/DL (ref 32–36)
MCV RBC AUTO: 88 FL (ref 80–100)
MONOCYTES # BLD AUTO: 0.92 X10*3/UL (ref 0.1–1)
MONOCYTES NFR BLD AUTO: 25.4 %
NEUTROPHILS # BLD AUTO: 2.25 X10*3/UL (ref 1.2–7.7)
NEUTROPHILS NFR BLD AUTO: 62.1 %
NRBC BLD-RTO: 0 /100 WBCS (ref 0–0)
PLATELET # BLD AUTO: 132 X10*3/UL (ref 150–450)
POTASSIUM SERPL-SCNC: 3.3 MMOL/L (ref 3.5–5.3)
PROT SERPL-MCNC: 6.5 G/DL (ref 6.4–8.2)
RBC # BLD AUTO: 4.6 X10*6/UL (ref 4.5–5.9)
SODIUM SERPL-SCNC: 140 MMOL/L (ref 136–145)
URATE SERPL-MCNC: 4 MG/DL (ref 4–7.5)
WBC # BLD AUTO: 3.6 X10*3/UL (ref 4.4–11.3)

## 2024-09-09 PROCEDURE — 3049F LDL-C 100-129 MG/DL: CPT

## 2024-09-09 PROCEDURE — 96413 CHEMO IV INFUSION 1 HR: CPT

## 2024-09-09 PROCEDURE — 85025 COMPLETE CBC W/AUTO DIFF WBC: CPT

## 2024-09-09 PROCEDURE — 82565 ASSAY OF CREATININE: CPT

## 2024-09-09 PROCEDURE — 96367 TX/PROPH/DG ADDL SEQ IV INF: CPT

## 2024-09-09 PROCEDURE — 2500000001 HC RX 250 WO HCPCS SELF ADMINISTERED DRUGS (ALT 637 FOR MEDICARE OP): Performed by: INTERNAL MEDICINE

## 2024-09-09 PROCEDURE — 1160F RVW MEDS BY RX/DR IN RCRD: CPT

## 2024-09-09 PROCEDURE — 2500000002 HC RX 250 W HCPCS SELF ADMINISTERED DRUGS (ALT 637 FOR MEDICARE OP, ALT 636 FOR OP/ED)

## 2024-09-09 PROCEDURE — 96375 TX/PRO/DX INJ NEW DRUG ADDON: CPT | Mod: INF

## 2024-09-09 PROCEDURE — 99215 OFFICE O/P EST HI 40 MIN: CPT | Mod: 25

## 2024-09-09 PROCEDURE — 2500000004 HC RX 250 GENERAL PHARMACY W/ HCPCS (ALT 636 FOR OP/ED)

## 2024-09-09 PROCEDURE — 1159F MED LIST DOCD IN RCRD: CPT

## 2024-09-09 PROCEDURE — 2500000004 HC RX 250 GENERAL PHARMACY W/ HCPCS (ALT 636 FOR OP/ED): Performed by: INTERNAL MEDICINE

## 2024-09-09 PROCEDURE — 84550 ASSAY OF BLOOD/URIC ACID: CPT

## 2024-09-09 PROCEDURE — 3061F NEG MICROALBUMINURIA REV: CPT

## 2024-09-09 PROCEDURE — 99215 OFFICE O/P EST HI 40 MIN: CPT

## 2024-09-09 PROCEDURE — 1123F ACP DISCUSS/DSCN MKR DOCD: CPT

## 2024-09-09 PROCEDURE — 3044F HG A1C LEVEL LT 7.0%: CPT

## 2024-09-09 PROCEDURE — 83615 LACTATE (LD) (LDH) ENZYME: CPT

## 2024-09-09 PROCEDURE — 96415 CHEMO IV INFUSION ADDL HR: CPT

## 2024-09-09 RX ORDER — HEPARIN SODIUM,PORCINE/PF 10 UNIT/ML
50 SYRINGE (ML) INTRAVENOUS AS NEEDED
OUTPATIENT
Start: 2024-09-09

## 2024-09-09 RX ORDER — POTASSIUM CHLORIDE 750 MG/1
20 TABLET, FILM COATED, EXTENDED RELEASE ORAL ONCE
Status: COMPLETED | OUTPATIENT
Start: 2024-09-09 | End: 2024-09-09

## 2024-09-09 RX ORDER — POTASSIUM CHLORIDE 14.9 MG/ML
20 INJECTION INTRAVENOUS ONCE
Status: COMPLETED | OUTPATIENT
Start: 2024-09-09 | End: 2024-09-09

## 2024-09-09 RX ORDER — HEPARIN 100 UNIT/ML
500 SYRINGE INTRAVENOUS AS NEEDED
OUTPATIENT
Start: 2024-09-09

## 2024-09-09 RX ORDER — DIPHENHYDRAMINE HCL 50 MG
50 CAPSULE ORAL ONCE
Status: COMPLETED | OUTPATIENT
Start: 2024-09-09 | End: 2024-09-09

## 2024-09-09 RX ORDER — POTASSIUM CHLORIDE 750 MG/1
20 TABLET, FILM COATED, EXTENDED RELEASE ORAL ONCE
Status: CANCELLED | OUTPATIENT
Start: 2024-09-09 | End: 2024-09-09

## 2024-09-09 RX ORDER — ALBUTEROL SULFATE 0.83 MG/ML
3 SOLUTION RESPIRATORY (INHALATION) AS NEEDED
Status: DISCONTINUED | OUTPATIENT
Start: 2024-09-09 | End: 2024-09-09 | Stop reason: HOSPADM

## 2024-09-09 RX ORDER — PROCHLORPERAZINE EDISYLATE 5 MG/ML
10 INJECTION INTRAMUSCULAR; INTRAVENOUS EVERY 6 HOURS PRN
Status: DISCONTINUED | OUTPATIENT
Start: 2024-09-09 | End: 2024-09-09 | Stop reason: HOSPADM

## 2024-09-09 RX ORDER — DIPHENHYDRAMINE HYDROCHLORIDE 50 MG/ML
50 INJECTION INTRAMUSCULAR; INTRAVENOUS AS NEEDED
Status: DISCONTINUED | OUTPATIENT
Start: 2024-09-09 | End: 2024-09-09 | Stop reason: HOSPADM

## 2024-09-09 RX ORDER — FAMOTIDINE 10 MG/ML
20 INJECTION INTRAVENOUS ONCE AS NEEDED
Status: DISCONTINUED | OUTPATIENT
Start: 2024-09-09 | End: 2024-09-09 | Stop reason: HOSPADM

## 2024-09-09 RX ORDER — PROCHLORPERAZINE MALEATE 10 MG
10 TABLET ORAL EVERY 6 HOURS PRN
Status: DISCONTINUED | OUTPATIENT
Start: 2024-09-09 | End: 2024-09-09 | Stop reason: HOSPADM

## 2024-09-09 RX ORDER — ACETAMINOPHEN 325 MG/1
650 TABLET ORAL ONCE
Status: COMPLETED | OUTPATIENT
Start: 2024-09-09 | End: 2024-09-09

## 2024-09-09 RX ORDER — POTASSIUM CHLORIDE 750 MG/1
20 TABLET, FILM COATED, EXTENDED RELEASE ORAL DAILY
Qty: 60 TABLET | Refills: 3 | Status: SHIPPED | OUTPATIENT
Start: 2024-09-09 | End: 2025-09-09

## 2024-09-09 RX ORDER — EPINEPHRINE 0.3 MG/.3ML
0.3 INJECTION SUBCUTANEOUS EVERY 5 MIN PRN
Status: DISCONTINUED | OUTPATIENT
Start: 2024-09-09 | End: 2024-09-09 | Stop reason: HOSPADM

## 2024-09-09 RX ORDER — POTASSIUM CHLORIDE 14.9 MG/ML
20 INJECTION INTRAVENOUS ONCE
Status: CANCELLED | OUTPATIENT
Start: 2024-09-09 | End: 2024-09-09

## 2024-09-09 ASSESSMENT — ENCOUNTER SYMPTOMS
UNEXPECTED WEIGHT CHANGE: 0
NAUSEA: 1
TROUBLE SWALLOWING: 0
FATIGUE: 0
SORE THROAT: 0
APPETITE CHANGE: 0

## 2024-09-09 ASSESSMENT — PAIN SCALES - GENERAL: PAINLEVEL: 0-NO PAIN

## 2024-09-09 NOTE — PROGRESS NOTES
"[facility] Clinical Note    Demar Pittman \"Bud\" is a 64 y.o. male, who is on the specialty pharmacy service for management of: Oncology Core with status of: (Enrolled)     Demar was contacted on 9/9/2024.    Refer to the encounter summary report for documentation details about patient counseling and education.      Medication Adherence  The importance of adherence was discussed with the patient and they were advised to take the medication as prescribed by their provider. Demar was encouraged to call his physician's office if they have a question regarding a missed dose.       Patient advised to contact the pharmacy if there are any changes to her medication list, including prescriptions, OTC medications, herbal products, or supplements. Patient was advised of [facility]’s dispensing process, refill timeline, contact information ([phone]), and patient management follow up. Patient confirmed understanding of education conducted during assessment. All patient questions and concerns were addressed to the best of my ability. Patient was encouraged to contact the specialty pharmacy with any questions or concerns.    Confirmed follow-up outreaches are properly scheduled. Reviewed goals of therapy in the program targets.    Pedro Jim, PharmD  "

## 2024-09-09 NOTE — PROGRESS NOTES
Pt arrived ambulatory to infusion for treatment of rituximab. Denies any new or worsening symptoms. Pt states he forgot to take his venetoclax this morning. Pt fell yesterday on a hike with his daughter. Seen by Mayelin Bailon NP at chair side. K 3.3. Pt received about 5 minutes of IV K, complained of burning and refused the rest of infusion. Pt instructed to take 20 meq of oral potassium at home. Pt voiced understanding. Discharged in stable condition.

## 2024-09-11 PROBLEM — M25.421 ELBOW SWELLING, RIGHT: Status: ACTIVE | Noted: 2024-09-11

## 2024-09-20 PROBLEM — R19.5 POSITIVE COLORECTAL CANCER SCREENING USING COLOGUARD TEST: Status: ACTIVE | Noted: 2024-09-20

## 2024-09-20 LAB — NONINV COLON CA DNA+OCC BLD SCRN STL QL: POSITIVE

## 2024-09-24 ENCOUNTER — SPECIALTY PHARMACY (OUTPATIENT)
Dept: PHARMACY | Facility: CLINIC | Age: 64
End: 2024-09-24

## 2024-09-24 PROCEDURE — RXMED WILLOW AMBULATORY MEDICATION CHARGE

## 2024-09-26 ENCOUNTER — PHARMACY VISIT (OUTPATIENT)
Dept: PHARMACY | Facility: CLINIC | Age: 64
End: 2024-09-26
Payer: COMMERCIAL

## 2024-10-04 ENCOUNTER — TELEPHONE (OUTPATIENT)
Dept: HEMATOLOGY/ONCOLOGY | Facility: HOSPITAL | Age: 64
End: 2024-10-04
Payer: COMMERCIAL

## 2024-10-04 ENCOUNTER — OFFICE VISIT (OUTPATIENT)
Dept: OTOLARYNGOLOGY | Facility: HOSPITAL | Age: 64
End: 2024-10-04
Payer: COMMERCIAL

## 2024-10-04 VITALS — WEIGHT: 185 LBS | HEIGHT: 67 IN | TEMPERATURE: 97.2 F | BODY MASS INDEX: 29.03 KG/M2

## 2024-10-04 DIAGNOSIS — C91.10 CLL (CHRONIC LYMPHOCYTIC LEUKEMIA) (MULTI): ICD-10-CM

## 2024-10-04 DIAGNOSIS — C91.10 CLL (CHRONIC LYMPHOCYTIC LEUKEMIA) (MULTI): Primary | ICD-10-CM

## 2024-10-04 DIAGNOSIS — K13.79 MOUTH SORES: ICD-10-CM

## 2024-10-04 PROCEDURE — 99213 OFFICE O/P EST LOW 20 MIN: CPT | Performed by: STUDENT IN AN ORGANIZED HEALTH CARE EDUCATION/TRAINING PROGRAM

## 2024-10-04 PROCEDURE — 1036F TOBACCO NON-USER: CPT | Performed by: STUDENT IN AN ORGANIZED HEALTH CARE EDUCATION/TRAINING PROGRAM

## 2024-10-04 PROCEDURE — 3049F LDL-C 100-129 MG/DL: CPT | Performed by: STUDENT IN AN ORGANIZED HEALTH CARE EDUCATION/TRAINING PROGRAM

## 2024-10-04 PROCEDURE — 1123F ACP DISCUSS/DSCN MKR DOCD: CPT | Performed by: STUDENT IN AN ORGANIZED HEALTH CARE EDUCATION/TRAINING PROGRAM

## 2024-10-04 PROCEDURE — 3044F HG A1C LEVEL LT 7.0%: CPT | Performed by: STUDENT IN AN ORGANIZED HEALTH CARE EDUCATION/TRAINING PROGRAM

## 2024-10-04 PROCEDURE — 3008F BODY MASS INDEX DOCD: CPT | Performed by: STUDENT IN AN ORGANIZED HEALTH CARE EDUCATION/TRAINING PROGRAM

## 2024-10-04 PROCEDURE — 3061F NEG MICROALBUMINURIA REV: CPT | Performed by: STUDENT IN AN ORGANIZED HEALTH CARE EDUCATION/TRAINING PROGRAM

## 2024-10-04 PROCEDURE — 1159F MED LIST DOCD IN RCRD: CPT | Performed by: STUDENT IN AN ORGANIZED HEALTH CARE EDUCATION/TRAINING PROGRAM

## 2024-10-04 RX ORDER — DEXAMETHASONE 0.5 MG/5ML
1 SOLUTION ORAL 2 TIMES DAILY PRN
Qty: 600 ML | Refills: 0 | Status: SHIPPED | OUTPATIENT
Start: 2024-10-04 | End: 2024-12-03

## 2024-10-04 ASSESSMENT — PATIENT HEALTH QUESTIONNAIRE - PHQ9
1. LITTLE INTEREST OR PLEASURE IN DOING THINGS: NOT AT ALL
2. FEELING DOWN, DEPRESSED OR HOPELESS: NOT AT ALL
SUM OF ALL RESPONSES TO PHQ9 QUESTIONS 1 & 2: 0

## 2024-10-04 NOTE — PROGRESS NOTES
"Head and Neck Surgery Outpatient Follow-Up    Chief Concern:  Tongue Lesions    History Of Present Illness  Demar Pittman \"Shraddha" is a 64 y.o. male with a history of  CLL presenting for evaluation of oral cavity lesions. He is known to our service, having previously been seen by Dr. Chirinos for a tongue base mass which was ultimately biopsied in the OR in May 2024 with path showing CLL. Since April 2024 he has had intermittent painful lesions of this oral cavity and tongue. This has cause him some trouble swallowing. He feels as though they wax and wane, and do completely resolve before returning. He is currently on treatment for his CLL receiving rituximab and venetoclax  He thinks maybe the lesions resolve quicker after his rituximab infusions. The lesions predate his current treatment regimen. He has no new neck masses, hemoptysis, dyspnea, or dysphonia, but does have stable cervical LAD for greater than a year related to his CLL. He is a non-smoker with minimal alcohol intake.     He was last seen by me 8/30/24 where we elected to differ bx and trial dexamethasone oral rinses.     Interval Hx - 10/4/24  Pain has resolved, and lesions improving  Has potential colon malignancy and is scheduled for colonoscopy  Is not interested in bx today unless absolutely necessary  Has final chemo infusion in December.     Interval Hx - 8/30/24  Rinses helping oral cavity pain, eating better  Lesions still persist  No new lesions, no new neck masses    Past Medical History  He has a past medical history of Diffuse otitis externa, bilateral (10/11/2021), Hypertrophy of tonsils (12/05/2019), Localized enlarged lymph nodes (04/30/2020), Personal history of diseases of the skin and subcutaneous tissue (03/09/2020), Personal history of other diseases of the circulatory system, Personal history of other diseases of the circulatory system, Personal history of other diseases of the circulatory system, Personal history of other " "diseases of the circulatory system, Personal history of other specified conditions (12/11/2019), and Transaminitis (06/26/2024).    Patient Active Problem List   Diagnosis    CAD (coronary artery disease)    CLL (chronic lymphocytic leukemia) (Multi)    Diastasis recti    Dry eyes, bilateral    Essential hypertension    Controlled type 2 diabetes mellitus without complication, without long-term current use of insulin (Multi)    Mixed hyperlipidemia    XENA on CPAP    Paroxysmal atrial fibrillation (Multi)    S/P CABG x 3    Sensorineural hearing loss (SNHL) of both ears    Pancytopenia    Tinnitus of both ears    Umbilical hernia without obstruction and without gangrene    Vitamin D deficiency    Annual physical exam    Hypokalemia    Class 1 obesity due to excess calories with serious comorbidity and body mass index (BMI) of 30.0 to 30.9 in adult    Tongue lesion    Difficult intubation    Mouth sores    Weight loss    Gastro-esophageal reflux disease without esophagitis    Old myocardial infarction    Presence of coronary angioplasty implant and graft    Unspecified right bundle-branch block    Lung nodule    Elbow swelling, right    Positive colorectal cancer screening using Cologuard test       Surgical History  He has a past surgical history that includes Other surgical history (10/24/2019); Other surgical history (10/24/2019); Other surgical history (12/13/2019); and Other surgical history (12/29/2019).     Social History  He reports that he quit smoking about 39 years ago. His smoking use included cigarettes. He has never used smokeless tobacco. He reports that he does not currently use alcohol after a past usage of about 2.0 standard drinks of alcohol per week. He reports that he does not use drugs.    Family History  No family history on file.     Allergies  Rituximab-pvvr    Last Recorded Vitals  Temperature 36.2 °C (97.2 °F), height 1.702 m (5' 7\"), weight 83.9 kg (185 lb).     Physical " Exam:  Constitutional:  No acute distress  Voice:  No hoarseness or other abnormality  Respiration:  Breathing comfortably, no stridors  Eyes:  EOM intact, sclera normal  Neuro:  Alert and oriented times 3, Cranial nerves II-XII grossly intact and symmetric bilaterally  Head and Face:  Symmetric facial features, no masses or lesions, sinuses non-tender to palpation  Salivary Glands:  Parotid and submandibular glands normal bilaterally  Oral Cavity/Oropharynx/Lips: Improved since previous. 2 Non-tender plaque-like lesions, one on buccal mucosa and one R tongue, L lateral tongue and other small lesions resolved.  Pharynx: no masses or lesions  Neck/Lymph:  L>R small mobile level II nodes, non tender  Skin:  Neck skin is without scar or injury  Psych:  Alert and oriented with appropriate mood and affect      Medications:  Medication Documentation Review Audit       Reviewed by Mami Jacobsen MA (Medical Assistant) on 10/04/24 at 1537      Medication Order Taking? Sig Documenting Provider Last Dose Status   acyclovir (Zovirax) 400 mg tablet [id number] Yes Take 1 tablet (400 mg) by mouth 2 times a day. ANNELISE Gant-CNP Taking Active   amLODIPine (Norvasc) 5 mg tablet [id number] Yes Take 1 tablet (5 mg) by mouth once daily. ANNELISE Espinoza-CNP Taking Active   aspirin 81 mg EC tablet [id number] Yes Take 1 tablet (81 mg) by mouth once daily. Historical Provider, MD Taking Active   atorvastatin (Lipitor) 20 mg tablet [id number] Yes Take 1 tablet (20 mg) by mouth once daily at bedtime. Deedee Mcclendon MD Taking Active   calcium carbonate EX (Tums Extra Strength) 300 mg (750 mg) chewable tablet [id number] Yes Chew 2 tablets (1,500 mg) 2 times a day. Compa Ortiz PA-C Taking Active   dexAMETHasone 0.5 mg/5 mL oral liquid [id number]  Take 10 mL (1 mg) by mouth 2 times a day as needed (mouth sores). Cristóbal Esquivel MD   24 2359   metoprolol tartrate (Lopressor) 25 mg tablet [id number] Yes Take 1  tablet (25 mg) by mouth 2 times a day. Kristen Conner, APRN-CNP Taking Active   multivitamin with folic acid (One Daily Multivitamin) 400 mcg tablet [id number] Yes Take 1 tablet by mouth once daily. Historical Provider, MD Taking Active   nitroglycerin (Nitrostat) 0.4 mg SL tablet [id number] Yes Place 1 tablet (0.4 mg) under the tongue every 5 minutes if needed. Historical Provider, MD Taking Active   ondansetron (Zofran) 8 mg tablet [id number] Yes Take 1 tablet (8 mg) by mouth every 8 hours if needed for nausea or vomiting. Ev Luis MD Taking Active   pantoprazole (ProtoNix) 40 mg EC tablet [id number] Yes Take 1 tablet (40 mg) by mouth once daily. Deedee Mcclendon MD Taking Active   potassium chloride CR (Klor-Con) 10 mEq ER tablet [id number] Yes Take 2 tablets (20 mEq) by mouth once daily. Do not crush, chew, or split. Mayelin Bailon APRN-CNP Taking Active   prochlorperazine (Compazine) 10 mg tablet [id number] Yes Take 1 tablet (10 mg) by mouth every 6 hours if needed for nausea or vomiting. Ev Luis MD Taking Active   venetoclax (Venclexta) 100 mg tablet [id number] Yes Take 4 tablets (400 mg total) by mouth once daily.  Take with food. Ev Luis MD Taking Active                  Imaging:  I personally reviewed the PET CT from 3/14/24.     Assessment/Plan   63yo M w/ long hx of CLL and multiple varied treatment courses seen today for intermittent oral cavity lesions. He has previously had biopsy proven CLL involvement at his tongue base. He is currently on treatment with ritux and venetoclax, but the lesions in question seme to predate this.     His symptoms of pain and dysphagia resolved and many of his lesions have healed. My concern for separate, unrelated oral cavity malignancy still remains low.    We will continue the dex rinse through the end of his chemo infusions in December and have a follow-up in January. If lesions return or worsen after stopping this, we will definitively  biopsy. If they worsen between now and then, we will see him sooner and biopsy.

## 2024-10-07 ENCOUNTER — OFFICE VISIT (OUTPATIENT)
Dept: HEMATOLOGY/ONCOLOGY | Facility: HOSPITAL | Age: 64
End: 2024-10-07
Payer: COMMERCIAL

## 2024-10-07 ENCOUNTER — INFUSION (OUTPATIENT)
Dept: HEMATOLOGY/ONCOLOGY | Facility: HOSPITAL | Age: 64
End: 2024-10-07
Payer: COMMERCIAL

## 2024-10-07 VITALS
DIASTOLIC BLOOD PRESSURE: 63 MMHG | TEMPERATURE: 98.1 F | HEIGHT: 67 IN | SYSTOLIC BLOOD PRESSURE: 120 MMHG | BODY MASS INDEX: 28.3 KG/M2 | WEIGHT: 180.34 LBS | HEART RATE: 61 BPM | OXYGEN SATURATION: 100 % | RESPIRATION RATE: 16 BRPM

## 2024-10-07 DIAGNOSIS — C91.10 CLL (CHRONIC LYMPHOCYTIC LEUKEMIA) (MULTI): Primary | ICD-10-CM

## 2024-10-07 DIAGNOSIS — C91.10 CLL (CHRONIC LYMPHOCYTIC LEUKEMIA) (MULTI): ICD-10-CM

## 2024-10-07 LAB
ALBUMIN SERPL BCP-MCNC: 4.2 G/DL (ref 3.4–5)
ALP SERPL-CCNC: 123 U/L (ref 33–136)
ALT SERPL W P-5'-P-CCNC: 29 U/L (ref 10–52)
ANION GAP SERPL CALC-SCNC: 13 MMOL/L (ref 10–20)
AST SERPL W P-5'-P-CCNC: 16 U/L (ref 9–39)
BASOPHILS # BLD MANUAL: 0 X10*3/UL (ref 0–0.1)
BASOPHILS NFR BLD MANUAL: 0 %
BILIRUB SERPL-MCNC: 0.8 MG/DL (ref 0–1.2)
BUN SERPL-MCNC: 10 MG/DL (ref 6–23)
CALCIUM SERPL-MCNC: 9.2 MG/DL (ref 8.6–10.3)
CHLORIDE SERPL-SCNC: 104 MMOL/L (ref 98–107)
CO2 SERPL-SCNC: 26 MMOL/L (ref 21–32)
CREAT SERPL-MCNC: 0.71 MG/DL (ref 0.5–1.3)
EGFRCR SERPLBLD CKD-EPI 2021: >90 ML/MIN/1.73M*2
EOSINOPHIL # BLD MANUAL: 0 X10*3/UL (ref 0–0.7)
EOSINOPHIL NFR BLD MANUAL: 0 %
ERYTHROCYTE [DISTWIDTH] IN BLOOD BY AUTOMATED COUNT: 15.6 % (ref 11.5–14.5)
GLUCOSE SERPL-MCNC: 114 MG/DL (ref 74–99)
HCT VFR BLD AUTO: 39.2 % (ref 41–52)
HGB BLD-MCNC: 13.9 G/DL (ref 13.5–17.5)
IMM GRANULOCYTES # BLD AUTO: 0.08 X10*3/UL (ref 0–0.7)
IMM GRANULOCYTES NFR BLD AUTO: 4.7 % (ref 0–0.9)
LDH SERPL L TO P-CCNC: 143 U/L (ref 84–246)
LYMPHOCYTES # BLD MANUAL: 0.6 X10*3/UL (ref 1.2–4.8)
LYMPHOCYTES NFR BLD MANUAL: 35 %
MCH RBC QN AUTO: 31.4 PG (ref 26–34)
MCHC RBC AUTO-ENTMCNC: 35.5 G/DL (ref 32–36)
MCV RBC AUTO: 89 FL (ref 80–100)
METAMYELOCYTES # BLD MANUAL: 0.02 X10*3/UL
METAMYELOCYTES NFR BLD MANUAL: 1 %
MONOCYTES # BLD MANUAL: 0.46 X10*3/UL (ref 0.1–1)
MONOCYTES NFR BLD MANUAL: 27 %
MYELOCYTES # BLD MANUAL: 0.03 X10*3/UL
MYELOCYTES NFR BLD MANUAL: 2 %
NEUTROPHILS # BLD MANUAL: 0.6 X10*3/UL (ref 1.2–7.7)
NEUTS BAND # BLD MANUAL: 0.07 X10*3/UL (ref 0–0.7)
NEUTS BAND NFR BLD MANUAL: 4 %
NEUTS SEG # BLD MANUAL: 0.53 X10*3/UL (ref 1.2–7)
NEUTS SEG NFR BLD MANUAL: 31 %
NRBC BLD-RTO: 0 /100 WBCS (ref 0–0)
OVALOCYTES BLD QL SMEAR: ABNORMAL
PLATELET # BLD AUTO: 177 X10*3/UL (ref 150–450)
POLYCHROMASIA BLD QL SMEAR: ABNORMAL
POTASSIUM SERPL-SCNC: 3.6 MMOL/L (ref 3.5–5.3)
PROT SERPL-MCNC: 6.2 G/DL (ref 6.4–8.2)
RBC # BLD AUTO: 4.43 X10*6/UL (ref 4.5–5.9)
RBC MORPH BLD: ABNORMAL
SODIUM SERPL-SCNC: 139 MMOL/L (ref 136–145)
TOTAL CELLS COUNTED BLD: 100
URATE SERPL-MCNC: 4.7 MG/DL (ref 4–7.5)
WBC # BLD AUTO: 1.7 X10*3/UL (ref 4.4–11.3)

## 2024-10-07 PROCEDURE — 1123F ACP DISCUSS/DSCN MKR DOCD: CPT | Performed by: NURSE PRACTITIONER

## 2024-10-07 PROCEDURE — 99215 OFFICE O/P EST HI 40 MIN: CPT | Performed by: NURSE PRACTITIONER

## 2024-10-07 PROCEDURE — 83615 LACTATE (LD) (LDH) ENZYME: CPT

## 2024-10-07 PROCEDURE — 96413 CHEMO IV INFUSION 1 HR: CPT

## 2024-10-07 PROCEDURE — 96375 TX/PRO/DX INJ NEW DRUG ADDON: CPT | Mod: INF

## 2024-10-07 PROCEDURE — 3049F LDL-C 100-129 MG/DL: CPT | Performed by: NURSE PRACTITIONER

## 2024-10-07 PROCEDURE — 84550 ASSAY OF BLOOD/URIC ACID: CPT

## 2024-10-07 PROCEDURE — 3044F HG A1C LEVEL LT 7.0%: CPT | Performed by: NURSE PRACTITIONER

## 2024-10-07 PROCEDURE — 85027 COMPLETE CBC AUTOMATED: CPT

## 2024-10-07 PROCEDURE — 2500000004 HC RX 250 GENERAL PHARMACY W/ HCPCS (ALT 636 FOR OP/ED): Performed by: INTERNAL MEDICINE

## 2024-10-07 PROCEDURE — 96415 CHEMO IV INFUSION ADDL HR: CPT

## 2024-10-07 PROCEDURE — 84075 ASSAY ALKALINE PHOSPHATASE: CPT

## 2024-10-07 PROCEDURE — 85007 BL SMEAR W/DIFF WBC COUNT: CPT

## 2024-10-07 PROCEDURE — 2500000001 HC RX 250 WO HCPCS SELF ADMINISTERED DRUGS (ALT 637 FOR MEDICARE OP): Performed by: INTERNAL MEDICINE

## 2024-10-07 PROCEDURE — 3061F NEG MICROALBUMINURIA REV: CPT | Performed by: NURSE PRACTITIONER

## 2024-10-07 PROCEDURE — 2500000004 HC RX 250 GENERAL PHARMACY W/ HCPCS (ALT 636 FOR OP/ED): Mod: JZ | Performed by: INTERNAL MEDICINE

## 2024-10-07 RX ORDER — EPINEPHRINE 0.3 MG/.3ML
0.3 INJECTION SUBCUTANEOUS EVERY 5 MIN PRN
Status: DISCONTINUED | OUTPATIENT
Start: 2024-10-07 | End: 2024-10-07 | Stop reason: HOSPADM

## 2024-10-07 RX ORDER — DIPHENHYDRAMINE HCL 50 MG
50 CAPSULE ORAL ONCE
Status: COMPLETED | OUTPATIENT
Start: 2024-10-07 | End: 2024-10-07

## 2024-10-07 RX ORDER — ACETAMINOPHEN 325 MG/1
650 TABLET ORAL ONCE
Status: COMPLETED | OUTPATIENT
Start: 2024-10-07 | End: 2024-10-07

## 2024-10-07 RX ORDER — DIPHENHYDRAMINE HYDROCHLORIDE 50 MG/ML
50 INJECTION INTRAMUSCULAR; INTRAVENOUS AS NEEDED
Status: DISCONTINUED | OUTPATIENT
Start: 2024-10-07 | End: 2024-10-07 | Stop reason: HOSPADM

## 2024-10-07 RX ORDER — PROCHLORPERAZINE MALEATE 10 MG
10 TABLET ORAL EVERY 6 HOURS PRN
Status: DISCONTINUED | OUTPATIENT
Start: 2024-10-07 | End: 2024-10-07 | Stop reason: HOSPADM

## 2024-10-07 RX ORDER — ALBUTEROL SULFATE 0.83 MG/ML
3 SOLUTION RESPIRATORY (INHALATION) AS NEEDED
Status: DISCONTINUED | OUTPATIENT
Start: 2024-10-07 | End: 2024-10-07 | Stop reason: HOSPADM

## 2024-10-07 RX ORDER — FAMOTIDINE 10 MG/ML
20 INJECTION INTRAVENOUS ONCE AS NEEDED
Status: DISCONTINUED | OUTPATIENT
Start: 2024-10-07 | End: 2024-10-07 | Stop reason: HOSPADM

## 2024-10-07 RX ORDER — PROCHLORPERAZINE EDISYLATE 5 MG/ML
10 INJECTION INTRAMUSCULAR; INTRAVENOUS EVERY 6 HOURS PRN
Status: DISCONTINUED | OUTPATIENT
Start: 2024-10-07 | End: 2024-10-07 | Stop reason: HOSPADM

## 2024-10-08 ENCOUNTER — TELEPHONE (OUTPATIENT)
Dept: GASTROENTEROLOGY | Facility: CLINIC | Age: 64
End: 2024-10-08
Payer: COMMERCIAL

## 2024-10-08 NOTE — TELEPHONE ENCOUNTER
----- Message from Debora CRUZ sent at 9/24/2024 10:58 AM EDT -----  Regarding: RE: open access  Would like to schedule once November is open. Would like a Monday or Tuesday  ----- Message -----  From: Cordelia Trejo RN  Sent: 9/23/2024   2:28 PM EDT  To:  [email or web address] Clerical  Subject: open access                                      Open access

## 2024-10-10 ENCOUNTER — TELEPHONE (OUTPATIENT)
Dept: GASTROENTEROLOGY | Facility: CLINIC | Age: 64
End: 2024-10-10
Payer: COMMERCIAL

## 2024-10-10 ASSESSMENT — ENCOUNTER SYMPTOMS
DIARRHEA: 1
NAUSEA: 1

## 2024-10-10 NOTE — PROGRESS NOTES
"Patient ID: Demar Pittman \"Shraddha" is a 64 y.o. male.    Subjective    HPI  Presents today for C5D1 Ritux/Nico and routine follow up visit [facility]. Feeling well overall. Some diarrhea past few days but think it is due to what he ate. Seems to be resolving.    Review of Systems   Gastrointestinal:  Positive for diarrhea and nausea.   All other systems reviewed and are negative.      Objective    BSA: There is no height or weight on file to calculate BSA.  There were no vitals taken for this visit.  Vital signs reviewed today.      Physical Exam  Constitutional:       Appearance: Normal appearance.   HENT:      Head: Normocephalic.      Nose: Nose normal.      Mouth/Throat:      Mouth: Mucous membranes are moist.      Pharynx: Oropharynx is clear.   Eyes:      Extraocular Movements: Extraocular movements intact.      Conjunctiva/sclera: Conjunctivae normal.      Pupils: Pupils are equal, round, and reactive to light.   Cardiovascular:      Rate and Rhythm: Normal rate and regular rhythm.      Pulses: Normal pulses.      Heart sounds: Normal heart sounds.   Pulmonary:      Effort: Pulmonary effort is normal.      Breath sounds: Normal breath sounds.   Abdominal:      General: Abdomen is flat. Bowel sounds are normal.      Palpations: Abdomen is soft.   Musculoskeletal:         General: Normal range of motion.      Cervical back: Normal range of motion.   Skin:     General: Skin is warm and dry.      Capillary Refill: Capillary refill takes less than 2 seconds.   Neurological:      General: No focal deficit present.      Mental Status: He is alert and oriented to person, place, and time. Mental status is at baseline.   Psychiatric:         Mood and Affect: Mood normal.         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  The cells expressed CD5 and A light chains.   CD38 and  Zap-70 were negative. Cytogenetic studies by FISH were inconclusive.  WBC has slowly rising but he maintains good marrow reserve and has not required treatment.  CT chest done 3/9/17 showed extensive cervical, axillary and submental adenopathy; largest  node on the left measured 2.3 x 1.9 cm.  Small (less than 9 mm) pulmonology nodules were again seen; relatively stable.  Abdominal nodes slightly increased.    17: WBC 65.4, ALC 62.8; becoming more symptomatic.  17 : WBC 77.6.  ALC 72.2.  Hemoglobin and platelets were normal.  18 WBC: 69.3. A.9. Hgb 15.2. Plt: 148,000  18: WBC 88.2.  ALC 82.9.  Hemoglobin 14.2.  Platelets 170,000.  Adenopathy stable.  18: WBC 77.3.  ALC 66.5.  Hemoglobin 13.8.  Platelets 143,000.  Adenopathy slightly increased.  18: WBC 92.0.  ALC 86.5.  Hemoglobin 14.1.  Platelets 157,000.  Progressing symptomatic adenopathy.  Treatment options discussed.   18 completed rituximab weekly x 4.  10/9/19: CBC: WBC 16.5.  ALC 12.4.  Hemoglobin 14.6.  Platelets 148,000.  Worsening adenopathy.  3/17/20: Started bendamustine/rituximab after multiple thoracentesis for malignant pleural effusion with CLL  20: #2 bendamustine/rituximab.  20: #3 BR  20: WBC 4.7.  Hemoglobin 12.9.  Platelets 189,000.  20: #4 BR  20: #5 BR  20: WBC 1.7. hgb 11.9. Platelets 176.  20: #6/6 bendamustine/rituximab  2023, WBC count 62, hemoglobin 14.9, platelets 143, absolute lymphocyte count 50.2  January 15, 2024, WBC count 40.5, hemoglobin 15.0, platelets 175  2/10/24 , WBC count 31.5, hemoglobin 14.9, platelets 131  3/14/24 , WBC count 13.9, hemoglobin 14.9, platelets 122  3/14/24, physical examination positive for bilateral cervical lymphadenopathy, bilateral axillary lymphadenopathy  PET imaging 3/29/2024 intense  "uptake in tongue base ( S/P) tonsillectomy. Multiple enlarged lymph nodes in cervical, parotid, nodes, multipl pleural based and parenchymal non FDG avid pulmonary nodes, enlarged axillary lymph nodes, mediastinal and bilateral hilar lymph nodes, periportal lymph nodes, iliac nodes, inguinal nodes.     -FISH panel  of peripheral blood 2024 Pos for del 11q, negative for t(11,14), trisomy 12, monosomy 13 and deletion of 17p.     -Needle biopsy of posterior tongue mass 24 involvement by chronic lymphocytic leukemia/small lymphocytic lymphoma no evidence of transformation     CLL (chronic lymphocytic leukemia) (Multi)   2023 Initial Diagnosis    CLL (chronic lymphocytic leukemia) (CMS/[facility])     1/15/2024 - 1/15/2024 Chemotherapy    Bendamustine + RiTUXimab, 28 Day Cycles     2024 - 2024 Chemotherapy    (INPT) Venetoclax, 28 Day Cycles      2024 -  Chemotherapy    Venetoclax + RiTUXimab, 28 Day Cycles          Expand All Collapse All    Patient ID: Demar Pittman \"Shraddha" is a 64 y.o. male.     Patient's Oncology History documentation        Oncology History Overview Note    B-CLL diagnosed in 2015, SUÁREZ stage 0; WBC initially 16.3 with ALC 12.0, hemoglobin 15.1 and platelets 154,000.  The cells expressed CD5 and A light chains.   CD38 and  Zap-70 were negative. Cytogenetic studies by FISH were inconclusive.  WBC has slowly rising but he maintains good marrow reserve and has not required treatment.  CT chest done 3/9/17 showed extensive cervical, axillary and submental adenopathy; largest  node on the left measured 2.3 x 1.9 cm.  Small (less than 9 mm) pulmonology nodules were again seen; relatively stable.  Abdominal nodes slightly increased.    17: WBC 65.4, ALC 62.8; becoming more symptomatic.  17 : WBC 77.6.  ALC 72.2.  Hemoglobin and platelets were normal.  18 WBC: 69.3. A.9. Hgb 15.2. Plt: 148,000  18: WBC 88.2.  ALC 82.9.  Hemoglobin 14.2.  Platelets " 170,000.  Adenopathy stable.  8/13/18: WBC 77.3.  ALC 66.5.  Hemoglobin 13.8.  Platelets 143,000.  Adenopathy slightly increased.  11/19/18: WBC 92.0.  ALC 86.5.  Hemoglobin 14.1.  Platelets 157,000.  Progressing symptomatic adenopathy.  Treatment options discussed.   12/28/18 completed rituximab weekly x 4.  10/9/19: CBC: WBC 16.5.  ALC 12.4.  Hemoglobin 14.6.  Platelets 148,000.  Worsening adenopathy.  3/17/20: Started bendamustine/rituximab after multiple thoracentesis for malignant pleural effusion with CLL  4/13/20: #2 bendamustine/rituximab.  5/11/20: #3 BR  6/5/20: WBC 4.7.  Hemoglobin 12.9.  Platelets 189,000.  6/8/20: #4 BR  7/6/20: #5 BR  7/31/20: WBC 1.7. hgb 11.9. Platelets 176.  8/17/20: #6/6 bendamustine/rituximab  December 29, 2023, WBC count 62, hemoglobin 14.9, platelets 143, absolute lymphocyte count 50.2  January 15, 2024, WBC count 40.5, hemoglobin 15.0, platelets 175  2/10/24 , WBC count 31.5, hemoglobin 14.9, platelets 131  3/14/24 , WBC count 13.9, hemoglobin 14.9, platelets 122  3/14/24, physical examination positive for bilateral cervical lymphadenopathy, bilateral axillary lymphadenopathy  PET imaging 3/29/2024 intense uptake in tongue base ( S/P) tonsillectomy. Multiple enlarged lymph nodes in cervical, parotid, nodes, multipl pleural based and parenchymal non FDG avid pulmonary nodes, enlarged axillary lymph nodes, mediastinal and bilateral hilar lymph nodes, periportal lymph nodes, iliac nodes, inguinal nodes.      -FISH panel  of peripheral blood 5/9/2024 Pos for del 11q, negative for t(11,14), trisomy 12, monosomy 13 and deletion of 17p.     -Needle biopsy of posterior tongue mass 5/28/24 involvement by chronic lymphocytic leukemia/small lymphocytic lymphoma no evidence of transformation      CLL (chronic lymphocytic leukemia) (Multi)    8/16/2023 Initial Diagnosis      CLL (chronic lymphocytic leukemia) (CMS/HCC)       1/15/2024 - 1/15/2024 Chemotherapy      Bendamustine + RiTUXimab,  28 Day Cycles       2024 - 2024 Chemotherapy      (INPT) Venetoclax, 28 Day Cycles        2024 -  Chemotherapy      Venetoclax + RiTUXimab, 28 Day Cycles             Past medical history: CLL/SLL (2015) as described above, status post rituximab, 6 cycles of bendamustine/rituximab (3/2020-2020).  Excellent response to treatment.       History of CABGx3 19, vertigo, XENA, remote tonsillectomy, retinal tear, stable pulmonary nodules, hyperlipidemia, hypertension, sinusitis.  Unremarkable cardiac catheterization 21. Hyperglycemia, torn retina, cataract. Hx of afib since CABG.      Family medical history: Mother  of myeloma in her 70s and his father  of Hodgkin lymphoma at age 39.     Social history: Rare alcohol intake.  Former smoker but quit in .  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 2 biological children, one son with downs syndrome.         Subjective  HPI     Patient of Dr. Wang with a long history of CLL initially seen with his wife and son 24 for a second opinion for treatment of CLL.   He received BR completing in 2020.  In 2023 after COVID infection and he had increased lymphocytosis which resolved, subsequently noted to have lymphadenopathy confirmed on PET scan.  Ibrutinib recommended in 2024, but patient has not yet started this because he was leary of side effects.      Due to discordance in uptake at base of tongue on PET imaging, tongue biopsy was done 24 which was consistent with CLL and had no evidence of large cell transformation.     Was hospitalized 6/10/24-24 for first venetoclax ramp-up.  Then was hospitalized 24-24 for second venetoclax ramp-up. C1 rituximab - 7/15/24.     Demar presents to the clinic today (24) with his wife for follow up evaluation of his CLL and count checks.  He is due for C3 rituxan and remains on venetoclax 400 mg daily.     Energy level is okay.  Appetite is good.  Has been eating better, uses boost as needed.  Gained about 2 pounds since last visit.  Had a fall yesterday while hiking with his daughter, stepped on log.  Landing on chest, didn't hit head, and didn't lose consciousness.  No injury obtained, just feels a little sore.       Continues to have swelling to right elbow that started about 1 and a 1/2 months ago.  No injury.    Has been having more nausea after taking medications.  Constipation at times taking stool softener as needed.  Continues to have mouth sores, saw ENT and Demar requested to try dexamethasone rinse first before having a biopsy performed.  Mouth sores are not affecting his oral food intake.  Mild discomfort noted.  No voice changes at this time.  No throat soreness.      Review of Systems   Constitutional:  Negative for appetite change, chills, fatigue, fever and unexpected weight change.   HENT:   Positive for mouth sores. Negative for sore throat, trouble swallowing and voice change.    Respiratory: Negative.     Cardiovascular: Negative.    Gastrointestinal:  Positive for constipation and nausea. Negative for vomiting.   Genitourinary: Negative.     Musculoskeletal: Negative.    Skin: Negative.    Neurological: Negative.    Hematological: Negative.    All other systems reviewed and are negative.           Objective  BSA: There is no height or weight on file to calculate BSA.  There were no vitals taken for this visit.      Wt Readings from Last 5 Encounters:   09/09/24 82.3 kg (181 lb 7 oz)   08/30/24 81.3 kg (179 lb 4.8 oz)   08/23/24 81.4 kg (179 lb 6.4 oz)   08/16/24 78.9 kg (174 lb)   08/12/24 79.8 kg (175 lb 14.8 oz)      Physical Exam  Vitals reviewed.   Constitutional:       Appearance: Normal appearance.   HENT:      Head: Normocephalic.      Mouth/Throat:      Mouth: Mucous membranes are moist.      Comments: Multiple small pink lesions to sides of tongue, under the tongue and left cheek,  roof of mouth.    Eyes:      Conjunctiva/sclera: Conjunctivae normal.   Cardiovascular:      Rate and Rhythm: Normal rate and regular rhythm.      Pulses: Normal pulses.      Heart sounds: Normal heart sounds.   Pulmonary:      Effort: Pulmonary effort is normal.      Breath sounds: Normal breath sounds.   Abdominal:      General: Bowel sounds are normal.      Palpations: Abdomen is soft. There is no mass.      Tenderness: There is no abdominal tenderness.   Musculoskeletal:         General: Normal range of motion.      Comments: Swelling to right elbow, mild tenderness on palpation.     Lymphadenopathy:      Comments: No lymphadenopathy   Skin:     General: Skin is warm and dry.      Capillary Refill: Capillary refill takes less than 2 seconds.   Neurological:      General: No focal deficit present.      Mental Status: He is alert and oriented to person, place, and time. Mental status is at baseline.   Psychiatric:         Mood and Affect: Mood normal.         Behavior: Behavior normal.      Performance Status:  Karnofsky Score: 90 - Able to carry on normal activity; minor signs or symptoms of disease            Assessment/Plan      CLL (chronic lymphocytic leukemia) (Multi)  Chronic lymphocytic leukemia with symptomatic adenopathy, excellent response to bendamustine/rituximab; completed 6 cycles in August 2020.    Patient seen  for further management of bulky CLL.  PET imaging shows discordant uptake at the base of the tongue , pulmonary nodules which the patient states are longstanding. There is no evidence of Richters transformation by histology and FISH analysis shows del 11q. Due to symptoms of tongue swelling has started dexamethasone 4 mg po bid for 10 days for sx relief.     6/3/24 Previously discussed treatment options and patient would prefer to receive venetoclax as a time limited approach and I have recommended the [place] regimen with escalating doses of venetoclax followed by monthly rituxan for 6   monthly doses  and a planned 2 year course of venetoclax. We reviewed side effects of venetoclax and rituxan signed chemo consent.  Given patients tumor burden have schedule inpatient hospitalization to monitor for TLS for at least first two dose ramp ups on 5/10 and 5/17/24  Started allopurinol for TLS prevention  6/17/24:  Hospital admit for Venetoclax ramp up to 50 mg daily x 7 days. Next ramp ups are planned to be done outpatient.      10/7/24:  CBC stable, no TLS. No palpable lymph nodes at this time.  Planned for C5 rituximab today - with methylpred and benadryl premed.  Continue venetoclax 400 mg daily.  Follow up visit in 1 month for C6 rituximab.  Patient recently had CT chest (9/6/24) for monitoring of pulmonary nodule which showed significant interval decrease in size of multiple cervical, axillary, mediastinal, and upper abdominal lymph nodes.  Will postpone imaging at this time.             Mouth Sores:  Lesions on both sides of tongue are improving with time. Unknown etiology of lesions, but predate Venetoclax.   7/15/24:  Tongue lesions healed, noted lesion to left buccal surface.  Continue to use BMX solution PRN & Nystatin Swish & Swallow 4x daily.   7/25/24:  Worsening mouth sores and white lesions to sides of tongue, roof of mouth, and bilateral cheeks.  Demar reports that he has had mouth sores off and on since May 2024.  Unable to wear dentures at this time.  Swabbed for HSV.  Dr. Luis prescribed valcyclovir 1,000 mg TID x10 day.  Advised patient to stop acyclovir while one valcyclovir and to restart acyclovir after completes 10 day cycle of valcyclovir.  Advised to contact if mouth sores worsen or do not improve after 10 day course of valcyclovir as we may need to treat with another 10 day course.  Advised Demar to continue to use salt and soda rinse.  He declined wanting refill for BMX.    9/9/24:  Continues to have mouth sores, but reports they wax and wane but overall improving.  Swab  for HSV was negative (24).  Not affecting oral intake.  Continuing dexamethasone rinse.  Following with ENT, but patient wants to wait on having biopsy at this time. Monitoring.     Weight Loss:  24:  Continues to have weight loss, weight today 77.6 kg. Weight 87.6 kg (24).  Weight loss likely related to mouth sores.  Currently eating soft foods and drinking 1-2 protein supplements per day.  Advised to continue 2 protein supplements per day.  Declined wanting to see dietician today.  Monitoring weight.    24:  Weight slowly trending upwards, is up 2 pounds from prior visit.  Discussed foods, continue monitoring weight.       CAD:  status post CABG.       Transaminitis: PET imaging shows periportal mass vs lymphadenopathy, hepatitis serologies negative, ultrasound consistent with large periportal node, have ordered CT of liver with contrast as transaminitis worse today. Also sent CMV pcr on blood  24:  Improvement in liver enzymes: , AST 29, bilirubin 0.6.  Discontinued CT of liver as liver enzymes are improving.  10/7/24:  ALT and AST stable.     Pulmonary Nodule:  Chest x-ray (24) showing RUL pulmonary nodule-potentially neoplastic.    CT Chest order by PCP (24) showing stable pulmonary nodule with recommendation for follow up imaging in 1 year.    Discussed with Dr. Luis, plan to monitor with future imaging.     Right Elbow Swellin24:  Continues to report swelling and mild tenderness to right elbow.  Swelling has improved since last visit.  Discussed with Dr. Luis, who feels findings are most likely bursitis.  Monitoring.       RTC:  24:  Follow up visit with provider and is due for C6D1 rituximab.            Payal Escoto, APRN-CNP  [facility]

## 2024-10-10 NOTE — TELEPHONE ENCOUNTER
----- Message from Jhoana AQUINO sent at 10/10/2024 11:54 AM EDT -----  Regarding: RE: open access  Colonoscopy 10/29/24  ----- Message -----  From: Debora Allen MA  Sent: 9/24/2024  11:22 AM EDT  To: [id number]er[facility]  Subject: RE: open access                                  Would like to schedule once November is open. Would like a Monday or Tuesday  ----- Message -----  From: Cordelia Trejo RN  Sent: 9/23/2024   2:28 PM EDT  To: [id number]er[facility]  Subject: open access                                      Open access

## 2024-10-11 DIAGNOSIS — C91.10 CLL (CHRONIC LYMPHOCYTIC LEUKEMIA) (MULTI): Primary | ICD-10-CM

## 2024-10-18 ENCOUNTER — LAB (OUTPATIENT)
Dept: LAB | Facility: LAB | Age: 64
End: 2024-10-18
Payer: COMMERCIAL

## 2024-10-18 DIAGNOSIS — C91.10 CLL (CHRONIC LYMPHOCYTIC LEUKEMIA) (MULTI): ICD-10-CM

## 2024-10-18 PROCEDURE — 85025 COMPLETE CBC W/AUTO DIFF WBC: CPT

## 2024-10-18 PROCEDURE — 36415 COLL VENOUS BLD VENIPUNCTURE: CPT

## 2024-10-19 LAB
BASOPHILS # BLD AUTO: 0.01 X10*3/UL (ref 0–0.1)
BASOPHILS NFR BLD AUTO: 0.2 %
EOSINOPHIL # BLD AUTO: 0 X10*3/UL (ref 0–0.7)
EOSINOPHIL NFR BLD AUTO: 0 %
ERYTHROCYTE [DISTWIDTH] IN BLOOD BY AUTOMATED COUNT: 14.4 % (ref 11.5–14.5)
HCT VFR BLD AUTO: 39.8 % (ref 41–52)
HGB BLD-MCNC: 13.9 G/DL (ref 13.5–17.5)
IMM GRANULOCYTES # BLD AUTO: 0.05 X10*3/UL (ref 0–0.7)
IMM GRANULOCYTES NFR BLD AUTO: 0.8 % (ref 0–0.9)
LYMPHOCYTES # BLD AUTO: 0.69 X10*3/UL (ref 1.2–4.8)
LYMPHOCYTES NFR BLD AUTO: 11.5 %
MCH RBC QN AUTO: 30.8 PG (ref 26–34)
MCHC RBC AUTO-ENTMCNC: 34.9 G/DL (ref 32–36)
MCV RBC AUTO: 88 FL (ref 80–100)
MONOCYTES # BLD AUTO: 1.14 X10*3/UL (ref 0.1–1)
MONOCYTES NFR BLD AUTO: 19.1 %
NEUTROPHILS # BLD AUTO: 4.09 X10*3/UL (ref 1.2–7.7)
NEUTROPHILS NFR BLD AUTO: 68.4 %
NRBC BLD-RTO: 0 /100 WBCS (ref 0–0)
PLATELET # BLD AUTO: 141 X10*3/UL (ref 150–450)
RBC # BLD AUTO: 4.51 X10*6/UL (ref 4.5–5.9)
WBC # BLD AUTO: 6 X10*3/UL (ref 4.4–11.3)

## 2024-10-21 PROCEDURE — RXMED WILLOW AMBULATORY MEDICATION CHARGE

## 2024-10-26 ENCOUNTER — SPECIALTY PHARMACY (OUTPATIENT)
Dept: PHARMACY | Facility: CLINIC | Age: 64
End: 2024-10-26

## 2024-10-29 ENCOUNTER — APPOINTMENT (OUTPATIENT)
Dept: GASTROENTEROLOGY | Facility: HOSPITAL | Age: 64
End: 2024-10-29
Payer: COMMERCIAL

## 2024-10-31 ENCOUNTER — PHARMACY VISIT (OUTPATIENT)
Dept: PHARMACY | Facility: CLINIC | Age: 64
End: 2024-10-31
Payer: COMMERCIAL

## 2024-11-02 ENCOUNTER — PREP FOR PROCEDURE (OUTPATIENT)
Dept: GASTROENTEROLOGY | Facility: CLINIC | Age: 64
End: 2024-11-02
Payer: COMMERCIAL

## 2024-11-03 RX ORDER — EPINEPHRINE 0.3 MG/.3ML
0.3 INJECTION SUBCUTANEOUS EVERY 5 MIN PRN
Status: CANCELLED | OUTPATIENT
Start: 2024-11-04

## 2024-11-03 RX ORDER — FAMOTIDINE 10 MG/ML
20 INJECTION INTRAVENOUS ONCE AS NEEDED
Status: CANCELLED | OUTPATIENT
Start: 2024-11-04

## 2024-11-03 RX ORDER — DIPHENHYDRAMINE HCL 50 MG
50 CAPSULE ORAL ONCE
Status: CANCELLED | OUTPATIENT
Start: 2024-11-04

## 2024-11-03 RX ORDER — PROCHLORPERAZINE MALEATE 10 MG
10 TABLET ORAL EVERY 6 HOURS PRN
Status: CANCELLED | OUTPATIENT
Start: 2024-11-04

## 2024-11-03 RX ORDER — DIPHENHYDRAMINE HYDROCHLORIDE 50 MG/ML
50 INJECTION INTRAMUSCULAR; INTRAVENOUS AS NEEDED
Status: CANCELLED | OUTPATIENT
Start: 2024-11-04

## 2024-11-03 RX ORDER — PROCHLORPERAZINE EDISYLATE 5 MG/ML
10 INJECTION INTRAMUSCULAR; INTRAVENOUS EVERY 6 HOURS PRN
Status: CANCELLED | OUTPATIENT
Start: 2024-11-04

## 2024-11-03 RX ORDER — ALBUTEROL SULFATE 0.83 MG/ML
3 SOLUTION RESPIRATORY (INHALATION) AS NEEDED
Status: CANCELLED | OUTPATIENT
Start: 2024-11-04

## 2024-11-03 RX ORDER — ACETAMINOPHEN 325 MG/1
650 TABLET ORAL ONCE
Status: CANCELLED | OUTPATIENT
Start: 2024-11-04

## 2024-11-03 NOTE — PROGRESS NOTES
"Patient ID: Demar Pittman \"Shraddha" is a 64 y.o. male.    Oncology History Overview Note   B-CLL diagnosed in 2015, SUÁREZ stage 0; WBC initially 16.3 with ALC 12.0, hemoglobin 15.1 and platelets 154,000.  The cells expressed CD5 and A light chains.   CD38 and  Zap-70 were negative. Cytogenetic studies by FISH were inconclusive.  WBC has slowly rising but he maintains good marrow reserve and has not required treatment.  CT chest done 3/9/17 showed extensive cervical, axillary and submental adenopathy; largest  node on the left measured 2.3 x 1.9 cm.  Small (less than 9 mm) pulmonology nodules were again seen; relatively stable.  Abdominal nodes slightly increased.    17: WBC 65.4, ALC 62.8; becoming more symptomatic.  17 : WBC 77.6.  ALC 72.2.  Hemoglobin and platelets were normal.  18 WBC: 69.3. A.9. Hgb 15.2. Plt: 148,000  18: WBC 88.2.  ALC 82.9.  Hemoglobin 14.2.  Platelets 170,000.  Adenopathy stable.  18: WBC 77.3.  ALC 66.5.  Hemoglobin 13.8.  Platelets 143,000.  Adenopathy slightly increased.  18: WBC 92.0.  ALC 86.5.  Hemoglobin 14.1.  Platelets 157,000.  Progressing symptomatic adenopathy.  Treatment options discussed.   18 completed rituximab weekly x 4.  10/9/19: CBC: WBC 16.5.  ALC 12.4.  Hemoglobin 14.6.  Platelets 148,000.  Worsening adenopathy.  3/17/20: Started bendamustine/rituximab after multiple thoracentesis for malignant pleural effusion with CLL  20: #2 bendamustine/rituximab.  20: #3 BR  20: WBC 4.7.  Hemoglobin 12.9.  Platelets 189,000.  20: #4 BR  20: #5 BR  20: WBC 1.7. hgb 11.9. Platelets 176.  20: #6/6 bendamustine/rituximab  2023, WBC count 62, hemoglobin 14.9, platelets 143, absolute lymphocyte count 50.2  January 15, 2024, WBC count 40.5, hemoglobin 15.0, platelets 175  2/10/24 , WBC count 31.5, hemoglobin 14.9, platelets 131  3/14/24 , WBC count 13.9, hemoglobin 14.9, platelets " 122  3/14/24, physical examination positive for bilateral cervical lymphadenopathy, bilateral axillary lymphadenopathy  PET imaging 3/29/2024 intense uptake in tongue base ( S/P) tonsillectomy. Multiple enlarged lymph nodes in cervical, parotid, nodes, multipl pleural based and parenchymal non FDG avid pulmonary nodes, enlarged axillary lymph nodes, mediastinal and bilateral hilar lymph nodes, periportal lymph nodes, iliac nodes, inguinal nodes.     -FISH panel  of peripheral blood 2024 Pos for del 11q, negative for t(11,14), trisomy 12, monosomy 13 and deletion of 17p.     -Needle biopsy of posterior tongue mass 24 involvement by chronic lymphocytic leukemia/small lymphocytic lymphoma no evidence of transformation     CLL (chronic lymphocytic leukemia) (Multi)   2023 Initial Diagnosis    CLL (chronic lymphocytic leukemia) (CMS/[facility])     1/15/2024 - 1/15/2024 Chemotherapy    Bendamustine + RiTUXimab, 28 Day Cycles     2024 - 2024 Chemotherapy    (INPT) Venetoclax, 28 Day Cycles      2024 -  Chemotherapy    Venetoclax + RiTUXimab, 28 Day Cycles        Past medical history: CLL/SLL (2015) as described above, status post rituximab, 6 cycles of bendamustine/rituximab (3/2020-2020).  Excellent response to treatment.       History of CABGx3 19, vertigo, XENA, remote tonsillectomy, retinal tear, stable pulmonary nodules, hyperlipidemia, hypertension, sinusitis.  Unremarkable cardiac catheterization 21. Hyperglycemia, torn retina, cataract. Hx of afib since CABG.      Family medical history: Mother  of myeloma in her 70s and his father  of Hodgkin lymphoma at age 39.     Social history: Rare alcohol intake.  Former smoker but quit in .  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 2 biological children, one son with downs syndrome.      Subjective  HPI     Demar Pittman is a patient of Dr. Wang with a long history  of CLL initially seen with his wife and son 5/9/24 for a second opinion for treatment of CLL.   He received BR completing in 8/2020.  In December 2023 after COVID infection and he had increased lymphocytosis which resolved, subsequently noted to have lymphadenopathy confirmed on PET scan.  Ibrutinib recommended in March 2024, but patient has not yet started this because he was leary of side effects.      Due to discordance in uptake at base of tongue on PET imaging, tongue biopsy was done 5/28/24 which was consistent with CLL and had no evidence of large cell transformation.     Was hospitalized 6/10/24-6/13/24 for first venetoclax ramp-up.  Then was hospitalized 6/17/24-6/19/24 for second venetoclax ramp-up. C1 rituximab - 7/15/24.     Demar presents to the clinic today (11/4/24) with his daughter for follow up evaluation of his CLL and count checks.  He is due for dose 5 of 6 rituxan today.  Currently on 200 mg venetoclax, was instructed to decrease to 200 mg around 10/11/24 by Dr. Luis.  Getting venetoclax okay from the  specialty pharmacy.  Energy level is lower.  Appetite is good, and weight is increasing.  Continues to have mouth lesions that wax and wane.  Demar states his ENT provider thinks lesions are from his oncology treatment.  He is planing to have mouth lesion biopsy in January at next appt.  Continues dexamethasone mouth rinse.  Mouth sore aren't affecting his eating or drinking.  Having ongoing night sweats about once per week, not drenching.  Intermittent nausea, has prn antiemetics.      Had positive cologuard (9/15/24), having colonoscopy performed tomorrow.  Has no visible blood in stool.  Right elbow swelling is significantly reduced.  Had CT chest (9/6/24) showing significant interval decrease in size of multiple cervical, axillary, mediastinal and upper abdominal lymph nodes, compatible with treatment response, persistent splenomegaly, stable pulmonary nodules measuring up to 6 mm, mild  hepatic steatosis.  States he is planning on retiring around [facility].  Also, has trip planned to go to [place] this week for a convention/reunion.  Wife had knee surgery, had issues with reaction to steri strips and infection.  It has been hard for her to get around with her recovery.      Review of Systems   Constitutional:  Positive for diaphoresis and fatigue. Negative for appetite change, chills, fever and unexpected weight change.   HENT:   Positive for mouth sores. Negative for sore throat, trouble swallowing and voice change.    Respiratory: Negative.     Cardiovascular: Negative.    Gastrointestinal:  Positive for nausea. Negative for blood in stool, constipation, diarrhea and vomiting.   Genitourinary: Negative.     Musculoskeletal: Negative.  Negative for gait problem.   Skin: Negative.    Neurological:  Negative for dizziness, gait problem, light-headedness and numbness.   Hematological: Negative.    ---------------------------------------------------------------------------------------  Subjective      Objective    BSA: There is no height or weight on file to calculate BSA.  There were no vitals taken for this visit.     Physical Exam  Constitutional:       Appearance: Normal appearance.   HENT:      Head: Normocephalic.      Nose: Nose normal.      Mouth/Throat:      Mouth: Mucous membranes are moist.      Pharynx: Oropharynx is clear.      Comments: Eraser size white lesion to left cheek, smaller lesions to right cheek, and soreness to tongue.  Eyes:      Extraocular Movements: Extraocular movements intact.      Conjunctiva/sclera: Conjunctivae normal.      Pupils: Pupils are equal, round, and reactive to light.   Cardiovascular:      Rate and Rhythm: Normal rate and regular rhythm.      Pulses: Normal pulses.      Heart sounds: Normal heart sounds.   Pulmonary:      Effort: Pulmonary effort is normal.      Breath sounds: Normal breath sounds.   Abdominal:      General: Abdomen is flat. Bowel  sounds are normal.      Palpations: Abdomen is soft.   Musculoskeletal:         General: Normal range of motion.      Cervical back: Normal range of motion.   Skin:     General: Skin is warm and dry.      Capillary Refill: Capillary refill takes less than 2 seconds.   Neurological:      General: No focal deficit present.      Mental Status: He is alert and oriented to person, place, and time. Mental status is at baseline.   Psychiatric:         Mood and Affect: Mood normal.     Performance Status:  Karnofsky Score: 80 - Normal activity with effort; some signs or symptoms of disease  -------------------------------------------------------------------------------------------------------------------------------------  Assessment/Plan      CLL (chronic lymphocytic leukemia) (Multi)  Chronic lymphocytic leukemia with symptomatic adenopathy, excellent response to bendamustine/rituximab; completed 6 cycles in August 2020.    Patient seen  for further management of bulky CLL.  PET imaging shows discordant uptake at the base of the tongue , pulmonary nodules which the patient states are longstanding. There is no evidence of Richters transformation by histology and FISH analysis shows del 11q. Due to symptoms of tongue swelling has started dexamethasone 4 mg po bid for 10 days for sx relief.     6/3/24 Previously discussed treatment options and patient would prefer to receive venetoclax as a time limited approach and I have recommended the [place] regimen with escalating doses of venetoclax followed by monthly rituxan for 6  monthly doses  and a planned 2 year course of venetoclax. We reviewed side effects of venetoclax and rituxan signed chemo consent.  Given patients tumor burden have schedule inpatient hospitalization to monitor for TLS for at least first two dose ramp ups on 5/10 and 5/17/24  Started allopurinol for TLS prevention  6/17/24:  Hospital admit for Venetoclax ramp up to 50 mg daily x 7 days. Next ramp ups are  planned to be done outpatient.      CT chest (9/6/24) showing significant interval decrease in size of multiple cervical, axillary, mediastinal and upper abdominal lymph nodes, compatible with treatment response, persistent splenomegaly, stable pulmonary nodules measuring up to 6 mm, mild hepatic steatosis.   Will postpone imaging at this time.      11/4/24:  Continues to have decrease in counts, plt 78, WBC 3.0, ANC 1.99.  Hgb stable at 13.9.  No concerning findings on physical examination.  Scheduled to receive dose 5 of 6 monthly rituxan today.  Venetoclax dose decreased to 200 mg on 10/11/24 by Dr. Luis for decreased counts.  Continue venetoclax 200 mg daily.  Plan for imaging in about 6 months.  Follow up visit on 12/2/24 for dose 6 rituxan.     Mouth Sores:  Lesions on both sides of tongue are improving with time. Unknown etiology of lesions, but predate Venetoclax.   7/15/24:  Tongue lesions healed, noted lesion to left buccal surface.  Continue to use BMX solution PRN & Nystatin Swish & Swallow 4x daily.   7/25/24:  Worsening mouth sores and white lesions to sides of tongue, roof of mouth, and bilateral cheeks.  Demar reports that he has had mouth sores off and on since May 2024.  Unable to wear dentures at this time.  Swabbed for HSV.  Dr. Luis prescribed valcyclovir 1,000 mg TID x10 day.  Advised patient to stop acyclovir while one valcyclovir and to restart acyclovir after completes 10 day cycle of valcyclovir.  Advised to contact if mouth sores worsen or do not improve after 10 day course of valcyclovir as we may need to treat with another 10 day course.  Advised Demar to continue to use salt and soda rinse.  He declined wanting refill for BMX.    11/4/24:  Continues to have mouth sores, but reports they wax and wane but overall improving.  Swab for HSV was negative (7/25/24).  Not affecting oral intake.  Continuing dexamethasone rinse.  Following with ENT, with plans to have lesion biopsy at next  ENT visit. Monitoring.     Weight Loss:  24:  Continues to have weight loss, weight today 77.6 kg. Weight 87.6 kg (24).  Weight loss likely related to mouth sores.  Currently eating soft foods and drinking 1-2 protein supplements per day.  Advised to continue 2 protein supplements per day.  Declined wanting to see dietician today.  Monitoring weight.    24:  Weight trending upwards, is up 8 pounds from prior visit.  Discussed foods, continue monitoring weight.       CAD:  status post CABG.       Transaminitis: PET imaging shows periportal mass vs lymphadenopathy, hepatitis serologies negative, ultrasound consistent with large periportal node, have ordered CT of liver with contrast as transaminitis worse today. Also sent CMV pcr on blood  24:  Improvement in liver enzymes: , AST 29, bilirubin 0.6.  Discontinued CT of liver as liver enzymes are improving.  24:  ALT and AST stable.     Pulmonary Nodule:  Chest x-ray (24) showing RUL pulmonary nodule-potentially neoplastic.    CT Chest order by PCP (24) showing stable pulmonary nodule with recommendation for follow up imaging in 1 year.    Discussed with Dr. Luis, plan to monitor with future imaging.      Right Elbow Swellin24:  Continues to report swelling and mild tenderness to right elbow.  Swelling has improved since last visit.  Discussed with Dr. Luis, who feels findings are most likely bursitis.  Monitoring.    24:  Swelling to right elbow has significantly improved and is almost back to baseline.  Mild tenderness on palpation.      Hypokalemia:  24:  Potassium 3.1.  Currently taking potassium chloride 20 meq daily.  Advised Demar to take 20 meq twice daily for 7 days then go back to taking 20 meq daily.  Reviewed foods high in potassium.       RTC:  24:  Follow up visit with provider and is due for dose 6 of 6  rituximab.  --------------------------------------------------------------------------------------------------  HELDER Gant

## 2024-11-04 ENCOUNTER — INFUSION (OUTPATIENT)
Dept: HEMATOLOGY/ONCOLOGY | Facility: HOSPITAL | Age: 64
End: 2024-11-04
Payer: MEDICARE

## 2024-11-04 ENCOUNTER — ANESTHESIA EVENT (OUTPATIENT)
Dept: GASTROENTEROLOGY | Facility: HOSPITAL | Age: 64
End: 2024-11-04
Payer: COMMERCIAL

## 2024-11-04 ENCOUNTER — OFFICE VISIT (OUTPATIENT)
Dept: HEMATOLOGY/ONCOLOGY | Facility: HOSPITAL | Age: 64
End: 2024-11-04
Payer: MEDICARE

## 2024-11-04 VITALS
OXYGEN SATURATION: 100 % | DIASTOLIC BLOOD PRESSURE: 75 MMHG | HEART RATE: 62 BPM | WEIGHT: 188.27 LBS | SYSTOLIC BLOOD PRESSURE: 127 MMHG | TEMPERATURE: 97.3 F | RESPIRATION RATE: 18 BRPM | BODY MASS INDEX: 29.79 KG/M2

## 2024-11-04 DIAGNOSIS — K13.79 MOUTH SORES: ICD-10-CM

## 2024-11-04 DIAGNOSIS — R63.4 WEIGHT LOSS: ICD-10-CM

## 2024-11-04 DIAGNOSIS — C91.10 CLL (CHRONIC LYMPHOCYTIC LEUKEMIA) (MULTI): Primary | ICD-10-CM

## 2024-11-04 DIAGNOSIS — E87.6 HYPOKALEMIA: ICD-10-CM

## 2024-11-04 DIAGNOSIS — C91.10 CLL (CHRONIC LYMPHOCYTIC LEUKEMIA) (MULTI): ICD-10-CM

## 2024-11-04 DIAGNOSIS — R91.1 LUNG NODULE: ICD-10-CM

## 2024-11-04 LAB
ALBUMIN SERPL BCP-MCNC: 4.5 G/DL (ref 3.4–5)
ALP SERPL-CCNC: 125 U/L (ref 33–136)
ALT SERPL W P-5'-P-CCNC: 35 U/L (ref 10–52)
ANION GAP SERPL CALC-SCNC: 12 MMOL/L (ref 10–20)
AST SERPL W P-5'-P-CCNC: 17 U/L (ref 9–39)
BASOPHILS # BLD AUTO: 0 X10*3/UL (ref 0–0.1)
BASOPHILS NFR BLD AUTO: 0 %
BILIRUB SERPL-MCNC: 1.1 MG/DL (ref 0–1.2)
BUN SERPL-MCNC: 10 MG/DL (ref 6–23)
CALCIUM SERPL-MCNC: 9.2 MG/DL (ref 8.6–10.3)
CHLORIDE SERPL-SCNC: 105 MMOL/L (ref 98–107)
CO2 SERPL-SCNC: 27 MMOL/L (ref 21–32)
CREAT SERPL-MCNC: 0.64 MG/DL (ref 0.5–1.3)
EGFRCR SERPLBLD CKD-EPI 2021: >90 ML/MIN/1.73M*2
EOSINOPHIL # BLD AUTO: 0.01 X10*3/UL (ref 0–0.7)
EOSINOPHIL NFR BLD AUTO: 0.3 %
ERYTHROCYTE [DISTWIDTH] IN BLOOD BY AUTOMATED COUNT: 14.7 % (ref 11.5–14.5)
GLUCOSE SERPL-MCNC: 129 MG/DL (ref 74–99)
HCT VFR BLD AUTO: 38.2 % (ref 41–52)
HGB BLD-MCNC: 13.9 G/DL (ref 13.5–17.5)
IMM GRANULOCYTES # BLD AUTO: 0.02 X10*3/UL (ref 0–0.7)
IMM GRANULOCYTES NFR BLD AUTO: 0.7 % (ref 0–0.9)
LDH SERPL L TO P-CCNC: 175 U/L (ref 84–246)
LYMPHOCYTES # BLD AUTO: 0.4 X10*3/UL (ref 1.2–4.8)
LYMPHOCYTES NFR BLD AUTO: 13.2 %
MCH RBC QN AUTO: 31.6 PG (ref 26–34)
MCHC RBC AUTO-ENTMCNC: 36.4 G/DL (ref 32–36)
MCV RBC AUTO: 87 FL (ref 80–100)
MONOCYTES # BLD AUTO: 0.61 X10*3/UL (ref 0.1–1)
MONOCYTES NFR BLD AUTO: 20.1 %
NEUTROPHILS # BLD AUTO: 1.99 X10*3/UL (ref 1.2–7.7)
NEUTROPHILS NFR BLD AUTO: 65.7 %
NRBC BLD-RTO: 0 /100 WBCS (ref 0–0)
PLATELET # BLD AUTO: 78 X10*3/UL (ref 150–450)
POTASSIUM SERPL-SCNC: 3.1 MMOL/L (ref 3.5–5.3)
PROT SERPL-MCNC: 6.3 G/DL (ref 6.4–8.2)
RBC # BLD AUTO: 4.4 X10*6/UL (ref 4.5–5.9)
SODIUM SERPL-SCNC: 141 MMOL/L (ref 136–145)
URATE SERPL-MCNC: 3.7 MG/DL (ref 4–7.5)
WBC # BLD AUTO: 3 X10*3/UL (ref 4.4–11.3)

## 2024-11-04 PROCEDURE — 85025 COMPLETE CBC W/AUTO DIFF WBC: CPT

## 2024-11-04 PROCEDURE — 2500000004 HC RX 250 GENERAL PHARMACY W/ HCPCS (ALT 636 FOR OP/ED): Performed by: INTERNAL MEDICINE

## 2024-11-04 PROCEDURE — 3044F HG A1C LEVEL LT 7.0%: CPT

## 2024-11-04 PROCEDURE — 80053 COMPREHEN METABOLIC PANEL: CPT

## 2024-11-04 PROCEDURE — 1159F MED LIST DOCD IN RCRD: CPT

## 2024-11-04 PROCEDURE — 1160F RVW MEDS BY RX/DR IN RCRD: CPT

## 2024-11-04 PROCEDURE — 96413 CHEMO IV INFUSION 1 HR: CPT

## 2024-11-04 PROCEDURE — 3061F NEG MICROALBUMINURIA REV: CPT

## 2024-11-04 PROCEDURE — 99214 OFFICE O/P EST MOD 30 MIN: CPT

## 2024-11-04 PROCEDURE — 3049F LDL-C 100-129 MG/DL: CPT

## 2024-11-04 PROCEDURE — 2500000002 HC RX 250 W HCPCS SELF ADMINISTERED DRUGS (ALT 637 FOR MEDICARE OP, ALT 636 FOR OP/ED)

## 2024-11-04 PROCEDURE — 99214 OFFICE O/P EST MOD 30 MIN: CPT | Mod: 25

## 2024-11-04 PROCEDURE — 1123F ACP DISCUSS/DSCN MKR DOCD: CPT

## 2024-11-04 PROCEDURE — 96375 TX/PRO/DX INJ NEW DRUG ADDON: CPT | Mod: INF

## 2024-11-04 PROCEDURE — 83615 LACTATE (LD) (LDH) ENZYME: CPT

## 2024-11-04 PROCEDURE — 84550 ASSAY OF BLOOD/URIC ACID: CPT

## 2024-11-04 PROCEDURE — 2500000001 HC RX 250 WO HCPCS SELF ADMINISTERED DRUGS (ALT 637 FOR MEDICARE OP): Performed by: INTERNAL MEDICINE

## 2024-11-04 RX ORDER — DIPHENHYDRAMINE HYDROCHLORIDE 50 MG/ML
50 INJECTION INTRAMUSCULAR; INTRAVENOUS AS NEEDED
Status: DISCONTINUED | OUTPATIENT
Start: 2024-11-04 | End: 2024-11-04 | Stop reason: HOSPADM

## 2024-11-04 RX ORDER — EPINEPHRINE 0.3 MG/.3ML
0.3 INJECTION SUBCUTANEOUS EVERY 5 MIN PRN
Status: DISCONTINUED | OUTPATIENT
Start: 2024-11-04 | End: 2024-11-04 | Stop reason: HOSPADM

## 2024-11-04 RX ORDER — ACETAMINOPHEN 325 MG/1
650 TABLET ORAL ONCE
Status: COMPLETED | OUTPATIENT
Start: 2024-11-04 | End: 2024-11-04

## 2024-11-04 RX ORDER — POTASSIUM CHLORIDE 750 MG/1
20 TABLET, FILM COATED, EXTENDED RELEASE ORAL ONCE
Status: CANCELLED | OUTPATIENT
Start: 2024-11-04 | End: 2024-11-04

## 2024-11-04 RX ORDER — POTASSIUM CHLORIDE 750 MG/1
20 TABLET, FILM COATED, EXTENDED RELEASE ORAL ONCE
Status: COMPLETED | OUTPATIENT
Start: 2024-11-04 | End: 2024-11-04

## 2024-11-04 RX ORDER — FAMOTIDINE 10 MG/ML
20 INJECTION INTRAVENOUS ONCE AS NEEDED
Status: DISCONTINUED | OUTPATIENT
Start: 2024-11-04 | End: 2024-11-04 | Stop reason: HOSPADM

## 2024-11-04 RX ORDER — PROCHLORPERAZINE MALEATE 10 MG
10 TABLET ORAL EVERY 6 HOURS PRN
Status: DISCONTINUED | OUTPATIENT
Start: 2024-11-04 | End: 2024-11-04 | Stop reason: HOSPADM

## 2024-11-04 RX ORDER — ALBUTEROL SULFATE 0.83 MG/ML
3 SOLUTION RESPIRATORY (INHALATION) AS NEEDED
Status: DISCONTINUED | OUTPATIENT
Start: 2024-11-04 | End: 2024-11-04 | Stop reason: HOSPADM

## 2024-11-04 RX ORDER — PROCHLORPERAZINE EDISYLATE 5 MG/ML
10 INJECTION INTRAMUSCULAR; INTRAVENOUS EVERY 6 HOURS PRN
Status: DISCONTINUED | OUTPATIENT
Start: 2024-11-04 | End: 2024-11-04 | Stop reason: HOSPADM

## 2024-11-04 RX ORDER — DIPHENHYDRAMINE HCL 50 MG
50 CAPSULE ORAL ONCE
Status: COMPLETED | OUTPATIENT
Start: 2024-11-04 | End: 2024-11-04

## 2024-11-04 ASSESSMENT — ENCOUNTER SYMPTOMS
NUMBNESS: 0
NAUSEA: 1
UNEXPECTED WEIGHT CHANGE: 0
DIZZINESS: 0
FATIGUE: 1
TROUBLE SWALLOWING: 0
VOMITING: 0
CONSTIPATION: 0
RESPIRATORY NEGATIVE: 1
DIAPHORESIS: 1
DIARRHEA: 0
FEVER: 0
HEMATOLOGIC/LYMPHATIC NEGATIVE: 1
APPETITE CHANGE: 0
SORE THROAT: 0
VOICE CHANGE: 0
CHILLS: 0
BLOOD IN STOOL: 0
LIGHT-HEADEDNESS: 0
MUSCULOSKELETAL NEGATIVE: 1
CARDIOVASCULAR NEGATIVE: 1

## 2024-11-04 ASSESSMENT — PAIN SCALES - GENERAL: PAINLEVEL_OUTOF10: 0-NO PAIN

## 2024-11-04 NOTE — PROGRESS NOTES
[facility] Infusion Nursing Note  11/04/24    Demar Pittman is a 64 y.o. year old male patient presenting to outpatient infusion for cycle 6 day 1 of the following regimen:    Treatment Plans       Name Type Plan Dates Plan Provider         Active    Venetoclax + RiTUXimab, 28 Day Cycles Oncology Treatment 6/24/2024 - Present Ev Luis MD                  Since the last visit, he reports doing well. Overall, he states that energy level is energy level is good and able to perform all ADLs. Appetite has been unchanged and good. he reports no complaints.     Line type: PIV  Line removed/maintained prior to discharge: removed    Administrations This Visit       acetaminophen (Tylenol) tablet 650 mg       Admin Date  11/04/2024 Action  Given Dose  650 mg Route  oral Documented By  Lydia Hu RN              diphenhydrAMINE (BENADryl) capsule 50 mg       Admin Date  11/04/2024 Action  Given Dose  50 mg Route  oral Documented By  Lydia Hu RN              methylPREDNISolone sod succinate (SOLU-Medrol) 40 mg/mL injection 40 mg       Admin Date  11/04/2024 Action  Given Dose  40 mg Route  intravenous Documented By  Lydia Hu RN                  Hypersensitivity reaction noted: No  Patient tolerated treatment well. Discharged home in stable condition.    Follow-up Plan: will get follow up plan from MARY Hu RN

## 2024-11-04 NOTE — PROGRESS NOTES
Rituximab accelerated rates verified by JENNIFER Nunez RN.     147 ml/hr for 73.5 ml  294 ml/hr for 294 ml (remaining volume).     LAI Hu RN

## 2024-11-05 ENCOUNTER — ANESTHESIA (OUTPATIENT)
Dept: GASTROENTEROLOGY | Facility: HOSPITAL | Age: 64
End: 2024-11-05
Payer: COMMERCIAL

## 2024-11-05 ENCOUNTER — APPOINTMENT (OUTPATIENT)
Dept: GASTROENTEROLOGY | Facility: HOSPITAL | Age: 64
End: 2024-11-05
Payer: COMMERCIAL

## 2024-11-05 PROBLEM — B00.9 HERPES SIMPLEX VIRUS (HSV) INFECTION: Status: ACTIVE | Noted: 2023-08-16

## 2024-11-29 ENCOUNTER — SPECIALTY PHARMACY (OUTPATIENT)
Dept: PHARMACY | Facility: CLINIC | Age: 64
End: 2024-11-29

## 2024-12-01 RX ORDER — DIPHENHYDRAMINE HYDROCHLORIDE 50 MG/ML
50 INJECTION INTRAMUSCULAR; INTRAVENOUS AS NEEDED
Status: CANCELLED | OUTPATIENT
Start: 2024-12-02

## 2024-12-01 RX ORDER — PROCHLORPERAZINE EDISYLATE 5 MG/ML
10 INJECTION INTRAMUSCULAR; INTRAVENOUS EVERY 6 HOURS PRN
Status: CANCELLED | OUTPATIENT
Start: 2024-12-02

## 2024-12-01 RX ORDER — FAMOTIDINE 10 MG/ML
20 INJECTION INTRAVENOUS ONCE AS NEEDED
Status: CANCELLED | OUTPATIENT
Start: 2024-12-02

## 2024-12-01 RX ORDER — ACETAMINOPHEN 325 MG/1
650 TABLET ORAL ONCE
Status: CANCELLED | OUTPATIENT
Start: 2024-12-02

## 2024-12-01 RX ORDER — DIPHENHYDRAMINE HCL 50 MG
50 CAPSULE ORAL ONCE
Status: CANCELLED | OUTPATIENT
Start: 2024-12-02

## 2024-12-01 RX ORDER — EPINEPHRINE 0.3 MG/.3ML
0.3 INJECTION SUBCUTANEOUS EVERY 5 MIN PRN
Status: CANCELLED | OUTPATIENT
Start: 2024-12-02

## 2024-12-01 RX ORDER — ALBUTEROL SULFATE 0.83 MG/ML
3 SOLUTION RESPIRATORY (INHALATION) AS NEEDED
Status: CANCELLED | OUTPATIENT
Start: 2024-12-02

## 2024-12-01 RX ORDER — PROCHLORPERAZINE MALEATE 10 MG
10 TABLET ORAL EVERY 6 HOURS PRN
Status: CANCELLED | OUTPATIENT
Start: 2024-12-02

## 2024-12-01 ASSESSMENT — ENCOUNTER SYMPTOMS
TROUBLE SWALLOWING: 0
CARDIOVASCULAR NEGATIVE: 1
DIZZINESS: 0
CHILLS: 0
BLOOD IN STOOL: 0
RESPIRATORY NEGATIVE: 1
FEVER: 0
APPETITE CHANGE: 0
LIGHT-HEADEDNESS: 0
CONSTIPATION: 0
VOICE CHANGE: 0
VOMITING: 0
NUMBNESS: 0
MUSCULOSKELETAL NEGATIVE: 1
UNEXPECTED WEIGHT CHANGE: 0
SORE THROAT: 0
HEMATOLOGIC/LYMPHATIC NEGATIVE: 1
DIARRHEA: 0

## 2024-12-01 NOTE — PROGRESS NOTES
"Counseling:  The patient was counseled regarding diagnostic results, instructions for management, risk factor reductions, prognosis, patient and family education, impressions, risks and benefits of treatment options and importance of compliance with treatment.      Chief Complaint:   The patient presents today for 6-month followup of CAD, dyslipidemia and HTN.     History Of Present Illness:    Demar Pittman is a 64 year old male patient who presents today for 6-month followup of CAD, dyslipidemia and HTN. His PMH is significant for CLL, hyperlipidemia, HTN, XENA, CAD s/p CABG (LIMA- LAD, R SVG- PDA, and L Radial) 11/2019 and IFG. Over the past 6 months, the patient states that he has done well from a cardiac standpoint. He denies any CP, chest discomfort or SOB. Per the patient, he had his last chemotherapy treatment this morning. BP has been stable. The patient is compliant with his prescribed medications.      Last Recorded Vitals:  Vitals:    12/02/24 1550   BP: 128/70   BP Location: Left arm   Pulse: 80   Weight: 83 kg (183 lb)   Height: 1.702 m (5' 7\")       Past Surgical History:  He has a past surgical history that includes Other surgical history (10/24/2019); Other surgical history (10/24/2019); Other surgical history (12/13/2019); and Other surgical history (12/29/2019).      Social History:  He reports that he quit smoking about 39 years ago. His smoking use included cigarettes. He has never used smokeless tobacco. He reports that he does not currently use alcohol after a past usage of about 2.0 standard drinks of alcohol per week. He reports that he does not use drugs.    Family History:  No family history on file.     Allergies:  Rituximab-pvvr    Outpatient Medications:  Current Outpatient Medications   Medication Instructions    acyclovir (ZOVIRAX) 400 mg, oral, 2 times daily    amLODIPine (NORVASC) 5 mg, oral, Daily    aspirin 81 mg, Daily    atorvastatin (LIPITOR) 20 mg, oral, Nightly    calcium " carbonate (TUMS EXTRA STRENGTH) 1,500 mg, oral, 2 times daily    dexAMETHasone 1 mg, oral, 2 times daily PRN    doxycycline (ADOXA) 100 mg, oral, 2 times daily, Take with a full glass of water and do not lie down for at least 30 minutes after    metoprolol tartrate (LOPRESSOR) 25 mg, oral, 2 times daily    multivitamin with folic acid (One Daily Multivitamin) 400 mcg tablet 1 tablet, Daily    nitroglycerin (NITROSTAT) 0.4 mg, Every 5 min PRN    ondansetron (ZOFRAN) 8 mg, oral, Every 8 hours PRN    pantoprazole (PROTONIX) 40 mg, oral, Daily    potassium chloride CR (Klor-Con) 10 mEq ER tablet 20 mEq, oral, Daily, Do not crush, chew, or split.    prochlorperazine (COMPAZINE) 10 mg, oral, Every 6 hours PRN    Venclexta 400 mg, oral, Daily, Take with food.     Review of Systems   All other systems reviewed and are negative.     Physical Exam:  Constitutional:       Appearance: Healthy appearance. Not in distress.   Neck:      Vascular: No JVR. JVD normal.   Pulmonary:      Effort: Pulmonary effort is normal.      Breath sounds: Normal breath sounds. No wheezing. No rhonchi. No rales.   Chest:      Chest wall: Not tender to palpatation.   Cardiovascular:      PMI at left midclavicular line. Normal rate. Regular rhythm. Normal S1. Normal S2.       Murmurs: There is no murmur.      No gallop.  No click. No rub.   Pulses:     Intact distal pulses.   Edema:     Peripheral edema absent.   Abdominal:      General: Bowel sounds are normal.      Palpations: Abdomen is soft.      Tenderness: There is no abdominal tenderness.   Musculoskeletal: Normal range of motion.         General: No tenderness. Skin:     General: Skin is warm and dry.   Neurological:      General: No focal deficit present.      Mental Status: Alert and oriented to person, place and time.          Last Labs:  CBC -  Lab Results   Component Value Date    WBC 4.8 12/02/2024    HGB 12.0 (L) 12/02/2024    HCT 34.7 (L) 12/02/2024    MCV 87 12/02/2024      12/02/2024       CMP -  Lab Results   Component Value Date    CALCIUM 9.3 12/02/2024    PHOS 3.2 07/25/2024    PROT 6.5 12/02/2024    ALBUMIN 4.2 12/02/2024    AST 25 12/02/2024    ALT 28 12/02/2024    ALKPHOS 121 12/02/2024    BILITOT 0.7 12/02/2024       LIPID PANEL -   Lab Results   Component Value Date    CHOL 151 07/08/2024    TRIG 115 07/08/2024    HDL 35.5 07/08/2024    CHHDL 4.3 07/08/2024    LDLF 55 08/09/2023    VLDL 23 07/08/2024    NHDL 116 07/08/2024       RENAL FUNCTION PANEL -   Lab Results   Component Value Date    GLUCOSE 135 (H) 12/02/2024     12/02/2024    K 3.8 12/02/2024     12/02/2024    CO2 28 12/02/2024    ANIONGAP 14 12/02/2024    BUN 11 12/02/2024    CREATININE 0.62 12/02/2024    GFRMALE >90 08/25/2023    CALCIUM 9.3 12/02/2024    PHOS 3.2 07/25/2024    ALBUMIN 4.2 12/02/2024        Lab Results   Component Value Date     (H) 08/21/2023    HGBA1C 6.9 (H) 07/08/2024       Last Cardiology Tests:  05/25/2024 - Abdominal Ultrasound  1. Unremarkable sonographic evaluation of the liver, gallbladder, and biliary tree.  2. A 1.1 cm right renal cyst.  3. Incidentally noted diffuse lymphadenopathy throughout the visualized abdomen which is better characterized on PET-CT dated 03/28/2024.    08/22/2023 - Stress Test  1. Normal Stress Test.No clinical or electrocardiographic evidence for ischemia at maximal infusion.  2. Medium-sized area of fixed perfusion defect of the inferolateral and anterolateral segments, consistent with myocardial scar or hibernating myocardium in left circumflex territory. No ischemia was detected. Well-maintained left ventricular function.      08/22/2023 - TTE  1. Left ventricular systolic function is normal with a 65% estimated ejection fraction.  2. Mildly elevated RVSP.    01/04/2021 - Cardiac Catheterization (LH)  1. Triple vessel coronary artery disease with proximal left anterior descending involvement.  2. Patent grafts to LAD, CX and RCA.      12/29/2020 - CXR  Minimal atelectasis left lower lobe with resolution of the previous noted pleural effusion.     12/14/2020 - Exercise Stress Echo  1. Abnormal resting EGG without further changes or chest pain.  2. No clinical, echocardiographic or electrocardiographic evidence for ischemia at maximal workload.  3. The blunted heart rate diminishes the sensitivity of this test.  4. The submaximal level of stress was achieved.     06/05/2020 - TTE  1. The left ventricular systolic function is normal with a 60% estimated ejection fraction.  2. Spectral Doppler shows an impaired relaxation pattern of left ventricular diastolic filling.     02/03/2020 - TTE  1. The left ventricular systolic function is normal with a 65% estimated ejection fraction.  2. Spectral Doppler shows an impaired relaxation pattern of left ventricular diastolic filling.  3. Aortic valve stenosis is not present.  4. Left Ventricular Global Longitudinal Strain- -17.5%.     01/03/2020 - CT Chest  1. Extensive lymphadenopathy involving the chest wall, neck, mediastinum, hilum and axilla. The nodes appear similar in size and number to the prior exam.  2. Lymphadenopathy in the upper abdomen.  3. Interval development of a left pleural effusion. Left lower lobe consolidation and loss of volume.     11/20/2019 - Interoperative RICKIE  1. The left ventricular systolic function is normal.  2. POST CARDIOPULMONARY BYPASS REPORT: Patient has a normal sinus rhythm. LV function is mildly improved from pre-pump exam. RV function is mildly improved from pre-pump exam. No evidence of post aortic cannulation dissection.     11/14/2019 - TTE  1. The left ventricular systolic function is normal with a 60-65% estimated ejection fraction.  2. Slightly elevated RVSP.  3. No prior echocardiogram available for comparison.     11/14/2019 - Vascular Lab Carotid Artery Duplex U/S  1. Right Carotid: Findings are consistent with less than 50% stenosis of the right proximal  ICA. Right external carotid artery appears patent with no evidence of stenosis. No evidence of hemodynamically significant stenosis of the right common carotid artery. The right vertebral artery is patent with antegrade flow. No evidence of hemodynamically significant stenosis in the right subclavian.  2. Left Carotid: Findings are consistent with less than 50% stenosis of the left proximal ICA. Left external carotid artery appears patent with no evidence of stenosis. No evidence of hemodynamically significant stenosis of the left common carotid artery. The left vertebral artery is patent with antegrade flow. No evidence of hemodynamically significant stenosis in the left subclavian.  3. Additional Findings: Technically difficult study due to grossly enlarged lymph nodes . Patient has history of chronic Hodgkins lymphoma.     11/07/2019 - Cardiac Catheterization (LH)  1. Triple vessel coronary artery disease with proximal left anterior descending involvement.  2. Normal LV filling pressures.    Lab review: I have personally reviewed the laboratory result(s).    Assessment/Plan   1) CAD s/p CABG Nov. 2019  On ASA 81 mg daily, metoprolol tartrate 25 mg BID, amlodipine 5 mg daily  Atorvastatin discontinued s/t elevated liver enzymes   Counselled about diet and exercise  Repeat echo June 2020 with normal LVEF  LHC January 2021 with patent grafts to LAD, circumflex and RCA  Stress 08/22/2023 negative for ischemia  TTE 08/22/2023 with LVEF 65%, mildly elevated RVSP  Denies CP, chest discomfort or SOB  BP stable  Continue current medical Rx   F/U 1 year      2) Hyperlipidemia  Goal LDL <70   Atorvastatin discontinued s/t elevated liver enzymes   Abdominal U/S 05/25/2024 with unremarkable sonographic evaluation of liver, gallbladder, and biliary tree, 1.1 cm right renal cyst, incidentally noted diffuse lymphadenopathy throughout the visualized abdomen which is better characterized on PET-CT dated 03/28/2024.   Based on U/S  findings, I suspect elevated liver enzymes are due to Lymphoma - recommended to followup with Dr. Wang, oncologist.  Lipid panel 07/08/2024 with LDL of 93  Liver enzymes improved   Remain off atorvastatin as lipids are relatively stable, liver enzymes have improved and just recently completed chemotherapy.  F/U 1 year     3) HTN  Stable   On metoprolol tartrate 25 mg BID, amlodipine 5 mg daily  Continue current medical Rx   F/U 1 year      4) CLL  Management per oncology   Will be undergoing further chemotherapy d/t worsening lymphadenopathy pending workup and management of tongue mass.   Now s/p tongue biopsy 05/28/2024  Advised to followup with Dr. Wang re: elevated liver enzymes which I suspect are s/t lymphoma    5) Cardiovascular Risk Stratification  Will be undergoing direct laryngoscopy with biopsy of tongue mass  Stress 08/22/2023 negative for ischemia  TTE 08/22/2023 with LVEF 65%, mildly elevated RVSP  Low risk for planned surgery - clearance provided  Now s/p tongue biopsy 05/28/2024      Scribe Attestation  By signing my name below, I, Yesenia Alonso   attest that this documentation has been prepared under the direction and in the presence of Donny Fox MD.

## 2024-12-02 ENCOUNTER — OFFICE VISIT (OUTPATIENT)
Dept: HEMATOLOGY/ONCOLOGY | Facility: HOSPITAL | Age: 64
End: 2024-12-02
Payer: MEDICARE

## 2024-12-02 ENCOUNTER — OFFICE VISIT (OUTPATIENT)
Dept: CARDIOLOGY | Facility: HOSPITAL | Age: 64
End: 2024-12-02
Payer: COMMERCIAL

## 2024-12-02 ENCOUNTER — INFUSION (OUTPATIENT)
Dept: HEMATOLOGY/ONCOLOGY | Facility: HOSPITAL | Age: 64
End: 2024-12-02
Payer: MEDICARE

## 2024-12-02 ENCOUNTER — APPOINTMENT (OUTPATIENT)
Dept: CARDIOLOGY | Facility: CLINIC | Age: 64
End: 2024-12-02
Payer: COMMERCIAL

## 2024-12-02 VITALS
OXYGEN SATURATION: 97 % | BODY MASS INDEX: 29.12 KG/M2 | HEART RATE: 74 BPM | SYSTOLIC BLOOD PRESSURE: 127 MMHG | TEMPERATURE: 98.1 F | DIASTOLIC BLOOD PRESSURE: 74 MMHG | WEIGHT: 184 LBS | RESPIRATION RATE: 18 BRPM

## 2024-12-02 VITALS
BODY MASS INDEX: 28.72 KG/M2 | HEIGHT: 67 IN | DIASTOLIC BLOOD PRESSURE: 70 MMHG | SYSTOLIC BLOOD PRESSURE: 128 MMHG | WEIGHT: 183 LBS | HEART RATE: 80 BPM

## 2024-12-02 DIAGNOSIS — C91.10 CLL (CHRONIC LYMPHOCYTIC LEUKEMIA) (MULTI): ICD-10-CM

## 2024-12-02 DIAGNOSIS — I25.10 CAD (CORONARY ARTERY DISEASE): Primary | ICD-10-CM

## 2024-12-02 LAB
ALBUMIN SERPL BCP-MCNC: 4.2 G/DL (ref 3.4–5)
ALP SERPL-CCNC: 121 U/L (ref 33–136)
ALT SERPL W P-5'-P-CCNC: 28 U/L (ref 10–52)
ANION GAP SERPL CALC-SCNC: 14 MMOL/L (ref 10–20)
AST SERPL W P-5'-P-CCNC: 25 U/L (ref 9–39)
BASOPHILS # BLD AUTO: 0.01 X10*3/UL (ref 0–0.1)
BASOPHILS NFR BLD AUTO: 0.2 %
BILIRUB SERPL-MCNC: 0.7 MG/DL (ref 0–1.2)
BUN SERPL-MCNC: 11 MG/DL (ref 6–23)
CALCIUM SERPL-MCNC: 9.3 MG/DL (ref 8.6–10.3)
CHLORIDE SERPL-SCNC: 101 MMOL/L (ref 98–107)
CO2 SERPL-SCNC: 28 MMOL/L (ref 21–32)
CREAT SERPL-MCNC: 0.62 MG/DL (ref 0.5–1.3)
EGFRCR SERPLBLD CKD-EPI 2021: >90 ML/MIN/1.73M*2
EOSINOPHIL # BLD AUTO: 0 X10*3/UL (ref 0–0.7)
EOSINOPHIL NFR BLD AUTO: 0 %
ERYTHROCYTE [DISTWIDTH] IN BLOOD BY AUTOMATED COUNT: 15.2 % (ref 11.5–14.5)
GLUCOSE SERPL-MCNC: 135 MG/DL (ref 74–99)
HCT VFR BLD AUTO: 34.7 % (ref 41–52)
HGB BLD-MCNC: 12 G/DL (ref 13.5–17.5)
IMM GRANULOCYTES # BLD AUTO: 0.05 X10*3/UL (ref 0–0.7)
IMM GRANULOCYTES NFR BLD AUTO: 1 % (ref 0–0.9)
LDH SERPL L TO P-CCNC: 247 U/L (ref 84–246)
LYMPHOCYTES # BLD AUTO: 0.38 X10*3/UL (ref 1.2–4.8)
LYMPHOCYTES NFR BLD AUTO: 7.9 %
MCH RBC QN AUTO: 30.2 PG (ref 26–34)
MCHC RBC AUTO-ENTMCNC: 34.6 G/DL (ref 32–36)
MCV RBC AUTO: 87 FL (ref 80–100)
MONOCYTES # BLD AUTO: 0.94 X10*3/UL (ref 0.1–1)
MONOCYTES NFR BLD AUTO: 19.5 %
NEUTROPHILS # BLD AUTO: 3.45 X10*3/UL (ref 1.2–7.7)
NEUTROPHILS NFR BLD AUTO: 71.4 %
NRBC BLD-RTO: 0 /100 WBCS (ref 0–0)
PLATELET # BLD AUTO: 206 X10*3/UL (ref 150–450)
POTASSIUM SERPL-SCNC: 3.8 MMOL/L (ref 3.5–5.3)
PROT SERPL-MCNC: 6.5 G/DL (ref 6.4–8.2)
RBC # BLD AUTO: 3.97 X10*6/UL (ref 4.5–5.9)
SODIUM SERPL-SCNC: 139 MMOL/L (ref 136–145)
URATE SERPL-MCNC: 4.1 MG/DL (ref 4–7.5)
WBC # BLD AUTO: 4.8 X10*3/UL (ref 4.4–11.3)

## 2024-12-02 PROCEDURE — 96375 TX/PRO/DX INJ NEW DRUG ADDON: CPT | Mod: INF

## 2024-12-02 PROCEDURE — 3049F LDL-C 100-129 MG/DL: CPT | Performed by: INTERNAL MEDICINE

## 2024-12-02 PROCEDURE — 84550 ASSAY OF BLOOD/URIC ACID: CPT

## 2024-12-02 PROCEDURE — 93010 ELECTROCARDIOGRAM REPORT: CPT | Performed by: INTERNAL MEDICINE

## 2024-12-02 PROCEDURE — 1123F ACP DISCUSS/DSCN MKR DOCD: CPT | Performed by: INTERNAL MEDICINE

## 2024-12-02 PROCEDURE — 85025 COMPLETE CBC W/AUTO DIFF WBC: CPT

## 2024-12-02 PROCEDURE — 3044F HG A1C LEVEL LT 7.0%: CPT | Performed by: INTERNAL MEDICINE

## 2024-12-02 PROCEDURE — 2500000004 HC RX 250 GENERAL PHARMACY W/ HCPCS (ALT 636 FOR OP/ED): Performed by: INTERNAL MEDICINE

## 2024-12-02 PROCEDURE — 1036F TOBACCO NON-USER: CPT | Performed by: INTERNAL MEDICINE

## 2024-12-02 PROCEDURE — 80053 COMPREHEN METABOLIC PANEL: CPT

## 2024-12-02 PROCEDURE — 99215 OFFICE O/P EST HI 40 MIN: CPT | Performed by: INTERNAL MEDICINE

## 2024-12-02 PROCEDURE — 2500000001 HC RX 250 WO HCPCS SELF ADMINISTERED DRUGS (ALT 637 FOR MEDICARE OP): Performed by: INTERNAL MEDICINE

## 2024-12-02 PROCEDURE — 96413 CHEMO IV INFUSION 1 HR: CPT

## 2024-12-02 PROCEDURE — 99213 OFFICE O/P EST LOW 20 MIN: CPT | Performed by: INTERNAL MEDICINE

## 2024-12-02 PROCEDURE — 3008F BODY MASS INDEX DOCD: CPT | Performed by: INTERNAL MEDICINE

## 2024-12-02 PROCEDURE — 93005 ELECTROCARDIOGRAM TRACING: CPT | Performed by: INTERNAL MEDICINE

## 2024-12-02 PROCEDURE — 83615 LACTATE (LD) (LDH) ENZYME: CPT

## 2024-12-02 PROCEDURE — 3061F NEG MICROALBUMINURIA REV: CPT | Performed by: INTERNAL MEDICINE

## 2024-12-02 PROCEDURE — 3074F SYST BP LT 130 MM HG: CPT | Performed by: INTERNAL MEDICINE

## 2024-12-02 PROCEDURE — 1159F MED LIST DOCD IN RCRD: CPT | Performed by: INTERNAL MEDICINE

## 2024-12-02 PROCEDURE — 3078F DIAST BP <80 MM HG: CPT | Performed by: INTERNAL MEDICINE

## 2024-12-02 RX ORDER — DOXYCYCLINE 100 MG/1
100 TABLET ORAL 2 TIMES DAILY
Qty: 28 TABLET | Refills: 0 | Status: SHIPPED | OUTPATIENT
Start: 2024-12-02 | End: 2024-12-16

## 2024-12-02 RX ORDER — ACETAMINOPHEN 325 MG/1
650 TABLET ORAL ONCE
Status: COMPLETED | OUTPATIENT
Start: 2024-12-02 | End: 2024-12-02

## 2024-12-02 RX ORDER — ALBUTEROL SULFATE 0.83 MG/ML
3 SOLUTION RESPIRATORY (INHALATION) AS NEEDED
Status: DISCONTINUED | OUTPATIENT
Start: 2024-12-02 | End: 2024-12-02 | Stop reason: HOSPADM

## 2024-12-02 RX ORDER — PROCHLORPERAZINE EDISYLATE 5 MG/ML
10 INJECTION INTRAMUSCULAR; INTRAVENOUS EVERY 6 HOURS PRN
Status: DISCONTINUED | OUTPATIENT
Start: 2024-12-02 | End: 2024-12-02 | Stop reason: HOSPADM

## 2024-12-02 RX ORDER — EPINEPHRINE 0.3 MG/.3ML
0.3 INJECTION SUBCUTANEOUS EVERY 5 MIN PRN
Status: DISCONTINUED | OUTPATIENT
Start: 2024-12-02 | End: 2024-12-02 | Stop reason: HOSPADM

## 2024-12-02 RX ORDER — DIPHENHYDRAMINE HYDROCHLORIDE 50 MG/ML
50 INJECTION INTRAMUSCULAR; INTRAVENOUS AS NEEDED
Status: DISCONTINUED | OUTPATIENT
Start: 2024-12-02 | End: 2024-12-02 | Stop reason: HOSPADM

## 2024-12-02 RX ORDER — FAMOTIDINE 10 MG/ML
20 INJECTION INTRAVENOUS ONCE AS NEEDED
Status: DISCONTINUED | OUTPATIENT
Start: 2024-12-02 | End: 2024-12-02 | Stop reason: HOSPADM

## 2024-12-02 RX ORDER — PROCHLORPERAZINE MALEATE 10 MG
10 TABLET ORAL EVERY 6 HOURS PRN
Status: DISCONTINUED | OUTPATIENT
Start: 2024-12-02 | End: 2024-12-02 | Stop reason: HOSPADM

## 2024-12-02 RX ORDER — DIPHENHYDRAMINE HCL 50 MG
50 CAPSULE ORAL ONCE
Status: COMPLETED | OUTPATIENT
Start: 2024-12-02 | End: 2024-12-02

## 2024-12-02 ASSESSMENT — ENCOUNTER SYMPTOMS
FATIGUE: 0
NAUSEA: 0
DIAPHORESIS: 0

## 2024-12-02 ASSESSMENT — PAIN SCALES - GENERAL: PAINLEVEL_OUTOF10: 0-NO PAIN

## 2024-12-02 NOTE — PROGRESS NOTES
"Patient ID: Demar Pittman \"Shraddha" is a 64 y.o. male.    Oncology History Overview Note   B-CLL diagnosed in 2015, SUÁREZ stage 0; WBC initially 16.3 with ALC 12.0, hemoglobin 15.1 and platelets 154,000.  The cells expressed CD5 and A light chains.   CD38 and  Zap-70 were negative. Cytogenetic studies by FISH were inconclusive.  WBC has slowly rising but he maintains good marrow reserve and has not required treatment.  CT chest done 3/9/17 showed extensive cervical, axillary and submental adenopathy; largest  node on the left measured 2.3 x 1.9 cm.  Small (less than 9 mm) pulmonology nodules were again seen; relatively stable.  Abdominal nodes slightly increased.    17: WBC 65.4, ALC 62.8; becoming more symptomatic.  17 : WBC 77.6.  ALC 72.2.  Hemoglobin and platelets were normal.  18 WBC: 69.3. A.9. Hgb 15.2. Plt: 148,000  18: WBC 88.2.  ALC 82.9.  Hemoglobin 14.2.  Platelets 170,000.  Adenopathy stable.  18: WBC 77.3.  ALC 66.5.  Hemoglobin 13.8.  Platelets 143,000.  Adenopathy slightly increased.  18: WBC 92.0.  ALC 86.5.  Hemoglobin 14.1.  Platelets 157,000.  Progressing symptomatic adenopathy.  Treatment options discussed.   18 completed rituximab weekly x 4.  10/9/19: CBC: WBC 16.5.  ALC 12.4.  Hemoglobin 14.6.  Platelets 148,000.  Worsening adenopathy.  3/17/20: Started bendamustine/rituximab after multiple thoracentesis for malignant pleural effusion with CLL  20: #2 bendamustine/rituximab.  20: #3 BR  20: WBC 4.7.  Hemoglobin 12.9.  Platelets 189,000.  20: #4 BR  20: #5 BR  20: WBC 1.7. hgb 11.9. Platelets 176.  20: #6/6 bendamustine/rituximab  2023, WBC count 62, hemoglobin 14.9, platelets 143, absolute lymphocyte count 50.2  January 15, 2024, WBC count 40.5, hemoglobin 15.0, platelets 175  2/10/24 , WBC count 31.5, hemoglobin 14.9, platelets 131  3/14/24 , WBC count 13.9, hemoglobin 14.9, platelets " 122  3/14/24, physical examination positive for bilateral cervical lymphadenopathy, bilateral axillary lymphadenopathy  PET imaging 3/29/2024 intense uptake in tongue base ( S/P) tonsillectomy. Multiple enlarged lymph nodes in cervical, parotid, nodes, multipl pleural based and parenchymal non FDG avid pulmonary nodes, enlarged axillary lymph nodes, mediastinal and bilateral hilar lymph nodes, periportal lymph nodes, iliac nodes, inguinal nodes.     -FISH panel  of peripheral blood 2024 Pos for del 11q, negative for t(11,14), trisomy 12, monosomy 13 and deletion of 17p.     -Needle biopsy of posterior tongue mass 24 involvement by chronic lymphocytic leukemia/small lymphocytic lymphoma no evidence of transformation     CLL (chronic lymphocytic leukemia) (Multi)   2023 Initial Diagnosis    CLL (chronic lymphocytic leukemia) (CMS/[facility])     1/15/2024 - 1/15/2024 Chemotherapy    Bendamustine + RiTUXimab, 28 Day Cycles     2024 - 2024 Chemotherapy    (INPT) Venetoclax, 28 Day Cycles      2024 -  Chemotherapy    Venetoclax + RiTUXimab, 28 Day Cycles        Past medical history: CLL/SLL (2015) as described above, status post rituximab, 6 cycles of bendamustine/rituximab (3/2020-2020).  Excellent response to treatment.       History of CABGx3 19, vertigo, XENA, remote tonsillectomy, retinal tear, stable pulmonary nodules, hyperlipidemia, hypertension, sinusitis.  Unremarkable cardiac catheterization 21. Hyperglycemia, torn retina, cataract. Hx of afib since CABG.      Family medical history: Mother  of myeloma in her 70s and his father  of Hodgkin lymphoma at age 39.     Social history: Rare alcohol intake.  Former smoker but quit in .  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 2 biological children, one son with downs syndrome.      Subjective  HPI     Demar Pittman is a patient of Dr. Wang with a long history  of CLL initially seen with his wife and son 5/9/24 for a second opinion for treatment of CLL.   He received BR completing in 8/2020.  In December 2023 after COVID infection and he had increased lymphocytosis which resolved, subsequently noted to have lymphadenopathy confirmed on PET scan.  Ibrutinib recommended in March 2024, but patient has not yet started this because he was leary of side effects.      Due to discordance in uptake at base of tongue on PET imaging, tongue biopsy was done 5/28/24 which was consistent with CLL and had no evidence of large cell transformation.     Was hospitalized 6/10/24-6/13/24 for first venetoclax ramp-up.  Then was hospitalized 6/17/24-6/19/24 for second venetoclax ramp-up. C1 rituximab - 7/15/24.    CT chest (9/6/24) showing significant interval decrease in size of multiple cervical, axillary, mediastinal and upper abdominal lymph nodes, compatible with treatment response, persistent splenomegaly, stable pulmonary nodules measuring up to 6 mm, mild hepatic steatosis.     Demar presents to the clinic today (12/2/24) with his wife for follow up evaluation of his CLL and count checks.  He is due for dose 6 of 6 rituxan today.  Currently on 200 mg venetoclax, was instructed to decrease to 200 mg around 10/11/24 by Dr. Luis.  He has had congestion for the past couple of weeks and has ear stuffiness, denies fevers or discolored drainage.  Mouth sores have miraculously disappeared.  Appetite is good, and weight is steady.   Recently had positive cologard, not sure whether colonscopy done yet.      Review of Systems   Constitutional:  Negative for appetite change, chills, diaphoresis, fatigue, fever and unexpected weight change.   HENT:   Positive for tinnitus (nasal congestion hearing difficulties). Negative for mouth sores, sore throat, trouble swallowing and voice change.    Respiratory: Negative.     Cardiovascular: Negative.    Gastrointestinal:  Negative for blood in stool,  constipation, diarrhea, nausea and vomiting.   Genitourinary: Negative.     Musculoskeletal: Negative.  Negative for gait problem.   Skin: Negative.    Neurological:  Negative for dizziness, gait problem, light-headedness and numbness.   Hematological: Negative.    ---------------------------------------------------------------------------------------  Subjective      Objective    BSA: There is no height or weight on file to calculate BSA.  There were no vitals taken for this visit.     Physical Exam  Vitals reviewed.   Constitutional:       Appearance: Normal appearance.      Comments: Looks well, hard of hearing   HENT:      Head: Normocephalic and atraumatic.      Nose: Nose normal.      Mouth/Throat:      Mouth: Mucous membranes are moist.      Pharynx: Oropharynx is clear.      Comments: No mouth sores  Eyes:      Extraocular Movements: Extraocular movements intact.      Conjunctiva/sclera: Conjunctivae normal.      Pupils: Pupils are equal, round, and reactive to light.   Cardiovascular:      Rate and Rhythm: Normal rate and regular rhythm.      Pulses: Normal pulses.      Heart sounds: Normal heart sounds.   Pulmonary:      Effort: Pulmonary effort is normal.      Breath sounds: Normal breath sounds.   Abdominal:      General: Abdomen is flat. Bowel sounds are normal.      Palpations: Abdomen is soft.   Musculoskeletal:         General: Normal range of motion.      Cervical back: Normal range of motion.   Skin:     General: Skin is warm and dry.      Capillary Refill: Capillary refill takes less than 2 seconds.   Neurological:      General: No focal deficit present.      Mental Status: He is alert and oriented to person, place, and time. Mental status is at baseline.   Psychiatric:         Mood and Affect: Mood normal.         Behavior: Behavior normal.         Thought Content: Thought content normal.         Judgment: Judgment normal.     Performance Status:  Karnofsky Score: 80 - Normal activity with  effort; some signs or symptoms of disease  -------------------------------------------------------------------------------------------------------------------------------------  Assessment/Plan      CLL (chronic lymphocytic leukemia) (Multi)  Chronic lymphocytic leukemia with symptomatic adenopathy, excellent response to bendamustine/rituximab; completed 6 cycles in August 2020.    Patient seen  for further management of bulky CLL.  PET imaging shows discordant uptake at the base of the tongue , pulmonary nodules which the patient states are longstanding. There is no evidence of Richters transformation by histology and FISH analysis shows del 11q. Due to symptoms of tongue swelling has started dexamethasone 4 mg po bid for 10 days for sx relief.     6/3/24 Previously discussed treatment options and patient would prefer to receive venetoclax as a time limited approach and I have recommended the [place] regimen with escalating doses of venetoclax followed by monthly rituxan for 6  monthly doses  and a planned 2 year course of venetoclax. We reviewed side effects of venetoclax and rituxan signed chemo consent.  Given patients tumor burden have schedule inpatient hospitalization to monitor for TLS for at least first two dose ramp ups on 5/10 and 5/17/24  Started allopurinol for TLS prevention  6/17/24:  Hospital admit for Venetoclax ramp up to 50 mg daily x 7 days. Next ramp ups are planned to be done outpatient.      CT chest (9/6/24) showing significant interval decrease in size of multiple cervical, axillary, mediastinal and upper abdominal lymph nodes, compatible with treatment response, persistent splenomegaly, stable pulmonary nodules measuring up to 6 mm, mild hepatic steatosis.         12/2/24:  Here for cycle 6 of rituxan.  Blood counts good,  continues on venetoclax 200 mg daily.  Has sinus congestion, given a  2 week course of doxycylcine.  Plan for imaging in about spring 2025  Follow Plan total of 2 year  course of venetoclax     Mouth Sores:  Now resolved.   Continuing prn dexamethasone rinse.  Following with ENT, in January    ID : continue prophylactic acyclovir   CAD:  status post CABG.       Transaminitis: PET imaging shows periportal mass vs lymphadenopathy, hepatitis serologies negative, ultrasound consistent with large periportal node, have ordered CT of liver with contrast as transaminitis worse today. Also sent CMV pcr on blood  6/24/24:  Improvement in liver enzymes: , AST 29, bilirubin 0.6.  Discontinued CT of liver as liver enzymes are improving.  11/4/24:  ALT and AST stable.     Pulmonary Nodule:  Chest x-ray (8/19/24) showing RUL pulmonary nodule-potentially neoplastic.    CT Chest order by PCP (9/6/24) showing stable pulmonary nodule with recommendation for follow up imaging in 1 year.    Discussed with Dr. Luis, plan to monitor with future imaging.           RTC:   2/3/25:  Follow up visit with provider and lab work consider increasing venetoclax to 400 mg  --------------------------------------------------------------------------------------------------  Ev Luis MD

## 2024-12-02 NOTE — PROGRESS NOTES
Rituximab accelerated rates verified by Laurel Cuellar RN.     147 ml/hr for 73.4  294 ml/hr for 294 (remaining volume)    A Zamzam PORTILLO

## 2024-12-02 NOTE — PATIENT INSTRUCTIONS
Remain off the atorvastatin.  Continue all other medications as prescribed.  When you followup with Dr. Mcclendon, please make sure that labs are ordered to followup on your cholesterol levels and liver enzymes.   Followup with Dr. Fox in 1 year, sooner should any issues or concerns arise before then.     If you have any questions or cardiac concerns, please call our office at [phone].

## 2024-12-02 NOTE — PROGRESS NOTES
Pt arrived to [facility] infusion for scheduled treatment. Upon assessment patient reporting that he had a head cold about a week and a half ago. Pt did report some chills but did not take his temperature and per patient he did not feel warm. Per patient most of his symptoms have resolved except for both his ears are very clogged and it is affecting his hearing. Wife reported that they are taking flonase and an antihistamine along with doing ear flushes to try to help with no success. RN notified primary provider Dr. Luis. Dr. Luis at chairside. Pt received infusion without incident and aware of future appointments.

## 2024-12-09 ENCOUNTER — TELEPHONE (OUTPATIENT)
Dept: HEMATOLOGY/ONCOLOGY | Facility: HOSPITAL | Age: 64
End: 2024-12-09
Payer: COMMERCIAL

## 2024-12-09 NOTE — TELEPHONE ENCOUNTER
Per Mayelin Bailon NP:   Mr. Pittman,    Hello.  I'm sorry that you are still having issues after taking the antibiotic and that you can't get into see ENT until 12/20/24.  At this time, I would advised that you contact your primary care provider for a full evaluation or be seen at an urgent care until you can get in at ENT.    Call to patient to let him know the above information.  He states understanding via teach back.

## 2024-12-09 NOTE — TELEPHONE ENCOUNTER
Patient calls and says he was in to see you with blocked ears and he is taking the medication you gave him.  It was doxycycline and he has been taking it since 12/2 with no relief.  They still feel blocked and he is having trouble hearing.  He has constant ringing and can hear his own heartbeat.    He tried to make an appt with dr nigel conway but cannot get in December 20th.      What would you advise?      Message sent

## 2024-12-10 ENCOUNTER — TELEPHONE (OUTPATIENT)
Dept: ADMISSION | Facility: HOSPITAL | Age: 64
End: 2024-12-10
Payer: COMMERCIAL

## 2024-12-10 ENCOUNTER — SPECIALTY PHARMACY (OUTPATIENT)
Dept: PHARMACY | Facility: CLINIC | Age: 64
End: 2024-12-10

## 2024-12-10 DIAGNOSIS — C91.10 CLL (CHRONIC LYMPHOCYTIC LEUKEMIA) (MULTI): Primary | ICD-10-CM

## 2024-12-11 PROCEDURE — RXMED WILLOW AMBULATORY MEDICATION CHARGE

## 2024-12-12 ENCOUNTER — OFFICE VISIT (OUTPATIENT)
Dept: PRIMARY CARE | Facility: CLINIC | Age: 64
End: 2024-12-12
Payer: COMMERCIAL

## 2024-12-12 VITALS
DIASTOLIC BLOOD PRESSURE: 69 MMHG | SYSTOLIC BLOOD PRESSURE: 124 MMHG | WEIGHT: 185.36 LBS | RESPIRATION RATE: 16 BRPM | OXYGEN SATURATION: 98 % | BODY MASS INDEX: 29.03 KG/M2 | TEMPERATURE: 96.4 F | HEART RATE: 67 BPM

## 2024-12-12 DIAGNOSIS — H66.002 NON-RECURRENT ACUTE SUPPURATIVE OTITIS MEDIA OF LEFT EAR WITHOUT SPONTANEOUS RUPTURE OF TYMPANIC MEMBRANE: ICD-10-CM

## 2024-12-12 DIAGNOSIS — H93.13 TINNITUS OF BOTH EARS: ICD-10-CM

## 2024-12-12 DIAGNOSIS — H90.3 SENSORINEURAL HEARING LOSS (SNHL) OF BOTH EARS: Primary | ICD-10-CM

## 2024-12-12 PROCEDURE — 3061F NEG MICROALBUMINURIA REV: CPT | Performed by: NURSE PRACTITIONER

## 2024-12-12 PROCEDURE — 1124F ACP DISCUSS-NO DSCNMKR DOCD: CPT | Performed by: NURSE PRACTITIONER

## 2024-12-12 PROCEDURE — 1159F MED LIST DOCD IN RCRD: CPT | Performed by: NURSE PRACTITIONER

## 2024-12-12 PROCEDURE — 3078F DIAST BP <80 MM HG: CPT | Performed by: NURSE PRACTITIONER

## 2024-12-12 PROCEDURE — 3044F HG A1C LEVEL LT 7.0%: CPT | Performed by: NURSE PRACTITIONER

## 2024-12-12 PROCEDURE — 3074F SYST BP LT 130 MM HG: CPT | Performed by: NURSE PRACTITIONER

## 2024-12-12 PROCEDURE — 1036F TOBACCO NON-USER: CPT | Performed by: NURSE PRACTITIONER

## 2024-12-12 PROCEDURE — 3049F LDL-C 100-129 MG/DL: CPT | Performed by: NURSE PRACTITIONER

## 2024-12-12 PROCEDURE — 99213 OFFICE O/P EST LOW 20 MIN: CPT | Performed by: NURSE PRACTITIONER

## 2024-12-12 RX ORDER — ACYCLOVIR 400 MG/1
400 TABLET ORAL 2 TIMES DAILY
COMMUNITY
Start: 2024-11-18

## 2024-12-12 ASSESSMENT — ENCOUNTER SYMPTOMS
NAUSEA: 0
CHILLS: 0
FEVER: 0
DIZZINESS: 0
CONSTITUTIONAL NEGATIVE: 1
VOMITING: 0
SHORTNESS OF BREATH: 0
FATIGUE: 0
HEADACHES: 0
WEAKNESS: 0
ACTIVITY CHANGE: 0
COUGH: 0
ABDOMINAL PAIN: 0
PALPITATIONS: 0
APNEA: 0
CONFUSION: 0
NERVOUS/ANXIOUS: 0
RESPIRATORY NEGATIVE: 1

## 2024-12-12 NOTE — PROGRESS NOTES
Subjective   Patient ID: Bud Pittman is a 64 y.o. male who presents for Ear Muffling, decreased hearing, and Tinnitus.    Bilateral ear stuffiness.  Patient with chronic lymphocytic leukemia was receiving transfusion on 12/2/2024 when he mentioned to specialist his symptoms of ear stuffiness.  He reports his ears were not evaluated but he was given an antibiotic for treatment.  Doxycycline twice daily for 10 days.  Patient still has a few more days left and has continued ear stuffiness.  Upon evaluation patient does have left ear infection.  Reporting increasing tinnitus and worsening hearing.  Has history of sensorineural hearing loss.  Also reports he has scheduled an ENT consult for 12/20/2024 for evaluation.  No sore throat, congestion, fever, body aches or chills.  Symptoms have not worsened since starting treatment on 12/2/2024         Review of Systems   Constitutional: Negative.  Negative for activity change, chills, fatigue and fever.   HENT:  Positive for hearing loss and tinnitus. Negative for ear discharge and ear pain.    Respiratory: Negative.  Negative for apnea, cough and shortness of breath.    Cardiovascular:  Negative for chest pain and palpitations.   Gastrointestinal:  Negative for abdominal pain, nausea and vomiting.   Neurological:  Negative for dizziness, weakness and headaches.   Psychiatric/Behavioral:  Negative for confusion. The patient is not nervous/anxious.        Objective   /69 (BP Location: Right arm, Patient Position: Sitting, BP Cuff Size: Large adult)   Pulse 67   Temp 35.8 °C (96.4 °F) (Temporal)   Resp 16   Wt 84.1 kg (185 lb 5.8 oz)   SpO2 98%   BMI 29.03 kg/m²     Physical Exam  Vitals reviewed.   Constitutional:       Appearance: Normal appearance.   HENT:      Right Ear: Tympanic membrane normal. Decreased hearing noted.      Left Ear: Tympanic membrane is erythematous.   Cardiovascular:      Rate and Rhythm: Normal rate and regular rhythm.      Pulses:  Normal pulses.      Heart sounds: Normal heart sounds.   Pulmonary:      Effort: Pulmonary effort is normal.      Breath sounds: Normal breath sounds.   Neurological:      Mental Status: He is alert and oriented to person, place, and time.         Assessment/Plan   Problem List Items Addressed This Visit             ICD-10-CM    Sensorineural hearing loss (SNHL) of both ears - Primary H90.3     Keep ENT appointment 12/20/2024  Referral to audiology for evaluation         Relevant Orders    Referral to ENT    Tinnitus of both ears H93.13     Referral to audiology for eval  Keep ENT appointment           Relevant Orders    Referral to ENT    Non-recurrent acute suppurative otitis media of left ear without spontaneous rupture of tympanic membrane H66.002     Complete doxycycline  Keep appointment with ENT

## 2024-12-14 ENCOUNTER — SPECIALTY PHARMACY (OUTPATIENT)
Dept: PHARMACY | Facility: CLINIC | Age: 64
End: 2024-12-14

## 2024-12-16 ENCOUNTER — PHARMACY VISIT (OUTPATIENT)
Dept: PHARMACY | Facility: CLINIC | Age: 64
End: 2024-12-16
Payer: COMMERCIAL

## 2024-12-17 ENCOUNTER — TELEPHONE (OUTPATIENT)
Dept: ADMISSION | Facility: HOSPITAL | Age: 64
End: 2024-12-17
Payer: COMMERCIAL

## 2024-12-17 RX ORDER — ACYCLOVIR 400 MG/1
TABLET ORAL
Qty: 60 TABLET | Refills: 0 | OUTPATIENT
Start: 2024-12-17

## 2024-12-17 RX ORDER — ACYCLOVIR 400 MG/1
400 TABLET ORAL 2 TIMES DAILY
Qty: 60 TABLET | Refills: 11 | Status: SHIPPED | OUTPATIENT
Start: 2024-12-17 | End: 2025-12-17

## 2024-12-17 NOTE — TELEPHONE ENCOUNTER
Refill Request  Acyclovir 400mg BID    Preferred Pharmacy  [id number] [place]    Pending to provider for review and signature

## 2024-12-20 ENCOUNTER — OFFICE VISIT (OUTPATIENT)
Dept: OTOLARYNGOLOGY | Facility: HOSPITAL | Age: 64
End: 2024-12-20
Payer: COMMERCIAL

## 2024-12-20 ENCOUNTER — PREP FOR PROCEDURE (OUTPATIENT)
Facility: CLINIC | Age: 64
End: 2024-12-20

## 2024-12-20 VITALS — WEIGHT: 190 LBS | HEIGHT: 67 IN | BODY MASS INDEX: 29.82 KG/M2

## 2024-12-20 DIAGNOSIS — C91.10 CLL (CHRONIC LYMPHOCYTIC LEUKEMIA) (MULTI): ICD-10-CM

## 2024-12-20 DIAGNOSIS — H91.93 BILATERAL HEARING LOSS, UNSPECIFIED HEARING LOSS TYPE: Primary | ICD-10-CM

## 2024-12-20 DIAGNOSIS — K13.79 MOUTH SORES: ICD-10-CM

## 2024-12-20 PROCEDURE — 3044F HG A1C LEVEL LT 7.0%: CPT | Performed by: STUDENT IN AN ORGANIZED HEALTH CARE EDUCATION/TRAINING PROGRAM

## 2024-12-20 PROCEDURE — 1126F AMNT PAIN NOTED NONE PRSNT: CPT | Performed by: STUDENT IN AN ORGANIZED HEALTH CARE EDUCATION/TRAINING PROGRAM

## 2024-12-20 PROCEDURE — 99214 OFFICE O/P EST MOD 30 MIN: CPT | Performed by: STUDENT IN AN ORGANIZED HEALTH CARE EDUCATION/TRAINING PROGRAM

## 2024-12-20 PROCEDURE — 3061F NEG MICROALBUMINURIA REV: CPT | Performed by: STUDENT IN AN ORGANIZED HEALTH CARE EDUCATION/TRAINING PROGRAM

## 2024-12-20 PROCEDURE — 3049F LDL-C 100-129 MG/DL: CPT | Performed by: STUDENT IN AN ORGANIZED HEALTH CARE EDUCATION/TRAINING PROGRAM

## 2024-12-20 PROCEDURE — 3008F BODY MASS INDEX DOCD: CPT | Performed by: STUDENT IN AN ORGANIZED HEALTH CARE EDUCATION/TRAINING PROGRAM

## 2024-12-20 PROCEDURE — 1123F ACP DISCUSS/DSCN MKR DOCD: CPT | Performed by: STUDENT IN AN ORGANIZED HEALTH CARE EDUCATION/TRAINING PROGRAM

## 2024-12-20 PROCEDURE — 1159F MED LIST DOCD IN RCRD: CPT | Performed by: STUDENT IN AN ORGANIZED HEALTH CARE EDUCATION/TRAINING PROGRAM

## 2024-12-20 ASSESSMENT — PAIN SCALES - GENERAL: PAINLEVEL_OUTOF10: 0-NO PAIN

## 2024-12-20 NOTE — PROGRESS NOTES
"Head and Neck Surgery Outpatient Follow-Up    Chief Concern:  Tongue Lesions    History Of Present Illness  Demar Pittman \"Shraddha" is a 64 y.o. male with a history of  CLL presenting for evaluation of oral cavity lesions. He is known to our service, having previously been seen by Dr. Chirinos for a tongue base mass which was ultimately biopsied in the OR in May 2024 with path showing CLL. Since April 2024 he has had intermittent painful lesions of this oral cavity and tongue. This has cause him some trouble swallowing. He feels as though they wax and wane, and do completely resolve before returning. He is currently on treatment for his CLL receiving rituximab and venetoclax  He thinks maybe the lesions resolve quicker after his rituximab infusions. The lesions predate his current treatment regimen. He has no new neck masses, hemoptysis, dyspnea, or dysphonia, but does have stable cervical LAD for greater than a year related to his CLL. He is a non-smoker with minimal alcohol intake.     He was last seen by me 10/4/24 where we elected to differ bx continue his dexamethasone and follow-up after completion of his sytemic therapy.     Interval Hx - 12/22/24  Oral lesions almost completely gone  Only has pain when he eats really spicy food  Recently had rapid onset drop in his hearing b/l with ear fullness just before thanksgiving. Was dxd w/ OM and treated with antibiotics. No pain but thinks he perhaps had one issue of drainage on his pillow when waking on the left.  Mild disequilibrium.  Ear symptoms improving but not completely resolved.  Has been using Flonase and he thinks this is helping.    Interval Hx - 10/4/24  Pain has resolved, and lesions improving  Has potential colon malignancy and is scheduled for colonoscopy  Is not interested in bx today unless absolutely necessary  Has final chemo infusion in December.     Interval Hx - 8/30/24  Rinses helping oral cavity pain, eating better  Lesions still persist  No " new lesions, no new neck masses    Past Medical History  He has a past medical history of Diffuse otitis externa, bilateral (10/11/2021), Hypertrophy of tonsils (12/05/2019), Localized enlarged lymph nodes (04/30/2020), Personal history of diseases of the skin and subcutaneous tissue (03/09/2020), Personal history of other diseases of the circulatory system, Personal history of other diseases of the circulatory system, Personal history of other diseases of the circulatory system, Personal history of other diseases of the circulatory system, Personal history of other specified conditions (12/11/2019), and Transaminitis (06/26/2024).    Patient Active Problem List   Diagnosis    CAD (coronary artery disease)    CLL (chronic lymphocytic leukemia) (Multi)    Diastasis recti    Dry eyes, bilateral    Essential hypertension    Herpes simplex virus (HSV) infection    Controlled type 2 diabetes mellitus without complication, without long-term current use of insulin (Multi)    Mixed hyperlipidemia    XENA on CPAP    Paroxysmal atrial fibrillation (Multi)    S/P CABG x 3    Sensorineural hearing loss (SNHL) of both ears    Pancytopenia    Tinnitus of both ears    Umbilical hernia without obstruction and without gangrene    Vitamin D deficiency    Annual physical exam    Hypokalemia    Class 1 obesity due to excess calories with serious comorbidity and body mass index (BMI) of 30.0 to 30.9 in adult    Tongue lesion    Difficult intubation    Mouth sores    Weight loss    Gastro-esophageal reflux disease without esophagitis    Old myocardial infarction    Presence of coronary angioplasty implant and graft    Unspecified right bundle-branch block    Lung nodule    Elbow swelling, right    Positive colorectal cancer screening using Cologuard test    Non-recurrent acute suppurative otitis media of left ear without spontaneous rupture of tympanic membrane       Surgical History  He has a past surgical history that includes Other  "surgical history (10/24/2019); Other surgical history (10/24/2019); Other surgical history (12/13/2019); and Other surgical history (12/29/2019).     Social History  He reports that he quit smoking about 40 years ago. His smoking use included cigarettes. He has been exposed to tobacco smoke. He has never used smokeless tobacco. He reports that he does not currently use alcohol after a past usage of about 2.0 standard drinks of alcohol per week. He reports that he does not use drugs.    Family History  No family history on file.     Allergies  Rituximab-pvvr    Last Recorded Vitals  Height 1.702 m (5' 7\"), weight 86.2 kg (190 lb).     Physical Exam:  Constitutional:  No acute distress  Voice:  No hoarseness or other abnormality  Respiration:  Breathing comfortably, no stridors  Eyes:  EOM intact, sclera normal  Ears: Bilateral external auditory canals clear with some minimal amount of cerumen on the left.  His bilateral tympanic membranes are intact and his middle ears appear well aerated.  With auto insufflation there is movement of both TMs.  Neuro:  Alert and oriented times 3, Cranial nerves II-XII grossly intact and symmetric bilaterally  Head and Face:  Symmetric facial features, no masses or lesions, sinuses non-tender to palpation  Salivary Glands:  Parotid and submandibular glands normal bilaterally  Oral Cavity/Oropharynx/Lips: Oral lesions essentially resolved.   Pharynx: no masses or lesions  Neck/Lymph:  L>R small mobile level II nodes, non tender  Skin:  Neck skin is without scar or injury  Psych:  Alert and oriented with appropriate mood and affect    Medications:  Medication Documentation Review Audit       Reviewed by Mami Jacobsen MA (Medical Assistant) on 12/20/24 at 1440      Medication Order Taking? Sig Documenting Provider Last Dose Status   acyclovir (Zovirax) 400 mg tablet [id number] Yes Take 1 tablet (400 mg) by mouth 2 times a day. Historical Provider, MD  Active   acyclovir (Zovirax) 400 " mg tablet [id number] Yes Take 1 tablet (400 mg) by mouth 2 times a day. Ev Luis MD  Active   amLODIPine (Norvasc) 5 mg tablet [id number] Yes Take 1 tablet (5 mg) by mouth once daily. HELDER Espinoza  Active   aspirin 81 mg EC tablet [id number] Yes Take 1 tablet (81 mg) by mouth once daily. Historical Provider, MD  Active   atorvastatin (Lipitor) 20 mg tablet [id number] Yes Take 1 tablet (20 mg) by mouth once daily at bedtime. Deedee Mcclendon MD  Active   calcium carbonate EX (Tums Extra Strength) 300 mg (750 mg) chewable tablet [id number] Yes Chew 2 tablets (1,500 mg) 2 times a day. Compa Ortiz PA-C  Active   dexAMETHasone 0.5 mg/5 mL oral liquid [id number]  Take 10 mL (1 mg) by mouth 2 times a day as needed (mouth sores). Cristóbal Esquivel MD   24   doxycycline (Adoxa) 100 mg tablet [id number]  Take 1 tablet (100 mg) by mouth 2 times a day for 14 days. Take with a full glass of water and do not lie down for at least 30 minutes after Ev Luis MD   24 235   metoprolol tartrate (Lopressor) 25 mg tablet [id number] Yes Take 1 tablet (25 mg) by mouth 2 times a day. HELDER Espinoza  Active   multivitamin with folic acid (One Daily Multivitamin) 400 mcg tablet [id number] Yes Take 1 tablet by mouth once daily. Historical Provider, MD  Active   ondansetron (Zofran) 8 mg tablet [id number] Yes Take 1 tablet (8 mg) by mouth every 8 hours if needed for nausea or vomiting. Ev Luis MD  Active   pantoprazole (ProtoNix) 40 mg EC tablet [id number] Yes Take 1 tablet (40 mg) by mouth once daily. Deedee Mcclendon MD  Active   potassium chloride CR (Klor-Con) 10 mEq ER tablet [id number] Yes Take 2 tablets (20 mEq) by mouth once daily. Do not crush, chew, or split. HELDER Gant  Active   prochlorperazine (Compazine) 10 mg tablet [id number] Yes Take 1 tablet (10 mg) by mouth every 6 hours if needed for nausea or vomiting. Ev Luis MD  Active    venetoclax (Venclexta) 100 mg tablet [id number] Yes Take 2 tablets (200 mg total) by mouth once daily.  Take with food. Ev Luis MD  Active                  Imaging:  I personally reviewed the PET CT from 3/14/24.     Assessment/Plan   63yo M w/ long hx of CLL and multiple varied treatment courses seen today for intermittent oral cavity lesions. He has previously had biopsy proven CLL involvement at his tongue base. He is currently on treatment with ritux and venetoclax, but the lesions in question seme to predate this.     His symptoms of pain and dysphagia resolved and his lesions have healed.     He does have a new episode of bilateral ear fullness with some decrease in his hearing.  He was diagnosed with otitis media and treated with antibiotics.  Today there is no evidence of ongoing purulent otitis media nor is there any obvious effusion however I explained that following an episode of otitis media there could be a chronic process which can take weeks to months to resolve this does not require antibiotics. It has been about 4 weeks since his symptoms onset and he does feel as though his ears are improving but not yet back to baseline.  I discussed at length with him the etiology of both acute and chronic otitis media as well as that his media with effusion and reviewed the anatomy of the middle ear and the role of the eustachian tube.  I advised him to continue using his Flonase and we will have a repeat video visit in 4 weeks.  If his symptoms have not completely resolved by then I will refer him to audiology for formal hearing evaluation as well as a microscopic ear exam with one of my otology colleagues.

## 2024-12-23 ENCOUNTER — HOSPITAL ENCOUNTER (OUTPATIENT)
Dept: GASTROENTEROLOGY | Facility: HOSPITAL | Age: 64
Discharge: HOME | End: 2024-12-23
Payer: COMMERCIAL

## 2024-12-23 VITALS
HEIGHT: 67 IN | RESPIRATION RATE: 15 BRPM | HEART RATE: 75 BPM | BODY MASS INDEX: 29.51 KG/M2 | TEMPERATURE: 97.5 F | DIASTOLIC BLOOD PRESSURE: 80 MMHG | SYSTOLIC BLOOD PRESSURE: 118 MMHG | WEIGHT: 188 LBS | OXYGEN SATURATION: 96 %

## 2024-12-23 DIAGNOSIS — R19.5 POSITIVE COLORECTAL CANCER SCREENING USING COLOGUARD TEST: ICD-10-CM

## 2024-12-23 PROCEDURE — 3700000002 HC GENERAL ANESTHESIA TIME - EACH INCREMENTAL 1 MINUTE

## 2024-12-23 PROCEDURE — 2500000004 HC RX 250 GENERAL PHARMACY W/ HCPCS (ALT 636 FOR OP/ED): Performed by: NURSE ANESTHETIST, CERTIFIED REGISTERED

## 2024-12-23 PROCEDURE — 3700000001 HC GENERAL ANESTHESIA TIME - INITIAL BASE CHARGE

## 2024-12-23 PROCEDURE — 7100000009 HC PHASE TWO TIME - INITIAL BASE CHARGE

## 2024-12-23 PROCEDURE — 45378 DIAGNOSTIC COLONOSCOPY: CPT | Performed by: INTERNAL MEDICINE

## 2024-12-23 PROCEDURE — 7100000010 HC PHASE TWO TIME - EACH INCREMENTAL 1 MINUTE

## 2024-12-23 PROCEDURE — G0121 COLON CA SCRN NOT HI RSK IND: HCPCS | Performed by: INTERNAL MEDICINE

## 2024-12-23 RX ORDER — FENTANYL CITRATE 50 UG/ML
INJECTION, SOLUTION INTRAMUSCULAR; INTRAVENOUS AS NEEDED
Status: DISCONTINUED | OUTPATIENT
Start: 2024-12-23 | End: 2024-12-23

## 2024-12-23 RX ORDER — SODIUM CHLORIDE 0.9 % (FLUSH) 0.9 %
SYRINGE (ML) INJECTION AS NEEDED
Status: DISCONTINUED | OUTPATIENT
Start: 2024-12-23 | End: 2024-12-23

## 2024-12-23 RX ORDER — PROPOFOL 10 MG/ML
INJECTION, EMULSION INTRAVENOUS AS NEEDED
Status: DISCONTINUED | OUTPATIENT
Start: 2024-12-23 | End: 2024-12-23

## 2024-12-23 RX ORDER — LIDOCAINE HYDROCHLORIDE 20 MG/ML
INJECTION, SOLUTION EPIDURAL; INFILTRATION; INTRACAUDAL; PERINEURAL AS NEEDED
Status: DISCONTINUED | OUTPATIENT
Start: 2024-12-23 | End: 2024-12-23

## 2024-12-23 SDOH — HEALTH STABILITY: MENTAL HEALTH: CURRENT SMOKER: 0

## 2024-12-23 ASSESSMENT — PAIN SCALES - GENERAL
PAINLEVEL_OUTOF10: 0 - NO PAIN
PAIN_LEVEL: 0
PAINLEVEL_OUTOF10: 0 - NO PAIN

## 2024-12-23 ASSESSMENT — PAIN - FUNCTIONAL ASSESSMENT: PAIN_FUNCTIONAL_ASSESSMENT: 0-10

## 2024-12-23 NOTE — H&P
Pre-sedation Evaluation:  Sedation Necessary For: Analgesia  Sedation to be Managed By: Anesthesia (Monitored Anesthesia Care/MAC)    History of Present Illness and Indication for Procedure      Bud Pittman is a 64 y.o. male with a history of CAD, CLL, A-fib, XENA, diabetes, HTN, and HLD who presents for OPEN ACCESS colonoscopy requested by his PCP to evaluate a positive Cologuard.    He had a colonoscopy in 2014 that was normal. He says that an aunt had colonoscopy. he has taken Aspirin (81 mg daily) with the last dose 1 days prior to the procedure.      NPO guidelines met: Yes         Review of Systems  Constitutional:  Negative for chills, fever and unexpected weight change.   HENT:  Negative for trouble swallowing.    Respiratory:  Negative for shortness of breath.    Cardiovascular:  Negative for chest pain.   Gastrointestinal:  As above.   Skin:  Negative for color change.       I performed a complete 10 point review of systems and it is negative except as noted in HPI or above. All other systems have been reviewed and are negative.      Patient Active Problem List   Diagnosis    CAD (coronary artery disease)    CLL (chronic lymphocytic leukemia) (Multi)    Diastasis recti    Dry eyes, bilateral    Essential hypertension    Herpes simplex virus (HSV) infection    Controlled type 2 diabetes mellitus without complication, without long-term current use of insulin (Multi)    Mixed hyperlipidemia    XENA on CPAP    Paroxysmal atrial fibrillation (Multi)    S/P CABG x 3    Sensorineural hearing loss (SNHL) of both ears    Pancytopenia    Tinnitus of both ears    Umbilical hernia without obstruction and without gangrene    Vitamin D deficiency    Annual physical exam    Hypokalemia    Class 1 obesity due to excess calories with serious comorbidity and body mass index (BMI) of 30.0 to 30.9 in adult    Tongue lesion    Difficult intubation    Mouth sores    Weight loss    Gastro-esophageal reflux disease without  esophagitis    Old myocardial infarction    Presence of coronary angioplasty implant and graft    Unspecified right bundle-branch block    Lung nodule    Elbow swelling, right    Positive colorectal cancer screening using Cologuard test    Non-recurrent acute suppurative otitis media of left ear without spontaneous rupture of tympanic membrane       Past Medical History:  He has a past medical history of Diffuse otitis externa, bilateral (10/11/2021), Hypertrophy of tonsils (12/05/2019), Localized enlarged lymph nodes (04/30/2020), Personal history of diseases of the skin and subcutaneous tissue (03/09/2020), Personal history of other diseases of the circulatory system, Personal history of other diseases of the circulatory system, Personal history of other diseases of the circulatory system, Personal history of other diseases of the circulatory system, Personal history of other specified conditions (12/11/2019), and Transaminitis (06/26/2024).    Past Surgical History:  He has a past surgical history that includes Other surgical history (10/24/2019); Other surgical history (10/24/2019); Other surgical history (12/13/2019); and Other surgical history (12/29/2019).      Social History:  He reports that he quit smoking about 40 years ago. His smoking use included cigarettes. He has been exposed to tobacco smoke. He has never used smokeless tobacco. He reports that he does not currently use alcohol after a past usage of about 2.0 standard drinks of alcohol per week. He reports that he does not use drugs.    Family History:  No family history on file.     Allergies:  Rituximab-pvvr    Current Medications  Current Outpatient Medications on File Prior to Encounter   Medication Sig Dispense Refill    acyclovir (Zovirax) 400 mg tablet Take 1 tablet (400 mg) by mouth 2 times a day.      acyclovir (Zovirax) 400 mg tablet Take 1 tablet (400 mg) by mouth 2 times a day. 60 tablet 11    amLODIPine (Norvasc) 5 mg tablet Take 1  tablet (5 mg) by mouth once daily. 90 tablet 3    aspirin 81 mg EC tablet Take 1 tablet (81 mg) by mouth once daily.      atorvastatin (Lipitor) 20 mg tablet Take 1 tablet (20 mg) by mouth once daily at bedtime.      calcium carbonate EX (Tums Extra Strength) 300 mg (750 mg) chewable tablet Chew 2 tablets (1,500 mg) 2 times a day.      dexAMETHasone 0.5 mg/5 mL oral liquid Take 10 mL (1 mg) by mouth 2 times a day as needed (mouth sores). 600 mL 0    [] doxycycline (Adoxa) 100 mg tablet Take 1 tablet (100 mg) by mouth 2 times a day for 14 days. Take with a full glass of water and do not lie down for at least 30 minutes after 28 tablet 0    metoprolol tartrate (Lopressor) 25 mg tablet Take 1 tablet (25 mg) by mouth 2 times a day. 180 tablet 3    multivitamin with folic acid (One Daily Multivitamin) 400 mcg tablet Take 1 tablet by mouth once daily.      ondansetron (Zofran) 8 mg tablet Take 1 tablet (8 mg) by mouth every 8 hours if needed for nausea or vomiting. 30 tablet 5    pantoprazole (ProtoNix) 40 mg EC tablet Take 1 tablet (40 mg) by mouth once daily. 90 tablet 3    potassium chloride CR (Klor-Con) 10 mEq ER tablet Take 2 tablets (20 mEq) by mouth once daily. Do not crush, chew, or split. 60 tablet 3    prochlorperazine (Compazine) 10 mg tablet Take 1 tablet (10 mg) by mouth every 6 hours if needed for nausea or vomiting. 30 tablet 5    venetoclax (Venclexta) 100 mg tablet Take 2 tablets (200 mg total) by mouth once daily.  Take with food. 60 tablet 2     No current facility-administered medications on file prior to encounter.         Last Recorded Vitals  There were no vitals taken for this visit.      Physical Exam  Vitals reviewed.   Constitutional:       General: He is not in acute distress.     Appearance: He is not ill-appearing.   HENT:      Head: Normocephalic and atraumatic.      Mouth/Throat:      Comments: Mallampati: III  Cardiovascular:      Rate and Rhythm: Normal rate and regular rhythm.       Pulses: Normal pulses.      Heart sounds: Normal heart sounds. No murmur heard.  Pulmonary:      Effort: Pulmonary effort is normal. No respiratory distress.   Abdominal:      General: Bowel sounds are normal.      Palpations: Abdomen is soft.      Tenderness: There is no abdominal tenderness.   Skin:     General: Skin is warm and dry.   Neurological:      General: No focal deficit present.      Mental Status: He is alert and oriented to person, place, and time.              Assessment/Plan     Colonoscopy in endo with MAC sedation, ASA 3        Level of Sedation: Moderate Sedation  (Sedation medications to be delivered via monitored anesthesia care (MAC).     This evaluation serves as my H&P.     Outpatient medication list and allergies have been reviewed.  Pre-procedure/nory procedure antibiotics not needed.     Pre-procedure evaluation completed by physician.           David Love MD

## 2024-12-23 NOTE — ANESTHESIA POSTPROCEDURE EVALUATION
"Patient: Demar Pittman \"Bud\"    Procedure Summary       Date: 12/23/24 Room / Location: [facility]    Anesthesia Start: 0821 Anesthesia Stop: 0854    Procedure: COLONOSCOPY Diagnosis: Positive colorectal cancer screening using Cologuard test    Scheduled Providers: David Love MD Responsible Provider: JEAN PIERRE Lopez    Anesthesia Type: MAC ASA Status: 3            Anesthesia Type: MAC    Vitals Value Taken Time   /68 12/23/24 0905   Temp 36.5 °C (97.7 °F) 12/23/24 0855   Pulse 60 12/23/24 0905   Resp 12 12/23/24 0905   SpO2 95 % 12/23/24 0905       Anesthesia Post Evaluation    Patient location during evaluation: bedside  Patient participation: complete - patient participated  Level of consciousness: awake and alert  Pain score: 0  Pain management: adequate  Airway patency: patent  Cardiovascular status: acceptable and hemodynamically stable  Respiratory status: acceptable  Hydration status: acceptable  Postoperative Nausea and Vomiting: none    There were no known notable events for this encounter.    "

## 2024-12-23 NOTE — ANESTHESIA PREPROCEDURE EVALUATION
"Patient: Demar Pittman \"Bud\"    Procedure Information       Anesthesia Start Date/Time: 12/23/24 0821    Scheduled providers: David Love MD    Procedure: COLONOSCOPY    Location: [facility]            Relevant Problems   Anesthesia   (+) Difficult intubation      Cardiac   (+) CAD (coronary artery disease)   (+) Essential hypertension   (+) Mixed hyperlipidemia   (+) Old myocardial infarction   (+) Paroxysmal atrial fibrillation (Multi)   (+) Unspecified right bundle-branch block      GI   (+) Gastro-esophageal reflux disease without esophagitis      Endocrine   (+) Class 1 obesity due to excess calories with serious comorbidity and body mass index (BMI) of 30.0 to 30.9 in adult   (+) Controlled type 2 diabetes mellitus without complication, without long-term current use of insulin (Multi)      Hematology   (+) CLL (chronic lymphocytic leukemia) (Multi)   (+) Pancytopenia      HEENT   (+) Sensorineural hearing loss (SNHL) of both ears      ID   (+) Herpes simplex virus (HSV) infection       Clinical information reviewed:   Tobacco  Allergies  Meds   Med Hx  Surg Hx   Fam Hx  Soc Hx        NPO Detail:  NPO/Void Status  Date of Last Liquid: 12/23/24  Time of Last Liquid: 0630  Date of Last Solid: 12/22/24  Time of Last Solid: 0700  Last Intake Type: Clear fluids         Physical Exam    Airway  Mallampati: III  TM distance: >3 FB  Neck ROM: full     Cardiovascular - normal exam  Rhythm: regular  Rate: normal     Dental   (+) upper dentures, lower dentures     Pulmonary - normal exam     Abdominal - normal exam             Anesthesia Plan    History of general anesthesia?: yes  History of complications of general anesthesia?: no    ASA 3     MAC     The patient is not a current smoker.    intravenous induction   Anesthetic plan and risks discussed with patient.      "

## 2024-12-24 NOTE — ADDENDUM NOTE
Encounter addended by: Kelsey Roberts RN on: 12/24/2024 11:21 AM   Actions taken: Contacts section saved, Flowsheet accepted

## 2025-01-03 ENCOUNTER — APPOINTMENT (OUTPATIENT)
Dept: OTOLARYNGOLOGY | Facility: HOSPITAL | Age: 65
End: 2025-01-03
Payer: COMMERCIAL

## 2025-01-08 NOTE — TELEPHONE ENCOUNTER
Called patient to let them know the atorvastatin had been discontinued and Dr. Fox did not want them to continue the medication at this time. Patient verbalized understanding.

## 2025-01-14 ENCOUNTER — LAB (OUTPATIENT)
Dept: LAB | Facility: LAB | Age: 65
End: 2025-01-14
Payer: MEDICARE

## 2025-01-14 ENCOUNTER — TELEPHONE (OUTPATIENT)
Dept: PRIMARY CARE | Facility: CLINIC | Age: 65
End: 2025-01-14

## 2025-01-14 ENCOUNTER — OFFICE VISIT (OUTPATIENT)
Dept: PRIMARY CARE | Facility: CLINIC | Age: 65
End: 2025-01-14
Payer: MEDICARE

## 2025-01-14 ENCOUNTER — HOSPITAL ENCOUNTER (OUTPATIENT)
Dept: RADIOLOGY | Facility: CLINIC | Age: 65
Discharge: HOME | End: 2025-01-14
Payer: MEDICARE

## 2025-01-14 VITALS
OXYGEN SATURATION: 98 % | BODY MASS INDEX: 28.91 KG/M2 | HEART RATE: 78 BPM | TEMPERATURE: 97.4 F | SYSTOLIC BLOOD PRESSURE: 122 MMHG | DIASTOLIC BLOOD PRESSURE: 80 MMHG | WEIGHT: 184.6 LBS

## 2025-01-14 DIAGNOSIS — J06.9 UPPER RESPIRATORY INFECTION, ACUTE: ICD-10-CM

## 2025-01-14 DIAGNOSIS — R05.1 ACUTE COUGH: Primary | ICD-10-CM

## 2025-01-14 DIAGNOSIS — R05.1 ACUTE COUGH: ICD-10-CM

## 2025-01-14 DIAGNOSIS — G47.33 OSA ON CPAP: Primary | ICD-10-CM

## 2025-01-14 LAB
BASOPHILS # BLD AUTO: 0.02 X10*3/UL (ref 0–0.1)
BASOPHILS NFR BLD AUTO: 0.7 %
EOSINOPHIL # BLD AUTO: 0.01 X10*3/UL (ref 0–0.7)
EOSINOPHIL NFR BLD AUTO: 0.4 %
ERYTHROCYTE [DISTWIDTH] IN BLOOD BY AUTOMATED COUNT: 13.2 % (ref 11.5–14.5)
HCT VFR BLD AUTO: 38.5 % (ref 41–52)
HGB BLD-MCNC: 13 G/DL (ref 13.5–17.5)
IMM GRANULOCYTES # BLD AUTO: 0.02 X10*3/UL (ref 0–0.7)
IMM GRANULOCYTES NFR BLD AUTO: 0.7 % (ref 0–0.9)
LYMPHOCYTES # BLD AUTO: 0.42 X10*3/UL (ref 1.2–4.8)
LYMPHOCYTES NFR BLD AUTO: 14.8 %
MCH RBC QN AUTO: 30.2 PG (ref 26–34)
MCHC RBC AUTO-ENTMCNC: 33.8 G/DL (ref 32–36)
MCV RBC AUTO: 89 FL (ref 80–100)
MONOCYTES # BLD AUTO: 0.55 X10*3/UL (ref 0.1–1)
MONOCYTES NFR BLD AUTO: 19.4 %
NEUTROPHILS # BLD AUTO: 1.82 X10*3/UL (ref 1.2–7.7)
NEUTROPHILS NFR BLD AUTO: 64 %
NRBC BLD-RTO: 0 /100 WBCS (ref 0–0)
PLATELET # BLD AUTO: 204 X10*3/UL (ref 150–450)
RBC # BLD AUTO: 4.31 X10*6/UL (ref 4.5–5.9)
WBC # BLD AUTO: 2.8 X10*3/UL (ref 4.4–11.3)

## 2025-01-14 PROCEDURE — 1159F MED LIST DOCD IN RCRD: CPT | Performed by: NURSE PRACTITIONER

## 2025-01-14 PROCEDURE — 99213 OFFICE O/P EST LOW 20 MIN: CPT | Performed by: NURSE PRACTITIONER

## 2025-01-14 PROCEDURE — 85025 COMPLETE CBC W/AUTO DIFF WBC: CPT

## 2025-01-14 PROCEDURE — 71046 X-RAY EXAM CHEST 2 VIEWS: CPT | Performed by: STUDENT IN AN ORGANIZED HEALTH CARE EDUCATION/TRAINING PROGRAM

## 2025-01-14 PROCEDURE — 1123F ACP DISCUSS/DSCN MKR DOCD: CPT | Performed by: NURSE PRACTITIONER

## 2025-01-14 PROCEDURE — 3079F DIAST BP 80-89 MM HG: CPT | Performed by: NURSE PRACTITIONER

## 2025-01-14 PROCEDURE — 36415 COLL VENOUS BLD VENIPUNCTURE: CPT

## 2025-01-14 PROCEDURE — 3074F SYST BP LT 130 MM HG: CPT | Performed by: NURSE PRACTITIONER

## 2025-01-14 PROCEDURE — 71046 X-RAY EXAM CHEST 2 VIEWS: CPT

## 2025-01-14 PROCEDURE — 86738 MYCOPLASMA ANTIBODY: CPT

## 2025-01-14 RX ORDER — BENZONATATE 200 MG/1
200 CAPSULE ORAL 3 TIMES DAILY PRN
Qty: 21 CAPSULE | Refills: 0 | Status: SHIPPED | OUTPATIENT
Start: 2025-01-14

## 2025-01-14 RX ORDER — DOXYCYCLINE 100 MG/1
100 CAPSULE ORAL 2 TIMES DAILY
Qty: 20 CAPSULE | Refills: 0 | Status: SHIPPED | OUTPATIENT
Start: 2025-01-14 | End: 2025-01-24

## 2025-01-14 ASSESSMENT — ENCOUNTER SYMPTOMS
SHORTNESS OF BREATH: 0
ABDOMINAL PAIN: 0
CHILLS: 0
FATIGUE: 0
FEVER: 0
PALPITATIONS: 0
CONSTITUTIONAL NEGATIVE: 1
ACTIVITY CHANGE: 0
WEAKNESS: 0
CONFUSION: 0
APNEA: 0
NERVOUS/ANXIOUS: 0
HEADACHES: 0
VOMITING: 0
COUGH: 1
DIZZINESS: 0
NAUSEA: 0

## 2025-01-14 NOTE — PROGRESS NOTES
Subjective   Patient ID: Bud Pittman is a 64 y.o. male who presents for Cough.    Cough : last 2 weeks. Described as dry and hacky but some phlegm  Headaches, nausea some   Taking otc meds with no relief.   Patient with current therapy for leukemia, Immunocompromised.   Denies fever, SOB       Cough  Pertinent negatives include no chest pain, chills, fever, headaches or shortness of breath.        Review of Systems   Constitutional: Negative.  Negative for activity change, chills, fatigue and fever.   Respiratory:  Positive for cough. Negative for apnea and shortness of breath.    Cardiovascular:  Negative for chest pain and palpitations.   Gastrointestinal:  Negative for abdominal pain, nausea and vomiting.   Neurological:  Negative for dizziness, weakness and headaches.   Psychiatric/Behavioral:  Negative for confusion. The patient is not nervous/anxious.        Objective   /80 (BP Location: Left arm)   Pulse 78   Temp 36.3 °C (97.4 °F) (Temporal)   Wt 83.7 kg (184 lb 9.6 oz)   SpO2 98%   BMI 28.91 kg/m²     Physical Exam  Vitals reviewed.   Constitutional:       Appearance: Normal appearance.   HENT:      Head: Normocephalic.   Cardiovascular:      Rate and Rhythm: Normal rate and regular rhythm.      Pulses: Normal pulses.      Heart sounds: Normal heart sounds.   Pulmonary:      Effort: Pulmonary effort is normal. No respiratory distress.      Breath sounds: Normal breath sounds. No wheezing.   Abdominal:      General: Bowel sounds are normal.   Neurological:      General: No focal deficit present.      Mental Status: He is alert and oriented to person, place, and time.   Psychiatric:         Mood and Affect: Mood normal.         Behavior: Behavior normal.         Assessment/Plan   Problem List Items Addressed This Visit             ICD-10-CM    Acute cough - Primary R05.1     2 view chest x-ray, Tessalon 200 3 times daily as needed  CBC, mycoplasma pneumonia IgG  Call for worsening  Start  doxycycline 100 mg twice daily for 10 days to cover for clinical pneumonia         Relevant Medications    benzonatate (Tessalon) 200 mg capsule    doxycycline (Vibramycin) 100 mg capsule    Other Relevant Orders    XR chest 2 views    Mycoplasma Pneumoniae Antibody, IgG    CBC and Auto Differential    Upper respiratory infection, acute J06.9     2 view chest x-ray, Tessalon 200 3 times daily as needed  CBC, mycoplasma pneumonia IgG  Call for worsening  Start doxycycline 100 mg twice daily for 10 days to cover for clinical pneumonia         Relevant Medications    benzonatate (Tessalon) 200 mg capsule    doxycycline (Vibramycin) 100 mg capsule    Other Relevant Orders    Mycoplasma Pneumoniae Antibody, IgG    CBC and Auto Differential

## 2025-01-14 NOTE — ASSESSMENT & PLAN NOTE
2 view chest x-ray, Tessalon 200 3 times daily as needed  CBC, mycoplasma pneumonia IgG  Call for worsening  Start doxycycline 100 mg twice daily for 10 days to cover for clinical pneumonia

## 2025-01-16 LAB — M PNEUMO IGG SER IA-ACNC: 0 U/L

## 2025-01-17 ENCOUNTER — TELEPHONE (OUTPATIENT)
Dept: PRIMARY CARE | Facility: CLINIC | Age: 65
End: 2025-01-17

## 2025-01-17 ENCOUNTER — TELEMEDICINE (OUTPATIENT)
Dept: OTOLARYNGOLOGY | Facility: HOSPITAL | Age: 65
End: 2025-01-17
Payer: MEDICARE

## 2025-01-17 ENCOUNTER — APPOINTMENT (OUTPATIENT)
Dept: OTOLARYNGOLOGY | Facility: HOSPITAL | Age: 65
End: 2025-01-17
Payer: MEDICARE

## 2025-01-17 DIAGNOSIS — H93.13 TINNITUS OF BOTH EARS: ICD-10-CM

## 2025-01-17 DIAGNOSIS — H90.3 SENSORINEURAL HEARING LOSS (SNHL) OF BOTH EARS: ICD-10-CM

## 2025-01-17 PROCEDURE — 99213 OFFICE O/P EST LOW 20 MIN: CPT | Performed by: STUDENT IN AN ORGANIZED HEALTH CARE EDUCATION/TRAINING PROGRAM

## 2025-01-17 PROCEDURE — 1123F ACP DISCUSS/DSCN MKR DOCD: CPT | Performed by: STUDENT IN AN ORGANIZED HEALTH CARE EDUCATION/TRAINING PROGRAM

## 2025-01-17 NOTE — PROGRESS NOTES
Head and Neck Surgery Telemedicine Follow-Up    Chief Concern:  Tongue Lesions, Hearing Changes    Interval Hx - 11/30/25  Hearing significantly improved. No pain or drainage. Similar mild disequilibrium that  is longstanding.   No issues with dysphagia/odynophagia. No new mouth ulcers.    Interval Hx - 12/22/24  Oral lesions almost completely gone  Only has pain when he eats really spicy food  Recently had rapid onset drop in his hearing b/l with ear fullness just before thanksgiving. Was dxd w/ OM and treated with antibiotics. No pain but thinks he perhaps had one issue of drainage on his pillow when waking on the left.  Mild disequilibrium.  Ear symptoms improving but not completely resolved.  Has been using Flonase and he thinks this is helping.    Interval Hx - 10/4/24  Pain has resolved, and lesions improving  Has potential colon malignancy and is scheduled for colonoscopy  Is not interested in bx today unless absolutely necessary  Has final chemo infusion in December.     Interval Hx - 8/30/24  Rinses helping oral cavity pain, eating better  Lesions still persist  No new lesions, no new neck masses    Initial HPI - 8/16/2024  64 y.o. male with a history of  CLL presenting for evaluation of oral cavity lesions. He is known to our service, having previously been seen by Dr. Chirinos for a tongue base mass which was ultimately biopsied in the OR in May 2024 with path showing CLL. Since April 2024 he has had intermittent painful lesions of this oral cavity and tongue. This has cause him some trouble swallowing. He feels as though they wax and wane, and do completely resolve before returning. He is currently on treatment for his CLL receiving rituximab and venetoclax  He thinks maybe the lesions resolve quicker after his rituximab infusions. The lesions predate his current treatment regimen. He has no new neck masses, hemoptysis, dyspnea, or dysphonia, but does have stable cervical LAD for greater than a year  related to his CLL. He is a non-smoker with minimal alcohol intake.     Past Medical History  He has a past medical history of Chronic lymphocytic leukemia (Multi), Diffuse otitis externa, bilateral (10/11/2021), GERD (gastroesophageal reflux disease), Hard to intubate, Hyperlipidemia, Hypertrophy of tonsils (12/05/2019), Localized enlarged lymph nodes (04/30/2020), Personal history of diseases of the skin and subcutaneous tissue (03/09/2020), Personal history of other diseases of the circulatory system, Personal history of other diseases of the circulatory system, Personal history of other diseases of the circulatory system, Personal history of other diseases of the circulatory system, Personal history of other specified conditions (12/11/2019), Positive colorectal cancer screening using Cologuard test, Sleep apnea, and Transaminitis (06/26/2024).    Patient Active Problem List   Diagnosis    CAD (coronary artery disease)    CLL (chronic lymphocytic leukemia) (Multi)    Diastasis recti    Dry eyes, bilateral    Essential hypertension    Herpes simplex virus (HSV) infection    Controlled type 2 diabetes mellitus without complication, without long-term current use of insulin (Multi)    Mixed hyperlipidemia    XENA on CPAP    Paroxysmal atrial fibrillation (Multi)    S/P CABG x 3    Sensorineural hearing loss (SNHL) of both ears    Pancytopenia    Tinnitus of both ears    Umbilical hernia without obstruction and without gangrene    Vitamin D deficiency    Annual physical exam    Hypokalemia    Class 1 obesity due to excess calories with serious comorbidity and body mass index (BMI) of 30.0 to 30.9 in adult    Tongue lesion    Difficult intubation    Mouth sores    Weight loss    Gastro-esophageal reflux disease without esophagitis    Old myocardial infarction    Presence of coronary angioplasty implant and graft    Unspecified right bundle-branch block    Lung nodule    Elbow swelling, right    Positive colorectal  cancer screening using Cologuard test    Non-recurrent acute suppurative otitis media of left ear without spontaneous rupture of tympanic membrane    Acute cough    Upper respiratory infection, acute       Surgical History  He has a past surgical history that includes Other surgical history (10/24/2019); Other surgical history (10/24/2019); Other surgical history (12/13/2019); and Other surgical history (12/29/2019).     Social History  He reports that he quit smoking about 40 years ago. His smoking use included cigarettes. He has been exposed to tobacco smoke. He has never used smokeless tobacco. He reports that he does not currently use alcohol after a past usage of about 2.0 standard drinks of alcohol per week. He reports that he does not use drugs.    Family History  No family history on file.     Allergies  Rituximab-pvvr    Last Recorded Vitals  There were no vitals taken for this visit.     Medications:  Medication Documentation Review Audit       Reviewed by Nell Garnica MA (Medical Assistant) on 01/14/25 at 1136      Medication Order Taking? Sig Documenting Provider Last Dose Status   acyclovir (Zovirax) 400 mg tablet [id number] Yes Take 1 tablet (400 mg) by mouth 2 times a day. Historical Provider, MD  Active   acyclovir (Zovirax) 400 mg tablet [id number]  Take 1 tablet (400 mg) by mouth 2 times a day.   Patient not taking: Reported on 1/14/2025    Ev Luis MD  Flag for Review   amLODIPine (Norvasc) 5 mg tablet [id number] Yes Take 1 tablet (5 mg) by mouth once daily. Kristen Conner, APRN-CNP  Active   aspirin 81 mg EC tablet [id number] Yes Take 1 tablet (81 mg) by mouth once daily. Historical Provider, MD  Active   atorvastatin (Lipitor) 20 mg tablet [id number]  Take 1 tablet (20 mg) by mouth once daily at bedtime.   Patient not taking: Reported on 1/14/2025    Deedee Mcclendon MD  Active   calcium carbonate EX (Tums Extra Strength) 300 mg (750 mg) chewable tablet [id number]  Chew 2 tablets  (1,500 mg) 2 times a day.   Patient not taking: Reported on 2025    Compa Ortiz PA-C  Active   dexAMETHasone 0.5 mg/5 mL oral liquid [id number]  Take 10 mL (1 mg) by mouth 2 times a day as needed (mouth sores).   Patient not taking: Reported on 2025    Cristóbal Esquivel MD   24 6400    Patient not taking:   Discontinued 25 1012   metoprolol tartrate (Lopressor) 25 mg tablet [id number] Yes TAKE ONE TABLET BY MOUTH TWO TIMES A DAY Kristne Conner APRN-CNP  Active   multivitamin with folic acid (One Daily Multivitamin) 400 mcg tablet [id number] Yes Take 1 tablet by mouth once daily. Yue Up MD  Active   ondansetron (Zofran) 8 mg tablet [id number] Yes Take 1 tablet (8 mg) by mouth every 8 hours if needed for nausea or vomiting. Ev Luis MD  Active   pantoprazole (ProtoNix) 40 mg EC tablet [id number] Yes Take 1 tablet (40 mg) by mouth once daily. Deedee Mcclendon MD  Active   potassium chloride CR (Klor-Con) 10 mEq ER tablet [id number] Yes Take 2 tablets (20 mEq) by mouth once daily. Do not crush, chew, or split. Mayelin Bailon APRN-CNP  Active   prochlorperazine (Compazine) 10 mg tablet [id number] Yes Take 1 tablet (10 mg) by mouth every 6 hours if needed for nausea or vomiting. Ev Luis MD  Active   venetoclax (Venclexta) 100 mg tablet [id number] Yes Take 2 tablets (200 mg total) by mouth once daily.  Take with food. Ev Luis MD  Active                  Imaging:  I personally reviewed the PET CT from 3/14/24.     Assessment/Plan   63yo M w/ long hx of CLL and multiple varied treatment courses seen today for intermittent oral cavity lesions. He has previously had biopsy proven CLL involvement at his tongue base. He is currently on treatment with ritux and venetoclax, but the lesions in question seme to predate this.     His symptoms of pain and dysphagia resolved and his lesions have healed.     His hearing today has significantly improved since his  last visit. He has no new concerns and does not feel he needs a formal audiogram/otology visit at this time.     He does not need scheduled follow-up and will call If any new issues arise.

## 2025-01-17 NOTE — TELEPHONE ENCOUNTER
----- Message from Elissa Levine sent at 1/17/2025  5:16 PM EST -----  Notify patient that chest x-ray shows possible consolidation/pneumonia   please check his symptoms and see if he is feeling better.  We started him on an antibiotic for treatment  ----- Message -----  From: Interface, Radiology Results In  Sent: 1/15/2025  10:13 PM EST  To: ANNELISE Pratt-CNP

## 2025-01-28 ENCOUNTER — PROCEDURE VISIT (OUTPATIENT)
Dept: SLEEP MEDICINE | Facility: CLINIC | Age: 65
End: 2025-01-28
Payer: MEDICARE

## 2025-01-28 VITALS
DIASTOLIC BLOOD PRESSURE: 80 MMHG | SYSTOLIC BLOOD PRESSURE: 122 MMHG | BODY MASS INDEX: 28.88 KG/M2 | HEIGHT: 67 IN | WEIGHT: 184 LBS

## 2025-01-28 DIAGNOSIS — G47.33 OSA ON CPAP: ICD-10-CM

## 2025-01-28 DIAGNOSIS — G47.61 PERIODIC LIMB MOVEMENT DISORDER: ICD-10-CM

## 2025-01-28 PROCEDURE — 95810 POLYSOM 6/> YRS 4/> PARAM: CPT | Performed by: INTERNAL MEDICINE

## 2025-01-28 ASSESSMENT — SLEEP AND FATIGUE QUESTIONNAIRES
HOW LIKELY ARE YOU TO NOD OFF OR FALL ASLEEP WHILE SITTING QUIETLY AFTER LUNCH WITHOUT ALCOHOL: SLIGHT CHANCE OF DOZING
HOW LIKELY ARE YOU TO NOD OFF OR FALL ASLEEP WHEN YOU ARE A PASSENGER IN A CAR FOR AN HOUR WITHOUT A BREAK: HIGH CHANCE OF DOZING
HOW LIKELY ARE YOU TO NOD OFF OR FALL ASLEEP WHILE WATCHING TV: SLIGHT CHANCE OF DOZING
SITING INACTIVE IN A PUBLIC PLACE LIKE A CLASS ROOM OR A MOVIE THEATER: SLIGHT CHANCE OF DOZING
HOW LIKELY ARE YOU TO NOD OFF OR FALL ASLEEP IN A CAR, WHILE STOPPED FOR A FEW MINUTES IN TRAFFIC: WOULD NEVER DOZE
HOW LIKELY ARE YOU TO NOD OFF OR FALL ASLEEP WHILE SITTING AND READING: MODERATE CHANCE OF DOZING
ESS-CHAD TOTAL SCORE: 11
HOW LIKELY ARE YOU TO NOD OFF OR FALL ASLEEP WHILE SITTING AND TALKING TO SOMEONE: WOULD NEVER DOZE
HOW LIKELY ARE YOU TO NOD OFF OR FALL ASLEEP WHILE LYING DOWN TO REST IN THE AFTERNOON WHEN CIRCUMSTANCES PERMIT: HIGH CHANCE OF DOZING

## 2025-01-29 DIAGNOSIS — G47.33 OSA ON CPAP: Primary | ICD-10-CM

## 2025-01-29 NOTE — PROGRESS NOTES
[facility] TECH NOTE:     Patient: Demar Pittman   MRN//AGE: [id number]  1960  64 y.o.   Technologist: Yessy Burkett   Room: [facility]   Service Date: 2025        Sleep Testing Location: [facility]    [place]: 11    TECHNOLOGIST SLEEP STUDY PROCEDURE NOTE:   This sleep study is being conducted according to the policies and procedures outlined by the [employer] accreditation standards.  The sleep study procedure and processes involved during this appointment was explained to the patient/patient’s family, questions were answered. The patient/family verbalized understanding.      The patient is a 64 year old male scheduled for aDiagnostic PSG Split night with montage of:  standard sleep montage . he arrived for his appointment.      The study that was ultimately completed was aDiagnostic PSG Split night with montage of:  standard sleep montage .    The full study Was completed.  Patient questionnaires completed?: yes     Consents signed? yes    Initial Fall Risk Screening:     Demar has not fallen in the last 6 months. his did not result in injury. Demar does not have a fear of falling. He does not need assistance with sitting, standing, or walking. he does not need assistance walking in his home. he does not need assistance in an unfamiliar setting. The patient is notusing an assistive device.     Brief Study observations: 64 yr old male presents for a diagnostic PSG. Pt states he has used CPAP for 30yrs. He recently is getting a code on his home unit that states he has exceeded the life of the motor. Does not no his scripted pressure. DME is [employer]. No change in weight. He is compliant.      Deviation to order/protocol and reason: NA      If PAP, which was preferred mask/pressure/mode: NA      Other:None    After the procedure, the patient/family was informed to ensure followup with ordering clinician for testing results.      Technologist: Yessy Burkett

## 2025-02-02 ASSESSMENT — ENCOUNTER SYMPTOMS
VOICE CHANGE: 0
SORE THROAT: 0
DIAPHORESIS: 0
TROUBLE SWALLOWING: 0
CARDIOVASCULAR NEGATIVE: 1
FATIGUE: 0
BLOOD IN STOOL: 0
RESPIRATORY NEGATIVE: 1
LIGHT-HEADEDNESS: 0
DIARRHEA: 0
VOMITING: 0
CONSTIPATION: 0
MUSCULOSKELETAL NEGATIVE: 1
NUMBNESS: 0
CHILLS: 0
DIZZINESS: 0
APPETITE CHANGE: 0
FEVER: 0
UNEXPECTED WEIGHT CHANGE: 0
NAUSEA: 0
HEMATOLOGIC/LYMPHATIC NEGATIVE: 1

## 2025-02-02 NOTE — PROGRESS NOTES
"Patient ID: Demar Pittman \"Shraddha" is a 64 y.o. male.    Oncology History Overview Note   B-CLL diagnosed in 2015, SUÁREZ stage 0; WBC initially 16.3 with ALC 12.0, hemoglobin 15.1 and platelets 154,000.  The cells expressed CD5 and A light chains.   CD38 and  Zap-70 were negative. Cytogenetic studies by FISH were inconclusive.  WBC has slowly rising but he maintains good marrow reserve and has not required treatment.  CT chest done 3/9/17 showed extensive cervical, axillary and submental adenopathy; largest  node on the left measured 2.3 x 1.9 cm.  Small (less than 9 mm) pulmonology nodules were again seen; relatively stable.  Abdominal nodes slightly increased.    17: WBC 65.4, ALC 62.8; becoming more symptomatic.  17 : WBC 77.6.  ALC 72.2.  Hemoglobin and platelets were normal.  18 WBC: 69.3. A.9. Hgb 15.2. Plt: 148,000  18: WBC 88.2.  ALC 82.9.  Hemoglobin 14.2.  Platelets 170,000.  Adenopathy stable.  18: WBC 77.3.  ALC 66.5.  Hemoglobin 13.8.  Platelets 143,000.  Adenopathy slightly increased.  18: WBC 92.0.  ALC 86.5.  Hemoglobin 14.1.  Platelets 157,000.  Progressing symptomatic adenopathy.  Treatment options discussed.   18 completed rituximab weekly x 4.  10/9/19: CBC: WBC 16.5.  ALC 12.4.  Hemoglobin 14.6.  Platelets 148,000.  Worsening adenopathy.  3/17/20: Started bendamustine/rituximab after multiple thoracentesis for malignant pleural effusion with CLL  20: #2 bendamustine/rituximab.  20: #3 BR  20: WBC 4.7.  Hemoglobin 12.9.  Platelets 189,000.  20: #4 BR  20: #5 BR  20: WBC 1.7. hgb 11.9. Platelets 176.  20: #6/6 bendamustine/rituximab  2023, WBC count 62, hemoglobin 14.9, platelets 143, absolute lymphocyte count 50.2  January 15, 2024, WBC count 40.5, hemoglobin 15.0, platelets 175  2/10/24 , WBC count 31.5, hemoglobin 14.9, platelets 131  3/14/24 , WBC count 13.9, hemoglobin 14.9, platelets " 122  3/14/24, physical examination positive for bilateral cervical lymphadenopathy, bilateral axillary lymphadenopathy  PET imaging 3/29/2024 intense uptake in tongue base ( S/P) tonsillectomy. Multiple enlarged lymph nodes in cervical, parotid, nodes, multipl pleural based and parenchymal non FDG avid pulmonary nodes, enlarged axillary lymph nodes, mediastinal and bilateral hilar lymph nodes, periportal lymph nodes, iliac nodes, inguinal nodes.     -FISH panel  of peripheral blood 2024 Pos for del 11q, negative for t(11,14), trisomy 12, monosomy 13 and deletion of 17p.     -Needle biopsy of posterior tongue mass 24 involvement by chronic lymphocytic leukemia/small lymphocytic lymphoma no evidence of transformation    Venetoclax ramp up 6/10/24-24  Then was hospitalized 24-24 for second venetoclax ramp-up. C1 rituximab - 7/15/24.     CLL (chronic lymphocytic leukemia) (Multi)   2023 Initial Diagnosis    CLL (chronic lymphocytic leukemia) (CMS/[facility])     1/15/2024 - 1/15/2024 Chemotherapy    Bendamustine + RiTUXimab, 28 Day Cycles     2024 - 2024 Chemotherapy    (INPT) Venetoclax, 28 Day Cycles      2024 - 2024 Chemotherapy    Venetoclax + RiTUXimab, 28 Day Cycles        Past medical history: CLL/SLL (2015) as described above, status post rituximab, 6 cycles of bendamustine/rituximab (3/2020-2020).  Excellent response to treatment.       History of CABGx3 19, vertigo, XENA, remote tonsillectomy, retinal tear, stable pulmonary nodules, hyperlipidemia, hypertension, sinusitis.  Unremarkable cardiac catheterization 21. Hyperglycemia, torn retina, cataract. Hx of afib since CABG.      Family medical history: Mother  of myeloma in her 70s and his father  of Hodgkin lymphoma at age 39.     Social history: Rare alcohol intake.  Former smoker but quit in .  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 2  biological children, one son with downs syndrome.      Subjective  HPI     Demar Pittman is a patient of Dr. Wang with a long history of CLL initially seen with his wife and son 5/9/24 for a second opinion for treatment of CLL.   He received BR completing in 8/2020.  In December 2023 after COVID infection and he had increased lymphocytosis which resolved, subsequently noted to have lymphadenopathy confirmed on PET scan.  Ibrutinib recommended in March 2024, but patient has not yet started this because he was leary of side effects.      Due to discordance in uptake at base of tongue on PET imaging, tongue biopsy was done 5/28/24 which was consistent with CLL and had no evidence of large cell transformation.     Was hospitalized 6/10/24-6/13/24 for first venetoclax ramp-up.  Then was hospitalized 6/17/24-6/19/24 for second venetoclax ramp-up. C1 rituximab - 7/15/24 cycle 6 completed 12/2/24    CT chest (9/6/24) showing significant interval decrease in size of multiple cervical, axillary, mediastinal and upper abdominal lymph nodes, compatible with treatment response, persistent splenomegaly, stable pulmonary nodules measuring up to 6 mm, mild hepatic steatosis.     Demar presents to the clinic today (2/3/25) with his wife for follow up evaluation of his CLL and count checks.  He completed dose 6 of rituxan on 12/2/25.  Currently on 200 mg venetoclax, was instructed to decrease to 200 mg around 10/11/24 by Dr. Luis.  He has had congestion for the past couple of weeks and has ear stuffiness, denies fevers or discolored drainage.  Hearing is finally better.  Mouth sores have miraculously disappeared.  Appetite is good, and weight is steady.     Recently had positive cologard, and had colonoscopy 12/23/2024 which showed no abnormalities. He is process of being evaluated for new CPAP mask.       Review of Systems   Constitutional:  Negative for appetite change, chills, diaphoresis, fatigue, fever and unexpected  weight change.   HENT:   Negative for mouth sores, sore throat, tinnitus (nasal congestion hearing difficulties), trouble swallowing and voice change.    Respiratory: Negative.     Cardiovascular: Negative.    Gastrointestinal:  Negative for blood in stool, constipation, diarrhea, nausea and vomiting.   Genitourinary: Negative.     Musculoskeletal: Negative.  Negative for gait problem.   Skin: Negative.    Neurological:  Negative for dizziness, gait problem, light-headedness and numbness.   Hematological: Negative.    ---------------------------------------------------------------------------------------  Subjective      Objective    BSA: 2 meters squared  /82   Pulse 69   Temp 36.1 °C (97 °F)   Resp 18   Wt 84.7 kg (186 lb 11.7 oz)   SpO2 100%   BMI 29.25 kg/m²      Physical Exam  Vitals reviewed.   Constitutional:       Appearance: Normal appearance.      Comments: Looks well, hard of hearing   HENT:      Head: Normocephalic and atraumatic.      Nose: Nose normal.      Mouth/Throat:      Mouth: Mucous membranes are moist.      Pharynx: Oropharynx is clear.      Comments: No mouth sores  Eyes:      Extraocular Movements: Extraocular movements intact.      Conjunctiva/sclera: Conjunctivae normal.      Pupils: Pupils are equal, round, and reactive to light.   Cardiovascular:      Rate and Rhythm: Normal rate and regular rhythm.      Pulses: Normal pulses.      Heart sounds: Normal heart sounds.   Pulmonary:      Effort: Pulmonary effort is normal.      Breath sounds: Normal breath sounds.   Abdominal:      General: Abdomen is flat. Bowel sounds are normal.      Palpations: Abdomen is soft.   Musculoskeletal:         General: Normal range of motion.      Cervical back: Normal range of motion.   Skin:     General: Skin is warm and dry.      Capillary Refill: Capillary refill takes less than 2 seconds.   Neurological:      General: No focal deficit present.      Mental Status: He is alert and oriented to  person, place, and time. Mental status is at baseline.   Psychiatric:         Mood and Affect: Mood normal.         Behavior: Behavior normal.         Thought Content: Thought content normal.         Judgment: Judgment normal.     Performance Status:  Karnofsky Score: 80 - Normal activity with effort; some signs or symptoms of disease  -------------------------------------------------------------------------------------------------------------------------------------  Assessment/Plan      CLL (chronic lymphocytic leukemia) (Multi)  Chronic lymphocytic leukemia with symptomatic adenopathy, excellent response to bendamustine/rituximab; completed 6 cycles in August 2020.    Patient seen  for further management of bulky CLL.  PET imaging shows discordant uptake at the base of the tongue , pulmonary nodules which the patient states are longstanding. There is no evidence of Richters transformation by histology and FISH analysis shows del 11q. Due to symptoms of tongue swelling has started dexamethasone 4 mg po bid for 10 days for sx relief.     6/3/24 Previously discussed treatment options and patient would prefer to receive venetoclax as a time limited approach and I have recommended the [place] regimen with escalating doses of venetoclax followed by monthly rituxan for 6  monthly doses  and a planned 2 year course of venetoclax. We reviewed side effects of venetoclax and rituxan signed chemo consent.  Given patients tumor burden have schedule inpatient hospitalization to monitor for TLS for at least first two dose ramp ups on 5/10 and 5/17/24  Started allopurinol for TLS prevention    6/17/24:  Hospital admit for Venetoclax ramp up to 50 mg daily x 7 days. Next ramp ups are planned to be done outpatient.      CT chest (9/6/24) showing significant interval decrease in size of multiple cervical, axillary, mediastinal and upper abdominal lymph nodes, compatible with treatment response, persistent splenomegaly, stable  pulmonary nodules measuring up to 6 mm, mild hepatic steatosis.       12/2/24:  completed  cycle 6 of rituxan.      2/3/25 Blood counts good,  continues on venetoclax 200 mg daily.  Will check IgG levels due to recurrent sinus congestion, may be a candidate for IVIG.   Plan for imaging after next visit.  Follow Plan total of 2 year course of venetoclax. Continue venetoclax at 200 mg daily due to ongoing mild cytopenia     Mouth Sores:  Now resolved.   Continuing prn dexamethasone rinse.  Following with ENT, in January    ID : continue prophylactic acyclovir   CAD:  status post CABG.       Transaminitis:  at diagnosis transaminitis with AST about 500.  PET imaging shows periportal mass vs lymphadenopathy, hepatitis serologies negative, ultrasound consistent with large periportal node,  CT of liver no abnormalties.  6/24/24:  Improvement in liver enzymes: , AST 29, bilirubin 0.6.  Discontinued CT of liver as liver enzymes are improving.  2/3/25:  ALT and AST  have remained in normal range.      Pulmonary Nodule:  Chest x-ray (8/19/24) showing RUL pulmonary nodule-potentially neoplastic.    CT Chest order by PCP (9/6/24) showing stable pulmonary nodule with recommendation for follow up imaging in 1 year.    Discussed with Dr. Luis, plan to monitor with future imaging.     Elevated Blood sugars- discussed weight loss strategies and cutting down carbs.hgb A1c 5 today which is great  Hypokalemia- encouraged to take potassium pills          RTC: 2 months April 2025, plan CT scan of CAP with contrast after that visit.     --------------------------------------------------------------------------------------------------  Ev Luis MD

## 2025-02-03 ENCOUNTER — OFFICE VISIT (OUTPATIENT)
Dept: HEMATOLOGY/ONCOLOGY | Facility: HOSPITAL | Age: 65
End: 2025-02-03
Payer: MEDICARE

## 2025-02-03 ENCOUNTER — SPECIALTY PHARMACY (OUTPATIENT)
Dept: OTHER | Facility: HOSPITAL | Age: 65
End: 2025-02-03
Payer: MEDICARE

## 2025-02-03 ENCOUNTER — LAB (OUTPATIENT)
Dept: LAB | Facility: HOSPITAL | Age: 65
End: 2025-02-03
Payer: MEDICARE

## 2025-02-03 VITALS
RESPIRATION RATE: 18 BRPM | TEMPERATURE: 97 F | SYSTOLIC BLOOD PRESSURE: 145 MMHG | BODY MASS INDEX: 29.25 KG/M2 | WEIGHT: 186.73 LBS | HEART RATE: 69 BPM | OXYGEN SATURATION: 100 % | DIASTOLIC BLOOD PRESSURE: 82 MMHG

## 2025-02-03 DIAGNOSIS — C91.10 CLL (CHRONIC LYMPHOCYTIC LEUKEMIA) (MULTI): ICD-10-CM

## 2025-02-03 DIAGNOSIS — R73.9 HYPERGLYCEMIA: ICD-10-CM

## 2025-02-03 LAB
ALBUMIN SERPL BCP-MCNC: 4.4 G/DL (ref 3.4–5)
ALP SERPL-CCNC: 130 U/L (ref 33–136)
ALT SERPL W P-5'-P-CCNC: 44 U/L (ref 10–52)
ANION GAP SERPL CALC-SCNC: 14 MMOL/L (ref 10–20)
AST SERPL W P-5'-P-CCNC: 23 U/L (ref 9–39)
BASOPHILS # BLD AUTO: 0 X10*3/UL (ref 0–0.1)
BASOPHILS NFR BLD AUTO: 0 %
BILIRUB SERPL-MCNC: 0.9 MG/DL (ref 0–1.2)
BUN SERPL-MCNC: 12 MG/DL (ref 6–23)
CALCIUM SERPL-MCNC: 9.4 MG/DL (ref 8.6–10.3)
CHLORIDE SERPL-SCNC: 102 MMOL/L (ref 98–107)
CO2 SERPL-SCNC: 27 MMOL/L (ref 21–32)
CREAT SERPL-MCNC: 0.71 MG/DL (ref 0.5–1.3)
EGFRCR SERPLBLD CKD-EPI 2021: >90 ML/MIN/1.73M*2
EOSINOPHIL # BLD AUTO: 0 X10*3/UL (ref 0–0.7)
EOSINOPHIL NFR BLD AUTO: 0 %
ERYTHROCYTE [DISTWIDTH] IN BLOOD BY AUTOMATED COUNT: 14.3 % (ref 11.5–14.5)
EST. AVERAGE GLUCOSE BLD GHB EST-MCNC: 97 MG/DL
GLUCOSE SERPL-MCNC: 173 MG/DL (ref 74–99)
HBA1C MFR BLD: 5 %
HCT VFR BLD AUTO: 38.7 % (ref 41–52)
HGB BLD-MCNC: 13.7 G/DL (ref 13.5–17.5)
IMM GRANULOCYTES # BLD AUTO: 0.03 X10*3/UL (ref 0–0.7)
IMM GRANULOCYTES NFR BLD AUTO: 0.8 % (ref 0–0.9)
LDH SERPL L TO P-CCNC: 199 U/L (ref 84–246)
LYMPHOCYTES # BLD AUTO: 0.52 X10*3/UL (ref 1.2–4.8)
LYMPHOCYTES NFR BLD AUTO: 13.9 %
MCH RBC QN AUTO: 29.8 PG (ref 26–34)
MCHC RBC AUTO-ENTMCNC: 35.4 G/DL (ref 32–36)
MCV RBC AUTO: 84 FL (ref 80–100)
MONOCYTES # BLD AUTO: 0.77 X10*3/UL (ref 0.1–1)
MONOCYTES NFR BLD AUTO: 20.6 %
NEUTROPHILS # BLD AUTO: 2.41 X10*3/UL (ref 1.2–7.7)
NEUTROPHILS NFR BLD AUTO: 64.7 %
NRBC BLD-RTO: 0 /100 WBCS (ref 0–0)
PLATELET # BLD AUTO: 116 X10*3/UL (ref 150–450)
POTASSIUM SERPL-SCNC: 3.4 MMOL/L (ref 3.5–5.3)
PROT SERPL-MCNC: 6.5 G/DL (ref 6.4–8.2)
RBC # BLD AUTO: 4.59 X10*6/UL (ref 4.5–5.9)
SODIUM SERPL-SCNC: 140 MMOL/L (ref 136–145)
WBC # BLD AUTO: 3.7 X10*3/UL (ref 4.4–11.3)

## 2025-02-03 PROCEDURE — 83036 HEMOGLOBIN GLYCOSYLATED A1C: CPT

## 2025-02-03 PROCEDURE — 3079F DIAST BP 80-89 MM HG: CPT | Performed by: INTERNAL MEDICINE

## 2025-02-03 PROCEDURE — 85025 COMPLETE CBC W/AUTO DIFF WBC: CPT

## 2025-02-03 PROCEDURE — 83615 LACTATE (LD) (LDH) ENZYME: CPT

## 2025-02-03 PROCEDURE — 3044F HG A1C LEVEL LT 7.0%: CPT | Performed by: INTERNAL MEDICINE

## 2025-02-03 PROCEDURE — 36415 COLL VENOUS BLD VENIPUNCTURE: CPT

## 2025-02-03 PROCEDURE — 1159F MED LIST DOCD IN RCRD: CPT | Performed by: INTERNAL MEDICINE

## 2025-02-03 PROCEDURE — 1123F ACP DISCUSS/DSCN MKR DOCD: CPT | Performed by: INTERNAL MEDICINE

## 2025-02-03 PROCEDURE — 99215 OFFICE O/P EST HI 40 MIN: CPT | Performed by: INTERNAL MEDICINE

## 2025-02-03 PROCEDURE — 1126F AMNT PAIN NOTED NONE PRSNT: CPT | Performed by: INTERNAL MEDICINE

## 2025-02-03 PROCEDURE — 80053 COMPREHEN METABOLIC PANEL: CPT

## 2025-02-03 PROCEDURE — 1036F TOBACCO NON-USER: CPT | Performed by: INTERNAL MEDICINE

## 2025-02-03 PROCEDURE — 3077F SYST BP >= 140 MM HG: CPT | Performed by: INTERNAL MEDICINE

## 2025-02-03 ASSESSMENT — PAIN SCALES - GENERAL: PAINLEVEL_OUTOF10: 0-NO PAIN

## 2025-02-03 NOTE — PROGRESS NOTES
"[facility] Clinical Note    Demar Pittman \"Bud\" is a 64 y.o. male, who is on the specialty pharmacy service for management of: Oncology Core with status of: (Enrolled)     Demar was contacted on 2/3/2025.    Refer to the encounter summary report for documentation details about patient counseling and education.      Medication Adherence  The importance of adherence was discussed with the patient and they were advised to take the medication as prescribed by their provider. Demar was encouraged to call his physician's office if they have a question regarding a missed dose.       Patient advised to contact the pharmacy if there are any changes to her medication list, including prescriptions, OTC medications, herbal products, or supplements. Patient was advised of [facility]’s dispensing process, refill timeline, contact information ([phone]), and patient management follow up. Patient confirmed understanding of education conducted during assessment. All patient questions and concerns were addressed to the best of my ability. Patient was encouraged to contact the specialty pharmacy with any questions or concerns.    Confirmed follow-up outreaches are properly scheduled. Reviewed goals of therapy in the program targets.    Pedro Jim, PharmD  "

## 2025-02-04 ENCOUNTER — SPECIALTY PHARMACY (OUTPATIENT)
Dept: PHARMACY | Facility: CLINIC | Age: 65
End: 2025-02-04

## 2025-02-04 PROCEDURE — RXMED WILLOW AMBULATORY MEDICATION CHARGE

## 2025-02-07 ENCOUNTER — CLINICAL SUPPORT (OUTPATIENT)
Dept: SLEEP MEDICINE | Facility: CLINIC | Age: 65
End: 2025-02-07
Payer: MEDICARE

## 2025-02-07 VITALS
SYSTOLIC BLOOD PRESSURE: 145 MMHG | WEIGHT: 185.98 LBS | DIASTOLIC BLOOD PRESSURE: 82 MMHG | HEIGHT: 67 IN | BODY MASS INDEX: 29.19 KG/M2

## 2025-02-07 DIAGNOSIS — G47.33 OSA ON CPAP: ICD-10-CM

## 2025-02-07 ASSESSMENT — SLEEP AND FATIGUE QUESTIONNAIRES
HOW LIKELY ARE YOU TO NOD OFF OR FALL ASLEEP WHILE SITTING AND TALKING TO SOMEONE: WOULD NEVER DOZE
HOW LIKELY ARE YOU TO NOD OFF OR FALL ASLEEP WHILE SITTING QUIETLY AFTER LUNCH WITHOUT ALCOHOL: SLIGHT CHANCE OF DOZING
HOW LIKELY ARE YOU TO NOD OFF OR FALL ASLEEP WHEN YOU ARE A PASSENGER IN A CAR FOR AN HOUR WITHOUT A BREAK: HIGH CHANCE OF DOZING
HOW LIKELY ARE YOU TO NOD OFF OR FALL ASLEEP WHILE WATCHING TV: SLIGHT CHANCE OF DOZING
HOW LIKELY ARE YOU TO NOD OFF OR FALL ASLEEP WHILE SITTING AND READING: MODERATE CHANCE OF DOZING
ESS-CHAD TOTAL SCORE: 11
HOW LIKELY ARE YOU TO NOD OFF OR FALL ASLEEP WHILE LYING DOWN TO REST IN THE AFTERNOON WHEN CIRCUMSTANCES PERMIT: HIGH CHANCE OF DOZING
HOW LIKELY ARE YOU TO NOD OFF OR FALL ASLEEP IN A CAR, WHILE STOPPED FOR A FEW MINUTES IN TRAFFIC: WOULD NEVER DOZE
SITING INACTIVE IN A PUBLIC PLACE LIKE A CLASS ROOM OR A MOVIE THEATER: SLIGHT CHANCE OF DOZING

## 2025-02-08 NOTE — PROGRESS NOTES
[facility] TECH NOTE:     Patient: Demar Pittman   MRN//AGE: [id number]  1960  64 y.o.   Technologist: Yessy Burkett   Room: [facility]   Service Date: 2025        Sleep Testing Location: [facility]    [place]: 11    TECHNOLOGIST SLEEP STUDY PROCEDURE NOTE:   This sleep study is being conducted according to the policies and procedures outlined by the AAS accreditation standards.  The sleep study procedure and processes involved during this appointment was explained to the patient/patient’s family, questions were answered. The patient/family verbalized understanding.      The patient is a 64 year old male scheduled for aCPAP titration with montage of:  standard sleep montage . he arrived for his appointment.      The study that was ultimately completed was aCPAP titration with montage of:  standard sleep montage .    The full study Was completed.  Patient questionnaires completed?: yes     Consents signed? yes    Initial Fall Risk Screening:     Demar has not fallen in the last 6 months. his did not result in injury. Demar does not have a fear of falling. He does not need assistance with sitting, standing, or walking. he does not need assistance walking in his home. he does not need assistance in an unfamiliar setting. The patient is notusing an assistive device.     Brief Study observations: 64 yr old male presents for a CPAP titration. Pt has used CPAP for 30yrs. Recently has been getting error code on his home equipment. He does not no his scripted pressure. DME is [employer]. No change in weight. Pt was started at 6cm for his comfort.      Deviation to order/protocol and reason: NA      If PAP, which was preferred mask/pressure/mode: Resned Airfit  F30i small.      Other:None    After the procedure, the patient/family was informed to ensure followup with ordering clinician for testing results.      Technologist: Yessy Burkett

## 2025-02-10 ENCOUNTER — PHARMACY VISIT (OUTPATIENT)
Dept: PHARMACY | Facility: CLINIC | Age: 65
End: 2025-02-10
Payer: COMMERCIAL

## 2025-02-18 ENCOUNTER — TELEPHONE (OUTPATIENT)
Dept: HEMATOLOGY/ONCOLOGY | Facility: HOSPITAL | Age: 65
End: 2025-02-18
Payer: MEDICARE

## 2025-02-18 DIAGNOSIS — C91.10 CLL (CHRONIC LYMPHOCYTIC LEUKEMIA) (MULTI): ICD-10-CM

## 2025-02-18 DIAGNOSIS — C91.10 CLL (CHRONIC LYMPHOCYTIC LEUKEMIA) (MULTI): Primary | ICD-10-CM

## 2025-02-18 RX ORDER — ACYCLOVIR 400 MG/1
400 TABLET ORAL 2 TIMES DAILY
Qty: 60 TABLET | Refills: 5 | Status: SHIPPED | OUTPATIENT
Start: 2025-02-18 | End: 2025-02-20 | Stop reason: SDUPTHER

## 2025-02-18 NOTE — TELEPHONE ENCOUNTER
Refill request received via [employer] patient message, as below:    Can you please send a new prescription for Acyclovir 400mg to [place], [place]. for me Thank You!     Appears there is already a pended order for the refill.

## 2025-02-20 RX ORDER — ACYCLOVIR 400 MG/1
400 TABLET ORAL 2 TIMES DAILY
Qty: 60 TABLET | Refills: 3 | Status: SHIPPED | OUTPATIENT
Start: 2025-02-20 | End: 2025-06-20

## 2025-02-28 ENCOUNTER — SPECIALTY PHARMACY (OUTPATIENT)
Dept: PHARMACY | Facility: CLINIC | Age: 65
End: 2025-02-28

## 2025-02-28 PROCEDURE — RXMED WILLOW AMBULATORY MEDICATION CHARGE

## 2025-03-03 ENCOUNTER — APPOINTMENT (OUTPATIENT)
Dept: PRIMARY CARE | Facility: CLINIC | Age: 65
End: 2025-03-03
Payer: COMMERCIAL

## 2025-03-04 ENCOUNTER — PHARMACY VISIT (OUTPATIENT)
Dept: PHARMACY | Facility: CLINIC | Age: 65
End: 2025-03-04
Payer: COMMERCIAL

## 2025-03-04 ENCOUNTER — OFFICE VISIT (OUTPATIENT)
Dept: PRIMARY CARE | Facility: CLINIC | Age: 65
End: 2025-03-04
Payer: MEDICARE

## 2025-03-04 VITALS
TEMPERATURE: 95.4 F | DIASTOLIC BLOOD PRESSURE: 82 MMHG | WEIGHT: 190 LBS | HEART RATE: 79 BPM | SYSTOLIC BLOOD PRESSURE: 126 MMHG | BODY MASS INDEX: 29.76 KG/M2 | OXYGEN SATURATION: 97 %

## 2025-03-04 DIAGNOSIS — H65.93 BILATERAL OTITIS MEDIA WITH EFFUSION: ICD-10-CM

## 2025-03-04 DIAGNOSIS — I10 ESSENTIAL HYPERTENSION: ICD-10-CM

## 2025-03-04 PROCEDURE — 3079F DIAST BP 80-89 MM HG: CPT | Performed by: NURSE PRACTITIONER

## 2025-03-04 PROCEDURE — 1159F MED LIST DOCD IN RCRD: CPT | Performed by: NURSE PRACTITIONER

## 2025-03-04 PROCEDURE — 1123F ACP DISCUSS/DSCN MKR DOCD: CPT | Performed by: NURSE PRACTITIONER

## 2025-03-04 PROCEDURE — 99213 OFFICE O/P EST LOW 20 MIN: CPT | Performed by: NURSE PRACTITIONER

## 2025-03-04 PROCEDURE — 3074F SYST BP LT 130 MM HG: CPT | Performed by: NURSE PRACTITIONER

## 2025-03-04 PROCEDURE — 1036F TOBACCO NON-USER: CPT | Performed by: NURSE PRACTITIONER

## 2025-03-04 PROCEDURE — 3044F HG A1C LEVEL LT 7.0%: CPT | Performed by: NURSE PRACTITIONER

## 2025-03-04 PROCEDURE — 1160F RVW MEDS BY RX/DR IN RCRD: CPT | Performed by: NURSE PRACTITIONER

## 2025-03-04 RX ORDER — AMOXICILLIN AND CLAVULANATE POTASSIUM 500; 125 MG/1; MG/1
500 TABLET, FILM COATED ORAL 2 TIMES DAILY
Qty: 20 TABLET | Refills: 0 | Status: SHIPPED | OUTPATIENT
Start: 2025-03-04 | End: 2025-03-14

## 2025-03-04 RX ORDER — OFLOXACIN 3 MG/ML
5 SOLUTION AURICULAR (OTIC) 2 TIMES DAILY
Qty: 10 ML | Refills: 0 | Status: SHIPPED | OUTPATIENT
Start: 2025-03-04 | End: 2025-03-11

## 2025-03-04 RX ORDER — AMLODIPINE BESYLATE 5 MG/1
5 TABLET ORAL DAILY
Qty: 90 TABLET | Refills: 3 | Status: SHIPPED | OUTPATIENT
Start: 2025-03-04 | End: 2026-02-27

## 2025-03-04 ASSESSMENT — ENCOUNTER SYMPTOMS
APNEA: 0
DIZZINESS: 0
RESPIRATORY NEGATIVE: 1
CONFUSION: 0
FATIGUE: 0
COUGH: 0
HEADACHES: 0
NAUSEA: 0
WEAKNESS: 0
PALPITATIONS: 0
ABDOMINAL PAIN: 0
ACTIVITY CHANGE: 0
CHILLS: 0
SHORTNESS OF BREATH: 0
NERVOUS/ANXIOUS: 0
CONSTITUTIONAL NEGATIVE: 1
FEVER: 0
VOMITING: 0

## 2025-03-04 ASSESSMENT — PATIENT HEALTH QUESTIONNAIRE - PHQ9
1. LITTLE INTEREST OR PLEASURE IN DOING THINGS: NOT AT ALL
2. FEELING DOWN, DEPRESSED OR HOPELESS: NOT AT ALL
SUM OF ALL RESPONSES TO PHQ9 QUESTIONS 1 AND 2: 0

## 2025-03-04 NOTE — TELEPHONE ENCOUNTER
----- Message from Nurse Olivia VERA sent at 3/3/2025  6:00 PM EST -----  Regarding: Refill  Patient is requesting a refill of amlodipine 5 mg to be sent to [facility] in [place].

## 2025-03-04 NOTE — ASSESSMENT & PLAN NOTE
Start Augmentin twice daily for 10 days  Ofloxacin 5 drops to the left and right ear twice daily for 7 days  Risk/benefits/side effects discussed  If hearing is not improved we will refer for audiology consult

## 2025-03-04 NOTE — PROGRESS NOTES
Subjective   Patient ID: Bud Pittman is a 64 y.o. male who presents for Ear Fullness, Ear Drainage, and Hearing Problem.    Bilateral ear drainage and decreased hearing.  Has tried warm compress and over-the-counter antihistamines.  Reports cough/cold symptoms 3 weeks ago.  Though symptoms have resolved denies sore throat, fever, body aches or chills at this time      Ear Fullness   Associated symptoms include ear discharge and hearing loss. Pertinent negatives include no abdominal pain, coughing, headaches or vomiting.   Ear Drainage   Associated symptoms include ear discharge and hearing loss. Pertinent negatives include no abdominal pain, coughing, headaches or vomiting.   Hearing Problem  Pertinent negatives include no abdominal pain, chest pain, chills, coughing, fatigue, fever, headaches, nausea, vomiting or weakness.   Earache   Associated symptoms include ear discharge and hearing loss. Pertinent negatives include no abdominal pain, coughing, headaches or vomiting.        Review of Systems   Constitutional: Negative.  Negative for activity change, chills, fatigue and fever.   HENT:  Positive for ear discharge and hearing loss. Negative for ear pain.    Respiratory: Negative.  Negative for apnea, cough and shortness of breath.    Cardiovascular:  Negative for chest pain and palpitations.   Gastrointestinal:  Negative for abdominal pain, nausea and vomiting.   Neurological:  Negative for dizziness, weakness and headaches.   Psychiatric/Behavioral:  Negative for confusion. The patient is not nervous/anxious.        Objective   /82   Pulse 79   Temp 35.2 °C (95.4 °F) (Temporal)   Wt 86.2 kg (190 lb)   SpO2 97%   BMI 29.76 kg/m²     Physical Exam  Vitals reviewed.   Constitutional:       Appearance: Normal appearance.   HENT:      Head: Normocephalic.      Right Ear: Decreased hearing noted. Tympanic membrane is erythematous.      Left Ear: Decreased hearing noted. Swelling present. A middle ear  effusion is present. Tympanic membrane is erythematous.   Cardiovascular:      Rate and Rhythm: Normal rate and regular rhythm.      Pulses: Normal pulses.      Heart sounds: Normal heart sounds.   Pulmonary:      Effort: Pulmonary effort is normal. No respiratory distress.      Breath sounds: Normal breath sounds. No wheezing.   Abdominal:      General: Bowel sounds are normal.   Neurological:      General: No focal deficit present.      Mental Status: He is alert and oriented to person, place, and time.   Psychiatric:         Mood and Affect: Mood normal.         Behavior: Behavior normal.         Assessment/Plan   Problem List Items Addressed This Visit             ICD-10-CM    Bilateral otitis media with effusion H65.93     Start Augmentin twice daily for 10 days  Ofloxacin 5 drops to the left and right ear twice daily for 7 days  Risk/benefits/side effects discussed  If hearing is not improved we will refer for audiology consult         Relevant Medications    amoxicillin-pot clavulanate (Augmentin) 500-125 mg tablet    ofloxacin (Floxin) 0.3 % otic solution

## 2025-03-17 ENCOUNTER — TELEPHONE (OUTPATIENT)
Dept: PRIMARY CARE | Facility: CLINIC | Age: 65
End: 2025-03-17
Payer: MEDICARE

## 2025-03-17 DIAGNOSIS — G47.33 OSA ON CPAP: Primary | ICD-10-CM

## 2025-03-17 NOTE — TELEPHONE ENCOUNTER
The patient was in for an appointment on 3/4/2025 for cold symptoms. Ear drainage as well.     He was given an antibiotic and ear drops.     Today he states they did NOT help.     He stated at his apportionment referrals to ENT and hearing specialist were discussed.     Can these be ordered for him?

## 2025-03-19 DIAGNOSIS — C91.10 CLL (CHRONIC LYMPHOCYTIC LEUKEMIA) (MULTI): ICD-10-CM

## 2025-03-21 ENCOUNTER — CLINICAL SUPPORT (OUTPATIENT)
Dept: AUDIOLOGY | Facility: HOSPITAL | Age: 65
End: 2025-03-21
Payer: MEDICARE

## 2025-03-21 ENCOUNTER — OFFICE VISIT (OUTPATIENT)
Dept: OTOLARYNGOLOGY | Facility: HOSPITAL | Age: 65
End: 2025-03-21
Payer: MEDICARE

## 2025-03-21 ENCOUNTER — TELEPHONE (OUTPATIENT)
Dept: PRIMARY CARE | Facility: CLINIC | Age: 65
End: 2025-03-21

## 2025-03-21 VITALS — WEIGHT: 190 LBS | HEIGHT: 67 IN | TEMPERATURE: 96.8 F | BODY MASS INDEX: 29.82 KG/M2

## 2025-03-21 DIAGNOSIS — H91.93 BILATERAL HEARING LOSS, UNSPECIFIED HEARING LOSS TYPE: ICD-10-CM

## 2025-03-21 DIAGNOSIS — H90.6 MIXED CONDUCTIVE AND SENSORINEURAL HEARING LOSS OF BOTH EARS: Primary | ICD-10-CM

## 2025-03-21 PROCEDURE — 3008F BODY MASS INDEX DOCD: CPT | Performed by: STUDENT IN AN ORGANIZED HEALTH CARE EDUCATION/TRAINING PROGRAM

## 2025-03-21 PROCEDURE — 99213 OFFICE O/P EST LOW 20 MIN: CPT | Performed by: STUDENT IN AN ORGANIZED HEALTH CARE EDUCATION/TRAINING PROGRAM

## 2025-03-21 PROCEDURE — 1123F ACP DISCUSS/DSCN MKR DOCD: CPT | Performed by: STUDENT IN AN ORGANIZED HEALTH CARE EDUCATION/TRAINING PROGRAM

## 2025-03-21 PROCEDURE — 92567 TYMPANOMETRY: CPT

## 2025-03-21 PROCEDURE — 1159F MED LIST DOCD IN RCRD: CPT | Performed by: STUDENT IN AN ORGANIZED HEALTH CARE EDUCATION/TRAINING PROGRAM

## 2025-03-21 PROCEDURE — 3044F HG A1C LEVEL LT 7.0%: CPT | Performed by: STUDENT IN AN ORGANIZED HEALTH CARE EDUCATION/TRAINING PROGRAM

## 2025-03-21 PROCEDURE — 92557 COMPREHENSIVE HEARING TEST: CPT

## 2025-03-21 NOTE — PROGRESS NOTES
"AUDIOLOGIC EVALUATION    HISTORY  Demar Pittman \"Bud\", 64 y.o., was seen today, 3/21/2025, for a follow-up audiologic evaluation in conjunction with an evaluation with Cristóbal Esquivel MD.    The following case history was obtained from the patient during today's visit on 3/21/2025  Hearing Concerns? Yes    Patient reported concerns for changes in hearing following obtaining the flu about 4 weeks ago. He reported his right ear is primarily his better hearing ear, however he is unsure if that remains true currently.    Tinnitus? Yes     Patient reported he hears ringing in his ears along with his heart beat and his own breathing.    He indicated he hears everything in both ears, however he perceives it to be greater in the right ear.    Otalgia? None reported at this time   Aural Pressure/Fullness? Yes, following the flu about 4 weeks ago. Both ears   Recent Ear Infections/Otorrhea? Patient reported recent drainage primarily in the left ear, however he indicated that was a few weeks ago in which it has stopped.    Dizziness? Yes     Patient reported dizziness with movements (e.g. quick movements, laying down to siitting up, etc). He reported this worsened after obtaining the flu.     Patient indicated he must sit for about 30 seconds prior to moving in order to ease his symptoms.    History of ear surgeries? None reported at this time   History of noise exposure? 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    Family history of hearing loss? None reported at this time   Other significant history? None reported at this time     ASSESSMENT  Otoscopy  Right Ear: Ear canal clear, tympanic membrane visualized.  Left Ear: Ear canal clear, tympanic membrane visualized.    Tympanometry (226 Hz):   Right Ear: Type C, negative middle ear pressure (-193 daPa) and normal eardrum mobility  Left Ear: Type C, negative middle ear pressure (-157 daPa) and reduced eardrum mobility    Acoustic Reflexes: "   (Probe) Right Ear: Did not test.  (Probe) Left Ear: Did not test.    DPOAEs (1,500-8,000 Hz Protocol)  Right Ear: Did not test  Left Ear:  Did not test    Pure Tone Audiometry:     Right Ear: Moderate sloping to severe mixed hearing loss from 250-8000 Hz (sensorineural component noted at 2000 Hz only)  Left Ear: Moderate sloping to profound sensorineural hearing loss from 250-8000 Hz    Speech Audiometry:   Right Ear: Obtained at 40 dB HL; good agreement with pure tone average  Left Ear:  Obtained at 35 dB HL; fair agreement with pure tone average    Speech Perception  Right Ear: excellent (92%) at 85 dB HL; based on [facility] Auditory Test No.6 (NU-6) (N=25).  Left Ear: excellent (92%) at 85 dB HL; based on [facility] Auditory Test No.6 (NU-6) (N=25).     Test technique: Conventional Audiometry via insert earphones.   Reliability:  good    Comparison of today's results with previous test results: re: 4/25/2022  Right Ear: Progressive from 250-8000 Hz   Left Ear: Progressive from 250-4000 Hz (Stable 4644-9794 Hz)    IMPRESSIONS  Moderate sloping to severe mixed hearing loss in the right ear   Moderate sloping to profound mixed hearing loss in the left ear  Abnormal middle ear status in both ears (negative pressure)  Did not test DPOAEs due to known hearing loss    RECOMMENDATIONS  Follow-up with ENT/Otolayrngology, as scheduled for today  Repeat hearing per ENT/Otolaryngology or no later than 3 months to assess middle ear status and monitor hearing    GAIL Hoskins, CCC-A  Clinical Audiologist    Time: [id number]  Today's results were discussed with patient and family. Patient reported understanding of today's results and agreement with care plan. Please see the audiogram for today's visit below.     AUDIOGRAM

## 2025-03-21 NOTE — PROGRESS NOTES
Head and Neck Surgery Telemedicine Follow-Up    Chief Concern:  Tongue Lesions, Hearing Changes    Interval Hx - 3/21/25  -No new tongue lesions  -His hearing improved to baseline after his previous visit, but then worsened again following another cold-like illness. Cannot equalize ear pressure with reduced hearing and ear fullness. PCP started augmentin and drops.   -Audiogram today w/ Moderate sloping to severe mixed hearing loss in the right ear, Moderate sloping to profound mixed hearing loss in the left ear, Abnormal middle ear status in both ears (negative pressure). L hearing has always been worse. Does not wear hearing aid.     Interval Hx - 11/30/25  Hearing significantly improved. No pain or drainage. Similar mild disequilibrium that  is longstanding.   No issues with dysphagia/odynophagia. No new mouth ulcers.    Interval Hx - 12/22/24  Oral lesions almost completely gone  Only has pain when he eats really spicy food  Recently had rapid onset drop in his hearing b/l with ear fullness just before thanksgiving. Was dxd w/ OM and treated with antibiotics. No pain but thinks he perhaps had one issue of drainage on his pillow when waking on the left.  Mild disequilibrium.  Ear symptoms improving but not completely resolved.  Has been using Flonase and he thinks this is helping.    Interval Hx - 10/4/24  Pain has resolved, and lesions improving  Has potential colon malignancy and is scheduled for colonoscopy  Is not interested in bx today unless absolutely necessary  Has final chemo infusion in December.     Interval Hx - 8/30/24  Rinses helping oral cavity pain, eating better  Lesions still persist  No new lesions, no new neck masses    Initial HPI - 8/16/2024  64 y.o. male with a history of  CLL presenting for evaluation of oral cavity lesions. He is known to our service, having previously been seen by Dr. Chirinos for a tongue base mass which was ultimately biopsied in the OR in May 2024 with path showing CLL.  Since April 2024 he has had intermittent painful lesions of this oral cavity and tongue. This has cause him some trouble swallowing. He feels as though they wax and wane, and do completely resolve before returning. He is currently on treatment for his CLL receiving rituximab and venetoclax  He thinks maybe the lesions resolve quicker after his rituximab infusions. The lesions predate his current treatment regimen. He has no new neck masses, hemoptysis, dyspnea, or dysphonia, but does have stable cervical LAD for greater than a year related to his CLL. He is a non-smoker with minimal alcohol intake.     Past Medical History  He has a past medical history of Chronic lymphocytic leukemia (Multi), Diffuse otitis externa, bilateral (10/11/2021), GERD (gastroesophageal reflux disease), Hard to intubate, Hyperlipidemia, Hypertrophy of tonsils (12/05/2019), Localized enlarged lymph nodes (04/30/2020), Personal history of diseases of the skin and subcutaneous tissue (03/09/2020), Personal history of other diseases of the circulatory system, Personal history of other diseases of the circulatory system, Personal history of other diseases of the circulatory system, Personal history of other diseases of the circulatory system, Personal history of other specified conditions (12/11/2019), Positive colorectal cancer screening using Cologuard test, Sleep apnea, and Transaminitis (06/26/2024).    Patient Active Problem List   Diagnosis    CAD (coronary artery disease)    CLL (chronic lymphocytic leukemia) (Multi)    Diastasis recti    Dry eyes, bilateral    Essential hypertension    Herpes simplex virus (HSV) infection    Controlled type 2 diabetes mellitus without complication, without long-term current use of insulin (Multi)    Mixed hyperlipidemia    XENA on CPAP    Paroxysmal atrial fibrillation (Multi)    S/P CABG x 3    Sensorineural hearing loss (SNHL) of both ears    Pancytopenia    Tinnitus of both ears    Umbilical hernia  without obstruction and without gangrene    Vitamin D deficiency    Annual physical exam    Hypokalemia    Class 1 obesity due to excess calories with serious comorbidity and body mass index (BMI) of 30.0 to 30.9 in adult    Tongue lesion    Difficult intubation    Mouth sores    Weight loss    Gastro-esophageal reflux disease without esophagitis    Old myocardial infarction    Presence of coronary angioplasty implant and graft    Unspecified right bundle-branch block    Lung nodule    Elbow swelling, right    Positive colorectal cancer screening using Cologuard test    Non-recurrent acute suppurative otitis media of left ear without spontaneous rupture of tympanic membrane    Acute cough    Upper respiratory infection, acute    Bilateral otitis media with effusion       Surgical History  He has a past surgical history that includes Other surgical history (10/24/2019); Other surgical history (10/24/2019); Other surgical history (12/13/2019); and Other surgical history (12/29/2019).     Social History  He reports that he quit smoking about 40 years ago. His smoking use included cigarettes. He has been exposed to tobacco smoke. He has never used smokeless tobacco. He reports that he does not currently use alcohol after a past usage of about 2.0 standard drinks of alcohol per week. He reports that he does not use drugs.    Family History  No family history on file.     Allergies  Rituximab-pvvr    Last Recorded Vitals  There were no vitals taken for this visit.     Physical Exam:  Constitutional:  No acute distress  Voice:  No hoarseness or other abnormality  Respiration:  Breathing comfortably, no stridors  Eyes:  EOM intact, sclera normal  Ears: Bilateral external auditory canals clear with some minimal amount of cerumen on the left.  Possible serous effusion on the R. No inflammation.   Neuro:  Alert and oriented times 3, Cranial nerves II-XII grossly intact and symmetric bilaterally  Head and Face:  Symmetric  facial features, no masses or lesions, sinuses non-tender to palpation  Salivary Glands:  Parotid and submandibular glands normal bilaterally  Oral Cavity/Oropharynx/Lips: Oral lesions resolved.   Pharynx: no masses or lesions  Neck/Lymph:  L>R small mobile level II nodes, non tender  Skin:  Neck skin is without scar or injury  Psych:  Alert and oriented with appropriate mood and affect    Medications:  Medication Documentation Review Audit       Reviewed by HELDER Pratt (Nurse Practitioner) on 03/04/25 at 1026      Medication Order Taking? Sig Documenting Provider Last Dose Status   acyclovir (Zovirax) 400 mg tablet [id number] Yes Take 1 tablet (400 mg) by mouth 2 times a day. Ev Luis MD  Active   amLODIPine (Norvasc) 5 mg tablet [id number] Yes Take 1 tablet (5 mg) by mouth once daily. HELDER Espinoza  Active   amoxicillin-pot clavulanate (Augmentin) 500-125 mg tablet [id number]  Take 1 tablet (500 mg) by mouth 2 times a day for 10 days. HELDER Pratt  Active   aspirin 81 mg EC tablet [id number] Yes Take 1 tablet (81 mg) by mouth once daily. Historical Provider, MD  Active   metoprolol tartrate (Lopressor) 25 mg tablet [id number] Yes TAKE ONE TABLET BY MOUTH TWO TIMES A DAY HELDER Espinoza  Active   multivitamin with folic acid (One Daily Multivitamin) 400 mcg tablet [id number] Yes Take 1 tablet by mouth once daily. Historical Provider, MD  Active   ofloxacin (Floxin) 0.3 % otic solution [id number]  Administer 5 drops into each ear 2 times a day for 7 days. HELDER Pratt  Active   ondansetron (Zofran) 8 mg tablet [id number] Yes Take 1 tablet (8 mg) by mouth every 8 hours if needed for nausea or vomiting. Ev Luis MD  Active   pantoprazole (ProtoNix) 40 mg EC tablet [id number] Yes Take 1 tablet (40 mg) by mouth once daily. Deedee Mcclendon MD  Active   potassium chloride CR (Klor-Con) 10 mEq ER tablet [id number] Yes Take 2 tablets (20 mEq) by mouth once  daily. Do not crush, chew, or split. Mayelin Bailon, APRN-CNP  Active   prochlorperazine (Compazine) 10 mg tablet [id number] Yes Take 1 tablet (10 mg) by mouth every 6 hours if needed for nausea or vomiting. Ev Luis MD  Active   venetoclax (Venclexta) 100 mg tablet [id number] Yes Take 2 tablets (200 mg total) by mouth once daily.  Take with food. Ev Luis MD  Active                  Imaging:  I personally reviewed the PET CT from 3/14/24.     Assessment/Plan   63yo M w/ long hx of CLL and multiple varied treatment courses seen today for intermittent oral cavity lesions. He has previously had biopsy proven CLL involvement at his tongue base. He is currently on treatment with ritux and venetoclax, but the lesions in question seme to predate this.     His symptoms of pain and dysphagia resolved and his lesions have healed.     He continues to have fluctuating symptoms of ear fullness and hearing loss associated with URI symptoms, abnormal ME pressure on tymps, and longstanding L>R HL that is unaided.     He is interested in seeing a specialist for his ears and I will therefore refer him to one of my Otology colleagues. A referral has been placed.     He does not need scheduled follow-up with me but will call if there are any new oral cavity lesions.

## 2025-03-21 NOTE — TELEPHONE ENCOUNTER
The patients wife called this afternoon to,  Demar went to the ENT doctor today, 3/21/2025.    The ENT is referring the patient to hearing specialist.     The wife stated there is still fluid in his ears, per the ENT doctor.     She called to ask if he should do another round of antibiotics and keep using the ear drops.     I asked if the ENT doctor recommended this and she stated he said nothing about it.     They were advised that ENT should be the office that handles this for the patient now but she still asked for the message to be sent to [facility].

## 2025-03-24 NOTE — TELEPHONE ENCOUNTER
Patients wife was informed.   She did state she was having a hard time trying to reach someone if the office of Dr Cristóbal Esquivel.

## 2025-03-26 ENCOUNTER — SPECIALTY PHARMACY (OUTPATIENT)
Dept: PHARMACY | Facility: CLINIC | Age: 65
End: 2025-03-26

## 2025-03-26 PROCEDURE — RXMED WILLOW AMBULATORY MEDICATION CHARGE

## 2025-04-04 ENCOUNTER — PHARMACY VISIT (OUTPATIENT)
Dept: PHARMACY | Facility: CLINIC | Age: 65
End: 2025-04-04
Payer: COMMERCIAL

## 2025-04-04 ENCOUNTER — LAB (OUTPATIENT)
Dept: LAB | Facility: HOSPITAL | Age: 65
End: 2025-04-04
Payer: MEDICARE

## 2025-04-04 DIAGNOSIS — K13.79 OTHER LESIONS OF ORAL MUCOSA: Primary | ICD-10-CM

## 2025-04-04 DIAGNOSIS — C91.10 CLL (CHRONIC LYMPHOCYTIC LEUKEMIA) (MULTI): ICD-10-CM

## 2025-04-04 LAB
ALBUMIN SERPL BCP-MCNC: 4.5 G/DL (ref 3.4–5)
ALP SERPL-CCNC: 125 U/L (ref 33–136)
ALT SERPL W P-5'-P-CCNC: 38 U/L (ref 10–52)
ANION GAP SERPL CALC-SCNC: 17 MMOL/L (ref 10–20)
AST SERPL W P-5'-P-CCNC: 23 U/L (ref 9–39)
BILIRUB SERPL-MCNC: 1 MG/DL (ref 0–1.2)
BUN SERPL-MCNC: 9 MG/DL (ref 6–23)
CALCIUM SERPL-MCNC: 9.1 MG/DL (ref 8.6–10.3)
CHLORIDE SERPL-SCNC: 99 MMOL/L (ref 98–107)
CO2 SERPL-SCNC: 29 MMOL/L (ref 21–32)
CREAT SERPL-MCNC: 0.68 MG/DL (ref 0.5–1.3)
EGFRCR SERPLBLD CKD-EPI 2021: >90 ML/MIN/1.73M*2
GLUCOSE SERPL-MCNC: 118 MG/DL (ref 74–99)
POTASSIUM SERPL-SCNC: 4 MMOL/L (ref 3.5–5.3)
PROT SERPL-MCNC: 6.1 G/DL (ref 6.4–8.2)
SODIUM SERPL-SCNC: 141 MMOL/L (ref 136–145)

## 2025-04-04 PROCEDURE — 80053 COMPREHEN METABOLIC PANEL: CPT

## 2025-04-05 LAB
25(OH)D3+25(OH)D2 SERPL-MCNC: 34 NG/ML (ref 30–100)
ALBUMIN SERPL-MCNC: 4.5 G/DL (ref 3.6–5.1)
ALBUMIN/CREAT UR: 8 MG/G CREAT
ALP SERPL-CCNC: 130 U/L (ref 35–144)
ALT SERPL-CCNC: 36 U/L (ref 9–46)
ANION GAP SERPL CALCULATED.4IONS-SCNC: 10 MMOL/L (CALC) (ref 7–17)
AST SERPL-CCNC: 23 U/L (ref 10–35)
BILIRUB SERPL-MCNC: 0.9 MG/DL (ref 0.2–1.2)
BUN SERPL-MCNC: 9 MG/DL (ref 7–25)
CALCIUM SERPL-MCNC: 9.2 MG/DL (ref 8.6–10.3)
CHLORIDE SERPL-SCNC: 101 MMOL/L (ref 98–110)
CHOLEST SERPL-MCNC: 199 MG/DL
CHOLEST/HDLC SERPL: 4.9 (CALC)
CO2 SERPL-SCNC: 28 MMOL/L (ref 20–32)
CREAT SERPL-MCNC: 0.63 MG/DL (ref 0.7–1.35)
CREAT UR-MCNC: 80 MG/DL (ref 20–320)
EGFRCR SERPLBLD CKD-EPI 2021: 105 ML/MIN/1.73M2
ERYTHROCYTE [DISTWIDTH] IN BLOOD BY AUTOMATED COUNT: 15.5 % (ref 11–15)
EST. AVERAGE GLUCOSE BLD GHB EST-MCNC: 117 MG/DL
EST. AVERAGE GLUCOSE BLD GHB EST-SCNC: 6.5 MMOL/L
GLUCOSE SERPL-MCNC: 117 MG/DL (ref 65–99)
HBA1C MFR BLD: 5.7 % OF TOTAL HGB
HCT VFR BLD AUTO: 43.9 % (ref 38.5–50)
HDLC SERPL-MCNC: 41 MG/DL
HGB BLD-MCNC: 14.9 G/DL (ref 13.2–17.1)
LDLC SERPL CALC-MCNC: 121 MG/DL (CALC)
LDLC SERPL DIRECT ASSAY-MCNC: 132 MG/DL
MCH RBC QN AUTO: 30.6 PG (ref 27–33)
MCHC RBC AUTO-ENTMCNC: 33.9 G/DL (ref 32–36)
MCV RBC AUTO: 90.1 FL (ref 80–100)
MICROALBUMIN UR-MCNC: 0.6 MG/DL
NONHDLC SERPL-MCNC: 158 MG/DL (CALC)
PLATELET # BLD AUTO: 138 THOUSAND/UL (ref 140–400)
PMV BLD REES-ECKER: 11.1 FL (ref 7.5–12.5)
POTASSIUM SERPL-SCNC: 3.9 MMOL/L (ref 3.5–5.3)
PROT SERPL-MCNC: 6.3 G/DL (ref 6.1–8.1)
PSA SERPL-MCNC: 0.91 NG/ML
RBC # BLD AUTO: 4.87 MILLION/UL (ref 4.2–5.8)
SODIUM SERPL-SCNC: 139 MMOL/L (ref 135–146)
TRIGL SERPL-MCNC: 241 MG/DL
TSH SERPL-ACNC: 3.74 MIU/L (ref 0.4–4.5)
VIT B12 SERPL-MCNC: 517 PG/ML (ref 200–1100)
WBC # BLD AUTO: 3.7 THOUSAND/UL (ref 3.8–10.8)

## 2025-04-06 ASSESSMENT — ENCOUNTER SYMPTOMS
VOICE CHANGE: 0
LIGHT-HEADEDNESS: 0
VOMITING: 0
FEVER: 0
NUMBNESS: 0
DIZZINESS: 0
BLOOD IN STOOL: 0
SORE THROAT: 0
CHILLS: 0
TROUBLE SWALLOWING: 0
RESPIRATORY NEGATIVE: 1
DIARRHEA: 0
APPETITE CHANGE: 0
CONSTIPATION: 0
UNEXPECTED WEIGHT CHANGE: 0
CARDIOVASCULAR NEGATIVE: 1

## 2025-04-06 NOTE — PROGRESS NOTES
"Patient ID: Demar Pittman \"Shraddha" is a 64 y.o. male.    Oncology History Overview Note   B-CLL diagnosed in 2015, SUÁREZ stage 0; WBC initially 16.3 with ALC 12.0, hemoglobin 15.1 and platelets 154,000.  The cells expressed CD5 and A light chains.   CD38 and  Zap-70 were negative. Cytogenetic studies by FISH were inconclusive.  WBC has slowly rising but he maintains good marrow reserve and has not required treatment.  CT chest done 3/9/17 showed extensive cervical, axillary and submental adenopathy; largest  node on the left measured 2.3 x 1.9 cm.  Small (less than 9 mm) pulmonology nodules were again seen; relatively stable.  Abdominal nodes slightly increased.    17: WBC 65.4, ALC 62.8; becoming more symptomatic.  17 : WBC 77.6.  ALC 72.2.  Hemoglobin and platelets were normal.  18 WBC: 69.3. A.9. Hgb 15.2. Plt: 148,000  18: WBC 88.2.  ALC 82.9.  Hemoglobin 14.2.  Platelets 170,000.  Adenopathy stable.  18: WBC 77.3.  ALC 66.5.  Hemoglobin 13.8.  Platelets 143,000.  Adenopathy slightly increased.  18: WBC 92.0.  ALC 86.5.  Hemoglobin 14.1.  Platelets 157,000.  Progressing symptomatic adenopathy.  Treatment options discussed.   18 completed rituximab weekly x 4.  10/9/19: CBC: WBC 16.5.  ALC 12.4.  Hemoglobin 14.6.  Platelets 148,000.  Worsening adenopathy.  3/17/20: Started bendamustine/rituximab after multiple thoracentesis for malignant pleural effusion with CLL  20: #2 bendamustine/rituximab.  20: #3 BR  20: WBC 4.7.  Hemoglobin 12.9.  Platelets 189,000.  20: #4 BR  20: #5 BR  20: WBC 1.7. hgb 11.9. Platelets 176.  20: #6/6 bendamustine/rituximab  2023, WBC count 62, hemoglobin 14.9, platelets 143, absolute lymphocyte count 50.2  January 15, 2024, WBC count 40.5, hemoglobin 15.0, platelets 175  2/10/24 , WBC count 31.5, hemoglobin 14.9, platelets 131  3/14/24 , WBC count 13.9, hemoglobin 14.9, platelets " 122  3/14/24, physical examination positive for bilateral cervical lymphadenopathy, bilateral axillary lymphadenopathy  PET imaging 3/29/2024 intense uptake in tongue base ( S/P) tonsillectomy. Multiple enlarged lymph nodes in cervical, parotid, nodes, multipl pleural based and parenchymal non FDG avid pulmonary nodes, enlarged axillary lymph nodes, mediastinal and bilateral hilar lymph nodes, periportal lymph nodes, iliac nodes, inguinal nodes.     -FISH panel  of peripheral blood 2024 Pos for del 11q, negative for t(11,14), trisomy 12, monosomy 13 and deletion of 17p.     -Needle biopsy of posterior tongue mass 24 involvement by chronic lymphocytic leukemia/small lymphocytic lymphoma no evidence of transformation    Venetoclax ramp up 6/10/24-24  Then was hospitalized 24-24 for second venetoclax ramp-up. C1 rituximab - 7/15/24.     CLL (chronic lymphocytic leukemia) (Multi)   2023 Initial Diagnosis    CLL (chronic lymphocytic leukemia) (CMS/[facility])     1/15/2024 - 1/15/2024 Chemotherapy    Bendamustine + RiTUXimab, 28 Day Cycles     2024 - 2024 Chemotherapy    (INPT) Venetoclax, 28 Day Cycles      2024 - 2024 Chemotherapy    Venetoclax + RiTUXimab, 28 Day Cycles        Past medical history: CLL/SLL (2015) as described above, status post rituximab, 6 cycles of bendamustine/rituximab (3/2020-2020).  Excellent response to treatment.       History of CABGx3 19, vertigo, XENA, remote tonsillectomy, retinal tear, stable pulmonary nodules, hyperlipidemia, hypertension, sinusitis.  Unremarkable cardiac catheterization 21. Hyperglycemia, torn retina, cataract. Hx of afib since CABG.      Family medical history: Mother  of myeloma in her 70s and his father  of Hodgkin lymphoma at age 39.     Social history: Rare alcohol intake.  Former smoker but quit in .  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 2  biological children, one son with downs syndrome.      Subjective  HPI     Demar Pittman is a patient of Dr. Wang with a long history of CLL initially seen with his wife and son 5/9/24 for a second opinion for treatment of CLL.   He received BR completing in 8/2020.  In December 2023 after COVID infection and he had increased lymphocytosis which resolved, subsequently noted to have lymphadenopathy confirmed on PET scan.  Ibrutinib recommended in March 2024, but patient has not yet started this because he was leary of side effects.      Due to discordance in uptake at base of tongue on PET imaging, tongue biopsy was done 5/28/24 which was consistent with CLL and had no evidence of large cell transformation.     Was hospitalized 6/10/24-6/13/24 for first venetoclax ramp-up.  Then was hospitalized 6/17/24-6/19/24 for second venetoclax ramp-up. C1 rituximab - 7/15/24 cycle 6 completed 12/2/24    CT chest (9/6/24) showing significant interval decrease in size of multiple cervical, axillary, mediastinal and upper abdominal lymph nodes, compatible with treatment response, persistent splenomegaly, stable pulmonary nodules measuring up to 6 mm, mild hepatic steatosis.     Demar presents to the clinic today (4/7/25) with his wife for follow up evaluation of his CLL and count checks.  He completed dose 6 of rituxan on 12/2/25.  Currently on 200 mg venetoclax, was instructed to decrease to 200 mg around 10/11/24 for cytopenias.  Continues venetoclax 200 mg daily.  Receiving okay from  [facility] pharmacy.  Venetoclax cost is covered by insurance.      Main concern is that he continues to have episodes of losing hearing after infection to bilateral ears.  PCP gave antibiotics and ear drops, with no improvement to hearing.  Reports mild discomfort to right ear, can feel with heartbeat.  No drainage from ears.  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  Was previously  taking antihistamine and flonase, but felt these weren't helping.  About 3 days ago tried using OTC cold medication with decongestant to see if this would dry up congestion.  No recent fevers or chills.  Was evaluated by ENT, Dr. Esquivel who recommended that he see Dr. Henri Mccormick for further evaluation of his ears.  He is scheduled to see Dr. Lobito Mccormick on 4/14/25.  Energy level is lower.  Appetite is okay.  Has gained some weight, feels like weight gain is diet related.  Ongoing intermittent night sweats, can be drenching at times.  Night sweats have been going on for years.  No longer having mouth sores. Has intermittent nausea after taking venetoclax, using antinausea medication.  Reports intermittent lower back pain, takes tylenol as needed.  Continues to feel lymph nodes to throat, staying the same.      Review of Systems   Constitutional:  Positive for diaphoresis and fatigue. Negative for appetite change, chills, fever and unexpected weight change.   HENT:   Positive for tinnitus (nasal congestion hearing difficulties). Negative for mouth sores, sore throat, trouble swallowing and voice change.    Respiratory: Negative.     Cardiovascular: Negative.    Gastrointestinal:  Positive for nausea. Negative for blood in stool, constipation, diarrhea and vomiting.   Genitourinary: Negative.     Musculoskeletal:  Positive for back pain. Negative for gait problem.   Skin: Negative.    Neurological:  Negative for dizziness, gait problem, light-headedness and numbness.   Hematological:  Positive for adenopathy. Does not bruise/bleed easily.   ---------------------------------------------------------------------------------------  Subjective      Objective    BSA: 2.02 meters squared  /66   Pulse 65   Temp 36.3 °C (97.3 °F)   Resp 16   Wt 86.7 kg (191 lb 2.2 oz)   SpO2 97%   BMI 29.94 kg/m²      Physical Exam  Vitals reviewed.   Constitutional:       Appearance: Normal appearance.      Comments:  Looks well, hard of hearing   HENT:      Head: Normocephalic and atraumatic.      Right Ear: Decreased hearing noted. No drainage.      Left Ear: Decreased hearing noted. No drainage.      Ears:      Comments: Pearly grey TM noted to bilateral ears, bulging TM, no erythema.       Nose: Nose normal.      Mouth/Throat:      Mouth: Mucous membranes are moist.      Pharynx: Oropharynx is clear.      Comments: No mouth sores  Eyes:      Extraocular Movements: Extraocular movements intact.      Conjunctiva/sclera: Conjunctivae normal.      Pupils: Pupils are equal, round, and reactive to light.   Cardiovascular:      Rate and Rhythm: Normal rate and regular rhythm.      Pulses: Normal pulses.      Heart sounds: Normal heart sounds.   Pulmonary:      Effort: Pulmonary effort is normal.      Breath sounds: Normal breath sounds.   Abdominal:      General: Abdomen is flat. Bowel sounds are normal.      Palpations: Abdomen is soft.   Musculoskeletal:         General: Normal range of motion.      Cervical back: Normal range of motion.   Lymphadenopathy:      Comments: Continues to have cervical LN, mobile, non-tender.     Skin:     General: Skin is warm and dry.      Capillary Refill: Capillary refill takes less than 2 seconds.   Neurological:      General: No focal deficit present.      Mental Status: He is alert and oriented to person, place, and time. Mental status is at baseline.   Psychiatric:         Mood and Affect: Mood normal.         Behavior: Behavior normal.         Thought Content: Thought content normal.         Judgment: Judgment normal.     Performance Status:  Karnofsky Score: 80 - Normal activity with effort; some signs or symptoms of disease  -------------------------------------------------------------------------------------------------------------------------------------  Assessment/Plan      CLL (chronic lymphocytic leukemia) (Multi)  Chronic lymphocytic leukemia with symptomatic adenopathy, excellent  response to bendamustine/rituximab; completed 6 cycles in August 2020.    Patient seen  for further management of bulky CLL.  PET imaging shows discordant uptake at the base of the tongue , pulmonary nodules which the patient states are longstanding. There is no evidence of Richters transformation by histology and FISH analysis shows del 11q. Due to symptoms of tongue swelling has started dexamethasone 4 mg po bid for 10 days for sx relief.     6/3/24 Previously discussed treatment options and patient would prefer to receive venetoclax as a time limited approach and I have recommended the [place] regimen with escalating doses of venetoclax followed by monthly rituxan for 6  monthly doses  and a planned 2 year course of venetoclax. We reviewed side effects of venetoclax and rituxan signed chemo consent.  Given patients tumor burden have schedule inpatient hospitalization to monitor for TLS for at least first two dose ramp ups on 5/10 and 5/17/24  Started allopurinol for TLS prevention    6/17/24:  Hospital admit for Venetoclax ramp up to 50 mg daily x 7 days. Next ramp ups are planned to be done outpatient.      CT chest (9/6/24) showing significant interval decrease in size of multiple cervical, axillary, mediastinal and upper abdominal lymph nodes, compatible with treatment response, persistent splenomegaly, stable pulmonary nodules measuring up to 6 mm, mild hepatic steatosis.       12/2/24:  completed cycle 6 of rituxan.      4/7/25:  CBC results stable from today.  Continue to palpate slightly enlarged bilateral cervical lymph nodes, non-tender.  Continue venetoclax 200 mg daily.  Follow Plan total of 2 year course of venetoclax. Continue venetoclax at 200 mg daily due to ongoing mild cytopenia.  Ordered CT CAP prior to next visit.  Follow up in 2 months.      ID : continue prophylactic acyclovir     Hypogammaglobinemia/Recurrent infections:  Demar has had multiple sinus infections/ear infections over the last  few months.  IgG level 295.  Plan to start monthly IVIG if approved by insurance.  Educated pt and wife on IVIG, possible side effects, pre-medications.  Education verbalized.  Would like to have at [facility].      Hearing Loss:  Has been suffering from intermittent hearing loss following multiple sinus/ear infection. Was evaluated by ENT, Dr. Esquivel who recommended that he see Dr. Henri Mccormick for further evaluation of his ears. No erythema noted to TM, bulging to bilateral TM.  Decreased hearing noted.  Advised to follow with Dr. Lobito Mccormick for further evaluation, appt scheduled for 4/14/25.  Advised to try OTC decongestants or antihistamines.       Mouth Sores:  Now resolved.  Continuing prn dexamethasone rinse.  Following with ENT, in January.    CAD:  status post CABG.       Transaminitis:  at diagnosis transaminitis with AST about 500.  PET imaging shows periportal mass vs lymphadenopathy, hepatitis serologies negative, ultrasound consistent with large periportal node,  CT of liver no abnormalties.  6/24/24:  Improvement in liver enzymes: , AST 29, bilirubin 0.6.  Discontinued CT of liver as liver enzymes are improving.  2/3/25:  ALT and AST have remained in normal range.      Pulmonary Nodule:  Chest x-ray (8/19/24) showing RUL pulmonary nodule-potentially neoplastic.    CT Chest order by PCP (9/6/24) showing stable pulmonary nodule with recommendation for follow up imaging in 1 year.    Discussed with Dr. Luis, plan to monitor with future imaging.     Elevated Blood sugars- discussed weight loss strategies and cutting down carbs. Hgb A1c 5.7 (4/5/25) which is great.    Hypokalemia:  Continue to take potassium chloride 20 meq daily.    Healthcare Maintenance:  Recently had positive cologard, and had colonoscopy 12/23/2024 which showed no abnormalities.      RTC:   [facility] Dr. Lobito Mccormick.  Start monthly IVIG infusions at [facility].  CT CAP prior to next visit around  6/2/25  Follow up visit with provider in 2 months.     --------------------------------------------------------------------------------------------------  ANNELISE Gant-CNP

## 2025-04-07 ENCOUNTER — SPECIALTY PHARMACY (OUTPATIENT)
Dept: HEMATOLOGY/ONCOLOGY | Facility: HOSPITAL | Age: 65
End: 2025-04-07

## 2025-04-07 ENCOUNTER — OFFICE VISIT (OUTPATIENT)
Dept: HEMATOLOGY/ONCOLOGY | Facility: HOSPITAL | Age: 65
End: 2025-04-07
Payer: MEDICARE

## 2025-04-07 VITALS
SYSTOLIC BLOOD PRESSURE: 117 MMHG | TEMPERATURE: 97.3 F | OXYGEN SATURATION: 97 % | DIASTOLIC BLOOD PRESSURE: 66 MMHG | WEIGHT: 191.14 LBS | BODY MASS INDEX: 29.94 KG/M2 | RESPIRATION RATE: 16 BRPM | HEART RATE: 65 BPM

## 2025-04-07 DIAGNOSIS — R91.1 LUNG NODULE: ICD-10-CM

## 2025-04-07 DIAGNOSIS — E87.6 HYPOKALEMIA: ICD-10-CM

## 2025-04-07 DIAGNOSIS — E11.9 CONTROLLED TYPE 2 DIABETES MELLITUS WITHOUT COMPLICATION, WITHOUT LONG-TERM CURRENT USE OF INSULIN: ICD-10-CM

## 2025-04-07 DIAGNOSIS — C91.10 CLL (CHRONIC LYMPHOCYTIC LEUKEMIA) (MULTI): Primary | ICD-10-CM

## 2025-04-07 DIAGNOSIS — H90.3 SENSORINEURAL HEARING LOSS (SNHL) OF BOTH EARS: ICD-10-CM

## 2025-04-07 DIAGNOSIS — D80.1 HYPOGAMMAGLOBULINEMIA (MULTI): ICD-10-CM

## 2025-04-07 PROBLEM — J06.9 UPPER RESPIRATORY INFECTION, ACUTE: Status: RESOLVED | Noted: 2025-01-14 | Resolved: 2025-04-07

## 2025-04-07 PROBLEM — H65.93 BILATERAL OTITIS MEDIA WITH EFFUSION: Status: RESOLVED | Noted: 2025-03-04 | Resolved: 2025-04-07

## 2025-04-07 PROBLEM — R63.4 WEIGHT LOSS: Status: RESOLVED | Noted: 2024-07-25 | Resolved: 2025-04-07

## 2025-04-07 PROBLEM — E66.09 CLASS 1 OBESITY DUE TO EXCESS CALORIES WITH SERIOUS COMORBIDITY AND BODY MASS INDEX (BMI) OF 30.0 TO 30.9 IN ADULT: Status: RESOLVED | Noted: 2024-02-16 | Resolved: 2025-04-07

## 2025-04-07 PROBLEM — Z00.00 WELCOME TO MEDICARE PREVENTIVE VISIT: Status: ACTIVE | Noted: 2025-04-07

## 2025-04-07 PROBLEM — B00.9 HERPES SIMPLEX VIRUS (HSV) INFECTION: Status: RESOLVED | Noted: 2023-08-16 | Resolved: 2025-04-07

## 2025-04-07 PROBLEM — H66.002 NON-RECURRENT ACUTE SUPPURATIVE OTITIS MEDIA OF LEFT EAR WITHOUT SPONTANEOUS RUPTURE OF TYMPANIC MEMBRANE: Status: RESOLVED | Noted: 2024-12-12 | Resolved: 2025-04-07

## 2025-04-07 PROBLEM — I48.0 PAROXYSMAL ATRIAL FIBRILLATION (MULTI): Status: RESOLVED | Noted: 2023-08-16 | Resolved: 2025-04-07

## 2025-04-07 PROBLEM — M25.421 ELBOW SWELLING, RIGHT: Status: RESOLVED | Noted: 2024-09-11 | Resolved: 2025-04-07

## 2025-04-07 PROBLEM — E66.811 CLASS 1 OBESITY DUE TO EXCESS CALORIES WITH SERIOUS COMORBIDITY AND BODY MASS INDEX (BMI) OF 30.0 TO 30.9 IN ADULT: Status: RESOLVED | Noted: 2024-02-16 | Resolved: 2025-04-07

## 2025-04-07 PROBLEM — R05.1 ACUTE COUGH: Status: RESOLVED | Noted: 2025-01-14 | Resolved: 2025-04-07

## 2025-04-07 PROBLEM — K13.79 MOUTH SORES: Status: RESOLVED | Noted: 2024-07-17 | Resolved: 2025-04-07

## 2025-04-07 PROBLEM — R19.5 POSITIVE COLORECTAL CANCER SCREENING USING COLOGUARD TEST: Status: RESOLVED | Noted: 2024-09-20 | Resolved: 2025-04-07

## 2025-04-07 LAB
IGA SERPL-MCNC: 88 MG/DL (ref 70–400)
IGG SERPL-MCNC: 295 MG/DL (ref 700–1600)
IGM SERPL-MCNC: 6 MG/DL (ref 40–230)

## 2025-04-07 PROCEDURE — 3044F HG A1C LEVEL LT 7.0%: CPT

## 2025-04-07 PROCEDURE — 1126F AMNT PAIN NOTED NONE PRSNT: CPT

## 2025-04-07 PROCEDURE — 1160F RVW MEDS BY RX/DR IN RCRD: CPT

## 2025-04-07 PROCEDURE — 1159F MED LIST DOCD IN RCRD: CPT

## 2025-04-07 PROCEDURE — 1123F ACP DISCUSS/DSCN MKR DOCD: CPT

## 2025-04-07 PROCEDURE — 99215 OFFICE O/P EST HI 40 MIN: CPT

## 2025-04-07 PROCEDURE — 3078F DIAST BP <80 MM HG: CPT

## 2025-04-07 PROCEDURE — 3074F SYST BP LT 130 MM HG: CPT

## 2025-04-07 ASSESSMENT — ENCOUNTER SYMPTOMS
NAUSEA: 1
DIAPHORESIS: 1
FATIGUE: 1
BRUISES/BLEEDS EASILY: 0
BACK PAIN: 1
ADENOPATHY: 1

## 2025-04-07 ASSESSMENT — PAIN SCALES - GENERAL: PAINLEVEL_OUTOF10: 0-NO PAIN

## 2025-04-07 NOTE — ASSESSMENT & PLAN NOTE
Medical Wellness exam done.   Prevnar 20 8/23  Had measels as child  colonoscopy 12/24  PSA 4/25  Former smoker quit 1985  Not on opioids  Depression Screening  Depression screening completed using the PHQ-2 questions, with results documented in the chart/encounter (~5min).  (See Rooming Screening section for documentation, and/or progress note for additional information)

## 2025-04-07 NOTE — PROGRESS NOTES
Subjective :  Chief Complaint: Demar Pittman is an 64 y.o. male here for an Welcome to Medicare Wellness visit.    Recurrent URIs - oncology diagnosd with low IgG and set up for infusion. Also following with ENT.    Patient otherwise feels well. No other complaints or concerns.    I have reviewed and reconciled the medication list with the patient today.    Current Outpatient Medications:     acyclovir (Zovirax) 400 mg tablet, Take 1 tablet (400 mg) by mouth 2 times a day., Disp: 60 tablet, Rfl: 3    amLODIPine (Norvasc) 5 mg tablet, Take 1 tablet (5 mg) by mouth once daily., Disp: 90 tablet, Rfl: 3    aspirin 81 mg EC tablet, Take 1 tablet (81 mg) by mouth once daily., Disp: , Rfl:     metoprolol tartrate (Lopressor) 25 mg tablet, TAKE ONE TABLET BY MOUTH TWO TIMES A DAY, Disp: 180 tablet, Rfl: 3    multivitamin with folic acid (One Daily Multivitamin) 400 mcg tablet, Take 1 tablet by mouth once daily., Disp: , Rfl:     ondansetron (Zofran) 8 mg tablet, Take 1 tablet (8 mg) by mouth every 8 hours if needed for nausea or vomiting., Disp: 30 tablet, Rfl: 5    potassium chloride CR (Klor-Con) 10 mEq ER tablet, Take 2 tablets (20 mEq) by mouth once daily. Do not crush, chew, or split., Disp: 60 tablet, Rfl: 3    prochlorperazine (Compazine) 10 mg tablet, Take 1 tablet (10 mg) by mouth every 6 hours if needed for nausea or vomiting., Disp: 30 tablet, Rfl: 5    venetoclax (Venclexta) 100 mg tablet, Take 2 tablets (200 mg total) by mouth once daily.  Take with food., Disp: 60 tablet, Rfl: 5    clotrimazole-betamethasone (Lotrisone) cream, Apply 1 Application topically 2 times a day., Disp: 45 g, Rfl: 3    pantoprazole (ProtoNix) 40 mg EC tablet, Take 1 tablet (40 mg) by mouth once daily., Disp: 90 tablet, Rfl: 3    rosuvastatin (Crestor) 5 mg tablet, Take 1 tablet (5 mg) by mouth once daily., Disp: 100 tablet, Rfl: 3    The patient's relevant past medical, surgical, family and social history was reviewed in  [facility].  All pertinent lab work and results for this visit were reviewed with patient.    Office Visit on 04/11/2025   Component Date Value Ref Range Status    PSA, TOTAL 04/04/2025 0.91  < OR = 4.00 ng/mL Final    Comment: The total PSA value from this assay system is   standardized against the WHO standard. The test   result will be approximately 20% lower when compared   to the equimolar-standardized total PSA (Jaimee   [employer]). Comparison of serial PSA results should be   interpreted with this fact in mind.     This test was performed using the Siemens   chemiluminescent method. Values obtained from   different assay methods cannot be used  interchangeably. PSA levels, regardless of  value, should not be interpreted as absolute  evidence of the presence or absence of disease.      VITAMIN B12 04/04/2025 517  200 - 1,100 pg/mL Final    VITAMIN D,25-OH,TOTAL,IA 04/04/2025 34  30 - 100 ng/mL Final    Comment: Vitamin D Status         25-OH Vitamin D:     Deficiency:                    <20 ng/mL  Insufficiency:             20 - 29 ng/mL  Optimal:                 > or = 30 ng/mL     For 25-OH Vitamin D testing on patients on   D2-supplementation and patients for whom quantitation   of D2 and D3 fractions is required, the [employer]ureD(TM)  25-OH VIT D, (D2,D3), LC/MS/MS is recommended: order   code 91450 (patients >2yrs).     See Note 1     Note 1     For additional information, please refer to   http://education.[employer].[facility]/faq/OCP660   (This link is being provided for informational/  educational purposes only.)      HEMOGLOBIN A1c 04/04/2025 5.7 (H)  <5.7 % of total Hgb Final    Comment: For someone without known diabetes, a hemoglobin   A1c value between 5.7% and 6.4% is consistent with  prediabetes and should be confirmed with a   follow-up test.     For someone with known diabetes, a value <7%  indicates that their diabetes is well controlled. A1c  targets should be individualized based on duration  of  diabetes, age, comorbid conditions, and other  considerations.     This assay result is consistent with an increased risk  of diabetes.     Currently, no consensus exists regarding use of  hemoglobin A1c for diagnosis of diabetes for children.         eAG (mg/dL) 04/04/2025 117  mg/dL Final    eAG (mmol/L) 04/04/2025 6.5  mmol/L Final    CREATININE, RANDOM URINE 04/04/2025 80  20 - 320 mg/dL Final    ALBUMIN, URINE 04/04/2025 0.6  See Note: mg/dL Final    Comment: Reference Range:    Reference Range  Not established      ALBUMIN/CREATININE RATIO, RANDOM U* 04/04/2025 8  <30 mg/g creat Final    Comment:    The ADA defines abnormalities in albumin  excretion as follows:     Albuminuria Category        Result (mg/g creatinine)     Normal to Mildly increased   <30  Moderately increased            Severely increased           > OR = 300     The ADA recommends that at least two of three  specimens collected within a 3-6 month period be  abnormal before considering a patient to be  within a diagnostic category.      GLUCOSE 04/04/2025 117 (H)  65 - 99 mg/dL Final    Comment:               Fasting reference interval     For someone without known diabetes, a glucose value  between 100 and 125 mg/dL is consistent with  prediabetes and should be confirmed with a  follow-up test.         UREA NITROGEN (BUN) 04/04/2025 9  7 - 25 mg/dL Final    CREATININE 04/04/2025 0.63 (L)  0.70 - 1.35 mg/dL Final    EGFR 04/04/2025 105  > OR = 60 mL/min/1.73m2 Final    SODIUM 04/04/2025 139  135 - 146 mmol/L Final    POTASSIUM 04/04/2025 3.9  3.5 - 5.3 mmol/L Final    CHLORIDE 04/04/2025 101  98 - 110 mmol/L Final    CARBON DIOXIDE 04/04/2025 28  20 - 32 mmol/L Final    ELECTROLYTE BALANCE 04/04/2025 10  7 - 17 mmol/L (calc) Final    CALCIUM 04/04/2025 9.2  8.6 - 10.3 mg/dL Final    PROTEIN, TOTAL 04/04/2025 6.3  6.1 - 8.1 g/dL Final    ALBUMIN 04/04/2025 4.5  3.6 - 5.1 g/dL Final    BILIRUBIN, TOTAL 04/04/2025 0.9  0.2 - 1.2 mg/dL  Final    ALKALINE PHOSPHATASE 04/04/2025 130  35 - 144 U/L Final    AST 04/04/2025 23  10 - 35 U/L Final    ALT 04/04/2025 36  9 - 46 U/L Final    DIRECT LDL 04/04/2025 132 (H)  <100 mg/dL Final    Comment:    Desirable range <100 mg/dL for primary prevention;    <70 mg/dL for patients with CHD or diabetic patients   with > or = 2 CHD risk factors.         CHOLESTEROL, TOTAL 04/04/2025 199  <200 mg/dL Final    HDL CHOLESTEROL 04/04/2025 41  > OR = 40 mg/dL Final    TRIGLYCERIDES 04/04/2025 241 (H)  <150 mg/dL Final    Comment:    If a non-fasting specimen was collected, consider  repeat triglyceride testing on a fasting specimen  if clinically indicated.   Stone et al. J. of Clin. Lipidol. 2015;9:129-169.         LDL-CHOLESTEROL 04/04/2025 121 (H)  mg/dL (calc) Final    Comment: Reference range: <100     Desirable range <100 mg/dL for primary prevention;    <70 mg/dL for patients with CHD or diabetic patients   with > or = 2 CHD risk factors.     LDL-C is now calculated using the Getachew-Danette   calculation, which is a validated novel method providing   better accuracy than the Friedewald equation in the   estimation of LDL-C.   Getachew SS et al. HOLLY. 2013;310(19): 5613-4390   (http://education.[employer]/faq/NYL188)      CHOL/HDLC RATIO 04/04/2025 4.9  <5.0 (calc) Final    NON HDL CHOLESTEROL 04/04/2025 158 (H)  <130 mg/dL (calc) Final    Comment: For patients with diabetes plus 1 major ASCVD risk   factor, treating to a non-HDL-C goal of <100 mg/dL   (LDL-C of <70 mg/dL) is considered a therapeutic   option.      WHITE BLOOD CELL COUNT 04/04/2025 3.7 (L)  3.8 - 10.8 Thousand/uL Final    RED BLOOD CELL COUNT 04/04/2025 4.87  4.20 - 5.80 Million/uL Final    HEMOGLOBIN 04/04/2025 14.9  13.2 - 17.1 g/dL Final    HEMATOCRIT 04/04/2025 43.9  38.5 - 50.0 % Final    MCV 04/04/2025 90.1  80.0 - 100.0 fL Final    MCH 04/04/2025 30.6  27.0 - 33.0 pg Final    MCHC 04/04/2025 33.9  32.0 - 36.0 g/dL Final     Comment: For adults, a slight decrease in the calculated MCHC  value (in the range of 30 to 32 g/dL) is most likely  not clinically significant; however, it should be  interpreted with caution in correlation with other  red cell parameters and the patient's clinical  condition.      RDW 04/04/2025 15.5 (H)  11.0 - 15.0 % Final    PLATELET COUNT 04/04/2025 138 (L)  140 - 400 Thousand/uL Final    MPV 04/04/2025 11.1  7.5 - 12.5 fL Final    TSH W/REFLEX TO FT4 04/04/2025 3.74  0.40 - 4.50 mIU/L Final   Lab on 04/04/2025   Component Date Value Ref Range Status    Glucose 04/04/2025 118 (H)  74 - 99 mg/dL Final    Sodium 04/04/2025 141  136 - 145 mmol/L Final    Potassium 04/04/2025 4.0  3.5 - 5.3 mmol/L Final    Chloride 04/04/2025 99  98 - 107 mmol/L Final    Bicarbonate 04/04/2025 29  21 - 32 mmol/L Final    Anion Gap 04/04/2025 17  10 - 20 mmol/L Final    Urea Nitrogen 04/04/2025 9  6 - 23 mg/dL Final    Creatinine 04/04/2025 0.68  0.50 - 1.30 mg/dL Final    eGFR 04/04/2025 >90  >60 mL/min/1.73m*2 Final    Calculations of estimated GFR are performed using the 2021 CKD-EPI Study Refit equation without the race variable for the IDMS-Traceable creatinine methods.  https://jasn.asnjournals.org/content/early/2021/09/22/ASN.2091612691    Calcium 04/04/2025 9.1  8.6 - 10.3 mg/dL Final    Albumin 04/04/2025 4.5  3.4 - 5.0 g/dL Final    Alkaline Phosphatase 04/04/2025 125  33 - 136 U/L Final    Total Protein 04/04/2025 6.1 (L)  6.4 - 8.2 g/dL Final    AST 04/04/2025 23  9 - 39 U/L Final    Bilirubin, Total 04/04/2025 1.0  0.0 - 1.2 mg/dL Final    ALT 04/04/2025 38  10 - 52 U/L Final    Patients treated with Sulfasalazine may generate falsely decreased results for ALT.    IgG 04/04/2025 295 (L)  700 - 1,600 mg/dL Final    IgA 04/04/2025 88  70 - 400 mg/dL Final    IgM 04/04/2025 6 (L)  40 - 230 mg/dL Final         Review of Systems   A complete review of systems was performed and all systems were normal except what is noted in  "the HPI.      List of current healthcare providers:  Patient Care Team:  Deedee Mcclendon MD as PCP - General  Celestine Crisostomo DO as PCP - [employer] PCP  Donny Fox MD as Consulting Physician (Cardiology)  Mandy Wang MD as Consulting Physician (Hematology and Oncology)  Isabela Pineda MD as Surgeon (General Surgery)  Henri Mccormick MD as Surgeon (Otolaryngology)  ANSON Ray as  ()  Ev Luis MD as Consulting Physician (Hematology and Oncology)  Franci Alcala RN as Registered Nurse (Hematology and Oncology)  Lilliam Gilbert PA-C as Physician Assistant (Hematology and Oncology)  John Reyes MD as Consulting Physician (Hematology and Oncology)        Over the past 2 weeks, how often have you been bothered by any of the following problems?  Little interest or pleasure in doing things: Not at all  Feeling down, depressed, or hopeless: Not at all  Patient Health Questionnaire-2 Score: 0             Advance Care Planning:    Living Will: No  POA: No  Recommend looking into getting appropriate documentation for living will and POA.      Objective :  /81   Pulse 65   Temp 36.3 °C (97.4 °F)   Ht 1.702 m (5' 7\")   Wt 86.8 kg (191 lb 6.4 oz)   SpO2 96%   BMI 29.98 kg/m²    Vision Screening    Right eye Left eye Both eyes   Without correction      With correction 20/15 20/13 20/13     Physical Exam  Constitutional:       Appearance: Normal appearance.   HENT:      Head: Normocephalic and atraumatic.   Neck:      Vascular: No carotid bruit.   Cardiovascular:      Rate and Rhythm: Normal rate and regular rhythm.      Heart sounds: Normal heart sounds.   Pulmonary:      Effort: Pulmonary effort is normal.      Breath sounds: Normal breath sounds. No wheezing, rhonchi or rales.   Abdominal:      General: Abdomen is flat. Bowel sounds are normal.      Palpations: Abdomen is soft.      Tenderness: There is no abdominal tenderness. " There is no guarding.   Musculoskeletal:         General: Normal range of motion.      Right lower leg: No edema.      Left lower leg: No edema.   Skin:     General: Skin is dry.   Neurological:      General: No focal deficit present.      Mental Status: He is alert and oriented to person, place, and time.   Psychiatric:         Mood and Affect: Mood normal.         Behavior: Behavior normal.         Thought Content: Thought content normal.         Assessment/Plan :  Problem List Items Addressed This Visit       CAD (coronary artery disease)     Stable and asymptomatic   Continue current medication   Has follow up cardiology 12/25         CLL (chronic lymphocytic leukemia) (Multi)     Currently on Venetoclax   Monitored closely by oncology - saw them this week           Essential hypertension     Well controlled. Continue current medicine and recheck in 6 months.           Relevant Orders    TSH with reflex to Free T4 if abnormal (Completed)    CBC (Completed)    Comprehensive Metabolic Panel (Completed)    Controlled type 2 diabetes mellitus without complication, without long-term current use of insulin     well controlled with diet  No microalbuminuria   Restart statin  Continue to monitor   Recheck 6 months          Relevant Medications    rosuvastatin (Crestor) 5 mg tablet    Other Relevant Orders    Comprehensive Metabolic Panel (Completed)    Albumin-Creatinine Ratio, Urine Random (Completed)    Hemoglobin A1C (Completed)    Vitamin B12 (Completed)    Mixed hyperlipidemia     Restart statin - trial crestor 5 mg and check LFTs in 6 weeks since h/o elevated Lfts with statin in past  Work on diet reviewed with patient for moderately elevated triglycerides   Recheck 6 months          Relevant Medications    rosuvastatin (Crestor) 5 mg tablet    Other Relevant Orders    Lipid Panel (Completed)    Cholesterol, LDL Direct (Completed)    Hepatic function panel    XENA on CPAP     Patient is compliant with and  benefiting from CPAP - will continue use.           Pancytopenia     Mild leukopenia and thrombocytopenia currently  Stable  Also followed by oncology  Recheck 6 months          Vitamin D deficiency     within normal limits  Recheck 6 months            Relevant Orders    Vitamin D 25-Hydroxy,Total (for eval of Vitamin D levels) (Completed)    Hypokalemia     Well controlled. Continue current medicine and recheck in 6 months.           Relevant Medications    pantoprazole (ProtoNix) 40 mg EC tablet    Other Relevant Orders    Comprehensive Metabolic Panel (Completed)    Welcome to Medicare preventive visit - Primary     Medical Wellness exam done.   Prevnar 20 8/23  Had measels as child  colonoscopy 12/24  PSA 4/25  Former smoker quit 1985  Not on opioids  Depression Screening  Depression screening completed using the PHQ-2 questions, with results documented in the chart/encounter (~5min).  (See Rooming Screening section for documentation, and/or progress note for additional information)          Combined immunodeficiency, unspecified     Upcoming IgG infusion  Managed per oncology          Other Visit Diagnoses       Screening PSA (prostate specific antigen)        Relevant Orders    Prostate Specific Antigen (Completed)    Eczema, unspecified type        Relevant Medications    clotrimazole-betamethasone (Lotrisone) cream               The following health maintenance schedule was reviewed with the patient and provided in printed form in the after visit summary:  Health Maintenance   Topic Date Due    Medicare Annual Wellness Visit (AWV)  Never done    MMR Vaccines (1 of 1 - Standard series) Never done    Diabetes: Retinopathy Screening  Never done    Zoster Vaccines (1 of 2) Never done    DTaP/Tdap/Td Vaccines (1 - Tdap) Never done    RSV High Risk: (Elderly (60+) or Pregnant Population) (1 - Risk 60-74 years 1-dose series) Never done    Abdominal Aortic Aneurysm (AAA) Screening  07/28/2023    Diabetes: Hemoglobin  A1C  07/04/2025    Lipid Panel  04/04/2026    Diabetes: Urine Protein Screening  04/04/2026    Colorectal Cancer Screening  12/21/2034    Pneumococcal Vaccine  Completed    Hepatitis C Screening  Completed    HIB Vaccines  Aged Out    Hepatitis B Vaccines  Aged Out    IPV Vaccines  Aged Out    Hepatitis A Vaccines  Aged Out    Meningococcal Vaccine  Aged Out    Rotavirus Vaccines  Aged Out    HPV Vaccines  Aged Out    Influenza Vaccine  Discontinued    COVID-19 Vaccine  Discontinued    Irritable Bowel Syndrome  Discontinued           Patient understands and agrees with treatment plan.          Deedee Mcclendon MD

## 2025-04-07 NOTE — ASSESSMENT & PLAN NOTE
Mild leukopenia and thrombocytopenia currently  Stable  Also followed by oncology  Recheck 6 months

## 2025-04-07 NOTE — ASSESSMENT & PLAN NOTE
well controlled with diet  No microalbuminuria   Restart statin  Continue to monitor   Recheck 6 months

## 2025-04-07 NOTE — PATIENT INSTRUCTIONS
I recommend RSV vaccine, Shingrix, new COVID booster, and Boostrix at the pharmacy.    Please get nonfasting labs in 6 weeks to check your liver function.      I would like you to follow up in 6 months  Please have all labs that were ordered done at least 1 week prior to your visit.

## 2025-04-07 NOTE — ASSESSMENT & PLAN NOTE
Restart statin - trial crestor 5 mg and check LFTs in 6 weeks since h/o elevated Lfts with statin in past  Work on diet reviewed with patient for moderately elevated triglycerides   Recheck 6 months

## 2025-04-07 NOTE — PROGRESS NOTES
"[facility] Clinical Note  Patient Reassessment     Introduction  Demar Pittman \"Bud\" is a 64 y.o. male who is on the specialty pharmacy service for management of: Oncology Core.      [facility] supplied medication: Venetoclax     Duration of therapy: 2 years    The most recent encounter visit with the referring prescriber Dr. Luis on 4/7/25 was reviewed.  Pharmacy will continue to collaborate in the care of this patient with the referring prescriber.    Discussion  Demar was contacted on 4/7/2025 at 3:34 PM for a pharmacy visit with encounter number [id number] from:   [facility]  [address]  [address]  [facility] [address]  Dept: [phone]  Loc: [phone]  Demar and Spouse consented to a/an In person visit, which was performed.    Efficacy  Patient has developed new symptoms of condition: No  Patient/caregiver feels medication is affecting the disease state: He feels great aside from mild nausea which is controllable.    Goals  Provided education on goals and possible outcomes of therapy:  Adherence with therapy  Timely completion of appropriate labs  Timely and appropriate follow up with provider  Identify and address medication interactions with presciption medications, OTC medications and supplements  Optimize or maintain quality of life  Oncology: Prolong life/No disease progression  Manage side effects (ex: nausea/vomiting, constipation, fatigue) in conjunction with care team  Patient has documented target(s) for goals of therapy: No    Tolerance  Patient has experienced side effects from this medication: Yes - mild nausea  Changes to current therapy regimen: No    The follow-up timeline was discussed. Every person responds to and reacts to therapy differently. Patient should be assessed for efficacy and tolerability in approximately: 6 months    Adherence  Patient Information  Informant: Self (Patient)  Demonstrates " Understanding of Importance of Adherence: Yes  Does the patient have any barriers to self-administration (including physical and mental?): No  Support Network for Adherence: Family Member  Adherence Tools Used: Medication list  Medication Information  Medication: venetoclax (Venclexta)  Patient Reported Missed Doses in the Last 4 Weeks: 0  Estimated Medication Adherence Level: %  Adherence Estimation Source: Claims history  Barriers to Adherence: No Problems identified   The importance of adherence was discussed and patient/caregiver was advised to take the medication as prescribed by their provider. Encouraged patient/caregiver to call physician's office or specialty pharmacy if they have a question regarding a missed dose.    General Assessment  Changes to home medications, OTCs or supplements: No  Current Outpatient Medications   Medication Sig Dispense Refill    acyclovir (Zovirax) 400 mg tablet Take 1 tablet (400 mg) by mouth 2 times a day. 60 tablet 3    amLODIPine (Norvasc) 5 mg tablet Take 1 tablet (5 mg) by mouth once daily. 90 tablet 3    aspirin 81 mg EC tablet Take 1 tablet (81 mg) by mouth once daily.      metoprolol tartrate (Lopressor) 25 mg tablet TAKE ONE TABLET BY MOUTH TWO TIMES A  tablet 3    multivitamin with folic acid (One Daily Multivitamin) 400 mcg tablet Take 1 tablet by mouth once daily.      ondansetron (Zofran) 8 mg tablet Take 1 tablet (8 mg) by mouth every 8 hours if needed for nausea or vomiting. 30 tablet 5    pantoprazole (ProtoNix) 40 mg EC tablet Take 1 tablet (40 mg) by mouth once daily. 90 tablet 3    potassium chloride CR (Klor-Con) 10 mEq ER tablet Take 2 tablets (20 mEq) by mouth once daily. Do not crush, chew, or split. 60 tablet 3    prochlorperazine (Compazine) 10 mg tablet Take 1 tablet (10 mg) by mouth every 6 hours if needed for nausea or vomiting. 30 tablet 5    venetoclax (Venclexta) 100 mg tablet Take 2 tablets (200 mg total) by mouth once daily.  Take  with food. 60 tablet 5     No current facility-administered medications for this visit.     Reported new allergies: No  Reported new medical conditions: No  Additional monitoring reviewed: Oncology - CBC-diff:   Lab Results   Component Value Date    WBC 3.7 (L) 04/04/2025    RBC 4.87 04/04/2025    HGB 14.9 04/04/2025    HCT 43.9 04/04/2025    MCV 90.1 04/04/2025    MCHC 33.9 04/04/2025     (L) 04/04/2025    RDW 15.5 (H) 04/04/2025    NEUTOPHILPCT 64.7 02/03/2025    IGPCT 0.8 02/03/2025    LYMPHOPCT 13.9 02/03/2025    MONOPCT 20.6 02/03/2025    EOSPCT 0.0 02/03/2025    BASOPCT 0.0 02/03/2025    NEUTROABS 2.41 02/03/2025    LYMPHSABS 0.52 (L) 02/03/2025    MONOSABS 0.77 02/03/2025    EOSABS 0.00 02/03/2025    BASOSABS 0.00 02/03/2025    and CMP:   Lab Results   Component Value Date    GLUCOSE 118 (H) 04/04/2025     04/04/2025    K 4.0 04/04/2025    CL 99 04/04/2025    CO2 29 04/04/2025    ANIONGAP 17 04/04/2025    BUN 9 04/04/2025    CREATININE 0.68 04/04/2025    GFRF CANCELED 08/23/2023    CALCIUM 9.1 04/04/2025    ALBUMIN 4.5 04/04/2025    ALKPHOS 125 04/04/2025    PROT 6.1 (L) 04/04/2025    AST 23 04/04/2025    BILITOT 1.0 04/04/2025    ALT 38 04/04/2025     Is laboratory follow up needed? No    Advised to contact the pharmacy if there are any changes to the patient's medication list, including prescriptions, OTC medications, herbal products, or supplements.    Impression/Plan  This patient has not been identified as high risk due to Lack of high risk qualifiers.  The following action was taken: N/A.    QOL/Patient Satisfaction  Rate your quality of life on scale of 1-10: 10 - Completely satisfied  Rate your satisfaction with  Specialty Pharmacy on scale of 1-10: 10 - Completely satisfied    Provided contact information ([phone]) for [facility] and reviewed dispensing process, refill timeline and patient management follow up. Confirmed understanding of education  conducted during assessment. All questions and concerns were addressed and patient/caregiver was encouraged to reach out for additional questions or concerns.    Based on the patient's diagnosis, medication list, progress towards goals, adherence, tolerance, and medication list, medication remains appropriate: Therapy remains appropriate (I attest)    Pedro Jim, PharmD

## 2025-04-09 PROBLEM — D80.1 HYPOGAMMAGLOBULINEMIA (MULTI): Status: ACTIVE | Noted: 2025-04-09

## 2025-04-09 PROBLEM — D81.9 COMBINED IMMUNODEFICIENCY, UNSPECIFIED: Status: ACTIVE | Noted: 2025-04-09

## 2025-04-09 RX ORDER — ACETAMINOPHEN 325 MG/1
650 TABLET ORAL ONCE
OUTPATIENT
Start: 2025-04-24

## 2025-04-09 RX ORDER — DIPHENHYDRAMINE HCL 25 MG
25 CAPSULE ORAL ONCE
OUTPATIENT
Start: 2025-04-24

## 2025-04-09 RX ORDER — FAMOTIDINE 10 MG/ML
20 INJECTION, SOLUTION INTRAVENOUS ONCE AS NEEDED
OUTPATIENT
Start: 2025-04-24

## 2025-04-09 RX ORDER — EPINEPHRINE 0.3 MG/.3ML
0.3 INJECTION SUBCUTANEOUS EVERY 5 MIN PRN
OUTPATIENT
Start: 2025-04-24

## 2025-04-09 RX ORDER — DIPHENHYDRAMINE HYDROCHLORIDE 50 MG/ML
50 INJECTION, SOLUTION INTRAMUSCULAR; INTRAVENOUS AS NEEDED
OUTPATIENT
Start: 2025-04-24

## 2025-04-09 RX ORDER — ALBUTEROL SULFATE 0.83 MG/ML
3 SOLUTION RESPIRATORY (INHALATION) AS NEEDED
OUTPATIENT
Start: 2025-04-24

## 2025-04-11 ENCOUNTER — TELEPHONE (OUTPATIENT)
Dept: PRIMARY CARE | Facility: CLINIC | Age: 65
End: 2025-04-11

## 2025-04-11 ENCOUNTER — APPOINTMENT (OUTPATIENT)
Dept: PRIMARY CARE | Facility: CLINIC | Age: 65
End: 2025-04-11
Payer: COMMERCIAL

## 2025-04-11 VITALS
TEMPERATURE: 97.4 F | DIASTOLIC BLOOD PRESSURE: 81 MMHG | BODY MASS INDEX: 30.04 KG/M2 | OXYGEN SATURATION: 96 % | HEART RATE: 65 BPM | WEIGHT: 191.4 LBS | SYSTOLIC BLOOD PRESSURE: 131 MMHG | HEIGHT: 67 IN

## 2025-04-11 DIAGNOSIS — Z12.5 SCREENING PSA (PROSTATE SPECIFIC ANTIGEN): ICD-10-CM

## 2025-04-11 DIAGNOSIS — C91.10 CLL (CHRONIC LYMPHOCYTIC LEUKEMIA) (MULTI): ICD-10-CM

## 2025-04-11 DIAGNOSIS — G47.33 OSA ON CPAP: ICD-10-CM

## 2025-04-11 DIAGNOSIS — L30.9 ECZEMA, UNSPECIFIED TYPE: ICD-10-CM

## 2025-04-11 DIAGNOSIS — Z00.00 WELCOME TO MEDICARE PREVENTIVE VISIT: Primary | ICD-10-CM

## 2025-04-11 DIAGNOSIS — D81.9 COMBINED IMMUNODEFICIENCY, UNSPECIFIED: ICD-10-CM

## 2025-04-11 DIAGNOSIS — E78.2 MIXED HYPERLIPIDEMIA: ICD-10-CM

## 2025-04-11 DIAGNOSIS — I10 ESSENTIAL HYPERTENSION: ICD-10-CM

## 2025-04-11 DIAGNOSIS — E11.9 CONTROLLED TYPE 2 DIABETES MELLITUS WITHOUT COMPLICATION, WITHOUT LONG-TERM CURRENT USE OF INSULIN: ICD-10-CM

## 2025-04-11 DIAGNOSIS — D61.818 PANCYTOPENIA: ICD-10-CM

## 2025-04-11 DIAGNOSIS — E87.6 HYPOKALEMIA: ICD-10-CM

## 2025-04-11 DIAGNOSIS — E55.9 VITAMIN D DEFICIENCY: ICD-10-CM

## 2025-04-11 DIAGNOSIS — I25.10 CORONARY ARTERY DISEASE INVOLVING NATIVE HEART WITHOUT ANGINA PECTORIS, UNSPECIFIED VESSEL OR LESION TYPE: ICD-10-CM

## 2025-04-11 PROCEDURE — 1170F FXNL STATUS ASSESSED: CPT | Performed by: FAMILY MEDICINE

## 2025-04-11 PROCEDURE — 3044F HG A1C LEVEL LT 7.0%: CPT | Performed by: FAMILY MEDICINE

## 2025-04-11 PROCEDURE — 1036F TOBACCO NON-USER: CPT | Performed by: FAMILY MEDICINE

## 2025-04-11 PROCEDURE — 3079F DIAST BP 80-89 MM HG: CPT | Performed by: FAMILY MEDICINE

## 2025-04-11 PROCEDURE — 1124F ACP DISCUSS-NO DSCNMKR DOCD: CPT | Performed by: FAMILY MEDICINE

## 2025-04-11 PROCEDURE — 3075F SYST BP GE 130 - 139MM HG: CPT | Performed by: FAMILY MEDICINE

## 2025-04-11 PROCEDURE — 3008F BODY MASS INDEX DOCD: CPT | Performed by: FAMILY MEDICINE

## 2025-04-11 PROCEDURE — G0402 INITIAL PREVENTIVE EXAM: HCPCS | Performed by: FAMILY MEDICINE

## 2025-04-11 PROCEDURE — 1159F MED LIST DOCD IN RCRD: CPT | Performed by: FAMILY MEDICINE

## 2025-04-11 PROCEDURE — 1160F RVW MEDS BY RX/DR IN RCRD: CPT | Performed by: FAMILY MEDICINE

## 2025-04-11 RX ORDER — CLOTRIMAZOLE AND BETAMETHASONE DIPROPIONATE 10; .64 MG/G; MG/G
1 CREAM TOPICAL 2 TIMES DAILY
Qty: 45 G | Refills: 3 | Status: SHIPPED | OUTPATIENT
Start: 2025-04-11

## 2025-04-11 RX ORDER — PANTOPRAZOLE SODIUM 40 MG/1
40 TABLET, DELAYED RELEASE ORAL DAILY
Qty: 90 TABLET | Refills: 3 | Status: SHIPPED | OUTPATIENT
Start: 2025-04-11 | End: 2026-04-11

## 2025-04-11 RX ORDER — CLOTRIMAZOLE AND BETAMETHASONE DIPROPIONATE 10; .64 MG/G; MG/G
1 CREAM TOPICAL 2 TIMES DAILY
COMMUNITY
End: 2025-04-11 | Stop reason: SDUPTHER

## 2025-04-11 RX ORDER — ROSUVASTATIN CALCIUM 5 MG/1
5 TABLET, COATED ORAL DAILY
Qty: 100 TABLET | Refills: 3 | Status: SHIPPED | OUTPATIENT
Start: 2025-04-11 | End: 2026-05-16

## 2025-04-11 ASSESSMENT — ENCOUNTER SYMPTOMS
DEPRESSION: 0
LOSS OF SENSATION IN FEET: 0
OCCASIONAL FEELINGS OF UNSTEADINESS: 0

## 2025-04-11 ASSESSMENT — VISUAL ACUITY
OD_CC: 20/15
OS_CC: 20/13

## 2025-04-11 ASSESSMENT — ACTIVITIES OF DAILY LIVING (ADL)
MANAGING_FINANCES: INDEPENDENT
TAKING_MEDICATION: INDEPENDENT
BATHING: INDEPENDENT
DOING_HOUSEWORK: INDEPENDENT
DRESSING: INDEPENDENT
GROCERY_SHOPPING: INDEPENDENT

## 2025-04-14 ENCOUNTER — APPOINTMENT (OUTPATIENT)
Dept: OTOLARYNGOLOGY | Facility: CLINIC | Age: 65
End: 2025-04-14
Payer: MEDICARE

## 2025-04-14 VITALS — HEIGHT: 67 IN | WEIGHT: 184 LBS | BODY MASS INDEX: 28.88 KG/M2

## 2025-04-14 DIAGNOSIS — H69.93 DYSFUNCTION OF BOTH EUSTACHIAN TUBES: ICD-10-CM

## 2025-04-14 DIAGNOSIS — H90.6 MIXED CONDUCTIVE AND SENSORINEURAL HEARING LOSS OF BOTH EARS: Primary | ICD-10-CM

## 2025-04-14 DIAGNOSIS — H65.23 BILATERAL CHRONIC SEROUS OTITIS MEDIA: ICD-10-CM

## 2025-04-14 DIAGNOSIS — H65.20 SIMPLE CHRONIC SEROUS OTITIS MEDIA, UNSPECIFIED LATERALITY: ICD-10-CM

## 2025-04-14 PROCEDURE — 1160F RVW MEDS BY RX/DR IN RCRD: CPT | Performed by: OTOLARYNGOLOGY

## 2025-04-14 PROCEDURE — 1159F MED LIST DOCD IN RCRD: CPT | Performed by: OTOLARYNGOLOGY

## 2025-04-14 PROCEDURE — 3044F HG A1C LEVEL LT 7.0%: CPT | Performed by: OTOLARYNGOLOGY

## 2025-04-14 PROCEDURE — 3008F BODY MASS INDEX DOCD: CPT | Performed by: OTOLARYNGOLOGY

## 2025-04-14 PROCEDURE — 1123F ACP DISCUSS/DSCN MKR DOCD: CPT | Performed by: OTOLARYNGOLOGY

## 2025-04-14 PROCEDURE — 99214 OFFICE O/P EST MOD 30 MIN: CPT | Performed by: OTOLARYNGOLOGY

## 2025-04-14 PROCEDURE — 69433 CREATE EARDRUM OPENING: CPT | Performed by: OTOLARYNGOLOGY

## 2025-04-14 RX ORDER — OFLOXACIN 3 MG/ML
5 SOLUTION AURICULAR (OTIC) 2 TIMES DAILY
Qty: 5 ML | Refills: 0 | Status: SHIPPED | OUTPATIENT
Start: 2025-04-14 | End: 2025-04-21

## 2025-04-14 ASSESSMENT — PATIENT HEALTH QUESTIONNAIRE - PHQ9
SUM OF ALL RESPONSES TO PHQ9 QUESTIONS 1 AND 2: 1
2. FEELING DOWN, DEPRESSED OR HOPELESS: NOT AT ALL
1. LITTLE INTEREST OR PLEASURE IN DOING THINGS: SEVERAL DAYS
10. IF YOU CHECKED OFF ANY PROBLEMS, HOW DIFFICULT HAVE THESE PROBLEMS MADE IT FOR YOU TO DO YOUR WORK, TAKE CARE OF THINGS AT HOME, OR GET ALONG WITH OTHER PEOPLE: SOMEWHAT DIFFICULT

## 2025-04-14 NOTE — PROGRESS NOTES
"        Reason for Consult:  Follow-up and Hearing Loss (Fluid in ears.)     Subjective   History Of Present Illness:  Demar Pittman \"Shraddha" is a 64 y.o. male with bilateral Eustachian tube dysfunction and history of serous effusion dx 2021. His symptoms have improved after usage of Flonase and azelastine. During his last visit, he continued to have a little bit of fluid on the right side but he could do Valsalva in both ears. He was not interested in PE tubes.      Over the last 4 months, he has currently been on treatment for CLL. He is currently being evaluated for being in an immuno-compromised state. He is undergoing IVIG treatments.     He recently had an upper respiratory infections that resulted in worsening mucoid effusion and muffled hearing. He was treated with antibiotics and Flonase without improvement.     Past Medical History:  He has a past medical history of Chronic lymphocytic leukemia (Multi), Class 1 obesity due to excess calories with serious comorbidity and body mass index (BMI) of 30.0 to 30.9 in adult (02/16/2024), Diffuse otitis externa, bilateral (10/11/2021), GERD (gastroesophageal reflux disease), Hard to intubate, Herpes simplex virus (HSV) infection (08/16/2023), Hyperlipidemia, Hypertrophy of tonsils (12/05/2019), Localized enlarged lymph nodes (04/30/2020), Personal history of diseases of the skin and subcutaneous tissue (03/09/2020), Personal history of other diseases of the circulatory system, Personal history of other diseases of the circulatory system, Personal history of other diseases of the circulatory system, Personal history of other diseases of the circulatory system, Personal history of other specified conditions (12/11/2019), Positive colorectal cancer screening using Cologuard test, Sleep apnea, and Transaminitis (06/26/2024).    Surgical History:  He has a past surgical history that includes Other surgical history (10/24/2019); Other surgical history (10/24/2019); " "Other surgical history (12/13/2019); and Other surgical history (12/29/2019).     Social History:  He reports that he quit smoking about 39 years ago. His smoking use included cigarettes. He has a 38 pack-year smoking history. He has been exposed to tobacco smoke. He has never used smokeless tobacco. He reports that he does not currently use alcohol after a past usage of about 2.0 standard drinks of alcohol per week. He reports that he does not use drugs.    Family History:  family history is not on file.     Medications:  Current Outpatient Medications   Medication Instructions    acyclovir (ZOVIRAX) 400 mg, oral, 2 times daily    amLODIPine (NORVASC) 5 mg, oral, Daily    aspirin 81 mg, Daily    clotrimazole-betamethasone (Lotrisone) cream 1 Application, Topical, 2 times daily    metoprolol tartrate (LOPRESSOR) 25 mg, oral, 2 times daily    multivitamin with folic acid (One Daily Multivitamin) 400 mcg tablet 1 tablet, Daily    ondansetron (ZOFRAN) 8 mg, oral, Every 8 hours PRN    pantoprazole (PROTONIX) 40 mg, oral, Daily    potassium chloride CR (Klor-Con) 10 mEq ER tablet 20 mEq, oral, Daily, Do not crush, chew, or split.    prochlorperazine (COMPAZINE) 10 mg, oral, Every 6 hours PRN    rosuvastatin (CRESTOR) 5 mg, oral, Daily    Venclexta 200 mg, oral, Daily, Take with food.      Allergies:  Rituximab-pvvr    Review of Systems:   A comprehensive 10-point review of systems was obtained including constitutional, neurological, HEENT, pulmonary, cardiovascular, genito-urinary, and other pertinent systems and was negative except as noted in the HPI.     Objective   Physical Exam:  Last Recorded Vitals: Height 1.702 m (5' 7\"), weight 83.5 kg (184 lb).    On physical exam, the patient is a well-nourished, well-developed patient, in no acute distress, able to communicate without assistance in English language. Head and face is atraumatic and normocephalic. Salivary glands are intact. Facial strength is symmetrical " bilaterally.       On ear examination:  Right ear: The patient has an open and patent ear canal. The tympanic membrane is intact with a mucoid effusion.  BC>AC  Left ear: The patient has an open and patent ear canal. The tympanic membrane is intact with a mucoid effusion.  BC>AC  The Carney is midline    On vestibular exam, the patient has no spontaneous nystagmus, no headshake nystagmus, no head-thrust nystagmus, and no nystagmus on hyperventilation or Valsalva maneuvers. [place]-Hallpike maneuver is negative bilaterally.       On neuro exam, the patient is alert and oriented x3, cranial nerves are grossly intact, cerebellar exam is normal.      The rest of the exam, including anterior rhinoscopy, oropharyngeal exam, neck exam, and cardiovascular exam, were normal including no palpable lymphadenopathies, thyroid in the midline position, normal pulses, and normal chest excursion.       Reviewed Results:  Audiology Testing:   I personally reviewed the audiogram from 03/2025 that showed:   Moderate down-sloping to severe mixed hearing loss bilaterally with 30db of air-bone gap . Discrimination: 92%       I reviewed his audiogram from 4/25/2022 which shows a normal hearing sloping to moderate hearing loss with high frequency in both ears. There is 100% discrimination bilaterally.      I reviewed and interpreted the patient's audiogram from 8/4/2021, which shows asymmetric right greater than left hearing loss. On the right, there is a mild sloping to severe mixed hearing loss with a 20 dB air-bone gap in the lower frequencies, 84% speech discrimination on the right. On the left there is a normal quickly sloping to profound sensorineural hearing loss in the higher frequencies, 100% speech discrimination on the left. Type C tympanogram on the left, type B tympanogram on the right.       Imaging:  None     Procedure:  Tympanostomy Tube Placement  After informed consent was obtained, and the risks, indications and complications  "of a placing a tympanostomy tube in the affected side were discussed with the patient, the patient elected to proceed. The eardrum was topicalized with phenol and a radial incision was made in the posterior inferior quadrant. The middle ear was suctioned and a Juan PE tube was inserted. The patient tolerated the procedure well. Exactly the same procedure was done in the contralateral side.     Assessment/Plan     1. Mixed conductive and sensorineural hearing loss of both ears    2. Dysfunction of both eustachian tubes    3. Bilateral chronic serous otitis media    4. Simple chronic serous otitis media, unspecified laterality        In summary, Demar Pittman \"Shraddha" is a 64 y.o. male with bilateral Eustachian tube dysfunction and history of chronic serous otitis media which was controlled with conservative management and use of nasal sprays in 2021. His hearing had returned to baseline, and he had high frequency sensorineural hearing loss.     He is now in an immunocompromised state and is being treated for CLL with IVIG. He currently has a bilateral mucoid effusion after a URI. His ears have not cleared despite medical treatment.   For that reason, we placed bilateral PE tubes.     - He was put on drops.   - Follow-up in 3 months with an audiogram       ____________________________________________________  Henri Robin MD  Professor and Chief   Otology/Neurotology/Lateral Skull-Base Surgery   [facility]    Scribe Attestation  By signing my name below, I, Sylvain Escobar, Scribe   attest that this documentation has been prepared under the direction and in the presence of Henri Mccormick MD.  "

## 2025-04-14 NOTE — LETTER
"April 27, 2025     Mandy Wang MD  [address], [address]  [place] [address]    Patient: Bud Pittman   YOB: 1960   Date of Visit: 4/14/2025       Dear Dr. Mandy Wang MD:    Thank you for referring Bud Pittman to me for evaluation. Below are my notes for this consultation.  If you have questions, please do not hesitate to call me. I look forward to following your patient along with you.       Sincerely,     Henri Mccormick MD      CC: Deedee Mcclendon MD  ______________________________________________________________________________________            Reason for Consult:  Follow-up and Hearing Loss (Fluid in ears.)     Subjective  History Of Present Illness:  Demar Pittman \"Shraddha" is a 64 y.o. male with bilateral Eustachian tube dysfunction and history of serous effusion dx 2021. His symptoms have improved after usage of Flonase and azelastine. During his last visit, he continued to have a little bit of fluid on the right side but he could do Valsalva in both ears. He was not interested in PE tubes.      Over the last 4 months, he has currently been on treatment for CLL. He is currently being evaluated for being in an immuno-compromised state. He is undergoing IVIG treatments.     He recently had an upper respiratory infections that resulted in worsening mucoid effusion and muffled hearing. He was treated with antibiotics and Flonase without improvement.     Past Medical History:  He has a past medical history of Chronic lymphocytic leukemia (Multi), Class 1 obesity due to excess calories with serious comorbidity and body mass index (BMI) of 30.0 to 30.9 in adult (02/16/2024), Diffuse otitis externa, bilateral (10/11/2021), GERD (gastroesophageal reflux disease), Hard to intubate, Herpes simplex virus (HSV) infection (08/16/2023), Hyperlipidemia, Hypertrophy of tonsils (12/05/2019), Localized enlarged lymph nodes (04/30/2020), Personal history of " diseases of the skin and subcutaneous tissue (03/09/2020), Personal history of other diseases of the circulatory system, Personal history of other diseases of the circulatory system, Personal history of other diseases of the circulatory system, Personal history of other diseases of the circulatory system, Personal history of other specified conditions (12/11/2019), Positive colorectal cancer screening using Cologuard test, Sleep apnea, and Transaminitis (06/26/2024).    Surgical History:  He has a past surgical history that includes Other surgical history (10/24/2019); Other surgical history (10/24/2019); Other surgical history (12/13/2019); and Other surgical history (12/29/2019).     Social History:  He reports that he quit smoking about 39 years ago. His smoking use included cigarettes. He has a 38 pack-year smoking history. He has been exposed to tobacco smoke. He has never used smokeless tobacco. He reports that he does not currently use alcohol after a past usage of about 2.0 standard drinks of alcohol per week. He reports that he does not use drugs.    Family History:  family history is not on file.     Medications:  Current Outpatient Medications   Medication Instructions   • acyclovir (ZOVIRAX) 400 mg, oral, 2 times daily   • amLODIPine (NORVASC) 5 mg, oral, Daily   • aspirin 81 mg, Daily   • clotrimazole-betamethasone (Lotrisone) cream 1 Application, Topical, 2 times daily   • metoprolol tartrate (LOPRESSOR) 25 mg, oral, 2 times daily   • multivitamin with folic acid (One Daily Multivitamin) 400 mcg tablet 1 tablet, Daily   • ondansetron (ZOFRAN) 8 mg, oral, Every 8 hours PRN   • pantoprazole (PROTONIX) 40 mg, oral, Daily   • potassium chloride CR (Klor-Con) 10 mEq ER tablet 20 mEq, oral, Daily, Do not crush, chew, or split.   • prochlorperazine (COMPAZINE) 10 mg, oral, Every 6 hours PRN   • rosuvastatin (CRESTOR) 5 mg, oral, Daily   • Venclexta 200 mg, oral, Daily, Take with food.     "  Allergies:  Rituximab-pvvr    Review of Systems:   A comprehensive 10-point review of systems was obtained including constitutional, neurological, HEENT, pulmonary, cardiovascular, genito-urinary, and other pertinent systems and was negative except as noted in the HPI.     Objective  Physical Exam:  Last Recorded Vitals: Height 1.702 m (5' 7\"), weight 83.5 kg (184 lb).    On physical exam, the patient is a well-nourished, well-developed patient, in no acute distress, able to communicate without assistance in English language. Head and face is atraumatic and normocephalic. Salivary glands are intact. Facial strength is symmetrical bilaterally.       On ear examination:  Right ear: The patient has an open and patent ear canal. The tympanic membrane is intact with a mucoid effusion.  BC>AC  Left ear: The patient has an open and patent ear canal. The tympanic membrane is intact with a mucoid effusion.  BC>AC  The Carney is midline    On vestibular exam, the patient has no spontaneous nystagmus, no headshake nystagmus, no head-thrust nystagmus, and no nystagmus on hyperventilation or Valsalva maneuvers. [place]-Hallpike maneuver is negative bilaterally.       On neuro exam, the patient is alert and oriented x3, cranial nerves are grossly intact, cerebellar exam is normal.      The rest of the exam, including anterior rhinoscopy, oropharyngeal exam, neck exam, and cardiovascular exam, were normal including no palpable lymphadenopathies, thyroid in the midline position, normal pulses, and normal chest excursion.       Reviewed Results:  Audiology Testing:   I personally reviewed the audiogram from 03/2025 that showed:   Moderate down-sloping to severe mixed hearing loss bilaterally with 30db of air-bone gap . Discrimination: 92%       I reviewed his audiogram from 4/25/2022 which shows a normal hearing sloping to moderate hearing loss with high frequency in both ears. There is 100% discrimination bilaterally.      I reviewed " "and interpreted the patient's audiogram from 8/4/2021, which shows asymmetric right greater than left hearing loss. On the right, there is a mild sloping to severe mixed hearing loss with a 20 dB air-bone gap in the lower frequencies, 84% speech discrimination on the right. On the left there is a normal quickly sloping to profound sensorineural hearing loss in the higher frequencies, 100% speech discrimination on the left. Type C tympanogram on the left, type B tympanogram on the right.       Imaging:  None     Procedure:  Tympanostomy Tube Placement  After informed consent was obtained, and the risks, indications and complications of a placing a tympanostomy tube in the affected side were discussed with the patient, the patient elected to proceed. The eardrum was topicalized with phenol and a radial incision was made in the posterior inferior quadrant. The middle ear was suctioned and a Juan PE tube was inserted. The patient tolerated the procedure well. Exactly the same procedure was done in the contralateral side.     Assessment/Plan    1. Mixed conductive and sensorineural hearing loss of both ears    2. Dysfunction of both eustachian tubes    3. Bilateral chronic serous otitis media    4. Simple chronic serous otitis media, unspecified laterality        In summary, Demar Pittman \"Shraddha" is a 64 y.o. male with bilateral Eustachian tube dysfunction and history of chronic serous otitis media which was controlled with conservative management and use of nasal sprays in 2021. His hearing had returned to baseline, and he had high frequency sensorineural hearing loss.     He is now in an immunocompromised state and is being treated for CLL with IVIG. He currently has a bilateral mucoid effusion after a URI. His ears have not cleared despite medical treatment.   For that reason, we placed bilateral PE tubes.     - He was put on drops.   - Follow-up in 3 months with an audiogram   "     ____________________________________________________  Henri Robin MD  Professor and Chief   Otology/Neurotology/Lateral Skull-Base Surgery   [facility]    Scribe Attestation  By signing my name below, I, Sylvain Escobar, Scribe   attest that this documentation has been prepared under the direction and in the presence of Henri Mccormick MD.

## 2025-04-26 PROCEDURE — RXMED WILLOW AMBULATORY MEDICATION CHARGE

## 2025-04-29 ENCOUNTER — TELEPHONE (OUTPATIENT)
Dept: OTOLARYNGOLOGY | Facility: HOSPITAL | Age: 65
End: 2025-04-29
Payer: MEDICARE

## 2025-04-29 DIAGNOSIS — H66.23 CHRONIC ATTICOANTRAL SUPPURATIVE OTITIS MEDIA OF BOTH EARS: Primary | ICD-10-CM

## 2025-04-29 NOTE — TELEPHONE ENCOUNTER
Patient's spouse states Bud has had continued drainage from both ears since PE tubes placed. Sylvarena drainage reported at first now yellow with pink drainage. R ear with scant bright red bloody drainage this morning. Drops completed 1 week ago. Hearing only improved for 2 days after PE tube placement then declined.

## 2025-04-30 ENCOUNTER — SPECIALTY PHARMACY (OUTPATIENT)
Dept: PHARMACY | Facility: CLINIC | Age: 65
End: 2025-04-30

## 2025-04-30 DIAGNOSIS — H65.23 BILATERAL CHRONIC SEROUS OTITIS MEDIA: ICD-10-CM

## 2025-04-30 RX ORDER — CIPROFLOXACIN AND DEXAMETHASONE 3; 1 MG/ML; MG/ML
4 SUSPENSION/ DROPS AURICULAR (OTIC) 2 TIMES DAILY
Qty: 7.5 ML | Refills: 0 | Status: CANCELLED | OUTPATIENT
Start: 2025-04-30 | End: 2025-05-10

## 2025-04-30 RX ORDER — CIPROFLOXACIN AND DEXAMETHASONE 3; 1 MG/ML; MG/ML
4 SUSPENSION/ DROPS AURICULAR (OTIC) 2 TIMES DAILY
Qty: 7.5 ML | Refills: 0 | Status: SHIPPED | OUTPATIENT
Start: 2025-04-30 | End: 2025-05-10

## 2025-04-30 RX ORDER — AMOXICILLIN AND CLAVULANATE POTASSIUM 875; 125 MG/1; MG/1
1 TABLET, FILM COATED ORAL 2 TIMES DAILY
Qty: 14 TABLET | Refills: 0 | Status: CANCELLED | OUTPATIENT
Start: 2025-04-30 | End: 2025-05-07

## 2025-04-30 RX ORDER — AMOXICILLIN AND CLAVULANATE POTASSIUM 875; 125 MG/1; MG/1
1 TABLET, FILM COATED ORAL 2 TIMES DAILY
Qty: 14 TABLET | Refills: 0 | Status: SHIPPED | OUTPATIENT
Start: 2025-04-30 | End: 2025-05-07

## 2025-04-30 NOTE — PROGRESS NOTES
Verbal order for ciprodex ear drops 5 drops bilat ears BID for 14 days and Bactrim 800-160mg 1 tab BID for 14 days relayed from Dr. Robin. RN called in scripts to patient's [facility] pharmacy. RN left voicemail for patient informing him of the scripts.

## 2025-05-05 ENCOUNTER — PHARMACY VISIT (OUTPATIENT)
Dept: PHARMACY | Facility: CLINIC | Age: 65
End: 2025-05-05
Payer: COMMERCIAL

## 2025-05-15 ENCOUNTER — TELEPHONE (OUTPATIENT)
Dept: ADMISSION | Facility: HOSPITAL | Age: 65
End: 2025-05-15
Payer: MEDICARE

## 2025-05-15 NOTE — TELEPHONE ENCOUNTER
The patient called in to discuss lab work, all questions answered. Denied any further questions or concerns.

## 2025-05-23 ENCOUNTER — SPECIALTY PHARMACY (OUTPATIENT)
Dept: PHARMACY | Facility: CLINIC | Age: 65
End: 2025-05-23

## 2025-05-23 DIAGNOSIS — E78.2 MIXED HYPERLIPIDEMIA: ICD-10-CM

## 2025-05-23 PROCEDURE — RXMED WILLOW AMBULATORY MEDICATION CHARGE

## 2025-05-27 ENCOUNTER — LAB (OUTPATIENT)
Dept: LAB | Facility: HOSPITAL | Age: 65
End: 2025-05-27
Payer: MEDICARE

## 2025-05-27 DIAGNOSIS — C91.10 CHRONIC LYMPHOCYTIC LEUKEMIA OF B-CELL TYPE NOT HAVING ACHIEVED REMISSION (MULTI): Primary | ICD-10-CM

## 2025-05-27 LAB
IGA SERPL-MCNC: 73 MG/DL (ref 70–400)
IGG SERPL-MCNC: 291 MG/DL (ref 700–1600)
IGM SERPL-MCNC: 6 MG/DL (ref 40–230)
LDH SERPL L TO P-CCNC: 131 U/L (ref 84–246)

## 2025-05-27 PROCEDURE — 36415 COLL VENOUS BLD VENIPUNCTURE: CPT

## 2025-05-27 PROCEDURE — 83615 LACTATE (LD) (LDH) ENZYME: CPT

## 2025-05-27 PROCEDURE — 82784 ASSAY IGA/IGD/IGG/IGM EACH: CPT

## 2025-05-29 ENCOUNTER — PHARMACY VISIT (OUTPATIENT)
Dept: PHARMACY | Facility: CLINIC | Age: 65
End: 2025-05-29
Payer: COMMERCIAL

## 2025-06-02 ENCOUNTER — HOSPITAL ENCOUNTER (OUTPATIENT)
Dept: RADIOLOGY | Facility: HOSPITAL | Age: 65
Discharge: HOME | End: 2025-06-02
Payer: MEDICARE

## 2025-06-02 DIAGNOSIS — C91.10 CLL (CHRONIC LYMPHOCYTIC LEUKEMIA) (MULTI): ICD-10-CM

## 2025-06-02 LAB
CREAT SERPL-MCNC: 0.76 MG/DL (ref 0.6–1.3)
GFR SERPL CREATININE-BSD FRML MDRD: >90 ML/MIN/1.73M*2

## 2025-06-02 PROCEDURE — 71260 CT THORAX DX C+: CPT | Performed by: RADIOLOGY

## 2025-06-02 PROCEDURE — 2550000001 HC RX 255 CONTRASTS

## 2025-06-02 PROCEDURE — 74177 CT ABD & PELVIS W/CONTRAST: CPT

## 2025-06-02 PROCEDURE — 74177 CT ABD & PELVIS W/CONTRAST: CPT | Performed by: RADIOLOGY

## 2025-06-02 PROCEDURE — 82565 ASSAY OF CREATININE: CPT

## 2025-06-02 RX ADMIN — IOHEXOL 75 ML: 350 INJECTION, SOLUTION INTRAVENOUS at 10:01

## 2025-06-08 ASSESSMENT — ENCOUNTER SYMPTOMS
DIARRHEA: 0
CHILLS: 0
BACK PAIN: 1
LIGHT-HEADEDNESS: 0
BRUISES/BLEEDS EASILY: 0
CONSTIPATION: 0
RESPIRATORY NEGATIVE: 1
APPETITE CHANGE: 0
ADENOPATHY: 1
NAUSEA: 1
VOMITING: 0
CARDIOVASCULAR NEGATIVE: 1
TROUBLE SWALLOWING: 0
UNEXPECTED WEIGHT CHANGE: 0
NUMBNESS: 0
DIAPHORESIS: 1
SORE THROAT: 0
FATIGUE: 1
FEVER: 0
VOICE CHANGE: 0
DIZZINESS: 0
BLOOD IN STOOL: 0

## 2025-06-08 NOTE — PROGRESS NOTES
"Patient ID: Demar Pittman \"Shraddha" is a 64 y.o. male.    Oncology History Overview Note   B-CLL diagnosed in 2015, SUÁREZ stage 0; WBC initially 16.3 with ALC 12.0, hemoglobin 15.1 and platelets 154,000.  The cells expressed CD5 and A light chains.   CD38 and  Zap-70 were negative. Cytogenetic studies by FISH were inconclusive.  WBC has slowly rising but he maintains good marrow reserve and has not required treatment.  CT chest done 3/9/17 showed extensive cervical, axillary and submental adenopathy; largest  node on the left measured 2.3 x 1.9 cm.  Small (less than 9 mm) pulmonology nodules were again seen; relatively stable.  Abdominal nodes slightly increased.    17: WBC 65.4, ALC 62.8; becoming more symptomatic.  17 : WBC 77.6.  ALC 72.2.  Hemoglobin and platelets were normal.  18 WBC: 69.3. A.9. Hgb 15.2. Plt: 148,000  18: WBC 88.2.  ALC 82.9.  Hemoglobin 14.2.  Platelets 170,000.  Adenopathy stable.  18: WBC 77.3.  ALC 66.5.  Hemoglobin 13.8.  Platelets 143,000.  Adenopathy slightly increased.  18: WBC 92.0.  ALC 86.5.  Hemoglobin 14.1.  Platelets 157,000.  Progressing symptomatic adenopathy.  Treatment options discussed.   18 completed rituximab weekly x 4.  10/9/19: CBC: WBC 16.5.  ALC 12.4.  Hemoglobin 14.6.  Platelets 148,000.  Worsening adenopathy.  3/17/20: Started bendamustine/rituximab after multiple thoracentesis for malignant pleural effusion with CLL  20: #2 bendamustine/rituximab.  20: #3 BR  20: WBC 4.7.  Hemoglobin 12.9.  Platelets 189,000.  20: #4 BR  20: #5 BR  20: WBC 1.7. hgb 11.9. Platelets 176.  20: #6/6 bendamustine/rituximab  2023, WBC count 62, hemoglobin 14.9, platelets 143, absolute lymphocyte count 50.2  January 15, 2024, WBC count 40.5, hemoglobin 15.0, platelets 175  2/10/24 , WBC count 31.5, hemoglobin 14.9, platelets 131  3/14/24 , WBC count 13.9, hemoglobin 14.9, platelets " 122  3/14/24, physical examination positive for bilateral cervical lymphadenopathy, bilateral axillary lymphadenopathy  PET imaging 3/29/2024 intense uptake in tongue base ( S/P) tonsillectomy. Multiple enlarged lymph nodes in cervical, parotid, nodes, multipl pleural based and parenchymal non FDG avid pulmonary nodes, enlarged axillary lymph nodes, mediastinal and bilateral hilar lymph nodes, periportal lymph nodes, iliac nodes, inguinal nodes.     -FISH panel  of peripheral blood 5/9/2024 Pos for del 11q, negative for t(11,14), trisomy 12, monosomy 13 and deletion of 17p.     -Needle biopsy of posterior tongue mass 5/28/24 involvement by chronic lymphocytic leukemia/small lymphocytic lymphoma no evidence of transformation    Venetoclax ramp up 6/10/24-6/13/24  Then was hospitalized 6/17/24-6/19/24 for second venetoclax ramp-up. C1 rituximab - 7/15/24.     CLL (chronic lymphocytic leukemia) (Multi)   8/16/2023 Initial Diagnosis    CLL (chronic lymphocytic leukemia) (CMS/[facility])     1/15/2024 - 1/15/2024 Chemotherapy    Bendamustine + RiTUXimab, 28 Day Cycles     6/11/2024 - 6/18/2024 Chemotherapy    (INPT) Venetoclax, 28 Day Cycles      6/24/2024 - 12/2/2024 Chemotherapy    Venetoclax + RiTUXimab, 28 Day Cycles     6/2/2025 Imaging    CT CAP (6/2/25) IMPRESSION:  CHEST  1.  Postoperative changes related to prior CABG surgery.  2. Stable sclerotic changes involving T8 vertebra and right 6th rib. No additional bone lesions.  3. Additional detailed nonacute findings as above.  ABDOMEN - PELVIS  1. Prostatomegaly. Correlation with PSA levels recommended.  2. Subcentimeter intra-abdominal lymph nodes as detailed. No evidence for lymphadenopathy by CT imaging criteria.  3. Suspect tiny bilateral renal cysts but too small to fully characterize.  4. Additional detailed nonacute findings as above.        Past medical history: CLL/SLL (Feb 2015) as described above, status post rituximab, 6 cycles of bendamustine/rituximab  (3/2020-2020).  Excellent response to treatment.       History of CABGx3 19, vertigo, XENA, remote tonsillectomy, retinal tear, stable pulmonary nodules, hyperlipidemia, hypertension, sinusitis.  Unremarkable cardiac catheterization 21. Hyperglycemia, torn retina, cataract. Hx of afib since CABG.      Family medical history: Mother  of myeloma in her 70s and his father  of Hodgkin lymphoma at age 39.     Social history: Rare alcohol intake.  Former smoker but quit in .  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 2 biological children, one son with downs syndrome.      Subjective  HPI     Demar Pittman is a patient of Dr. Wang with a long history of CLL initially seen with his wife and son 22 for a second opinion for treatment of CLL.   He received BR completing in 2020.  In 2023 after COVID infection and he had increased lymphocytosis which resolved, subsequently noted to have lymphadenopathy confirmed on PET scan.  Ibrutinib recommended in 2024, but patient has not yet started this because he was leary of side effects.      Due to discordance in uptake at base of tongue on PET imaging, tongue biopsy was done 24 which was consistent with CLL and had no evidence of large cell transformation.     Was hospitalized 6/10/24-24 for first venetoclax ramp-up.  Then was hospitalized 24-24 for second venetoclax ramp-up. C1 rituximab - 7/15/24 cycle 6 completed 24    CT chest (24) showing significant interval decrease in size of multiple cervical, axillary, mediastinal and upper abdominal lymph nodes, compatible with treatment response, persistent splenomegaly, stable pulmonary nodules measuring up to 6 mm, mild hepatic steatosis.     Demar presents to the clinic today (25) with his wife for follow up evaluation of his CLL and count checks.  He completed dose 6 of rituxan on 25.  Decreased  venetoclax to 200 mg around 10/11/24 for cytopenias.  Continues venetoclax 200 mg daily.  Receiving okay from  [facility].  Venetoclax cost is covered by insurance.      Energy level is lower, depends on the day.  Gets tired quickly and takes naps.  Having night sweats, about once a week, not drenching.  Appetite is good and christina is stable.  Main concern is that he continues to have issues with his ears.  Ongoing ringing in ears and hearing is still off.  Can't hear out of right ear but left ear is getting better.  Got tubes placed in bilateral ears.  Has pressure to ears and intermittent pain.  Using flonase, stopped claritin.  Following with ENT, next appt scheduled on 8/4/25.  Expressed they have difficulty getting in contact with ENT office when having concerns.  Reports intermittent nausea, taking antinausea medication.  Has 1 small mouth sore to left lower mouth for few weeks.  Chronic back pain, not taking any pain medications.  Reports he is not able to feel any enlarged lymph nodes.      Review of Systems   Constitutional:  Positive for diaphoresis and fatigue. Negative for appetite change, chills, fever and unexpected weight change.   HENT:   Positive for tinnitus (nasal congestion hearing difficulties). Negative for mouth sores, sore throat, trouble swallowing and voice change.    Respiratory: Negative.     Cardiovascular: Negative.    Gastrointestinal:  Positive for nausea. Negative for blood in stool, constipation, diarrhea and vomiting.   Genitourinary: Negative.     Musculoskeletal:  Positive for back pain. Negative for gait problem.   Skin: Negative.    Neurological:  Negative for dizziness, gait problem, light-headedness and numbness.   Hematological:  Positive for adenopathy. Does not bruise/bleed easily.   ---------------------------------------------------------------------------------------  Subjective      Objective    BSA: 2.03 meters squared  /77   Pulse 66   Temp 36.1 °C  (97 °F)   Resp 16   Wt 87.6 kg (193 lb 2 oz)   SpO2 100%   BMI 30.25 kg/m²      Physical Exam  Vitals reviewed.   Constitutional:       Appearance: Normal appearance.      Comments: Looks well, hard of hearing   HENT:      Head: Normocephalic and atraumatic.      Right Ear: Decreased hearing noted.      Left Ear: Decreased hearing noted.      Nose: Nose normal.      Mouth/Throat:      Mouth: Mucous membranes are moist.      Pharynx: Oropharynx is clear.      Comments: Mouth sore to left lower cheek.    Eyes:      Extraocular Movements: Extraocular movements intact.      Conjunctiva/sclera: Conjunctivae normal.      Pupils: Pupils are equal, round, and reactive to light.   Cardiovascular:      Rate and Rhythm: Normal rate and regular rhythm.      Pulses: Normal pulses.      Heart sounds: Normal heart sounds.   Pulmonary:      Effort: Pulmonary effort is normal.      Breath sounds: Normal breath sounds.   Abdominal:      General: Abdomen is flat. Bowel sounds are normal.      Palpations: Abdomen is soft.   Musculoskeletal:         General: Normal range of motion.      Cervical back: Normal range of motion.   Lymphadenopathy:      Comments: Continues to have cervical LN, mobile, non-tender.     Skin:     General: Skin is warm and dry.      Capillary Refill: Capillary refill takes less than 2 seconds.   Neurological:      General: No focal deficit present.      Mental Status: He is alert and oriented to person, place, and time. Mental status is at baseline.   Psychiatric:         Mood and Affect: Mood normal.         Behavior: Behavior normal.         Thought Content: Thought content normal.         Judgment: Judgment normal.     Performance Status:  Karnofsky Score: 80 - Normal activity with effort; some signs or symptoms of disease  -------------------------------------------------------------------------------------------------------------------------------------  Assessment/Plan      CLL (chronic lymphocytic  leukemia) (Multi)  Chronic lymphocytic leukemia with symptomatic adenopathy, excellent response to bendamustine/rituximab; completed 6 cycles in August 2020.    Patient seen  for further management of bulky CLL.  PET imaging shows discordant uptake at the base of the tongue, pulmonary nodules which the patient states are longstanding. There is no evidence of Richters transformation by histology and FISH analysis shows del 11q. Due to symptoms of tongue swelling has started dexamethasone 4 mg po bid for 10 days for sx relief.     6/3/24 Previously discussed treatment options and patient would prefer to receive venetoclax as a time limited approach and I have recommended the [place] regimen with escalating doses of venetoclax followed by monthly rituxan for 6  monthly doses  and a planned 2 year course of venetoclax. We reviewed side effects of venetoclax and rituxan signed chemo consent.  Given patients tumor burden have schedule inpatient hospitalization to monitor for TLS for at least first two dose ramp ups on 5/10 and 5/17/24  Started allopurinol for TLS prevention    6/17/24:  Hospital admit for Venetoclax ramp up to 50 mg daily x 7 days. Next ramp ups are planned to be done outpatient.      CT chest (9/6/24) showing significant interval decrease in size of multiple cervical, axillary, mediastinal and upper abdominal lymph nodes, compatible with treatment response, persistent splenomegaly, stable pulmonary nodules measuring up to 6 mm, mild hepatic steatosis.       12/2/24:  completed cycle 6 of rituxan.      CT CAP (6/2/25) showing subcentimeter intra-abdominal lymph nodes as detailed. No evidence  for lymphadenopathy by CT imaging criteria, suspect tiny bilateral renal cysts but too small to fully  characterize.    6/9/25:  CBC results stable from today.  Reviewed CT CAP results with patient with subcentimeter intra-abdominal lymph nodes but no new LAD.  Continue to palpate slightly enlarged bilateral cervical  lymph nodes, non-tender.  Continue venetoclax 200 mg daily.  Follow Plan total of 2 year course of venetoclax.  Follow up visit in 2 months.      ID : continue prophylactic acyclovir     Hypogammaglobinemia/Recurrent infections:  Demar has had multiple sinus infections/ear infections over the last few months.  IgG level 295.  Plan to start monthly IVIG if approved by insurance.  Educated pt and wife on IVIG, possible side effects, pre-medications.  Education verbalized.  Would like to have at [facility].    6/9/25:  Demar continues to report ongoing issues with bilateral ears, following with ENT.  IgG 291 (5/27/25).  IVIG was approved by insurance but infusion appointments did not get scheduled as planned.  Demar will receive first dose of IVIG today.  Continue monthly IVIG.      Hearing Loss:  Has been suffering from intermittent hearing loss following multiple sinus/ear infection. Was evaluated by ENT, Dr. Esquivel who recommended that he see Dr. Henri Mccormick for further evaluation of his ears. No erythema noted to TM, bulging to bilateral TM.  Decreased hearing noted.  Advised to follow with Dr. Lobito Mccormick for further evaluation, appt scheduled for 4/14/25.  Advised to try OTC decongestants or antihistamines.    6/9/25:  Demar continues to report ongoing issues with bilateral ears with decreased hearing, ringing, discomfort, and pressure.   Tubes were placed by ENT.  Following with ENT, next appt scheduled for 8/4/25.  Advised to contact ENT for sooner appointment due to ongoing symptoms.    Mouth Sores:  Now resolved.  Continuing prn dexamethasone rinse.  Following with ENT, in January.  6/9/25:  Reports 1 small sore to left lower mouth.  No affecting PO intake.  Monitoring.    CAD:  status post CABG.       Transaminitis:  at diagnosis transaminitis with AST about 500.  PET imaging shows periportal mass vs lymphadenopathy, hepatitis serologies negative, ultrasound consistent with large  periportal node,  CT of liver no abnormalties.  6/24/24:  Improvement in liver enzymes: , AST 29, bilirubin 0.6.  Discontinued CT of liver as liver enzymes are improving.  2/3/25:  ALT and AST have remained in normal range.      Pulmonary Nodule:  Chest x-ray (8/19/24) showing RUL pulmonary nodule-potentially neoplastic.    CT Chest order by PCP (9/6/24) showing stable pulmonary nodule with recommendation for follow up imaging in 1 year.    Discussed with Dr. Luis, plan to monitor with future imaging.     Elevated Blood sugars- discussed weight loss strategies and cutting down carbs. Hgb A1c 5.7 (4/5/25) which is great.    Hypokalemia:  Continue to take potassium chloride 20 meq daily.    Healthcare Maintenance:  Recently had positive cologard, and had colonoscopy 12/23/2024 which showed no abnormalities.      RTC:   Advised to contact ENT for ongoing symptoms.    Continue monthly IVIG infusions at [place] or [facility].  Follow up visit with provider in 2 months.  Advised to obtain blood work prior to visit.  --------------------------------------------------------------------------------------------------  HELDER Gant

## 2025-06-09 ENCOUNTER — OFFICE VISIT (OUTPATIENT)
Dept: HEMATOLOGY/ONCOLOGY | Facility: HOSPITAL | Age: 65
End: 2025-06-09
Payer: MEDICARE

## 2025-06-09 ENCOUNTER — INFUSION (OUTPATIENT)
Dept: HEMATOLOGY/ONCOLOGY | Facility: HOSPITAL | Age: 65
End: 2025-06-09
Payer: MEDICARE

## 2025-06-09 VITALS
HEART RATE: 66 BPM | DIASTOLIC BLOOD PRESSURE: 77 MMHG | SYSTOLIC BLOOD PRESSURE: 125 MMHG | RESPIRATION RATE: 16 BRPM | OXYGEN SATURATION: 100 % | BODY MASS INDEX: 30.25 KG/M2 | WEIGHT: 193.12 LBS | TEMPERATURE: 97 F

## 2025-06-09 VITALS
HEART RATE: 71 BPM | TEMPERATURE: 97 F | SYSTOLIC BLOOD PRESSURE: 141 MMHG | RESPIRATION RATE: 16 BRPM | DIASTOLIC BLOOD PRESSURE: 72 MMHG

## 2025-06-09 DIAGNOSIS — I25.2 OLD MYOCARDIAL INFARCTION: ICD-10-CM

## 2025-06-09 DIAGNOSIS — D81.9 COMBINED IMMUNODEFICIENCY, UNSPECIFIED: ICD-10-CM

## 2025-06-09 DIAGNOSIS — K13.79 MOUTH SORE: ICD-10-CM

## 2025-06-09 DIAGNOSIS — C91.10 CLL (CHRONIC LYMPHOCYTIC LEUKEMIA) (MULTI): ICD-10-CM

## 2025-06-09 DIAGNOSIS — E87.6 HYPOKALEMIA: ICD-10-CM

## 2025-06-09 DIAGNOSIS — I25.10 CORONARY ARTERY DISEASE INVOLVING NATIVE HEART WITHOUT ANGINA PECTORIS, UNSPECIFIED VESSEL OR LESION TYPE: ICD-10-CM

## 2025-06-09 DIAGNOSIS — H93.13 TINNITUS OF BOTH EARS: ICD-10-CM

## 2025-06-09 DIAGNOSIS — C91.10 CLL (CHRONIC LYMPHOCYTIC LEUKEMIA) (MULTI): Primary | ICD-10-CM

## 2025-06-09 DIAGNOSIS — D80.1 HYPOGAMMAGLOBULINEMIA (MULTI): ICD-10-CM

## 2025-06-09 DIAGNOSIS — H90.6 MIXED CONDUCTIVE AND SENSORINEURAL HEARING LOSS OF BOTH EARS: ICD-10-CM

## 2025-06-09 DIAGNOSIS — E11.9 CONTROLLED TYPE 2 DIABETES MELLITUS WITHOUT COMPLICATION, WITHOUT LONG-TERM CURRENT USE OF INSULIN: ICD-10-CM

## 2025-06-09 DIAGNOSIS — D84.9 IMMUNOCOMPROMISED: ICD-10-CM

## 2025-06-09 LAB
ALBUMIN SERPL BCP-MCNC: 4.7 G/DL (ref 3.4–5)
ALP SERPL-CCNC: 141 U/L (ref 33–136)
ALT SERPL W P-5'-P-CCNC: 34 U/L (ref 10–52)
ANION GAP SERPL CALC-SCNC: 14 MMOL/L (ref 10–20)
AST SERPL W P-5'-P-CCNC: 23 U/L (ref 9–39)
BASOPHILS # BLD AUTO: 0 X10*3/UL (ref 0–0.1)
BASOPHILS NFR BLD AUTO: 0 %
BILIRUB SERPL-MCNC: 0.9 MG/DL (ref 0–1.2)
BUN SERPL-MCNC: 9 MG/DL (ref 6–23)
CALCIUM SERPL-MCNC: 9.4 MG/DL (ref 8.6–10.3)
CHLORIDE SERPL-SCNC: 104 MMOL/L (ref 98–107)
CO2 SERPL-SCNC: 27 MMOL/L (ref 21–32)
CREAT SERPL-MCNC: 0.7 MG/DL (ref 0.5–1.3)
EGFRCR SERPLBLD CKD-EPI 2021: >90 ML/MIN/1.73M*2
EOSINOPHIL # BLD AUTO: 0 X10*3/UL (ref 0–0.7)
EOSINOPHIL NFR BLD AUTO: 0 %
ERYTHROCYTE [DISTWIDTH] IN BLOOD BY AUTOMATED COUNT: 13.8 % (ref 11.5–14.5)
GLUCOSE SERPL-MCNC: 103 MG/DL (ref 74–99)
HCT VFR BLD AUTO: 41.1 % (ref 41–52)
HGB BLD-MCNC: 14.2 G/DL (ref 13.5–17.5)
IMM GRANULOCYTES # BLD AUTO: 0.06 X10*3/UL (ref 0–0.7)
IMM GRANULOCYTES NFR BLD AUTO: 1.7 % (ref 0–0.9)
LYMPHOCYTES # BLD AUTO: 0.35 X10*3/UL (ref 1.2–4.8)
LYMPHOCYTES NFR BLD AUTO: 9.8 %
MCH RBC QN AUTO: 30.5 PG (ref 26–34)
MCHC RBC AUTO-ENTMCNC: 34.5 G/DL (ref 32–36)
MCV RBC AUTO: 88 FL (ref 80–100)
MONOCYTES # BLD AUTO: 0.8 X10*3/UL (ref 0.1–1)
MONOCYTES NFR BLD AUTO: 22.3 %
NEUTROPHILS # BLD AUTO: 2.37 X10*3/UL (ref 1.2–7.7)
NEUTROPHILS NFR BLD AUTO: 66.2 %
NRBC BLD-RTO: 0 /100 WBCS (ref 0–0)
PLATELET # BLD AUTO: 125 X10*3/UL (ref 150–450)
POTASSIUM SERPL-SCNC: 3.4 MMOL/L (ref 3.5–5.3)
PROT SERPL-MCNC: 6.7 G/DL (ref 6.4–8.2)
RBC # BLD AUTO: 4.66 X10*6/UL (ref 4.5–5.9)
SODIUM SERPL-SCNC: 142 MMOL/L (ref 136–145)
WBC # BLD AUTO: 3.6 X10*3/UL (ref 4.4–11.3)

## 2025-06-09 PROCEDURE — 96365 THER/PROPH/DIAG IV INF INIT: CPT | Mod: INF

## 2025-06-09 PROCEDURE — 3078F DIAST BP <80 MM HG: CPT

## 2025-06-09 PROCEDURE — 2500000004 HC RX 250 GENERAL PHARMACY W/ HCPCS (ALT 636 FOR OP/ED): Mod: JZ,TB

## 2025-06-09 PROCEDURE — 85025 COMPLETE CBC W/AUTO DIFF WBC: CPT

## 2025-06-09 PROCEDURE — 1160F RVW MEDS BY RX/DR IN RCRD: CPT

## 2025-06-09 PROCEDURE — 99214 OFFICE O/P EST MOD 30 MIN: CPT

## 2025-06-09 PROCEDURE — 3074F SYST BP LT 130 MM HG: CPT

## 2025-06-09 PROCEDURE — 80053 COMPREHEN METABOLIC PANEL: CPT

## 2025-06-09 PROCEDURE — 1126F AMNT PAIN NOTED NONE PRSNT: CPT

## 2025-06-09 PROCEDURE — 96366 THER/PROPH/DIAG IV INF ADDON: CPT | Mod: INF

## 2025-06-09 PROCEDURE — 1159F MED LIST DOCD IN RCRD: CPT

## 2025-06-09 PROCEDURE — 3044F HG A1C LEVEL LT 7.0%: CPT

## 2025-06-09 PROCEDURE — 2500000001 HC RX 250 WO HCPCS SELF ADMINISTERED DRUGS (ALT 637 FOR MEDICARE OP)

## 2025-06-09 RX ORDER — EPINEPHRINE 0.3 MG/.3ML
0.3 INJECTION SUBCUTANEOUS EVERY 5 MIN PRN
Status: DISCONTINUED | OUTPATIENT
Start: 2025-06-09 | End: 2025-06-09 | Stop reason: HOSPADM

## 2025-06-09 RX ORDER — ALBUTEROL SULFATE 0.83 MG/ML
3 SOLUTION RESPIRATORY (INHALATION) AS NEEDED
Status: DISCONTINUED | OUTPATIENT
Start: 2025-06-09 | End: 2025-06-09 | Stop reason: HOSPADM

## 2025-06-09 RX ORDER — DIPHENHYDRAMINE HCL 25 MG
25 CAPSULE ORAL ONCE
OUTPATIENT
Start: 2025-07-07

## 2025-06-09 RX ORDER — EPINEPHRINE 0.3 MG/.3ML
0.3 INJECTION SUBCUTANEOUS EVERY 5 MIN PRN
OUTPATIENT
Start: 2025-07-07

## 2025-06-09 RX ORDER — FAMOTIDINE 10 MG/ML
20 INJECTION, SOLUTION INTRAVENOUS ONCE AS NEEDED
OUTPATIENT
Start: 2025-07-07

## 2025-06-09 RX ORDER — ACETAMINOPHEN 325 MG/1
650 TABLET ORAL ONCE
OUTPATIENT
Start: 2025-07-07

## 2025-06-09 RX ORDER — DIPHENHYDRAMINE HCL 25 MG
25 CAPSULE ORAL ONCE
Status: COMPLETED | OUTPATIENT
Start: 2025-06-09 | End: 2025-06-09

## 2025-06-09 RX ORDER — ACETAMINOPHEN 325 MG/1
650 TABLET ORAL ONCE
Status: COMPLETED | OUTPATIENT
Start: 2025-06-09 | End: 2025-06-09

## 2025-06-09 RX ORDER — ALBUTEROL SULFATE 0.83 MG/ML
3 SOLUTION RESPIRATORY (INHALATION) AS NEEDED
OUTPATIENT
Start: 2025-07-07

## 2025-06-09 RX ORDER — DIPHENHYDRAMINE HYDROCHLORIDE 50 MG/ML
50 INJECTION, SOLUTION INTRAMUSCULAR; INTRAVENOUS AS NEEDED
Status: DISCONTINUED | OUTPATIENT
Start: 2025-06-09 | End: 2025-06-09 | Stop reason: HOSPADM

## 2025-06-09 RX ORDER — DIPHENHYDRAMINE HYDROCHLORIDE 50 MG/ML
50 INJECTION, SOLUTION INTRAMUSCULAR; INTRAVENOUS AS NEEDED
OUTPATIENT
Start: 2025-07-07

## 2025-06-09 RX ORDER — FAMOTIDINE 10 MG/ML
20 INJECTION, SOLUTION INTRAVENOUS ONCE AS NEEDED
Status: DISCONTINUED | OUTPATIENT
Start: 2025-06-09 | End: 2025-06-09 | Stop reason: HOSPADM

## 2025-06-09 RX ADMIN — IMMUNE GLOBULIN INFUSION (HUMAN) 25 G: 100 INJECTION, SOLUTION INTRAVENOUS; SUBCUTANEOUS at 13:05

## 2025-06-09 RX ADMIN — ACETAMINOPHEN 650 MG: 325 TABLET ORAL at 12:48

## 2025-06-09 RX ADMIN — DIPHENHYDRAMINE HYDROCHLORIDE 25 MG: 25 CAPSULE ORAL at 12:48

## 2025-06-09 ASSESSMENT — PAIN SCALES - GENERAL: PAINLEVEL_OUTOF10: 0-NO PAIN

## 2025-06-09 NOTE — PROGRESS NOTES
Assessment unchanged from prior office visit.  Tolerated first dose IVIG without incidence.  Discharged in stable condition.

## 2025-06-09 NOTE — PROGRESS NOTES
IVIG rates  44ml/hr x 30 min  88ml/hr x 30 min  176ml/hr x 30 min  352ml/hr until infusion complete

## 2025-06-16 ENCOUNTER — TELEPHONE (OUTPATIENT)
Dept: OTOLARYNGOLOGY | Facility: HOSPITAL | Age: 65
End: 2025-06-16
Payer: MEDICARE

## 2025-06-16 NOTE — TELEPHONE ENCOUNTER
RN received call from patient stating his ears are still currently draining and now have a bad odor to the drainage. Ears itch and his hearing is very muffled despite tubes being placed in April by Dr. Robin. Will reach out to MD for recommendations. FUV scheduled next week.

## 2025-06-17 DIAGNOSIS — H65.23 BILATERAL CHRONIC SEROUS OTITIS MEDIA: ICD-10-CM

## 2025-06-17 RX ORDER — SULFAMETHOXAZOLE AND TRIMETHOPRIM 800; 160 MG/1; MG/1
1 TABLET ORAL 2 TIMES DAILY
Qty: 28 TABLET | Refills: 0 | Status: CANCELLED | OUTPATIENT
Start: 2025-06-17 | End: 2025-07-01

## 2025-06-20 ENCOUNTER — SPECIALTY PHARMACY (OUTPATIENT)
Dept: PHARMACY | Facility: CLINIC | Age: 65
End: 2025-06-20

## 2025-06-20 PROCEDURE — RXMED WILLOW AMBULATORY MEDICATION CHARGE

## 2025-06-24 ENCOUNTER — PHARMACY VISIT (OUTPATIENT)
Dept: PHARMACY | Facility: CLINIC | Age: 65
End: 2025-06-24
Payer: COMMERCIAL

## 2025-06-25 ENCOUNTER — APPOINTMENT (OUTPATIENT)
Dept: OTOLARYNGOLOGY | Facility: CLINIC | Age: 65
End: 2025-06-25
Payer: MEDICARE

## 2025-06-25 VITALS — WEIGHT: 193 LBS | BODY MASS INDEX: 30.29 KG/M2 | HEIGHT: 67 IN

## 2025-06-25 DIAGNOSIS — H81.11 BENIGN PAROXYSMAL POSITIONAL VERTIGO OF RIGHT EAR: ICD-10-CM

## 2025-06-25 DIAGNOSIS — H66.23 CHRONIC ATTICOANTRAL SUPPURATIVE OTITIS MEDIA OF BOTH EARS: Primary | ICD-10-CM

## 2025-06-25 DIAGNOSIS — H90.6 MIXED CONDUCTIVE AND SENSORINEURAL HEARING LOSS OF BOTH EARS: ICD-10-CM

## 2025-06-25 DIAGNOSIS — H61.23 BILATERAL IMPACTED CERUMEN: ICD-10-CM

## 2025-06-25 DIAGNOSIS — H69.93 DYSFUNCTION OF BOTH EUSTACHIAN TUBES: ICD-10-CM

## 2025-06-25 DIAGNOSIS — H65.23 BILATERAL CHRONIC SEROUS OTITIS MEDIA: ICD-10-CM

## 2025-06-25 RX ORDER — MUPIROCIN 20 MG/G
OINTMENT TOPICAL 2 TIMES DAILY
Qty: 15 G | Refills: 2 | Status: SHIPPED | OUTPATIENT
Start: 2025-06-25 | End: 2025-07-09

## 2025-06-25 NOTE — LETTER
"June 30, 2025     Mandy Wang MD  [address]  [facility] [address]    Patient: Bud Pittman   YOB: 1960   Date of Visit: 6/25/2025       Dear Dr. Mandy Wang MD:    Thank you for referring Bud Pittman to me for evaluation. Below are my notes for this consultation.  If you have questions, please do not hesitate to call me. I look forward to following your patient along with you.       Sincerely,     Henri Mccormick MD      CC: Deedee Mcclendon MD  ______________________________________________________________________________________            Reason for Consult:  Follow-up (Ear tubes may need replaced)     Subjective  History Of Present Illness:  Demar Pittman \"Shraddha" is a 64 y.o. male with bilateral Eustachian tube dysfunction and history of chronic serous otitis media which was controlled with conservative management and use of nasal sprays in 2021. His hearing had returned to baseline, and he had high frequency sensorineural hearing loss.     He is now in an immunocompromised state and is being treated for CLL with IVIG. He currently had a bilateral mucoid effusion after a URI that did not clear with conservative management. For that reason, I placed PE tubes at his visit in 04/2025.    He presents with complaints of dizziness and nausea. Per his wife, he had bloody drainage from the ears. He was recently started on bactrim.      Past Medical History:  He has a past medical history of Chronic lymphocytic leukemia (Multi), Class 1 obesity due to excess calories with serious comorbidity and body mass index (BMI) of 30.0 to 30.9 in adult (02/16/2024), Coronary artery disease (11/2019), Diffuse otitis externa, bilateral (10/11/2021), GERD (gastroesophageal reflux disease), Hard to intubate, Herpes simplex virus (HSV) infection (08/16/2023), Hyperlipidemia, Hypertension (1995), Hypertrophy of tonsils (12/05/2019), Localized enlarged lymph " nodes (04/30/2020), Personal history of diseases of the skin and subcutaneous tissue (03/09/2020), Personal history of other diseases of the circulatory system, Personal history of other diseases of the circulatory system, Personal history of other diseases of the circulatory system, Personal history of other diseases of the circulatory system, Personal history of other specified conditions (12/11/2019), Positive colorectal cancer screening using Cologuard test, Sleep apnea, and Transaminitis (06/26/2024).    Surgical History:  He has a past surgical history that includes Other surgical history (10/24/2019); Other surgical history (10/24/2019); Other surgical history (12/13/2019); Other surgical history (12/29/2019); Coronary artery bypass graft (11/2019); Vasectomy (3/1990); Circumcision, primary (07/1958); and Eye surgery (04/2013).     Social History:  He reports that he quit smoking about 39 years ago. His smoking use included cigarettes. He has a 38 pack-year smoking history. He has been exposed to tobacco smoke. He has never used smokeless tobacco. He reports that he does not currently use alcohol after a past usage of about 2.0 standard drinks of alcohol per week. He reports that he does not use drugs.    Family History:  family history includes Birth defects in his son; Cancer in his father, mother, mother's sister, and mother's sister; Colon cancer in his mother's sister; Diabetes in his mother; Diabetes type II in his mother; Heart attack in his maternal grandfather and paternal grandfather; Hypertension in his mother.     Medications:  Current Outpatient Medications   Medication Instructions   • amLODIPine (NORVASC) 5 mg, oral, Daily   • aspirin 81 mg, Daily   • clotrimazole-betamethasone (Lotrisone) cream 1 Application, Topical, 2 times daily   • metoprolol tartrate (LOPRESSOR) 25 mg, oral, 2 times daily   • multivitamin with folic acid (One Daily Multivitamin) 400 mcg tablet 1 tablet, Daily   •  "mupirocin (Bactroban) 2 % ointment Topical, 2 times daily, Apply to outer ear twice daily for 14 days   • ondansetron (ZOFRAN) 8 mg, oral, Every 8 hours PRN   • pantoprazole (PROTONIX) 40 mg, oral, Daily   • potassium chloride CR (Klor-Con) 10 mEq ER tablet 20 mEq, oral, Daily, Do not crush, chew, or split.   • prochlorperazine (COMPAZINE) 10 mg, oral, Every 6 hours PRN   • rosuvastatin (CRESTOR) 5 mg, oral, Daily   • tobramycin-dexamethasone (Tobradex ST) drops,suspension OPHTHalmic SUSPension 3 drops, Each Ear, 2 times daily, Apply in affected ear   • Venclexta 200 mg, oral, Daily, Take with food.      Allergies:  Rituximab-pvvr    Review of Systems:   A comprehensive 10-point review of systems was obtained including constitutional, neurological, HEENT, pulmonary, cardiovascular, genito-urinary, and other pertinent systems and was negative except as noted in the HPI.     Objective  Physical Exam:  Last Recorded Vitals: Height 1.702 m (5' 7\"), weight 87.5 kg (193 lb).    On physical exam, the patient is a well-nourished, well-developed patient, in no acute distress, able to communicate without assistance in English language. Head and face is atraumatic and normocephalic. Salivary glands are intact. Facial strength is symmetrical bilaterally.       On ear examination:  Right ear: The patient has ear canal was full debris was removed He had broken skin in the canal. The tympanic membrane has a TM perforation.  BC>AC  Left ear: The patient has an open and patent ear canal. The tympanic membrane is intact with a PE tube in place.   BC>AC  The Carney is midline    On vestibular exam, the patient has no spontaneous nystagmus, no headshake nystagmus, no head-thrust nystagmus, and no nystagmus on hyperventilation or Valsalva maneuvers. [place]-Hallpike maneuver is + on the right.       On neuro exam, the patient is alert and oriented x3, cranial nerves are grossly intact, cerebellar exam is normal.      The rest of the exam, " "including anterior rhinoscopy, oropharyngeal exam, neck exam, and cardiovascular exam, were normal including no palpable lymphadenopathies, thyroid in the midline position, normal pulses, and normal chest excursion.       Reviewed Results:  Audiology Testing:  I personally reviewed the audiogram from 03/2025 that showed:   Moderate down-sloping to severe mixed hearing loss bilaterally with 30db of air-bone gap . Discrimination: 92%       I reviewed his audiogram from 4/25/2022 which shows a normal hearing sloping to moderate hearing loss with high frequency in both ears. There is 100% discrimination bilaterally.      I reviewed and interpreted the patient's audiogram from 8/4/2021, which shows asymmetric right greater than left hearing loss. On the right, there is a mild sloping to severe mixed hearing loss with a 20 dB air-bone gap in the lower frequencies, 84% speech discrimination on the right. On the left there is a normal quickly sloping to profound sensorineural hearing loss in the higher frequencies, 100% speech discrimination on the left. Type C tympanogram on the left, type B tympanogram on the right.       Imaging:  None     Procedure:  Epley maneuver was performed on the right ear.     Assessment/Plan    1. Chronic atticoantral suppurative otitis media of both ears    2. Bilateral chronic serous otitis media    3. Mixed conductive and sensorineural hearing loss of both ears    4. Dysfunction of both eustachian tubes    5. Bilateral impacted cerumen    6. Benign paroxysmal positional vertigo of right ear          In summary, Demar Pittman \"Bud\" is a 64 y.o. male with bilateral Eustachian tube dysfunction and history of chronic serous otitis media which was controlled with conservative management and use of nasal sprays in 2021. His hearing had returned to baseline, and he had high frequency sensorineural hearing loss.     He is now in an immunocompromised state and is being treated for CLL with IVIG. " He currently has a bilateral mucoid effusion after a URI. His ears have not cleared despite medical treatment. For that reason, we placed bilateral PE tubes in 04/2025 .     Since then, his ears have been draining. He was on multiple antibiotics. He was started on Bactrim last week as he had blood tinged drainage. Today, his hears were cleaned out. The right PE tube is out with a perforation and the PE tube on the left was open and patent. He has breakdown of the skin in the opening of the EACs.  For that reason, he will complete his course of Bactrim, he will start Tobradex drops for 3 weeks, and Bactroban ointment BID until his follow up in one month.    Epley maneuver was performed on the right. If the dizziness doesn't improve, he will be referred for vestibular PT.          ____________________________________________________  Henri Robin MD  Professor and Chief   Otology/Neurotology/Lateral Skull-Base Surgery   [facility]      Scribe Attestation  By signing my name below, I, Kelly Borrego , Scribe attest that this documentation has been prepared under the direction and in the presence of Henri Mccormick MD.

## 2025-07-07 ENCOUNTER — INFUSION (OUTPATIENT)
Dept: HEMATOLOGY/ONCOLOGY | Facility: HOSPITAL | Age: 65
End: 2025-07-07
Payer: MEDICARE

## 2025-07-07 VITALS
RESPIRATION RATE: 16 BRPM | WEIGHT: 195.53 LBS | OXYGEN SATURATION: 98 % | TEMPERATURE: 97.3 F | DIASTOLIC BLOOD PRESSURE: 69 MMHG | HEART RATE: 66 BPM | SYSTOLIC BLOOD PRESSURE: 129 MMHG | BODY MASS INDEX: 30.62 KG/M2

## 2025-07-07 DIAGNOSIS — C91.10 CLL (CHRONIC LYMPHOCYTIC LEUKEMIA) (MULTI): ICD-10-CM

## 2025-07-07 DIAGNOSIS — D80.1 HYPOGAMMAGLOBULINEMIA (MULTI): ICD-10-CM

## 2025-07-07 DIAGNOSIS — D81.9 COMBINED IMMUNODEFICIENCY, UNSPECIFIED: ICD-10-CM

## 2025-07-07 PROCEDURE — 2500000004 HC RX 250 GENERAL PHARMACY W/ HCPCS (ALT 636 FOR OP/ED): Mod: JZ,TB

## 2025-07-07 PROCEDURE — 96366 THER/PROPH/DIAG IV INF ADDON: CPT | Mod: INF

## 2025-07-07 PROCEDURE — 2500000001 HC RX 250 WO HCPCS SELF ADMINISTERED DRUGS (ALT 637 FOR MEDICARE OP)

## 2025-07-07 PROCEDURE — 96365 THER/PROPH/DIAG IV INF INIT: CPT | Mod: INF

## 2025-07-07 RX ORDER — HEPARIN 100 UNIT/ML
500 SYRINGE INTRAVENOUS AS NEEDED
OUTPATIENT
Start: 2025-07-07

## 2025-07-07 RX ORDER — DIPHENHYDRAMINE HCL 25 MG
25 CAPSULE ORAL ONCE
Status: COMPLETED | OUTPATIENT
Start: 2025-07-07 | End: 2025-07-07

## 2025-07-07 RX ORDER — HEPARIN SODIUM,PORCINE/PF 10 UNIT/ML
50 SYRINGE (ML) INTRAVENOUS AS NEEDED
OUTPATIENT
Start: 2025-07-07

## 2025-07-07 RX ORDER — DIPHENHYDRAMINE HCL 25 MG
25 CAPSULE ORAL ONCE
OUTPATIENT
Start: 2025-08-04

## 2025-07-07 RX ORDER — ALBUTEROL SULFATE 0.83 MG/ML
3 SOLUTION RESPIRATORY (INHALATION) AS NEEDED
OUTPATIENT
Start: 2025-08-04

## 2025-07-07 RX ORDER — ACETAMINOPHEN 325 MG/1
650 TABLET ORAL ONCE
OUTPATIENT
Start: 2025-08-04

## 2025-07-07 RX ORDER — ACETAMINOPHEN 325 MG/1
650 TABLET ORAL ONCE
Status: COMPLETED | OUTPATIENT
Start: 2025-07-07 | End: 2025-07-07

## 2025-07-07 RX ORDER — FAMOTIDINE 10 MG/ML
20 INJECTION, SOLUTION INTRAVENOUS ONCE AS NEEDED
OUTPATIENT
Start: 2025-08-04

## 2025-07-07 RX ORDER — DIPHENHYDRAMINE HYDROCHLORIDE 50 MG/ML
50 INJECTION, SOLUTION INTRAMUSCULAR; INTRAVENOUS AS NEEDED
OUTPATIENT
Start: 2025-08-04

## 2025-07-07 RX ORDER — EPINEPHRINE 0.3 MG/.3ML
0.3 INJECTION SUBCUTANEOUS EVERY 5 MIN PRN
OUTPATIENT
Start: 2025-08-04

## 2025-07-07 RX ADMIN — ACETAMINOPHEN 650 MG: 325 TABLET ORAL at 13:10

## 2025-07-07 RX ADMIN — IMMUNE GLOBULIN INFUSION (HUMAN) 25 G: 100 INJECTION, SOLUTION INTRAVENOUS; SUBCUTANEOUS at 13:40

## 2025-07-07 RX ADMIN — DIPHENHYDRAMINE HYDROCHLORIDE 25 MG: 25 CAPSULE ORAL at 13:10

## 2025-07-07 ASSESSMENT — PAIN SCALES - GENERAL: PAINLEVEL_OUTOF10: 0-NO PAIN

## 2025-07-07 NOTE — PROGRESS NOTES
IVIG Rates 88 kg    44 ml/hr x 22 ml  88 ml/hr x 44 ml  176 ml/hr x 88 ml  352 ml/hr x remaining volume (~96 ml)    Rates verified by Ange COCHRAN RN

## 2025-07-07 NOTE — PROGRESS NOTES
Patient presents to infusion appointment in stable condition. IVIG infusion tolerated without incident. Discharged in stable condition.

## 2025-07-21 ENCOUNTER — SPECIALTY PHARMACY (OUTPATIENT)
Dept: PHARMACY | Facility: CLINIC | Age: 65
End: 2025-07-21

## 2025-07-21 PROCEDURE — RXMED WILLOW AMBULATORY MEDICATION CHARGE

## 2025-07-23 ENCOUNTER — APPOINTMENT (OUTPATIENT)
Dept: OTOLARYNGOLOGY | Facility: CLINIC | Age: 65
End: 2025-07-23
Payer: MEDICARE

## 2025-07-23 ENCOUNTER — PHARMACY VISIT (OUTPATIENT)
Dept: PHARMACY | Facility: CLINIC | Age: 65
End: 2025-07-23
Payer: COMMERCIAL

## 2025-07-23 VITALS — HEIGHT: 67 IN | BODY MASS INDEX: 30.61 KG/M2 | WEIGHT: 195 LBS

## 2025-07-23 DIAGNOSIS — H65.23 BILATERAL CHRONIC SEROUS OTITIS MEDIA: Primary | ICD-10-CM

## 2025-07-23 DIAGNOSIS — H93.13 TINNITUS OF BOTH EARS: ICD-10-CM

## 2025-07-23 DIAGNOSIS — H66.23 CHRONIC ATTICOANTRAL SUPPURATIVE OTITIS MEDIA OF BOTH EARS: ICD-10-CM

## 2025-07-23 DIAGNOSIS — H61.22 IMPACTED CERUMEN OF LEFT EAR: ICD-10-CM

## 2025-07-23 DIAGNOSIS — H81.11 BENIGN PAROXYSMAL POSITIONAL VERTIGO OF RIGHT EAR: ICD-10-CM

## 2025-07-23 DIAGNOSIS — H90.3 SENSORINEURAL HEARING LOSS (SNHL) OF BOTH EARS: ICD-10-CM

## 2025-07-23 DIAGNOSIS — H69.93 DYSFUNCTION OF BOTH EUSTACHIAN TUBES: ICD-10-CM

## 2025-07-23 PROCEDURE — 1160F RVW MEDS BY RX/DR IN RCRD: CPT | Performed by: OTOLARYNGOLOGY

## 2025-07-23 PROCEDURE — 69210 REMOVE IMPACTED EAR WAX UNI: CPT | Performed by: OTOLARYNGOLOGY

## 2025-07-23 PROCEDURE — 1159F MED LIST DOCD IN RCRD: CPT | Performed by: OTOLARYNGOLOGY

## 2025-07-23 PROCEDURE — 99213 OFFICE O/P EST LOW 20 MIN: CPT | Performed by: OTOLARYNGOLOGY

## 2025-07-23 PROCEDURE — 3008F BODY MASS INDEX DOCD: CPT | Performed by: OTOLARYNGOLOGY

## 2025-07-23 NOTE — PROGRESS NOTES
"        Reason for Consult:  Follow-up (Chronic atticoantral suppurative otitis media of both ears.)     Subjective   History Of Present Illness:  Demar Pittman \"Shraddha" is a 64 y.o. male with bilateral Eustachian tube dysfunction and history of chronic serous otitis media which was controlled with conservative management and use of nasal sprays in 2021. His hearing had returned to baseline, and he had high frequency sensorineural hearing loss.     He is now in an immunocompromised state and is being treated for CLL with IVIG. He currently has a bilateral mucoid effusion after a URI. His ears have not cleared despite medical treatment. For that reason, we placed bilateral PE tubes in 04/2025 .     After the tubes he persisted with ear drainage. He also had breakdown of the skin in the opening of the EACs.  For that reason, he completed a 2 week course of Bactrim, Tobradex drops for 3 weeks, and Bactroban ointment BID. Since then the infection resolvved and his hearing has improved.    He also had right BPPV and Epley maneuver were performed with resolution of the positional dizziness.     Past Medical History:  He has a past medical history of Chronic lymphocytic leukemia (Multi), Class 1 obesity due to excess calories with serious comorbidity and body mass index (BMI) of 30.0 to 30.9 in adult (02/16/2024), Coronary artery disease (11/2019), Diffuse otitis externa, bilateral (10/11/2021), GERD (gastroesophageal reflux disease), Hard to intubate, Herpes simplex virus (HSV) infection (08/16/2023), Hyperlipidemia, Hypertension (1995), Hypertrophy of tonsils (12/05/2019), Localized enlarged lymph nodes (04/30/2020), Nosebleed (07/02/2025), Personal history of diseases of the skin and subcutaneous tissue (03/09/2020), Personal history of other diseases of the circulatory system, Personal history of other diseases of the circulatory system, Personal history of other diseases of the circulatory system, Personal history " of other diseases of the circulatory system, Personal history of other specified conditions (12/11/2019), Positive colorectal cancer screening using Cologuard test, Sleep apnea (1988), and Transaminitis (06/26/2024).    Surgical History:  He has a past surgical history that includes Other surgical history (10/24/2019); Other surgical history (10/24/2019); Other surgical history (12/13/2019); Other surgical history (12/29/2019); Coronary artery bypass graft (11/2019); Vasectomy (3/1990); Circumcision, primary (07/1958); and Eye surgery (04/2013).     Social History:  He reports that he quit smoking about 39 years ago. His smoking use included cigarettes. He has a 38 pack-year smoking history. He has been exposed to tobacco smoke. He has never used smokeless tobacco. He reports that he does not currently use alcohol after a past usage of about 2.0 standard drinks of alcohol per week. He reports that he does not use drugs.    Family History:  family history includes Birth defects in his son; Cancer in his mother, mother's sister, mother's sister, and mother's sister; Cancer (age of onset: 39) in his father; Colon cancer in his mother's sister; Diabetes (age of onset: 40) in his mother; Diabetes type II in his mother; Heart attack in his maternal grandfather and paternal grandfather; Hypertension in his mother.     Medications:  Current Outpatient Medications   Medication Instructions    amLODIPine (NORVASC) 5 mg, oral, Daily    aspirin 81 mg, Daily    clotrimazole-betamethasone (Lotrisone) cream 1 Application, Topical, 2 times daily    metoprolol tartrate (LOPRESSOR) 25 mg, oral, 2 times daily    multivitamin with folic acid (One Daily Multivitamin) 400 mcg tablet 1 tablet, Daily    ondansetron (ZOFRAN) 8 mg, oral, Every 8 hours PRN    pantoprazole (PROTONIX) 40 mg, oral, Daily    potassium chloride CR (Klor-Con) 10 mEq ER tablet 20 mEq, oral, Daily, Do not crush, chew, or split.    prochlorperazine (COMPAZINE) 10 mg,  "oral, Every 6 hours PRN    rosuvastatin (CRESTOR) 5 mg, oral, Daily    Venclexta 200 mg, oral, Daily, Take with food.      Allergies:  Rituximab-pvvr    Review of Systems:   A comprehensive 10-point review of systems was obtained including constitutional, neurological, HEENT, pulmonary, cardiovascular, genito-urinary, and other pertinent systems and was negative except as noted in the HPI.     Objective   Physical Exam:  Last Recorded Vitals: Height 1.702 m (5' 7\"), weight 88.5 kg (195 lb).    On physical exam, the patient is a well-nourished, well-developed patient, in no acute distress, able to communicate without assistance in English language. Head and face is atraumatic and normocephalic. Salivary glands are intact. Facial strength is symmetrical bilaterally.       On ear examination:  Right ear: The patient has an open and patent ear canal with EAC dermatitis in the opening of the EAC. The tympanic membrane is intact with a PE tube in place open and patent. No drainage.  AC>BC  Left ear: The patient has cerumen impaction which was removed with EAC dermatitis in the opening of the EAC. The tympanic membrane is intact with a PE tube in place open and patent. No drainage.  AC>BC  The Carney is midline    On vestibular exam, the patient has no spontaneous nystagmus, no headshake nystagmus, no head-thrust nystagmus, and no nystagmus on hyperventilation or Valsalva maneuvers. [place]-Hallpike maneuver is + on the right.       On neuro exam, the patient is alert and oriented x3, cranial nerves are grossly intact, cerebellar exam is normal.      The rest of the exam, including anterior rhinoscopy, oropharyngeal exam, neck exam, and cardiovascular exam, were normal including no palpable lymphadenopathies, thyroid in the midline position, normal pulses, and normal chest excursion.       Reviewed Results:  Audiology Testing:  I personally reviewed the audiogram from 03/2025 that showed:   Moderate down-sloping to severe mixed " "hearing loss bilaterally with 30db of air-bone gap . Discrimination: 92%       I reviewed his audiogram from 4/25/2022 which shows a normal hearing sloping to moderate hearing loss with high frequency in both ears. There is 100% discrimination bilaterally.      I reviewed and interpreted the patient's audiogram from 8/4/2021, which shows asymmetric right greater than left hearing loss. On the right, there is a mild sloping to severe mixed hearing loss with a 20 dB air-bone gap in the lower frequencies, 84% speech discrimination on the right. On the left there is a normal quickly sloping to profound sensorineural hearing loss in the higher frequencies, 100% speech discrimination on the left. Type C tympanogram on the left, type B tympanogram on the right.       Imaging:  None     Procedure:  None    Assessment/Plan     1. Bilateral chronic serous otitis media    2. Dysfunction of both eustachian tubes    3. Chronic atticoantral suppurative otitis media of both ears    4. Sensorineural hearing loss (SNHL) of both ears    5. Tinnitus of both ears    6. Benign paroxysmal positional vertigo of right ear    7. Impacted cerumen of left ear            In summary, Demar Pittman \"Shraddha" is a 64 y.o. male wwith bilateral Eustachian tube dysfunction and history of chronic serous otitis media which was controlled with conservative management and use of nasal sprays in 2021. His hearing had returned to baseline, and he had high frequency sensorineural hearing loss.     He is now in an immunocompromised state and is being treated for CLL with IVIG. He currently has a bilateral mucoid effusion after a URI. His ears have not cleared despite medical treatment. For that reason, we placed bilateral PE tubes in 04/2025 .     After the tubes he persisted with ear drainage. He also had breakdown of the skin in the opening of the EACs.  For that reason, he completed a 2 week course of Bactrim, Tobradex drops for 3 weeks, and Bactroban " ointment BID. Since then the infection resolvved and his hearing has improved.    He also had right BPPV and Epley maneuver were performed with resolution of the positional dizziness.    F/U in 6 months with Audio.       ____________________________________________________  Henri Robin MD  Professor and Chief   Otology/Neurotology/Lateral Skull-Base Surgery   [facility]      Scribe Attestation  By signing my name below, I, Kelly Ochoamatthew , Scribe attest that this documentation has been prepared under the direction and in the presence of Henri Mccormick MD.

## 2025-07-23 NOTE — LETTER
"July 23, 2025     Mandy Wang MD  [address] [facility], [address]  [facility] [address]    Patient: Bud Pittman   YOB: 1960   Date of Visit: 7/23/2025       Dear Dr. Mandy Wang MD:    Thank you for referring Bud Pittman to me for evaluation. Below are my notes for this consultation.  If you have questions, please do not hesitate to call me. I look forward to following your patient along with you.       Sincerely,     Henri Mccormick MD      CC: Deedee Mcclendon MD  ______________________________________________________________________________________            Reason for Consult:  Follow-up (Chronic atticoantral suppurative otitis media of both ears.)     Subjective  History Of Present Illness:  Demar Pittman \"Shraddha" is a 64 y.o. male with bilateral Eustachian tube dysfunction and history of chronic serous otitis media which was controlled with conservative management and use of nasal sprays in 2021. His hearing had returned to baseline, and he had high frequency sensorineural hearing loss.     He is now in an immunocompromised state and is being treated for CLL with IVIG. He currently has a bilateral mucoid effusion after a URI. His ears have not cleared despite medical treatment. For that reason, we placed bilateral PE tubes in 04/2025 .     After the tubes he persisted with ear drainage. He also had breakdown of the skin in the opening of the EACs.  For that reason, he completed a 2 week course of Bactrim, Tobradex drops for 3 weeks, and Bactroban ointment BID. Since then the infection resolvved and his hearing has improved.    He also had right BPPV and Epley maneuver were performed with resolution of the positional dizziness.     Past Medical History:  He has a past medical history of Chronic lymphocytic leukemia (Multi), Class 1 obesity due to excess calories with serious comorbidity and body mass index (BMI) of 30.0 to 30.9 in adult (02/16/2024), " Coronary artery disease (11/2019), Diffuse otitis externa, bilateral (10/11/2021), GERD (gastroesophageal reflux disease), Hard to intubate, Herpes simplex virus (HSV) infection (08/16/2023), Hyperlipidemia, Hypertension (1995), Hypertrophy of tonsils (12/05/2019), Localized enlarged lymph nodes (04/30/2020), Nosebleed (07/02/2025), Personal history of diseases of the skin and subcutaneous tissue (03/09/2020), Personal history of other diseases of the circulatory system, Personal history of other diseases of the circulatory system, Personal history of other diseases of the circulatory system, Personal history of other diseases of the circulatory system, Personal history of other specified conditions (12/11/2019), Positive colorectal cancer screening using Cologuard test, Sleep apnea (1988), and Transaminitis (06/26/2024).    Surgical History:  He has a past surgical history that includes Other surgical history (10/24/2019); Other surgical history (10/24/2019); Other surgical history (12/13/2019); Other surgical history (12/29/2019); Coronary artery bypass graft (11/2019); Vasectomy (3/1990); Circumcision, primary (07/1958); and Eye surgery (04/2013).     Social History:  He reports that he quit smoking about 39 years ago. His smoking use included cigarettes. He has a 38 pack-year smoking history. He has been exposed to tobacco smoke. He has never used smokeless tobacco. He reports that he does not currently use alcohol after a past usage of about 2.0 standard drinks of alcohol per week. He reports that he does not use drugs.    Family History:  family history includes Birth defects in his son; Cancer in his mother, mother's sister, mother's sister, and mother's sister; Cancer (age of onset: 39) in his father; Colon cancer in his mother's sister; Diabetes (age of onset: 40) in his mother; Diabetes type II in his mother; Heart attack in his maternal grandfather and paternal grandfather; Hypertension in his mother.    "  Medications:  Current Outpatient Medications   Medication Instructions   • amLODIPine (NORVASC) 5 mg, oral, Daily   • aspirin 81 mg, Daily   • clotrimazole-betamethasone (Lotrisone) cream 1 Application, Topical, 2 times daily   • metoprolol tartrate (LOPRESSOR) 25 mg, oral, 2 times daily   • multivitamin with folic acid (One Daily Multivitamin) 400 mcg tablet 1 tablet, Daily   • ondansetron (ZOFRAN) 8 mg, oral, Every 8 hours PRN   • pantoprazole (PROTONIX) 40 mg, oral, Daily   • potassium chloride CR (Klor-Con) 10 mEq ER tablet 20 mEq, oral, Daily, Do not crush, chew, or split.   • prochlorperazine (COMPAZINE) 10 mg, oral, Every 6 hours PRN   • rosuvastatin (CRESTOR) 5 mg, oral, Daily   • Venclexta 200 mg, oral, Daily, Take with food.      Allergies:  Rituximab-pvvr    Review of Systems:   A comprehensive 10-point review of systems was obtained including constitutional, neurological, HEENT, pulmonary, cardiovascular, genito-urinary, and other pertinent systems and was negative except as noted in the HPI.     Objective  Physical Exam:  Last Recorded Vitals: Height 1.702 m (5' 7\"), weight 88.5 kg (195 lb).    On physical exam, the patient is a well-nourished, well-developed patient, in no acute distress, able to communicate without assistance in English language. Head and face is atraumatic and normocephalic. Salivary glands are intact. Facial strength is symmetrical bilaterally.       On ear examination:  Right ear: The patient has an open and patent ear canal with EAC dermatitis in the opening of the EAC. The tympanic membrane is intact with a PE tube in place open and patent. No drainage.  AC>BC  Left ear: The patient has cerumen impaction which was removed with EAC dermatitis in the opening of the EAC. The tympanic membrane is intact with a PE tube in place open and patent. No drainage.  AC>BC  The Carney is midline    On vestibular exam, the patient has no spontaneous nystagmus, no headshake nystagmus, no " "head-thrust nystagmus, and no nystagmus on hyperventilation or Valsalva maneuvers. Eliza-Hallpike maneuver is + on the right.       On neuro exam, the patient is alert and oriented x3, cranial nerves are grossly intact, cerebellar exam is normal.      The rest of the exam, including anterior rhinoscopy, oropharyngeal exam, neck exam, and cardiovascular exam, were normal including no palpable lymphadenopathies, thyroid in the midline position, normal pulses, and normal chest excursion.       Reviewed Results:  Audiology Testing:  I personally reviewed the audiogram from 03/2025 that showed:   Moderate down-sloping to severe mixed hearing loss bilaterally with 30db of air-bone gap . Discrimination: 92%       I reviewed his audiogram from 4/25/2022 which shows a normal hearing sloping to moderate hearing loss with high frequency in both ears. There is 100% discrimination bilaterally.      I reviewed and interpreted the patient's audiogram from 8/4/2021, which shows asymmetric right greater than left hearing loss. On the right, there is a mild sloping to severe mixed hearing loss with a 20 dB air-bone gap in the lower frequencies, 84% speech discrimination on the right. On the left there is a normal quickly sloping to profound sensorineural hearing loss in the higher frequencies, 100% speech discrimination on the left. Type C tympanogram on the left, type B tympanogram on the right.       Imaging:  None     Procedure:  None    Assessment/Plan    1. Bilateral chronic serous otitis media    2. Dysfunction of both eustachian tubes    3. Chronic atticoantral suppurative otitis media of both ears    4. Sensorineural hearing loss (SNHL) of both ears    5. Tinnitus of both ears    6. Benign paroxysmal positional vertigo of right ear    7. Impacted cerumen of left ear            In summary, Demar Pittman \"Bud\" is a 64 y.o. male wwith bilateral Eustachian tube dysfunction and history of chronic serous otitis media which was " controlled with conservative management and use of nasal sprays in 2021. His hearing had returned to baseline, and he had high frequency sensorineural hearing loss.     He is now in an immunocompromised state and is being treated for CLL with IVIG. He currently has a bilateral mucoid effusion after a URI. His ears have not cleared despite medical treatment. For that reason, we placed bilateral PE tubes in 04/2025 .     After the tubes he persisted with ear drainage. He also had breakdown of the skin in the opening of the EACs.  For that reason, he completed a 2 week course of Bactrim, Tobradex drops for 3 weeks, and Bactroban ointment BID. Since then the infection resolvved and his hearing has improved.    He also had right BPPV and Epley maneuver were performed with resolution of the positional dizziness.    F/U in 6 months with Audio.       ____________________________________________________  Henri Robin MD  Professor and Chief   Otology/Neurotology/Lateral Skull-Base Surgery   [facility]      Scribe Attestation  By signing my name below, I, Kelly Borrego , Scribe attest that this documentation has been prepared under the direction and in the presence of Henri Mccormick MD.

## 2025-07-28 ENCOUNTER — APPOINTMENT (OUTPATIENT)
Dept: AUDIOLOGY | Facility: CLINIC | Age: 65
End: 2025-07-28
Payer: MEDICARE

## 2025-07-28 ENCOUNTER — APPOINTMENT (OUTPATIENT)
Dept: OTOLARYNGOLOGY | Facility: CLINIC | Age: 65
End: 2025-07-28
Payer: MEDICARE

## 2025-08-04 ENCOUNTER — APPOINTMENT (OUTPATIENT)
Dept: AUDIOLOGY | Facility: CLINIC | Age: 65
End: 2025-08-04
Payer: MEDICARE

## 2025-08-04 ENCOUNTER — APPOINTMENT (OUTPATIENT)
Dept: OTOLARYNGOLOGY | Facility: CLINIC | Age: 65
End: 2025-08-04
Payer: MEDICARE

## 2025-08-04 DIAGNOSIS — C91.10 CLL (CHRONIC LYMPHOCYTIC LEUKEMIA) (MULTI): ICD-10-CM

## 2025-08-04 DIAGNOSIS — H66.23 CHRONIC ATTICOANTRAL SUPPURATIVE OTITIS MEDIA OF BOTH EARS: ICD-10-CM

## 2025-08-04 RX ORDER — ONDANSETRON 8 MG/1
8 TABLET, FILM COATED ORAL EVERY 8 HOURS PRN
Qty: 30 TABLET | Refills: 5 | Status: SHIPPED | OUTPATIENT
Start: 2025-08-04

## 2025-08-04 RX ORDER — TRIAMCINOLONE ACETONIDE 0.25 MG/G
1 CREAM TOPICAL DAILY
Qty: 15 G | Refills: 2 | Status: SHIPPED | OUTPATIENT
Start: 2025-08-04 | End: 2025-09-03

## 2025-08-05 ENCOUNTER — LAB (OUTPATIENT)
Dept: LAB | Facility: HOSPITAL | Age: 65
End: 2025-08-05
Payer: MEDICARE

## 2025-08-05 DIAGNOSIS — C91.10 CHRONIC LYMPHOCYTIC LEUKEMIA OF B-CELL TYPE NOT HAVING ACHIEVED REMISSION (MULTI): Primary | ICD-10-CM

## 2025-08-05 LAB
ALBUMIN SERPL BCP-MCNC: 4.4 G/DL (ref 3.4–5)
ALP SERPL-CCNC: 136 U/L (ref 33–136)
ALT SERPL W P-5'-P-CCNC: 42 U/L (ref 10–52)
ANION GAP SERPL CALC-SCNC: 12 MMOL/L (ref 10–20)
AST SERPL W P-5'-P-CCNC: 26 U/L (ref 9–39)
BASOPHILS # BLD AUTO: 0.01 X10*3/UL (ref 0–0.1)
BASOPHILS NFR BLD AUTO: 0.3 %
BILIRUB SERPL-MCNC: 0.8 MG/DL (ref 0–1.2)
BUN SERPL-MCNC: 10 MG/DL (ref 6–23)
CALCIUM SERPL-MCNC: 9.2 MG/DL (ref 8.6–10.3)
CHLORIDE SERPL-SCNC: 102 MMOL/L (ref 98–107)
CO2 SERPL-SCNC: 29 MMOL/L (ref 21–32)
CREAT SERPL-MCNC: 0.79 MG/DL (ref 0.5–1.3)
EGFRCR SERPLBLD CKD-EPI 2021: >90 ML/MIN/1.73M*2
EOSINOPHIL # BLD AUTO: 0.01 X10*3/UL (ref 0–0.7)
EOSINOPHIL NFR BLD AUTO: 0.3 %
ERYTHROCYTE [DISTWIDTH] IN BLOOD BY AUTOMATED COUNT: 14.1 % (ref 11.5–14.5)
GLUCOSE SERPL-MCNC: 163 MG/DL (ref 74–99)
HCT VFR BLD AUTO: 43.8 % (ref 41–52)
HGB BLD-MCNC: 14.5 G/DL (ref 13.5–17.5)
IGA SERPL-MCNC: 81 MG/DL (ref 70–400)
IGG SERPL-MCNC: 546 MG/DL (ref 700–1600)
IGM SERPL-MCNC: 5 MG/DL (ref 40–230)
IMM GRANULOCYTES # BLD AUTO: 0.02 X10*3/UL (ref 0–0.7)
IMM GRANULOCYTES NFR BLD AUTO: 0.6 % (ref 0–0.9)
LDH SERPL L TO P-CCNC: 150 U/L (ref 84–246)
LYMPHOCYTES # BLD AUTO: 0.43 X10*3/UL (ref 1.2–4.8)
LYMPHOCYTES NFR BLD AUTO: 13.3 %
MCH RBC QN AUTO: 31.1 PG (ref 26–34)
MCHC RBC AUTO-ENTMCNC: 33.1 G/DL (ref 32–36)
MCV RBC AUTO: 94 FL (ref 80–100)
MONOCYTES # BLD AUTO: 0.64 X10*3/UL (ref 0.1–1)
MONOCYTES NFR BLD AUTO: 19.8 %
NEUTROPHILS # BLD AUTO: 2.12 X10*3/UL (ref 1.2–7.7)
NEUTROPHILS NFR BLD AUTO: 65.7 %
NRBC BLD-RTO: 0 /100 WBCS (ref 0–0)
PLATELET # BLD AUTO: 109 X10*3/UL (ref 150–450)
POTASSIUM SERPL-SCNC: 3.7 MMOL/L (ref 3.5–5.3)
PROT SERPL-MCNC: 6.2 G/DL (ref 6.4–8.2)
RBC # BLD AUTO: 4.66 X10*6/UL (ref 4.5–5.9)
SODIUM SERPL-SCNC: 139 MMOL/L (ref 136–145)
WBC # BLD AUTO: 3.2 X10*3/UL (ref 4.4–11.3)

## 2025-08-05 PROCEDURE — 83615 LACTATE (LD) (LDH) ENZYME: CPT

## 2025-08-05 PROCEDURE — 80053 COMPREHEN METABOLIC PANEL: CPT

## 2025-08-05 PROCEDURE — 85025 COMPLETE CBC W/AUTO DIFF WBC: CPT

## 2025-08-05 PROCEDURE — 82784 ASSAY IGA/IGD/IGG/IGM EACH: CPT

## 2025-08-05 PROCEDURE — 36415 COLL VENOUS BLD VENIPUNCTURE: CPT

## 2025-08-10 ASSESSMENT — ENCOUNTER SYMPTOMS
TROUBLE SWALLOWING: 0
DIARRHEA: 0
DIAPHORESIS: 1
CARDIOVASCULAR NEGATIVE: 1
BRUISES/BLEEDS EASILY: 0
LIGHT-HEADEDNESS: 0
BACK PAIN: 1
RESPIRATORY NEGATIVE: 1
FATIGUE: 1
BLOOD IN STOOL: 0
APPETITE CHANGE: 0
VOMITING: 0
DIZZINESS: 0
VOICE CHANGE: 0
CHILLS: 0
NUMBNESS: 0
CONSTIPATION: 0
SORE THROAT: 0
UNEXPECTED WEIGHT CHANGE: 0
NAUSEA: 1
FEVER: 0
ADENOPATHY: 1

## 2025-08-11 ENCOUNTER — INFUSION (OUTPATIENT)
Dept: HEMATOLOGY/ONCOLOGY | Facility: HOSPITAL | Age: 65
End: 2025-08-11
Payer: MEDICARE

## 2025-08-11 ENCOUNTER — OFFICE VISIT (OUTPATIENT)
Dept: HEMATOLOGY/ONCOLOGY | Facility: HOSPITAL | Age: 65
End: 2025-08-11
Payer: MEDICARE

## 2025-08-11 VITALS
OXYGEN SATURATION: 100 % | HEART RATE: 60 BPM | TEMPERATURE: 97.5 F | DIASTOLIC BLOOD PRESSURE: 64 MMHG | SYSTOLIC BLOOD PRESSURE: 134 MMHG | RESPIRATION RATE: 16 BRPM

## 2025-08-11 VITALS
WEIGHT: 196.1 LBS | OXYGEN SATURATION: 99 % | HEART RATE: 75 BPM | DIASTOLIC BLOOD PRESSURE: 82 MMHG | TEMPERATURE: 97.2 F | BODY MASS INDEX: 30.71 KG/M2 | SYSTOLIC BLOOD PRESSURE: 130 MMHG | RESPIRATION RATE: 15 BRPM

## 2025-08-11 DIAGNOSIS — H90.3 SENSORINEURAL HEARING LOSS (SNHL) OF BOTH EARS: ICD-10-CM

## 2025-08-11 DIAGNOSIS — D80.1 HYPOGAMMAGLOBULINEMIA (MULTI): ICD-10-CM

## 2025-08-11 DIAGNOSIS — R91.1 LUNG NODULE: ICD-10-CM

## 2025-08-11 DIAGNOSIS — D81.9 COMBINED IMMUNODEFICIENCY, UNSPECIFIED: ICD-10-CM

## 2025-08-11 DIAGNOSIS — H93.13 TINNITUS OF BOTH EARS: ICD-10-CM

## 2025-08-11 DIAGNOSIS — E87.6 HYPOKALEMIA: ICD-10-CM

## 2025-08-11 DIAGNOSIS — C91.10 CLL (CHRONIC LYMPHOCYTIC LEUKEMIA) (MULTI): Primary | ICD-10-CM

## 2025-08-11 PROCEDURE — 96365 THER/PROPH/DIAG IV INF INIT: CPT | Mod: INF

## 2025-08-11 PROCEDURE — 1126F AMNT PAIN NOTED NONE PRSNT: CPT

## 2025-08-11 PROCEDURE — 99214 OFFICE O/P EST MOD 30 MIN: CPT

## 2025-08-11 PROCEDURE — 3079F DIAST BP 80-89 MM HG: CPT

## 2025-08-11 PROCEDURE — 1160F RVW MEDS BY RX/DR IN RCRD: CPT

## 2025-08-11 PROCEDURE — 2500000001 HC RX 250 WO HCPCS SELF ADMINISTERED DRUGS (ALT 637 FOR MEDICARE OP)

## 2025-08-11 PROCEDURE — 3075F SYST BP GE 130 - 139MM HG: CPT

## 2025-08-11 PROCEDURE — 96366 THER/PROPH/DIAG IV INF ADDON: CPT | Mod: INF

## 2025-08-11 PROCEDURE — 99214 OFFICE O/P EST MOD 30 MIN: CPT | Mod: 25

## 2025-08-11 PROCEDURE — 1159F MED LIST DOCD IN RCRD: CPT

## 2025-08-11 PROCEDURE — 2500000004 HC RX 250 GENERAL PHARMACY W/ HCPCS (ALT 636 FOR OP/ED): Mod: JZ,TB

## 2025-08-11 RX ORDER — ACETAMINOPHEN 325 MG/1
650 TABLET ORAL ONCE
OUTPATIENT
Start: 2025-08-22

## 2025-08-11 RX ORDER — DIPHENHYDRAMINE HCL 25 MG
25 CAPSULE ORAL ONCE
OUTPATIENT
Start: 2025-08-22

## 2025-08-11 RX ORDER — FAMOTIDINE 10 MG/ML
20 INJECTION, SOLUTION INTRAVENOUS ONCE AS NEEDED
OUTPATIENT
Start: 2025-08-22

## 2025-08-11 RX ORDER — DIPHENHYDRAMINE HCL 25 MG
25 CAPSULE ORAL ONCE
Status: COMPLETED | OUTPATIENT
Start: 2025-08-11 | End: 2025-08-11

## 2025-08-11 RX ORDER — ACYCLOVIR 400 MG/1
400 TABLET ORAL 2 TIMES DAILY
Qty: 180 TABLET | Refills: 3 | Status: SHIPPED | OUTPATIENT
Start: 2025-08-11 | End: 2026-08-06

## 2025-08-11 RX ORDER — EPINEPHRINE 0.3 MG/.3ML
0.3 INJECTION SUBCUTANEOUS EVERY 5 MIN PRN
OUTPATIENT
Start: 2025-08-22

## 2025-08-11 RX ORDER — ALBUTEROL SULFATE 0.83 MG/ML
3 SOLUTION RESPIRATORY (INHALATION) AS NEEDED
OUTPATIENT
Start: 2025-08-22

## 2025-08-11 RX ORDER — DIPHENHYDRAMINE HYDROCHLORIDE 50 MG/ML
50 INJECTION, SOLUTION INTRAMUSCULAR; INTRAVENOUS AS NEEDED
OUTPATIENT
Start: 2025-08-22

## 2025-08-11 RX ORDER — ACETAMINOPHEN 325 MG/1
650 TABLET ORAL ONCE
Status: COMPLETED | OUTPATIENT
Start: 2025-08-11 | End: 2025-08-11

## 2025-08-11 RX ADMIN — DIPHENHYDRAMINE HYDROCHLORIDE 25 MG: 25 CAPSULE ORAL at 12:03

## 2025-08-11 RX ADMIN — IMMUNE GLOBULIN INFUSION (HUMAN) 25 G: 100 INJECTION, SOLUTION INTRAVENOUS; SUBCUTANEOUS at 12:36

## 2025-08-11 RX ADMIN — ACETAMINOPHEN 650 MG: 325 TABLET ORAL at 12:03

## 2025-08-11 ASSESSMENT — PAIN SCALES - GENERAL: PAINLEVEL_OUTOF10: 0-NO PAIN

## 2025-08-15 PROCEDURE — RXMED WILLOW AMBULATORY MEDICATION CHARGE

## 2025-08-16 ENCOUNTER — SPECIALTY PHARMACY (OUTPATIENT)
Dept: PHARMACY | Facility: CLINIC | Age: 65
End: 2025-08-16

## 2025-08-18 ENCOUNTER — PHARMACY VISIT (OUTPATIENT)
Dept: PHARMACY | Facility: CLINIC | Age: 65
End: 2025-08-18
Payer: COMMERCIAL

## 2025-09-02 ENCOUNTER — APPOINTMENT (OUTPATIENT)
Dept: HEMATOLOGY/ONCOLOGY | Facility: HOSPITAL | Age: 65
End: 2025-09-02
Payer: MEDICARE

## 2025-10-24 ENCOUNTER — APPOINTMENT (OUTPATIENT)
Dept: PRIMARY CARE | Facility: CLINIC | Age: 65
End: 2025-10-24
Payer: MEDICARE

## 2026-01-09 ENCOUNTER — APPOINTMENT (OUTPATIENT)
Dept: OTOLARYNGOLOGY | Facility: CLINIC | Age: 66
End: 2026-01-09
Payer: MEDICARE

## 2026-01-09 ENCOUNTER — APPOINTMENT (OUTPATIENT)
Dept: AUDIOLOGY | Facility: CLINIC | Age: 66
End: 2026-01-09
Payer: MEDICARE

## (undated) DEVICE — VALVE, DEFENDO, 5 PCS BUTTON SET, SINGLE USE

## (undated) DEVICE — REST, HEAD, BAGEL, 9 IN

## (undated) DEVICE — ADAPTER, WATER BOTTLE, SMART CAP, 140/160/180 SERIES

## (undated) DEVICE — VALVE, BIOPSY, BRONCHOSCOPE, DISPOSABLE, STERILE

## (undated) DEVICE — BOWL, BASIN, 32 OZ, STERILE

## (undated) DEVICE — TUBING, SUCTION, CONNECTING, STERILE 0.25 X 120 IN., LF

## (undated) DEVICE — VALVE, SUCTION

## (undated) DEVICE — Device

## (undated) DEVICE — COVER, CART, 45 X 27 X 48 IN, CLEAR

## (undated) DEVICE — DRESSING, NON-ADHERENT, TELFA, OUCHLESS, 3 X 8 IN, STERILE

## (undated) DEVICE — DRESSING, GAUZE, 16 PLY, 4 X 4 IN, STERILE

## (undated) DEVICE — MANIFOLD, 4 PORT NEPTUNE STANDARD